# Patient Record
Sex: FEMALE | Race: WHITE | Employment: OTHER | URBAN - NONMETROPOLITAN AREA
[De-identification: names, ages, dates, MRNs, and addresses within clinical notes are randomized per-mention and may not be internally consistent; named-entity substitution may affect disease eponyms.]

---

## 2017-03-01 ENCOUNTER — INITIAL CONSULT (OUTPATIENT)
Dept: PHYSICAL MEDICINE AND REHAB | Age: 47
End: 2017-03-01

## 2017-03-01 VITALS
DIASTOLIC BLOOD PRESSURE: 90 MMHG | SYSTOLIC BLOOD PRESSURE: 136 MMHG | HEART RATE: 90 BPM | WEIGHT: 222.6 LBS | HEIGHT: 65 IN | BODY MASS INDEX: 37.09 KG/M2

## 2017-03-01 DIAGNOSIS — R42 DIZZINESS: ICD-10-CM

## 2017-03-01 DIAGNOSIS — R55 NEAR SYNCOPE: Primary | ICD-10-CM

## 2017-03-01 PROCEDURE — G8419 CALC BMI OUT NRM PARAM NOF/U: HCPCS | Performed by: PSYCHIATRY & NEUROLOGY

## 2017-03-01 PROCEDURE — 99204 OFFICE O/P NEW MOD 45 MIN: CPT | Performed by: PSYCHIATRY & NEUROLOGY

## 2017-03-01 PROCEDURE — 4004F PT TOBACCO SCREEN RCVD TLK: CPT | Performed by: PSYCHIATRY & NEUROLOGY

## 2017-03-01 PROCEDURE — G8484 FLU IMMUNIZE NO ADMIN: HCPCS | Performed by: PSYCHIATRY & NEUROLOGY

## 2017-03-01 PROCEDURE — G8427 DOCREV CUR MEDS BY ELIG CLIN: HCPCS | Performed by: PSYCHIATRY & NEUROLOGY

## 2017-03-07 ENCOUNTER — OFFICE VISIT (OUTPATIENT)
Dept: FAMILY MEDICINE CLINIC | Age: 47
End: 2017-03-07

## 2017-03-07 VITALS
HEIGHT: 65 IN | SYSTOLIC BLOOD PRESSURE: 122 MMHG | DIASTOLIC BLOOD PRESSURE: 70 MMHG | BODY MASS INDEX: 36.99 KG/M2 | RESPIRATION RATE: 16 BRPM | HEART RATE: 64 BPM | WEIGHT: 222 LBS

## 2017-03-07 DIAGNOSIS — F17.200 TOBACCO DEPENDENCE: Primary | ICD-10-CM

## 2017-03-07 PROCEDURE — 99213 OFFICE O/P EST LOW 20 MIN: CPT | Performed by: FAMILY MEDICINE

## 2017-03-07 RX ORDER — BUPROPION HYDROCHLORIDE 300 MG/1
300 TABLET ORAL EVERY MORNING
COMMUNITY
Start: 2017-02-24 | End: 2018-01-16 | Stop reason: SDUPTHER

## 2017-03-07 RX ORDER — VARENICLINE TARTRATE 25 MG
0.5 KIT ORAL SEE ADMIN INSTRUCTIONS
Qty: 1 EACH | Refills: 0 | Status: SHIPPED | OUTPATIENT
Start: 2017-03-07 | End: 2017-04-11 | Stop reason: SDUPTHER

## 2017-03-07 RX ORDER — VARENICLINE TARTRATE 1 MG/1
TABLET, FILM COATED ORAL
Qty: 60 TABLET | Refills: 3 | Status: SHIPPED | OUTPATIENT
Start: 2017-03-07 | End: 2017-04-17 | Stop reason: SDUPTHER

## 2017-03-07 ASSESSMENT — ENCOUNTER SYMPTOMS
CONSTIPATION: 0
SHORTNESS OF BREATH: 0
SINUS PRESSURE: 0

## 2017-03-13 ENCOUNTER — TELEPHONE (OUTPATIENT)
Dept: PHYSICAL MEDICINE AND REHAB | Age: 47
End: 2017-03-13

## 2017-04-10 ENCOUNTER — OFFICE VISIT (OUTPATIENT)
Dept: PHYSICAL MEDICINE AND REHAB | Age: 47
End: 2017-04-10

## 2017-04-10 VITALS
DIASTOLIC BLOOD PRESSURE: 92 MMHG | HEIGHT: 65 IN | HEART RATE: 79 BPM | BODY MASS INDEX: 36.82 KG/M2 | SYSTOLIC BLOOD PRESSURE: 138 MMHG | WEIGHT: 221 LBS

## 2017-04-10 DIAGNOSIS — R42 DIZZINESS: ICD-10-CM

## 2017-04-10 DIAGNOSIS — R55 NEAR SYNCOPE: Primary | ICD-10-CM

## 2017-04-10 PROCEDURE — G8427 DOCREV CUR MEDS BY ELIG CLIN: HCPCS | Performed by: PSYCHIATRY & NEUROLOGY

## 2017-04-10 PROCEDURE — 99213 OFFICE O/P EST LOW 20 MIN: CPT | Performed by: PSYCHIATRY & NEUROLOGY

## 2017-04-10 PROCEDURE — G8417 CALC BMI ABV UP PARAM F/U: HCPCS | Performed by: PSYCHIATRY & NEUROLOGY

## 2017-04-10 PROCEDURE — 4004F PT TOBACCO SCREEN RCVD TLK: CPT | Performed by: PSYCHIATRY & NEUROLOGY

## 2017-04-10 RX ORDER — LAMOTRIGINE 25 MG/1
TABLET ORAL
Qty: 120 TABLET | Refills: 3 | Status: SHIPPED | OUTPATIENT
Start: 2017-04-10 | End: 2017-06-05

## 2017-04-11 DIAGNOSIS — F17.200 TOBACCO DEPENDENCE: ICD-10-CM

## 2017-04-11 RX ORDER — VARENICLINE TARTRATE
KIT
Qty: 1 EACH | Refills: 0 | Status: SHIPPED | OUTPATIENT
Start: 2017-04-11 | End: 2017-04-18 | Stop reason: ALTCHOICE

## 2017-04-17 DIAGNOSIS — F17.200 TOBACCO DEPENDENCE: ICD-10-CM

## 2017-04-17 RX ORDER — VARENICLINE TARTRATE 1 MG/1
TABLET, FILM COATED ORAL
Qty: 60 TABLET | Refills: 3 | Status: SHIPPED | OUTPATIENT
Start: 2017-04-17 | End: 2017-06-01

## 2017-04-18 ENCOUNTER — OFFICE VISIT (OUTPATIENT)
Dept: FAMILY MEDICINE CLINIC | Age: 47
End: 2017-04-18

## 2017-04-18 VITALS
SYSTOLIC BLOOD PRESSURE: 132 MMHG | HEART RATE: 84 BPM | RESPIRATION RATE: 16 BRPM | WEIGHT: 218 LBS | BODY MASS INDEX: 36.32 KG/M2 | DIASTOLIC BLOOD PRESSURE: 74 MMHG | HEIGHT: 65 IN

## 2017-04-18 DIAGNOSIS — L57.0 KERATOSIS: ICD-10-CM

## 2017-04-18 DIAGNOSIS — F17.200 TOBACCO DEPENDENCE: Primary | ICD-10-CM

## 2017-04-18 PROCEDURE — 99213 OFFICE O/P EST LOW 20 MIN: CPT | Performed by: FAMILY MEDICINE

## 2017-04-18 RX ORDER — LANOLIN ALCOHOL/MO/W.PET/CERES
1000 CREAM (GRAM) TOPICAL DAILY
COMMUNITY
End: 2019-04-02

## 2017-04-18 ASSESSMENT — ENCOUNTER SYMPTOMS
SINUS PRESSURE: 0
SHORTNESS OF BREATH: 0
CONSTIPATION: 0

## 2017-06-01 ENCOUNTER — OFFICE VISIT (OUTPATIENT)
Dept: FAMILY MEDICINE CLINIC | Age: 47
End: 2017-06-01

## 2017-06-01 VITALS
HEART RATE: 88 BPM | WEIGHT: 229.13 LBS | DIASTOLIC BLOOD PRESSURE: 84 MMHG | HEIGHT: 65 IN | SYSTOLIC BLOOD PRESSURE: 122 MMHG | RESPIRATION RATE: 16 BRPM | BODY MASS INDEX: 38.17 KG/M2

## 2017-06-01 DIAGNOSIS — J06.9 UPPER RESPIRATORY TRACT INFECTION, UNSPECIFIED TYPE: Primary | ICD-10-CM

## 2017-06-01 PROCEDURE — 99213 OFFICE O/P EST LOW 20 MIN: CPT | Performed by: FAMILY MEDICINE

## 2017-06-01 RX ORDER — ARIPIPRAZOLE 2 MG/1
1 TABLET ORAL DAILY
COMMUNITY
Start: 2017-05-22 | End: 2018-03-01

## 2017-06-01 RX ORDER — VILAZODONE HYDROCHLORIDE 40 MG/1
1 TABLET ORAL DAILY
Status: ON HOLD | COMMUNITY
Start: 2017-05-25 | End: 2017-12-20 | Stop reason: HOSPADM

## 2017-06-01 RX ORDER — SULFAMETHOXAZOLE AND TRIMETHOPRIM 800; 160 MG/1; MG/1
1 TABLET ORAL 2 TIMES DAILY
Qty: 28 TABLET | Refills: 0 | Status: SHIPPED | OUTPATIENT
Start: 2017-06-01 | End: 2017-06-15

## 2017-06-01 ASSESSMENT — ENCOUNTER SYMPTOMS
EYE DISCHARGE: 0
RHINORRHEA: 1
CONSTIPATION: 0
COUGH: 1
SORE THROAT: 0

## 2017-06-05 ENCOUNTER — OFFICE VISIT (OUTPATIENT)
Dept: PHYSICAL MEDICINE AND REHAB | Age: 47
End: 2017-06-05

## 2017-06-05 VITALS
SYSTOLIC BLOOD PRESSURE: 130 MMHG | WEIGHT: 230 LBS | BODY MASS INDEX: 38.32 KG/M2 | HEIGHT: 65 IN | HEART RATE: 92 BPM | DIASTOLIC BLOOD PRESSURE: 82 MMHG

## 2017-06-05 DIAGNOSIS — R42 DIZZINESS: Primary | ICD-10-CM

## 2017-06-05 DIAGNOSIS — R55 NEAR SYNCOPE: ICD-10-CM

## 2017-06-05 PROCEDURE — 1036F TOBACCO NON-USER: CPT | Performed by: PSYCHIATRY & NEUROLOGY

## 2017-06-05 PROCEDURE — 99213 OFFICE O/P EST LOW 20 MIN: CPT | Performed by: PSYCHIATRY & NEUROLOGY

## 2017-06-05 PROCEDURE — G8427 DOCREV CUR MEDS BY ELIG CLIN: HCPCS | Performed by: PSYCHIATRY & NEUROLOGY

## 2017-06-05 PROCEDURE — G8417 CALC BMI ABV UP PARAM F/U: HCPCS | Performed by: PSYCHIATRY & NEUROLOGY

## 2017-06-05 RX ORDER — LAMOTRIGINE 100 MG/1
100 TABLET ORAL DAILY
Qty: 30 TABLET | Refills: 3 | Status: SHIPPED | OUTPATIENT
Start: 2017-06-05 | End: 2017-09-11

## 2017-07-31 ENCOUNTER — TELEPHONE (OUTPATIENT)
Dept: FAMILY MEDICINE CLINIC | Age: 47
End: 2017-07-31

## 2017-07-31 ENCOUNTER — OFFICE VISIT (OUTPATIENT)
Dept: FAMILY MEDICINE CLINIC | Age: 47
End: 2017-07-31

## 2017-07-31 VITALS
DIASTOLIC BLOOD PRESSURE: 82 MMHG | SYSTOLIC BLOOD PRESSURE: 142 MMHG | HEIGHT: 65 IN | WEIGHT: 227.25 LBS | HEART RATE: 92 BPM | BODY MASS INDEX: 37.86 KG/M2 | RESPIRATION RATE: 16 BRPM

## 2017-07-31 DIAGNOSIS — E66.9 OBESITY (BMI 30-39.9): ICD-10-CM

## 2017-07-31 DIAGNOSIS — R04.0 RECURRENT EPISTAXIS: Primary | ICD-10-CM

## 2017-07-31 PROCEDURE — 99213 OFFICE O/P EST LOW 20 MIN: CPT | Performed by: FAMILY MEDICINE

## 2017-07-31 RX ORDER — ALPRAZOLAM 0.25 MG/1
TABLET ORAL
Refills: 1 | COMMUNITY
Start: 2017-06-16 | End: 2020-05-26

## 2017-07-31 ASSESSMENT — ENCOUNTER SYMPTOMS
EYE DISCHARGE: 0
SORE THROAT: 0
SHORTNESS OF BREATH: 0
CONSTIPATION: 0
RHINORRHEA: 1
COUGH: 0
SINUS PRESSURE: 0

## 2017-08-29 ENCOUNTER — OFFICE VISIT (OUTPATIENT)
Dept: FAMILY MEDICINE CLINIC | Age: 47
End: 2017-08-29

## 2017-08-29 VITALS
SYSTOLIC BLOOD PRESSURE: 130 MMHG | RESPIRATION RATE: 16 BRPM | BODY MASS INDEX: 36.69 KG/M2 | HEIGHT: 65 IN | HEART RATE: 80 BPM | WEIGHT: 220.25 LBS | DIASTOLIC BLOOD PRESSURE: 74 MMHG

## 2017-08-29 DIAGNOSIS — E66.9 OBESITY (BMI 30-39.9): Primary | ICD-10-CM

## 2017-08-29 PROCEDURE — 99213 OFFICE O/P EST LOW 20 MIN: CPT | Performed by: FAMILY MEDICINE

## 2017-08-29 RX ORDER — LIRAGLUTIDE 6 MG/ML
1.8 INJECTION SUBCUTANEOUS DAILY
COMMUNITY
Start: 2017-08-26 | End: 2017-08-29 | Stop reason: SDUPTHER

## 2017-08-29 ASSESSMENT — ENCOUNTER SYMPTOMS
SINUS PRESSURE: 0
CONSTIPATION: 0
SHORTNESS OF BREATH: 0

## 2017-09-11 ENCOUNTER — OFFICE VISIT (OUTPATIENT)
Dept: NEUROLOGY | Age: 47
End: 2017-09-11
Payer: MEDICARE

## 2017-09-11 VITALS
DIASTOLIC BLOOD PRESSURE: 90 MMHG | BODY MASS INDEX: 37.65 KG/M2 | HEART RATE: 80 BPM | WEIGHT: 226 LBS | SYSTOLIC BLOOD PRESSURE: 131 MMHG | HEIGHT: 65 IN

## 2017-09-11 DIAGNOSIS — R55 NEAR SYNCOPE: ICD-10-CM

## 2017-09-11 DIAGNOSIS — R42 DIZZINESS: Primary | ICD-10-CM

## 2017-09-11 PROCEDURE — 99213 OFFICE O/P EST LOW 20 MIN: CPT | Performed by: PSYCHIATRY & NEUROLOGY

## 2017-09-11 PROCEDURE — 4004F PT TOBACCO SCREEN RCVD TLK: CPT | Performed by: PSYCHIATRY & NEUROLOGY

## 2017-09-11 PROCEDURE — G8417 CALC BMI ABV UP PARAM F/U: HCPCS | Performed by: PSYCHIATRY & NEUROLOGY

## 2017-09-11 PROCEDURE — G8427 DOCREV CUR MEDS BY ELIG CLIN: HCPCS | Performed by: PSYCHIATRY & NEUROLOGY

## 2017-09-21 ENCOUNTER — OFFICE VISIT (OUTPATIENT)
Dept: ENT CLINIC | Age: 47
End: 2017-09-21
Payer: MEDICARE

## 2017-09-21 VITALS
HEIGHT: 65 IN | TEMPERATURE: 98.4 F | DIASTOLIC BLOOD PRESSURE: 86 MMHG | WEIGHT: 224.5 LBS | BODY MASS INDEX: 37.4 KG/M2 | RESPIRATION RATE: 16 BRPM | HEART RATE: 92 BPM | SYSTOLIC BLOOD PRESSURE: 128 MMHG

## 2017-09-21 DIAGNOSIS — R09.81 NASAL CONGESTION: ICD-10-CM

## 2017-09-21 DIAGNOSIS — R09.81 NASAL CONGESTION: Primary | ICD-10-CM

## 2017-09-21 DIAGNOSIS — J01.01 ACUTE RECURRENT MAXILLARY SINUSITIS: ICD-10-CM

## 2017-09-21 DIAGNOSIS — J34.89 NASAL OBSTRUCTION: ICD-10-CM

## 2017-09-21 DIAGNOSIS — R06.83 SNORES: ICD-10-CM

## 2017-09-21 DIAGNOSIS — J34.89 NASAL OBSTRUCTION: Primary | ICD-10-CM

## 2017-09-21 PROCEDURE — G8427 DOCREV CUR MEDS BY ELIG CLIN: HCPCS | Performed by: PHYSICIAN ASSISTANT

## 2017-09-21 PROCEDURE — G8417 CALC BMI ABV UP PARAM F/U: HCPCS | Performed by: PHYSICIAN ASSISTANT

## 2017-09-21 PROCEDURE — 4004F PT TOBACCO SCREEN RCVD TLK: CPT | Performed by: PHYSICIAN ASSISTANT

## 2017-09-21 PROCEDURE — 99203 OFFICE O/P NEW LOW 30 MIN: CPT | Performed by: PHYSICIAN ASSISTANT

## 2017-09-21 RX ORDER — FLUTICASONE PROPIONATE 50 MCG
1 SPRAY, SUSPENSION (ML) NASAL DAILY
Qty: 1 BOTTLE | Refills: 3 | Status: ON HOLD | OUTPATIENT
Start: 2017-09-21 | End: 2017-12-20 | Stop reason: HOSPADM

## 2017-09-21 RX ORDER — SULFAMETHOXAZOLE AND TRIMETHOPRIM 800; 160 MG/1; MG/1
1 TABLET ORAL 2 TIMES DAILY
Qty: 56 TABLET | Refills: 0 | Status: SHIPPED | OUTPATIENT
Start: 2017-09-21 | End: 2017-10-19

## 2017-09-21 ASSESSMENT — ENCOUNTER SYMPTOMS
EYE REDNESS: 0
EYE DISCHARGE: 0
COUGH: 0
BLOOD IN STOOL: 0
DIARRHEA: 0
WHEEZING: 0
ABDOMINAL DISTENTION: 0
ANAL BLEEDING: 0
SORE THROAT: 0
ABDOMINAL PAIN: 0
SINUS PRESSURE: 0
TROUBLE SWALLOWING: 0
EYE PAIN: 0
BACK PAIN: 0
CHEST TIGHTNESS: 0
CHOKING: 0
VOMITING: 0
NAUSEA: 0
COLOR CHANGE: 0
CONSTIPATION: 0
EYE ITCHING: 0
PHOTOPHOBIA: 0
SHORTNESS OF BREATH: 0
RHINORRHEA: 0
APNEA: 0
RECTAL PAIN: 0
STRIDOR: 0
VOICE CHANGE: 0
FACIAL SWELLING: 0

## 2017-09-25 DIAGNOSIS — E66.9 OBESITY (BMI 30-39.9): ICD-10-CM

## 2017-09-25 RX ORDER — LIRAGLUTIDE 6 MG/ML
INJECTION SUBCUTANEOUS
Qty: 9 PEN | Refills: 3 | Status: SHIPPED | OUTPATIENT
Start: 2017-09-25 | End: 2017-09-28

## 2017-09-28 ENCOUNTER — TELEPHONE (OUTPATIENT)
Dept: FAMILY MEDICINE CLINIC | Age: 47
End: 2017-09-28

## 2017-10-24 ENCOUNTER — HOSPITAL ENCOUNTER (OUTPATIENT)
Dept: CT IMAGING | Age: 47
Discharge: HOME | End: 2017-10-24
Payer: MEDICARE

## 2017-10-24 DIAGNOSIS — J01.01 ACUTE RECURRENT MAXILLARY SINUSITIS: ICD-10-CM

## 2017-10-24 DIAGNOSIS — J34.89 NASAL OBSTRUCTION: ICD-10-CM

## 2017-10-24 DIAGNOSIS — R09.81 NASAL CONGESTION: ICD-10-CM

## 2017-10-24 PROCEDURE — 70486 CT MAXILLOFACIAL W/O DYE: CPT

## 2017-10-26 ENCOUNTER — OFFICE VISIT (OUTPATIENT)
Dept: ENT CLINIC | Age: 47
End: 2017-10-26
Payer: MEDICARE

## 2017-10-26 VITALS
HEART RATE: 100 BPM | TEMPERATURE: 98.2 F | DIASTOLIC BLOOD PRESSURE: 96 MMHG | BODY MASS INDEX: 38.65 KG/M2 | SYSTOLIC BLOOD PRESSURE: 140 MMHG | RESPIRATION RATE: 16 BRPM | WEIGHT: 232 LBS | HEIGHT: 65 IN

## 2017-10-26 DIAGNOSIS — R63.5 WEIGHT GAIN: ICD-10-CM

## 2017-10-26 DIAGNOSIS — J34.3 HYPERTROPHY OF BOTH INFERIOR NASAL TURBINATES: ICD-10-CM

## 2017-10-26 DIAGNOSIS — F17.200 SMOKER: ICD-10-CM

## 2017-10-26 DIAGNOSIS — R06.83 SNORES: ICD-10-CM

## 2017-10-26 DIAGNOSIS — J34.89 CONCHA BULLOSA: ICD-10-CM

## 2017-10-26 DIAGNOSIS — J34.2 DEVIATED NASAL SEPTUM: Primary | ICD-10-CM

## 2017-10-26 PROCEDURE — G8417 CALC BMI ABV UP PARAM F/U: HCPCS | Performed by: OTOLARYNGOLOGY

## 2017-10-26 PROCEDURE — 4004F PT TOBACCO SCREEN RCVD TLK: CPT | Performed by: OTOLARYNGOLOGY

## 2017-10-26 PROCEDURE — 99214 OFFICE O/P EST MOD 30 MIN: CPT | Performed by: OTOLARYNGOLOGY

## 2017-10-26 PROCEDURE — G8484 FLU IMMUNIZE NO ADMIN: HCPCS | Performed by: OTOLARYNGOLOGY

## 2017-10-26 PROCEDURE — G8427 DOCREV CUR MEDS BY ELIG CLIN: HCPCS | Performed by: OTOLARYNGOLOGY

## 2017-10-26 ASSESSMENT — ENCOUNTER SYMPTOMS
CHEST TIGHTNESS: 0
VOICE CHANGE: 0
SINUS PRESSURE: 0
COUGH: 0
RHINORRHEA: 0
NAUSEA: 0
TROUBLE SWALLOWING: 0
STRIDOR: 0
SHORTNESS OF BREATH: 0
CHOKING: 0
WHEEZING: 0
SORE THROAT: 0
VOMITING: 0
ABDOMINAL PAIN: 0
FACIAL SWELLING: 0
APNEA: 0
COLOR CHANGE: 0
DIARRHEA: 0

## 2017-10-26 NOTE — LETTER
[facility]  [facility] [address]  [address]  [address]  Phone: [phone]  Fax: [phone]    Madaline Spurling, MD        November 14, 2017    Roma Todd, [address] [facility] [address]    Patient: Kali Oconnor   MR Number: [id number]   YOB: 1970   Date of Visit: 10/26/2017     Dear Roma Todd,    I recently saw your patient, Kali Oconnor, regarding Her sinus CT after maximal therapy for sinusitis. There was no sinusitis present. She did have significant nasal obstruction. Below are the relevant portions of my assessment and plan of care. Assessment & Plan   Diagnoses and all orders for this visit:    1. Deviated nasal septum  SEPTOPLASTY   2. Autumn bullosa  SD NASAL/SINUS SCOPY,RMV AUTUMN BULL   3. Smoker     4. Hypertrophy of both inferior nasal turbinates  SD EXCISION TURBINATE,SUBMUCOUS   5. Snores     6. Weight gain         The findings were explained and her questions were answered. Options were discussed including surgery. She is agreeable to surgery. Recommended surgical procedures:  Septoplasty  Partial submucous resection both inferior turbinates  Partial resection uatumn bullosa right middle turbinate    Informed consent: Septoplasty complications that may occur were discussed including postoperative bleeding (usually easy to control), infection, nasal crusting, a hole in the septum (perforation), failure to completely improve breathing, persistent septal deflection resulting in the need for revision (uncommon), and very rarely, a change in appearance. They understand that no guarantees are made regarding either outcome or potential complications. They read the patient information sheet and were given a copy to take with them. All of their questions were answered. They requested we proceed. Return for Post-Op Check. If you have questions, please do not hesitate to call me.  I look forward

## 2017-10-26 NOTE — PROGRESS NOTES
morning       gabapentin (NEURONTIN) 400 MG capsule TAKE 1 CAPSULE BY MOUTH 3 TIMES A DAY  2    busPIRone (BUSPAR) 10 MG tablet Take 10 mg by mouth daily      Aspirin-Acetaminophen-Caffeine (EXCEDRIN EXTRA STRENGTH PO) Take  by mouth as needed.  calcium carbonate (TUMS) 500 MG chewable tablet Take 2 tablets by mouth as needed for Heartburn.  ibuprofen (ADVIL;MOTRIN) 800 MG tablet Take 800 mg by mouth 3 times daily. No current facility-administered medications for this visit. Past Medical History:   Diagnosis Date    Anxiety     Chronic gastritis 1/2015    Depression     GERD (gastroesophageal reflux disease)     chronic    Hiatal hernia 2009    Ovarian cyst     Pulmonary nodule 10/1/2013    Renal stone 2009    Schwannoma of spinal cord ([facility] [address] 9/17/2013      Past Surgical History:   Procedure Laterality Date    BLADDER SUSPENSION  2006    BREAST REDUCTION SURGERY  1995    CERVICAL SPINE SURGERY  2007    [address] [place]    COLONOSCOPY  [id number]    does not know who     HYSTERECTOMY  2006    hyst only    NERVE BLOCK  4/7/14    TONSILLECTOMY  1982    UPPER GASTROINTESTINAL ENDOSCOPY  2015     Family History   Problem Relation Age of Onset    Breast Cancer Mother     High Blood Pressure Father     Diabetes Father     Stroke Father     Cancer Brother     Cancer Maternal Grandfather      Social History   Substance Use Topics    Smoking status: Current Every Day Smoker     Packs/day: 0.50     Years: 14.00     Types: Cigarettes    Smokeless tobacco: Never Used      Comment: off & on    Alcohol use Yes      Comment: rarely       Subjective:      Review of Systems   Constitutional: Negative for activity change, appetite change, chills, diaphoresis, fatigue, fever and unexpected weight change.    HENT: Negative for congestion, dental problem, ear discharge, ear pain, facial swelling, hearing loss, mouth sores, nosebleeds, postnasal drip, rhinorrhea, sinus pressure, sneezing, sore throat, tinnitus, trouble swallowing and voice change. Eyes: Negative for visual disturbance. Respiratory: Negative for apnea, cough, choking, chest tightness, shortness of breath, wheezing and stridor. Cardiovascular: Negative for chest pain, palpitations and leg swelling. Gastrointestinal: Negative for abdominal pain, diarrhea, nausea and vomiting. Endocrine: Negative for cold intolerance, heat intolerance, polydipsia and polyuria. Genitourinary: Negative for dysuria, enuresis and hematuria. Musculoskeletal: Negative for arthralgias, gait problem, neck pain and neck stiffness. Skin: Negative for color change and rash. Allergic/Immunologic: Negative for environmental allergies, food allergies and immunocompromised state. Neurological: Negative for dizziness, syncope, facial asymmetry, speech difficulty, light-headedness and headaches. Hematological: Negative for adenopathy. Does not bruise/bleed easily. Psychiatric/Behavioral: Negative for confusion and sleep disturbance. The patient is not nervous/anxious. Objective:     BP (!) 140/96 (Site: Left Arm, Position: Sitting)   Pulse 100   Temp 98.2 °F (36.8 °C) (Oral)   Resp 16   Ht 5' 4.96\" (1.65 m)   Wt 232 lb (105.2 kg)   BMI 38.65 kg/m²     Physical Exam   Constitutional: She is oriented to person, place, and time. She appears well-developed and well-nourished. She is cooperative. HENT:   Head: Normocephalic and atraumatic. Head is without laceration. Right Ear: Hearing, tympanic membrane, external ear and ear canal normal. No drainage or swelling. Tympanic membrane is not scarred, not perforated and not erythematous. Tympanic membrane mobility is normal (on pneumatic massage). No middle ear effusion. Left Ear: Hearing, tympanic membrane, external ear and ear canal normal. No drainage or swelling. Tympanic membrane is not scarred, not perforated and not erythematous.  Tympanic membrane mobility is normal (on pneumatic

## 2017-10-30 DIAGNOSIS — Z01.818 PRE-OP TESTING: Primary | ICD-10-CM

## 2017-11-06 ENCOUNTER — TELEPHONE (OUTPATIENT)
Dept: FAMILY MEDICINE CLINIC | Age: 47
End: 2017-11-06

## 2017-11-16 ENCOUNTER — HOSPITAL ENCOUNTER (OUTPATIENT)
Age: 47
Discharge: HOME | End: 2017-11-16
Payer: MEDICARE

## 2017-11-16 ENCOUNTER — HOSPITAL ENCOUNTER (OUTPATIENT)
Dept: GENERAL RADIOLOGY | Age: 47
Discharge: HOME | End: 2017-11-16
Payer: MEDICARE

## 2017-11-16 DIAGNOSIS — Z01.818 PRE-OP TESTING: ICD-10-CM

## 2017-11-16 LAB
EKG ATRIAL RATE: 82 BPM
EKG P AXIS: 69 DEGREES
EKG P-R INTERVAL: 162 MS
EKG Q-T INTERVAL: 370 MS
EKG QRS DURATION: 82 MS
EKG QTC CALCULATION (BAZETT): 432 MS
EKG R AXIS: 47 DEGREES
EKG T AXIS: 57 DEGREES
EKG VENTRICULAR RATE: 82 BPM

## 2017-11-16 PROCEDURE — 71020 XR CHEST STANDARD TWO VW: CPT

## 2017-11-16 PROCEDURE — 93005 ELECTROCARDIOGRAM TRACING: CPT

## 2017-11-29 ENCOUNTER — OFFICE VISIT (OUTPATIENT)
Dept: FAMILY MEDICINE CLINIC | Age: 47
End: 2017-11-29

## 2017-11-29 VITALS
RESPIRATION RATE: 16 BRPM | DIASTOLIC BLOOD PRESSURE: 80 MMHG | HEART RATE: 76 BPM | HEIGHT: 65 IN | SYSTOLIC BLOOD PRESSURE: 120 MMHG | BODY MASS INDEX: 39.22 KG/M2 | WEIGHT: 235.38 LBS

## 2017-11-29 DIAGNOSIS — Z01.818 PRE-OP EVALUATION: Primary | ICD-10-CM

## 2017-11-29 PROCEDURE — 99214 OFFICE O/P EST MOD 30 MIN: CPT | Performed by: FAMILY MEDICINE

## 2017-11-29 ASSESSMENT — ENCOUNTER SYMPTOMS
SORE THROAT: 0
SHORTNESS OF BREATH: 0
RHINORRHEA: 0
CONSTIPATION: 0
SINUS PRESSURE: 0

## 2017-11-29 NOTE — PROGRESS NOTES
mood and affect. Her behavior is normal.     Blood pressure 120/80, pulse 76, resp. rate 16, height 5' 5\" (1.651 m), weight 235 lb 6 oz (106.8 kg), not currently breastfeeding. Assessment:      1.  Pre-op evaluation             Plan:      She is medically cleared for surgery

## 2017-12-08 ENCOUNTER — OFFICE VISIT (OUTPATIENT)
Dept: ENT CLINIC | Age: 47
End: 2017-12-08

## 2017-12-08 VITALS
HEIGHT: 65 IN | HEART RATE: 80 BPM | RESPIRATION RATE: 14 BRPM | WEIGHT: 233 LBS | DIASTOLIC BLOOD PRESSURE: 70 MMHG | BODY MASS INDEX: 38.82 KG/M2 | TEMPERATURE: 97.9 F | SYSTOLIC BLOOD PRESSURE: 126 MMHG

## 2017-12-08 DIAGNOSIS — J34.89 CONCHA BULLOSA: ICD-10-CM

## 2017-12-08 DIAGNOSIS — J34.3 HYPERTROPHY OF BOTH INFERIOR NASAL TURBINATES: ICD-10-CM

## 2017-12-08 DIAGNOSIS — J34.2 NASAL SEPTAL DEVIATION: Primary | ICD-10-CM

## 2017-12-08 PROCEDURE — 99999 PR OFFICE/OUTPT VISIT,PROCEDURE ONLY: CPT | Performed by: NURSE PRACTITIONER

## 2017-12-08 ASSESSMENT — ENCOUNTER SYMPTOMS
EYE DISCHARGE: 0
EYE REDNESS: 0
BACK PAIN: 0
PHOTOPHOBIA: 0
SHORTNESS OF BREATH: 0
CONSTIPATION: 0
NAUSEA: 0
DIARRHEA: 0
ABDOMINAL PAIN: 0
EYE PAIN: 0
EYE ITCHING: 0
CHEST TIGHTNESS: 0
CHOKING: 0
WHEEZING: 0
BLOOD IN STOOL: 0
SORE THROAT: 0
SINUS PRESSURE: 0
RECTAL PAIN: 0
VOMITING: 0
ABDOMINAL DISTENTION: 0
COLOR CHANGE: 0
ANAL BLEEDING: 0
RHINORRHEA: 0
FACIAL SWELLING: 0
VOICE CHANGE: 0
COUGH: 0
TROUBLE SWALLOWING: 0
STRIDOR: 0
APNEA: 0

## 2017-12-08 NOTE — PROGRESS NOTES
[facility]  [facility]  [address] [facility]  [address]  Dept: [phone]  Dept Fax: [phone]  Loc: [phone]    Palmer Chiang is a 56 y.o. female who was referred by No ref. provider found for:  Chief Complaint   Patient presents with    Pre-op Exam     Patient is here pre-op septoplasy/turbinates/irene 12/20/17   . HPI:       Patient here for pre-op appointment. She is scheduled for septoplasty, partial submucosal resection of bilateral inferior turbinates, and partial resection of right middle turbinate irene bullosa on 12/20/2017 with Dr Theresa Collins. Patient reports long-standing history of nasal obstruction. She also snores and sometimes chokes, then tosses and turns at night. She does not feel well rested in the mornings. She had CT sinus done that showed normally aerated sinuses, narrowed ostiomeatal units bilaterally and left nasal septal deviation. On exam, she was found to have very large inferior turbinates and a large right middle turbinate irene bullosa. No family or personal history of bleeding disorders or issues with anesthesia. Pre-op work-up included:    CXR- no acute cardiopulmonary process    EKG- normal sinus rhythm    CBC- within normal limits    CMP- within normal limits      Subjective:        Review of Systems   Constitutional: Negative for activity change, appetite change, chills, diaphoresis, fatigue, fever and unexpected weight change. HENT: Negative for congestion, dental problem, drooling, ear discharge, ear pain, facial swelling, hearing loss, mouth sores, nosebleeds, postnasal drip, rhinorrhea, sinus pressure, sneezing, sore throat, tinnitus, trouble swallowing and voice change. Eyes: Negative for photophobia, pain, discharge, redness, itching and visual disturbance. Respiratory: Negative for apnea, cough, choking, chest tightness, shortness of breath, wheezing and stridor.     Cardiovascular: Negative for chest

## 2017-12-11 ENCOUNTER — OFFICE VISIT (OUTPATIENT)
Dept: NEUROLOGY | Age: 47
End: 2017-12-11
Payer: MEDICARE

## 2017-12-11 VITALS
WEIGHT: 241 LBS | HEIGHT: 65 IN | SYSTOLIC BLOOD PRESSURE: 124 MMHG | BODY MASS INDEX: 40.15 KG/M2 | DIASTOLIC BLOOD PRESSURE: 84 MMHG | HEART RATE: 90 BPM

## 2017-12-11 DIAGNOSIS — R55 NEAR SYNCOPE: ICD-10-CM

## 2017-12-11 DIAGNOSIS — R42 DIZZINESS: Primary | ICD-10-CM

## 2017-12-11 PROCEDURE — G8427 DOCREV CUR MEDS BY ELIG CLIN: HCPCS | Performed by: PSYCHIATRY & NEUROLOGY

## 2017-12-11 PROCEDURE — 99213 OFFICE O/P EST LOW 20 MIN: CPT | Performed by: PSYCHIATRY & NEUROLOGY

## 2017-12-11 PROCEDURE — G8484 FLU IMMUNIZE NO ADMIN: HCPCS | Performed by: PSYCHIATRY & NEUROLOGY

## 2017-12-11 PROCEDURE — 4004F PT TOBACCO SCREEN RCVD TLK: CPT | Performed by: PSYCHIATRY & NEUROLOGY

## 2017-12-11 PROCEDURE — G8417 CALC BMI ABV UP PARAM F/U: HCPCS | Performed by: PSYCHIATRY & NEUROLOGY

## 2017-12-11 NOTE — PROGRESS NOTES
2-12 grossly intact with no focal deficits. Power 5/5 Throughout symmetric, Reflexes are +2 symmetric. Long tracts are intact. Cerebellar exam is Intact. Sensory exam is intact to light touch. Gait is intact. Musculoskeletal:  Has no hand arthritis, no limitation of ROM in any of the four extremities. Lower extremities no edema        DATA:  Results for orders placed or performed during the hospital encounter of 11/16/17   EKG 12 Lead   Result Value Ref Range    Ventricular Rate 82 BPM    Atrial Rate 82 BPM    P-R Interval 162 ms    QRS Duration 82 ms    Q-T Interval 370 ms    QTc Calculation (Bazett) 432 ms    P Axis 69 degrees    R Axis 47 degrees    T Axis 57 degrees        EEG 3/27/2017:  IMPRESSION:  This is an abnormal EEG due to presence of bifrontal sharp waves,  following hyperventilation, that are of questionable significance, though they  may be epileptogenic in nature or indicate seizure tendency in the proper  clinical context. No clinical seizures were observed. Clinical correlation  is recommended. MRI brain 4/14/2017:   I reviewed the MRI brain and agree with interpretation. COMPARISON: MRI of the brain dated March 10, 2017       TECHNIQUE: Multiplanar and multiple spin echo MRI images were obtained of the brain without contrast.           FINDINGS:       The brain parenchymal volume and gray-white interface is preserved. No abnormal signal alteration is identified. No impeded diffusion or abnormal susceptibility is seen. No mass, mass effect or extra-axial fluid collection is present. The ventricles are    stable in size and morphology without evidence of hydrocephalus. The basal cisterns and visualized vascular flow voids are patent.  The pituitary, brainstem and cervical medullary junction are within normal limits.       The visualized orbits, temporal bone structures and paranasal sinuses are unremarkable.              Impression       Unremarkable MRI of the exam without acute intracranial abnormality identified. No significant interval change from the prior exam.         Assessment:    1. Dizziness     2. Near syncope        She reports doing the same with her dizziness. No near passing out. She is off the Lamictal. Her exam is non focal.  No side effects reported to medication. She is back to smoking. After detailed discussion with patient we agreed on the following plan. Plan:  1. Referral to higher level of care re dizziness. 2. Continue with B12 supplements sublingual over the counter. 3. Need to quit smoking. 4. Report any new symptoms or concerns. 5. Follow up after evaluation at higher level of care. Please call if any questions.      Konrad Fajardo MD

## 2017-12-19 PROBLEM — J34.89 CONCHA BULLOSA: Status: ACTIVE | Noted: 2017-12-19

## 2017-12-19 PROBLEM — J34.3 HYPERTROPHY OF BOTH INFERIOR NASAL TURBINATES: Status: ACTIVE | Noted: 2017-12-19

## 2017-12-19 PROBLEM — J34.2 DEVIATED NASAL SEPTUM: Status: ACTIVE | Noted: 2017-12-19

## 2017-12-19 NOTE — H&P
Chief Complaint   Patient presents with    Pre-op Exam       Patient is here pre-op septoplasy/turbinates/irene 12/20/17   .     HPI:         Patient here for pre-op appointment. She is scheduled for septoplasty, partial submucosal resection of bilateral inferior turbinates, and partial resection of right middle turbinate irene bullosa on 12/20/2017 with Dr Iveth Bauer. Patient reports long-standing history of nasal obstruction. She also snores and sometimes chokes, then tosses and turns at night. She does not feel well rested in the mornings. She had CT sinus done that showed normally aerated sinuses, narrowed ostiomeatal units bilaterally and left nasal septal deviation. On exam, she was found to have very large inferior turbinates and a large right middle turbinate irene bullosa.      No family or personal history of bleeding disorders or issues with anesthesia.      Pre-op work-up included:    CXR- no acute cardiopulmonary process    EKG- normal sinus rhythm    CBC- within normal limits    CMP- within normal limits                            Subjective:         Review of Systems   Constitutional: Negative for activity change, appetite change, chills, diaphoresis, fatigue, fever and unexpected weight change. HENT: Negative for congestion, dental problem, drooling, ear discharge, ear pain, facial swelling, hearing loss, mouth sores, nosebleeds, postnasal drip, rhinorrhea, sinus pressure, sneezing, sore throat, tinnitus, trouble swallowing and voice change. Eyes: Negative for photophobia, pain, discharge, redness, itching and visual disturbance. Respiratory: Negative for apnea, cough, choking, chest tightness, shortness of breath, wheezing and stridor. Cardiovascular: Negative for chest pain, palpitations and leg swelling. Gastrointestinal: Negative for abdominal distention, abdominal pain, anal bleeding, blood in stool, constipation, diarrhea, nausea, rectal pain and vomiting.    Endocrine: bilaterally, no erythema  Turbinates: hypertrophic inferior turbinates (right>left)          Discharge:  none     Lips, tongue and oral cavity show tongue is midline, mobile, no lesions. Dentition: good, no malocclusion  Oral mucosa: moist  Tonsils: unremarkable  Oropharynx: normal-appearing mucosa and no pharyngitis, no exudate  Hard and soft palates symmetrical and intact. Uvula midline. Gag reflex present  Nasopharynx: not seen     No facial redness, swelling or tenderness.     Salivary glands not enlarged.     Neck symmetrical, supple  Cervical adenopathy: no palpable lymphadenopathy  Trachea midline     Chest equal and symmetrical expansion, no retractions     Extremities: no clubbing, cyanosis or edema  Gait steady  Skin: normal exposed surfaces  Cranial nerves grossly intact     Data:  All of the past medical history, past surgical history, family history, social history, allergies and current medications were reviewed.                CXR 11/16/2017  mpression   No acute findings. No active disease of the chest.      EKG 11/16/2017  Normal sinus rhythm  Normal ECG  When compared with ECG of 09-FEB-2013 11:43,  No significant change was found  Confirmed by PJ SHELTON ([id number]) on 11/16/2017 8:06:50 PM     Assessment:   Assessment   1. Nasal septal deviation      2. Irene bullosa      3. Hypertrophy of both inferior nasal turbinates            Plan:         1. Nasal septal deviation     2. Irene bullosa     3. Hypertrophy of both inferior nasal turbinates       Recommended surgical procedures:  Septoplasty  Partial submucous resection both inferior turbinates  Partial resection irene bullosa right middle turbinate     Okay to proceed with planned procedure. Reviewed planned procedure of septoplasty, partial submucosal resection of bilateral inferior turbinates, and partial resection of right middle turbinate irene bullosa. Discussed post-operative care.  Patient not to take any NSAIDs (including

## 2017-12-20 ENCOUNTER — ANESTHESIA (OUTPATIENT)
Dept: OPERATING ROOM | Age: 47
End: 2017-12-20
Payer: MEDICARE

## 2017-12-20 ENCOUNTER — ANESTHESIA EVENT (OUTPATIENT)
Dept: OPERATING ROOM | Age: 47
End: 2017-12-20
Payer: MEDICARE

## 2017-12-20 ENCOUNTER — HOSPITAL ENCOUNTER (OUTPATIENT)
Age: 47
Setting detail: OUTPATIENT SURGERY
Discharge: HOME | End: 2017-12-20
Attending: OTOLARYNGOLOGY | Admitting: OTOLARYNGOLOGY
Payer: MEDICARE

## 2017-12-20 VITALS
HEIGHT: 65 IN | SYSTOLIC BLOOD PRESSURE: 148 MMHG | HEART RATE: 93 BPM | TEMPERATURE: 98 F | OXYGEN SATURATION: 92 % | WEIGHT: 228.8 LBS | DIASTOLIC BLOOD PRESSURE: 93 MMHG | BODY MASS INDEX: 38.12 KG/M2 | RESPIRATION RATE: 18 BRPM

## 2017-12-20 VITALS
SYSTOLIC BLOOD PRESSURE: 106 MMHG | RESPIRATION RATE: 3 BRPM | OXYGEN SATURATION: 99 % | DIASTOLIC BLOOD PRESSURE: 60 MMHG

## 2017-12-20 PROCEDURE — 6360000002 HC RX W HCPCS: Performed by: NURSE ANESTHETIST, CERTIFIED REGISTERED

## 2017-12-20 PROCEDURE — 7100000011 HC PHASE II RECOVERY - ADDTL 15 MIN: Performed by: OTOLARYNGOLOGY

## 2017-12-20 PROCEDURE — 6360000002 HC RX W HCPCS: Performed by: ANESTHESIOLOGY

## 2017-12-20 PROCEDURE — 6360000002 HC RX W HCPCS: Performed by: OTOLARYNGOLOGY

## 2017-12-20 PROCEDURE — 3600000004 HC SURGERY LEVEL 4 BASE: Performed by: OTOLARYNGOLOGY

## 2017-12-20 PROCEDURE — 2500000003 HC RX 250 WO HCPCS: Performed by: NURSE ANESTHETIST, CERTIFIED REGISTERED

## 2017-12-20 PROCEDURE — 2580000003 HC RX 258: Performed by: OTOLARYNGOLOGY

## 2017-12-20 PROCEDURE — 3700000001 HC ADD 15 MINUTES (ANESTHESIA): Performed by: OTOLARYNGOLOGY

## 2017-12-20 PROCEDURE — 7100000010 HC PHASE II RECOVERY - FIRST 15 MIN: Performed by: OTOLARYNGOLOGY

## 2017-12-20 PROCEDURE — 7100000001 HC PACU RECOVERY - ADDTL 15 MIN: Performed by: OTOLARYNGOLOGY

## 2017-12-20 PROCEDURE — 3600000014 HC SURGERY LEVEL 4 ADDTL 15MIN: Performed by: OTOLARYNGOLOGY

## 2017-12-20 PROCEDURE — 7100000000 HC PACU RECOVERY - FIRST 15 MIN: Performed by: OTOLARYNGOLOGY

## 2017-12-20 PROCEDURE — 2500000003 HC RX 250 WO HCPCS: Performed by: OTOLARYNGOLOGY

## 2017-12-20 PROCEDURE — 6370000000 HC RX 637 (ALT 250 FOR IP): Performed by: OTOLARYNGOLOGY

## 2017-12-20 PROCEDURE — 3700000000 HC ANESTHESIA ATTENDED CARE: Performed by: OTOLARYNGOLOGY

## 2017-12-20 PROCEDURE — 2720000010 HC SURG SUPPLY STERILE: Performed by: OTOLARYNGOLOGY

## 2017-12-20 PROCEDURE — A6413 ADHESIVE BANDAGE, FIRST-AID: HCPCS | Performed by: OTOLARYNGOLOGY

## 2017-12-20 PROCEDURE — A6252 ABSORPT DRG >16 <=48 W/O BDR: HCPCS | Performed by: OTOLARYNGOLOGY

## 2017-12-20 RX ORDER — ROCURONIUM BROMIDE 10 MG/ML
INJECTION, SOLUTION INTRAVENOUS PRN
Status: DISCONTINUED | OUTPATIENT
Start: 2017-12-20 | End: 2017-12-20 | Stop reason: SDUPTHER

## 2017-12-20 RX ORDER — LIDOCAINE HYDROCHLORIDE 20 MG/ML
INJECTION, SOLUTION INFILTRATION; PERINEURAL PRN
Status: DISCONTINUED | OUTPATIENT
Start: 2017-12-20 | End: 2017-12-20 | Stop reason: SDUPTHER

## 2017-12-20 RX ORDER — GLYCOPYRROLATE 0.2 MG/ML
INJECTION INTRAMUSCULAR; INTRAVENOUS PRN
Status: DISCONTINUED | OUTPATIENT
Start: 2017-12-20 | End: 2017-12-20 | Stop reason: SDUPTHER

## 2017-12-20 RX ORDER — PREDNISONE 20 MG/1
20 TABLET ORAL DAILY
Qty: 4 TABLET | Refills: 0 | Status: SHIPPED | OUTPATIENT
Start: 2017-12-20 | End: 2017-12-23

## 2017-12-20 RX ORDER — SODIUM CHLORIDE 9 MG/ML
INJECTION, SOLUTION INTRAVENOUS CONTINUOUS
Status: DISCONTINUED | OUTPATIENT
Start: 2017-12-20 | End: 2017-12-20 | Stop reason: HOSPADM

## 2017-12-20 RX ORDER — DEXAMETHASONE SODIUM PHOSPHATE 4 MG/ML
12 INJECTION, SOLUTION INTRA-ARTICULAR; INTRALESIONAL; INTRAMUSCULAR; INTRAVENOUS; SOFT TISSUE
Status: DISCONTINUED | OUTPATIENT
Start: 2017-12-20 | End: 2017-12-20 | Stop reason: HOSPADM

## 2017-12-20 RX ORDER — FENTANYL CITRATE 50 UG/ML
50 INJECTION, SOLUTION INTRAMUSCULAR; INTRAVENOUS EVERY 5 MIN PRN
Status: DISCONTINUED | OUTPATIENT
Start: 2017-12-20 | End: 2017-12-20 | Stop reason: HOSPADM

## 2017-12-20 RX ORDER — ONDANSETRON 2 MG/ML
INJECTION INTRAMUSCULAR; INTRAVENOUS PRN
Status: DISCONTINUED | OUTPATIENT
Start: 2017-12-20 | End: 2017-12-20 | Stop reason: SDUPTHER

## 2017-12-20 RX ORDER — OXYMETAZOLINE HYDROCHLORIDE 0.05 G/100ML
SPRAY NASAL PRN
Status: DISCONTINUED | OUTPATIENT
Start: 2017-12-20 | End: 2017-12-20 | Stop reason: HOSPADM

## 2017-12-20 RX ORDER — ROPIVACAINE HYDROCHLORIDE 5 MG/ML
INJECTION, SOLUTION EPIDURAL; INFILTRATION; PERINEURAL PRN
Status: DISCONTINUED | OUTPATIENT
Start: 2017-12-20 | End: 2017-12-20 | Stop reason: HOSPADM

## 2017-12-20 RX ORDER — MORPHINE SULFATE 2 MG/ML
2 INJECTION, SOLUTION INTRAMUSCULAR; INTRAVENOUS EVERY 5 MIN PRN
Status: DISCONTINUED | OUTPATIENT
Start: 2017-12-20 | End: 2017-12-20 | Stop reason: HOSPADM

## 2017-12-20 RX ORDER — LIDOCAINE HYDROCHLORIDE AND EPINEPHRINE 10; 10 MG/ML; UG/ML
INJECTION, SOLUTION INFILTRATION; PERINEURAL PRN
Status: DISCONTINUED | OUTPATIENT
Start: 2017-12-20 | End: 2017-12-20 | Stop reason: HOSPADM

## 2017-12-20 RX ORDER — HYDROCODONE BITARTRATE AND ACETAMINOPHEN 7.5; 325 MG/1; MG/1
1 TABLET ORAL EVERY 4 HOURS PRN
Status: DISCONTINUED | OUTPATIENT
Start: 2017-12-20 | End: 2017-12-20 | Stop reason: HOSPADM

## 2017-12-20 RX ORDER — HYDRALAZINE HYDROCHLORIDE 20 MG/ML
5 INJECTION INTRAMUSCULAR; INTRAVENOUS EVERY 10 MIN PRN
Status: DISCONTINUED | OUTPATIENT
Start: 2017-12-20 | End: 2017-12-20 | Stop reason: HOSPADM

## 2017-12-20 RX ORDER — HYDROCODONE BITARTRATE AND ACETAMINOPHEN 7.5; 325 MG/1; MG/1
1 TABLET ORAL EVERY 4 HOURS PRN
Qty: 20 TABLET | Refills: 0 | Status: SHIPPED | OUTPATIENT
Start: 2017-12-20 | End: 2017-12-28 | Stop reason: ALTCHOICE

## 2017-12-20 RX ORDER — DIPHENHYDRAMINE HYDROCHLORIDE 50 MG/ML
12.5 INJECTION INTRAMUSCULAR; INTRAVENOUS
Status: DISCONTINUED | OUTPATIENT
Start: 2017-12-20 | End: 2017-12-20 | Stop reason: HOSPADM

## 2017-12-20 RX ORDER — FENTANYL CITRATE 50 UG/ML
INJECTION, SOLUTION INTRAMUSCULAR; INTRAVENOUS PRN
Status: DISCONTINUED | OUTPATIENT
Start: 2017-12-20 | End: 2017-12-20 | Stop reason: SDUPTHER

## 2017-12-20 RX ORDER — CEPHALEXIN 500 MG/1
500 CAPSULE ORAL 4 TIMES DAILY
Qty: 20 CAPSULE | Refills: 0 | Status: SHIPPED | OUTPATIENT
Start: 2017-12-20 | End: 2017-12-25

## 2017-12-20 RX ORDER — MEPERIDINE HYDROCHLORIDE 25 MG/ML
12.5 INJECTION INTRAMUSCULAR; INTRAVENOUS; SUBCUTANEOUS EVERY 5 MIN PRN
Status: DISCONTINUED | OUTPATIENT
Start: 2017-12-20 | End: 2017-12-20 | Stop reason: HOSPADM

## 2017-12-20 RX ORDER — NEOSTIGMINE METHYLSULFATE 1 MG/ML
INJECTION, SOLUTION INTRAVENOUS PRN
Status: DISCONTINUED | OUTPATIENT
Start: 2017-12-20 | End: 2017-12-20 | Stop reason: SDUPTHER

## 2017-12-20 RX ORDER — PSEUDOEPHEDRINE HYDROCHLORIDE 30 MG/1
30 TABLET ORAL EVERY 6 HOURS
Qty: 56 TABLET | Refills: 1 | Status: SHIPPED | OUTPATIENT
Start: 2017-12-20 | End: 2018-01-03

## 2017-12-20 RX ORDER — MIDAZOLAM HYDROCHLORIDE 1 MG/ML
INJECTION INTRAMUSCULAR; INTRAVENOUS PRN
Status: DISCONTINUED | OUTPATIENT
Start: 2017-12-20 | End: 2017-12-20 | Stop reason: SDUPTHER

## 2017-12-20 RX ORDER — HYDROCODONE BITARTRATE AND ACETAMINOPHEN 7.5; 325 MG/1; MG/1
TABLET ORAL
Status: DISCONTINUED
Start: 2017-12-20 | End: 2017-12-20 | Stop reason: HOSPADM

## 2017-12-20 RX ORDER — ONDANSETRON 2 MG/ML
4 INJECTION INTRAMUSCULAR; INTRAVENOUS
Status: DISCONTINUED | OUTPATIENT
Start: 2017-12-20 | End: 2017-12-20 | Stop reason: HOSPADM

## 2017-12-20 RX ORDER — LABETALOL HYDROCHLORIDE 5 MG/ML
5 INJECTION, SOLUTION INTRAVENOUS EVERY 5 MIN PRN
Status: DISCONTINUED | OUTPATIENT
Start: 2017-12-20 | End: 2017-12-20 | Stop reason: HOSPADM

## 2017-12-20 RX ADMIN — Medication 50 MG: at 14:28

## 2017-12-20 RX ADMIN — FENTANYL CITRATE 50 MCG: 50 INJECTION INTRAMUSCULAR; INTRAVENOUS at 14:28

## 2017-12-20 RX ADMIN — ONDANSETRON 4 MG: 2 INJECTION INTRAMUSCULAR; INTRAVENOUS at 16:35

## 2017-12-20 RX ADMIN — NEOSTIGMINE METHYLSULFATE 4 MG: 1 INJECTION, SOLUTION INTRAVENOUS at 16:34

## 2017-12-20 RX ADMIN — SODIUM CHLORIDE: 9 INJECTION, SOLUTION INTRAVENOUS at 09:43

## 2017-12-20 RX ADMIN — SODIUM CHLORIDE: 9 INJECTION, SOLUTION INTRAVENOUS at 15:32

## 2017-12-20 RX ADMIN — PROPOFOL 150 MG: 10 INJECTION, EMULSION INTRAVENOUS at 14:28

## 2017-12-20 RX ADMIN — MORPHINE SULFATE 2 MG: 2 INJECTION, SOLUTION INTRAMUSCULAR; INTRAVENOUS at 17:04

## 2017-12-20 RX ADMIN — MORPHINE SULFATE 2 MG: 2 INJECTION, SOLUTION INTRAMUSCULAR; INTRAVENOUS at 17:12

## 2017-12-20 RX ADMIN — MORPHINE SULFATE 2 MG: 2 INJECTION, SOLUTION INTRAMUSCULAR; INTRAVENOUS at 17:24

## 2017-12-20 RX ADMIN — FENTANYL CITRATE 50 MCG: 50 INJECTION INTRAMUSCULAR; INTRAVENOUS at 14:10

## 2017-12-20 RX ADMIN — DEXAMETHASONE SODIUM PHOSPHATE 12 MG: 4 INJECTION, SOLUTION INTRAMUSCULAR; INTRAVENOUS at 13:33

## 2017-12-20 RX ADMIN — MIDAZOLAM HYDROCHLORIDE 2 MG: 1 INJECTION, SOLUTION INTRAMUSCULAR; INTRAVENOUS at 14:26

## 2017-12-20 RX ADMIN — FENTANYL CITRATE 50 MCG: 50 INJECTION INTRAMUSCULAR; INTRAVENOUS at 15:20

## 2017-12-20 RX ADMIN — LIDOCAINE HYDROCHLORIDE 100 MG: 20 INJECTION, SOLUTION INFILTRATION; PERINEURAL at 14:28

## 2017-12-20 RX ADMIN — FENTANYL CITRATE 50 MCG: 50 INJECTION, SOLUTION INTRAMUSCULAR; INTRAVENOUS at 17:16

## 2017-12-20 RX ADMIN — PHENYLEPHRINE HYDROCHLORIDE 100 MCG: 10 INJECTION INTRAMUSCULAR; INTRAVENOUS; SUBCUTANEOUS at 14:41

## 2017-12-20 RX ADMIN — PHENYLEPHRINE HYDROCHLORIDE 100 MCG: 10 INJECTION INTRAMUSCULAR; INTRAVENOUS; SUBCUTANEOUS at 15:22

## 2017-12-20 RX ADMIN — PROPOFOL 50 MG: 10 INJECTION, EMULSION INTRAVENOUS at 16:30

## 2017-12-20 RX ADMIN — HYDROCODONE BITARTRATE AND ACETAMINOPHEN 1 TABLET: 7.5; 325 TABLET ORAL at 18:25

## 2017-12-20 RX ADMIN — GLYCOPYRROLATE 0.6 MG: 0.2 INJECTION, SOLUTION INTRAMUSCULAR; INTRAVENOUS at 16:34

## 2017-12-20 RX ADMIN — PROPOFOL 50 MG: 10 INJECTION, EMULSION INTRAVENOUS at 16:32

## 2017-12-20 RX ADMIN — Medication 20 MG: at 15:16

## 2017-12-20 ASSESSMENT — PULMONARY FUNCTION TESTS
PIF_VALUE: 18
PIF_VALUE: 18
PIF_VALUE: 17
PIF_VALUE: 19
PIF_VALUE: 34
PIF_VALUE: 18
PIF_VALUE: 17
PIF_VALUE: 17
PIF_VALUE: 19
PIF_VALUE: 19
PIF_VALUE: 18
PIF_VALUE: 18
PIF_VALUE: 17
PIF_VALUE: 0
PIF_VALUE: 17
PIF_VALUE: 17
PIF_VALUE: 1
PIF_VALUE: 19
PIF_VALUE: 17
PIF_VALUE: 18
PIF_VALUE: 18
PIF_VALUE: 17
PIF_VALUE: 19
PIF_VALUE: 18
PIF_VALUE: 18
PIF_VALUE: 31
PIF_VALUE: 17
PIF_VALUE: 18
PIF_VALUE: 17
PIF_VALUE: 18
PIF_VALUE: 19
PIF_VALUE: 18
PIF_VALUE: 17
PIF_VALUE: 17
PIF_VALUE: 18
PIF_VALUE: 18
PIF_VALUE: 19
PIF_VALUE: 17
PIF_VALUE: 18
PIF_VALUE: 17
PIF_VALUE: 19
PIF_VALUE: 18
PIF_VALUE: 18
PIF_VALUE: 0
PIF_VALUE: 18
PIF_VALUE: 20
PIF_VALUE: 19
PIF_VALUE: 0
PIF_VALUE: 18
PIF_VALUE: 17
PIF_VALUE: 18
PIF_VALUE: 16
PIF_VALUE: 19
PIF_VALUE: 17
PIF_VALUE: 17
PIF_VALUE: 18
PIF_VALUE: 19
PIF_VALUE: 18
PIF_VALUE: 18
PIF_VALUE: 17
PIF_VALUE: 18
PIF_VALUE: 18
PIF_VALUE: 17
PIF_VALUE: 16
PIF_VALUE: 30
PIF_VALUE: 16
PIF_VALUE: 20
PIF_VALUE: 18
PIF_VALUE: 18
PIF_VALUE: 17
PIF_VALUE: 17
PIF_VALUE: 18
PIF_VALUE: 17
PIF_VALUE: 18
PIF_VALUE: 18
PIF_VALUE: 17
PIF_VALUE: 18
PIF_VALUE: 18
PIF_VALUE: 17
PIF_VALUE: 18
PIF_VALUE: 17
PIF_VALUE: 18
PIF_VALUE: 17
PIF_VALUE: 30
PIF_VALUE: 19
PIF_VALUE: 18
PIF_VALUE: 19
PIF_VALUE: 25
PIF_VALUE: 25
PIF_VALUE: 18
PIF_VALUE: 17
PIF_VALUE: 18
PIF_VALUE: 17
PIF_VALUE: 17
PIF_VALUE: 21
PIF_VALUE: 21
PIF_VALUE: 18
PIF_VALUE: 19
PIF_VALUE: 18
PIF_VALUE: 17
PIF_VALUE: 18
PIF_VALUE: 18
PIF_VALUE: 0
PIF_VALUE: 18
PIF_VALUE: 18
PIF_VALUE: 17
PIF_VALUE: 23
PIF_VALUE: 0
PIF_VALUE: 17
PIF_VALUE: 17
PIF_VALUE: 6
PIF_VALUE: 17
PIF_VALUE: 16
PIF_VALUE: 18
PIF_VALUE: 18
PIF_VALUE: 17
PIF_VALUE: 23
PIF_VALUE: 16
PIF_VALUE: 17
PIF_VALUE: 18
PIF_VALUE: 19
PIF_VALUE: 17
PIF_VALUE: 17
PIF_VALUE: 18
PIF_VALUE: 20
PIF_VALUE: 17
PIF_VALUE: 17
PIF_VALUE: 19
PIF_VALUE: 18
PIF_VALUE: 17
PIF_VALUE: 17
PIF_VALUE: 18
PIF_VALUE: 17

## 2017-12-20 ASSESSMENT — PAIN SCALES - GENERAL
PAINLEVEL_OUTOF10: 6
PAINLEVEL_OUTOF10: 0
PAINLEVEL_OUTOF10: 5
PAINLEVEL_OUTOF10: 5
PAINLEVEL_OUTOF10: 7
PAINLEVEL_OUTOF10: 4
PAINLEVEL_OUTOF10: 6
PAINLEVEL_OUTOF10: 4
PAINLEVEL_OUTOF10: 5

## 2017-12-20 NOTE — ANESTHESIA PRE PROCEDURE
Temporal   SpO2:  96%   Weight: 230 lb (104.3 kg) 228 lb 12.8 oz (103.8 kg)   Height: 5' 5\" (1.651 m)                                               BP Readings from Last 3 Encounters:   12/20/17 (!) 147/78   12/11/17 124/84   12/08/17 126/70       NPO Status: Time of last liquid consumption: 2130                        Time of last solid consumption: 1830                        Date of last liquid consumption: 12/19/17                        Date of last solid food consumption: 12/19/17    BMI:   Wt Readings from Last 3 Encounters:   12/20/17 228 lb 12.8 oz (103.8 kg)   12/11/17 241 lb (109.3 kg)   12/08/17 233 lb (105.7 kg)     Body mass index is 38.07 kg/m². CBC:   Lab Results   Component Value Date    WBC 10.2 06/28/2016    WBC 5.2 02/09/2013    RBC 4.73 06/28/2016    HGB 14.5 06/28/2016    HCT 41.1 06/28/2016    MCV 86.9 06/28/2016    RDW 13.0 06/28/2016     06/28/2016     02/09/2013       CMP:   Lab Results   Component Value Date     06/28/2016    K 4.4 06/28/2016     06/28/2016    CO2 23 06/28/2016    BUN 15 06/28/2016    CREATININE 0.5 06/28/2016    LABGLOM >90 02/09/2013    GLUCOSE 89 06/28/2016    PROT 7.6 06/28/2016    CALCIUM 9.9 06/28/2016    BILITOT 0.3 06/28/2016    ALKPHOS 87 06/28/2016    AST 14 06/28/2016    ALT 10 06/28/2016       POC Tests: No results for input(s): POCGLU, POCNA, POCK, POCCL, POCBUN, POCHEMO, POCHCT in the last 72 hours.     Coags: No results found for: PROTIME, INR, APTT    HCG (If Applicable): No results found for: PREGTESTUR, PREGSERUM, HCG, HCGQUANT     ABGs: No results found for: PHART, PO2ART, DIY2DIK, XND9WJB, BEART, Z4CVAQMM     Type & Screen (If Applicable):  No results found for: LABABO, LABRH    Anesthesia Evaluation    Airway: Mallampati: III       Mouth opening: > = 3 FB Dental:          Pulmonary:                              Cardiovascular:        (-) CAD    ECG reviewed  Rhythm: regular                      Neuro/Psych:   (+) neuromuscular disease:, psychiatric history:            GI/Hepatic/Renal:   (+) hiatal hernia, GERD:,           Endo/Other:                     Abdominal:   (+) obese,         Vascular:                                        Anesthesia Plan      general     ASA 3       Induction: intravenous. MIPS: Postoperative opioids intended. Anesthetic plan and risks discussed with patient. Plan discussed with CRNA.                   Carolyne Mayer MD   12/20/2017

## 2017-12-20 NOTE — ANESTHESIA POSTPROCEDURE EVALUATION
Department of Anesthesiology  Postprocedure Note    Patient: Geoffrey Vail  MRN: [id number]  YOB: 1970  Date of evaluation: 12/20/2017  Time:  6:17 PM     Procedure Summary     Date:  12/20/17 Room / Location:  [address] [place] [address] NURIA Le    Anesthesia Start:  4357 Anesthesia Stop:  2772    Procedure:  SEPTOPLASTY SUBMUCOUS RESECTION - PARTIAL BILATERAL INFERIOR, NASAL ENDOSCOPY WITH AUTUMN BULLOSA RIGHT MIDDLE (Bilateral Nose) Diagnosis:  (DEVIATED NASAL SEPTUM, AUTUMN BULLOSA, HYPERTROPHY OF NASAL TURBINATES)    Surgeon:  Sarah Kitchen MD Responsible Provider:  Liliam Lynn MD    Anesthesia Type:  general ASA Status:  3          Anesthesia Type: general    Andrea Phase I: Andrea Score: 9    Andrea Phase II: Andrea Score: 10    Last vitals: Reviewed and per EMR flowsheets. Anesthesia Post Evaluation   [address]  POST-ANESTHESIA NOTE       Name:  Geoffrey Vail                                         Age:  56 y.o.   MRN:  [id number]      Last Vitals:  /83   Pulse 95   Temp 98 °F (36.7 °C) (Temporal)   Resp 16   Ht 5' 5\" (1.651 m)   Wt 228 lb 12.8 oz (103.8 kg)   SpO2 98%   BMI 38.07 kg/m²   Patient Vitals for the past 4 hrs:   BP Temp Temp src Pulse Resp SpO2   12/20/17 1759 130/83 - - 95 16 98 %   12/20/17 1740 (!) 146/80 - - 96 24 97 %   12/20/17 1735 (!) 146/79 - - 87 14 98 %   12/20/17 1730 (!) 149/80 - - 95 13 97 %   12/20/17 1725 (!) 142/74 - - 85 11 98 %   12/20/17 1720 - - - - - 96 %   12/20/17 1715 (!) 143/76 - - 91 17 95 %   12/20/17 1710 134/73 - - 95 10 96 %   12/20/17 1705 (!) 147/79 - - 97 12 96 %   12/20/17 1700 (!) 117/59 - - 103 18 97 %   12/20/17 1655 (!) 123/58 - - 100 21 94 %   12/20/17 1650 (!) 119/58 - - 103 22 91 %   12/20/17 1647 - 98 °F (36.7 °C) Temporal - - -       Level of Consciousness:  Awake    Respiratory:  Stable    Oxygen Saturation:  Stable    Cardiovascular:  Stable    Hydration:  Adequate    PONV:  Stable    Post-op Pain:  Adequate

## 2017-12-20 NOTE — PROGRESS NOTES
Oriented to sds    7     gave information on flu and pneumonia vaccine. Refuses flu and pneumonia vaccine. Family updated to register with volunteer out at . Informed family to take belonging with them. Keep nothing of value in room unattended. Medication given back to family. Family is taking them with them.

## 2017-12-20 NOTE — PROGRESS NOTES
1647-Pt arrived to PACU, pt hooked up to monitor, VSS, pt on room air, Yvette MONTANEZ at bedside for report,pt has no dressing to nose open to air, no drainage noted, SCD's on bilateral.   1652- Pt placed on face tent 02 sats 91% on room air.   1659- Pt states having a little bit of pain, rates pain a 3.  1704- Pt medicated with 2 mg of morphine pain 4 now. 1704- Pt states nose hurts. 1712- Pt states pain 4-5 now burning pain in her nose. 1716- Pt medicated with fentanyl for pain. 1718- Pt states still has burning pain. 1724- Pt medicated with 2 mg of morphine pain 5 now  1727- Pt denies nausea given a few ice chips. 1736- Pt tolerating ice chips. 1737- Pt dozes off easily respirations 12.   1744- Pt meets discharge criteria. No drainage from nose no dressing, burning about a 4-5 in nose.    Pt to SDS, friend in room on arrival, report to RN

## 2017-12-20 NOTE — BRIEF OP NOTE
Brief Postoperative Note  ______________________________________________________________    Patient: Nila Paula  YOB: 1970  MRN: [id number]  Date of Procedure: 12/20/2017    Pre-Op Diagnosis: DEVIATED NASAL SEPTUM, AUTUMN BULLOSA RIGHT MIDDLE TURBINATE, HYPERTROPHY OF INFERIOR NASAL TURBINATES    Post-Op Diagnosis: Same       Procedure(s):  SEPTOPLASTY; SUBMUCOUS RESECTION, PARTIAL, BILATERAL INFERIOR TURBINATES; NASAL ENDOSCOPY WITH PARTIAL RESECTION OF AUTUMN BULLOSA RIGHT MIDDLE TURBINATE    Anesthesia: General    Surgeon(s):  Darnell Lynne MD    Staff:  Scrub Person First: Denilson De Jesus     Estimated Blood Loss: LESS THAN 50 mL    Complications: None    Specimens:   * No specimens in log *    Implants:  * No implants in log *      Drains:      Findings: CONVEX  LEFT SEPTAL DEVIATION OPPOSITE LARGE RIGHT MIDDLE TURBINATE AUTUMN BULLOSA, WITH INFERIOR TURBINATE HYPERTROPHY.  Iraj Olson MD  Date: 12/20/2017  Time: 4:58 PM

## 2017-12-21 ENCOUNTER — OFFICE VISIT (OUTPATIENT)
Dept: ENT CLINIC | Age: 47
End: 2017-12-21

## 2017-12-21 VITALS
SYSTOLIC BLOOD PRESSURE: 120 MMHG | BODY MASS INDEX: 38.57 KG/M2 | DIASTOLIC BLOOD PRESSURE: 76 MMHG | HEART RATE: 78 BPM | WEIGHT: 231.8 LBS | RESPIRATION RATE: 18 BRPM | TEMPERATURE: 97.9 F

## 2017-12-21 DIAGNOSIS — Z98.890 STATUS POST NASAL SEPTOPLASTY: ICD-10-CM

## 2017-12-21 DIAGNOSIS — R09.81 NASAL CONGESTION: ICD-10-CM

## 2017-12-21 DIAGNOSIS — R06.83 SNORES: ICD-10-CM

## 2017-12-21 DIAGNOSIS — J34.89 NASAL OBSTRUCTION: ICD-10-CM

## 2017-12-21 DIAGNOSIS — J34.2 NASAL SEPTAL DEVIATION: Primary | ICD-10-CM

## 2017-12-21 DIAGNOSIS — J34.3 HYPERTROPHY OF BOTH INFERIOR NASAL TURBINATES: ICD-10-CM

## 2017-12-21 DIAGNOSIS — F17.200 SMOKER: ICD-10-CM

## 2017-12-21 DIAGNOSIS — J34.89 CONCHA BULLOSA: ICD-10-CM

## 2017-12-21 PROCEDURE — 99024 POSTOP FOLLOW-UP VISIT: CPT | Performed by: OTOLARYNGOLOGY

## 2017-12-21 ASSESSMENT — ENCOUNTER SYMPTOMS
FACIAL SWELLING: 0
RHINORRHEA: 0
CHOKING: 0
NAUSEA: 0
SINUS PRESSURE: 0
SORE THROAT: 0
SHORTNESS OF BREATH: 0
COUGH: 0
VOMITING: 0
COLOR CHANGE: 0
CHEST TIGHTNESS: 0
STRIDOR: 0
WHEEZING: 0
APNEA: 0
VOICE CHANGE: 0
ABDOMINAL PAIN: 0
TROUBLE SWALLOWING: 0
DIARRHEA: 0

## 2017-12-21 NOTE — OP NOTE
source of  the air cell. This area was opened up just enough to prevent obstruction  of outflow with mild lateralization of that turbinate. Cottonoids  impregnated with Afrin were placed in that space. A right hemitransfixion incision was created and right anterior and  posterior tunnel was elevated. The portion of the posterior inferior  aspect of the quadrangular cartilage was resected submucosally. Left  posterior tunnel was elevated and posterior bony reflections removed. This  appeared to adequately improve the airway. A fenestration was created in  the left posterior septal mucosa for drainage and septal envelope  reapproximated with plain 4-0 gut suture with a knot in the end and tied  anteriorly, \"quilting\" the septum back together. Hemitransfixion incision  was closed with a suture as well. Blocks with Ropivacaine with epinephrine were placed in the sphenopalatine  and anterior ethmoid areas. On the right side, the middle turbinate was  lightly cauterized with bipolar cautery and then Stammberger foam was  instilled into the operative space and on the medial side of the turbinate  to help stand it in appropriate anatomic position. Nose was cleansed. Two mL of 1% Xylocaine with epinephrine were injected into the  pterygopalatine fossa for postop pain relief for vasoconstriction. The  stomach was suctioned. Attention was returned to the nose. Hemostasis appeared secure. Bacitracin ointment applied to both sides of the nasal fossa. The patient  was awakened, extubated, and taken to the recovery room in satisfactory  condition. There were no complications.         Jeanette Kothari M.D.    D: 12/20/2017 17:23:45       T: 12/20/2017 23:08:41     [email or web address]  Job#: [id number]     Doc#: [id number]    CC:  Philomena Manzano M.D.

## 2017-12-21 NOTE — PROGRESS NOTES
[facility]  [address] [facility] Jay Misael Claros 84  Dept: [phone]  Dept Fax: [phone]  Loc: [phone]    Neeta Pleitez is a 56 y.o. female who was referred by No ref. provider found for:  Chief Complaint   Patient presents with    Post-Op Check     po septoplasty/turbinates/irene 12/20/17   . HPI:     Neeta Pleitez is a 56 y.o. female who presents today for post op exam, Septoplasty, bilateral partial submucous resection of the inferior turbinates and partial resection of irene bullosa, right middle turbinate on 12/20/17. Her nose is a bit sore and she is a bit congested today. History:     No Known Allergies  Current Outpatient Prescriptions   Medication Sig Dispense Refill    HYDROcodone-acetaminophen (NORCO) 7.5-325 MG per tablet Take 1 tablet by mouth every 4 hours as needed for Pain . 20 tablet 0    predniSONE (DELTASONE) 20 MG tablet Take 1 tablet by mouth daily for 3 days Then one-half tablet for two days. Start the day AFTER surgery 4 tablet 0    cephALEXin (KEFLEX) 500 MG capsule Take 1 capsule by mouth 4 times daily for 5 days 20 capsule 0    pseudoephedrine (DECONGESTANT) 30 MG tablet Take 1 tablet by mouth every 6 hours for 14 days 56 tablet 1    ALPRAZolam (XANAX) 0.25 MG tablet TAKE 1 TABLET BY MOUTH TWICE A DAY AS NEEDED FOR ANXIETY  1    ARIPiprazole (ABILIFY) 2 MG tablet Take 1 tablet by mouth daily      vitamin B-12 (CYANOCOBALAMIN) 1000 MCG tablet Take 1,000 mcg by mouth daily      buPROPion (WELLBUTRIN XL) 300 MG extended release tablet Take 300 mg by mouth every morning       gabapentin (NEURONTIN) 400 MG capsule TAKE 1 CAPSULE BY MOUTH 3 TIMES A DAY  2    busPIRone (BUSPAR) 10 MG tablet Take 10 mg by mouth daily      calcium carbonate (TUMS) 500 MG chewable tablet Take 2 tablets by mouth as needed for Heartburn. No current facility-administered medications for this visit.       Past Medical History: Diagnosis Date    Anxiety     Arthritis     Chronic gastritis 1/2015    Depression     Deviated septum     GERD (gastroesophageal reflux disease)     chronic    Hiatal hernia 2009    Ovarian cyst     PONV (postoperative nausea and vomiting)     Pulmonary nodule 10/1/2013    Schwannoma of spinal cord ([address] 9/17/2013      Past Surgical History:   Procedure Laterality Date    BLADDER SUSPENSION  2006   [address] CERVICAL SPINE SURGERY  2007    [address]    COLONOSCOPY  [id number]    does not know who     HYSTERECTOMY  2006    hyst only    NERVE BLOCK  4/7/14    OR REPAIR OF NASAL SEPTUM Bilateral 12/20/2017    SEPTOPLASTY SUBMUCOUS RESECTION - PARTIAL BILATERAL INFERIOR, NASAL ENDOSCOPY WITH AUTUMN BULLOSA RIGHT MIDDLE performed by Pennie Cabrera MD at [address] ENDOSCOPY  2015     Family History   Problem Relation Age of Onset    Breast Cancer Mother     High Blood Pressure Father     Diabetes Father     Stroke Father     Cancer Brother     Cancer Maternal Grandfather      Social History   Substance Use Topics    Smoking status: Current Every Day Smoker     Packs/day: 0.75     Years: 14.00     Types: Cigarettes    Smokeless tobacco: Never Used      Comment: off & on    Alcohol use Yes      Comment: rarely       Subjective:      Review of Systems   Constitutional: Negative for activity change, appetite change, chills, diaphoresis, fatigue, fever and unexpected weight change. HENT: Negative for congestion, dental problem, ear discharge, ear pain, facial swelling, hearing loss, mouth sores, nosebleeds, postnasal drip, rhinorrhea, sinus pressure, sneezing, sore throat, tinnitus, trouble swallowing and voice change. Eyes: Negative for visual disturbance. Respiratory: Negative for apnea, cough, choking, chest tightness, shortness of breath, wheezing and stridor.     Cardiovascular: Negative for chest pain, palpitations and leg swelling. Gastrointestinal: Negative for abdominal pain, diarrhea, nausea and vomiting. Endocrine: Negative for cold intolerance, heat intolerance, polydipsia and polyuria. Genitourinary: Negative for dysuria, enuresis and hematuria. Musculoskeletal: Negative for arthralgias, gait problem, neck pain and neck stiffness. Skin: Negative for color change and rash. Allergic/Immunologic: Negative for environmental allergies, food allergies and immunocompromised state. Neurological: Negative for dizziness, syncope, facial asymmetry, speech difficulty, light-headedness and headaches. Hematological: Negative for adenopathy. Does not bruise/bleed easily. Psychiatric/Behavioral: Negative for confusion and sleep disturbance. The patient is not nervous/anxious. Objective:     /76 (Site: Left Arm, Position: Sitting)   Pulse 78   Temp 97.9 °F (36.6 °C) (Oral)   Resp 18   Wt 231 lb 12.8 oz (105.1 kg)   BMI 38.57 kg/m²     Physical Exam    Nose is decongested and anesthetized with topical sprays. Nasal fossa is suctioned bilaterally. Clots are removed. Normal postoperative appearance. Data:  All of the past medical history, past surgical history, family history, social history, allergies and current medications were reviewed with the patient. Assessment & Plan   Diagnoses and all orders for this visit:    1. Nasal septal deviation     2. Rima bullosa     3. Hypertrophy of both inferior nasal turbinates     4. Smoker     5. Snores     6. Nasal congestion     7. Nasal obstruction     8. Status post nasal septoplasty         The findings were explained and her questions were answered. She is given the post op instructions again. Follow the instructions from yesterday's AVS form regarding continuing the Afrin, and starting the saline irrigations in 3 days. Use NeilMed bottle, their packets, and only distilled water at least twice a day.  Flush up one nostril and out

## 2017-12-28 ENCOUNTER — OFFICE VISIT (OUTPATIENT)
Dept: ENT CLINIC | Age: 47
End: 2017-12-28

## 2017-12-28 VITALS
RESPIRATION RATE: 14 BRPM | HEART RATE: 88 BPM | SYSTOLIC BLOOD PRESSURE: 114 MMHG | BODY MASS INDEX: 38.99 KG/M2 | HEIGHT: 65 IN | DIASTOLIC BLOOD PRESSURE: 70 MMHG | TEMPERATURE: 98.2 F | WEIGHT: 234 LBS

## 2017-12-28 DIAGNOSIS — J34.89 CONCHA BULLOSA: ICD-10-CM

## 2017-12-28 DIAGNOSIS — J34.2 NASAL SEPTAL DEVIATION: Primary | ICD-10-CM

## 2017-12-28 DIAGNOSIS — J34.3 HYPERTROPHY OF BOTH INFERIOR NASAL TURBINATES: ICD-10-CM

## 2017-12-28 PROCEDURE — 99024 POSTOP FOLLOW-UP VISIT: CPT | Performed by: OTOLARYNGOLOGY

## 2017-12-28 ASSESSMENT — ENCOUNTER SYMPTOMS
VOMITING: 0
WHEEZING: 0
TROUBLE SWALLOWING: 0
DIARRHEA: 0
ABDOMINAL PAIN: 0
COUGH: 0
COLOR CHANGE: 0
VOICE CHANGE: 0
SORE THROAT: 0
SINUS PRESSURE: 0
NAUSEA: 0
SHORTNESS OF BREATH: 0
CHOKING: 0
FACIAL SWELLING: 0
RHINORRHEA: 0
STRIDOR: 0
CHEST TIGHTNESS: 0
APNEA: 0

## 2017-12-28 NOTE — PROGRESS NOTES
[facility]  [facility]  [address]  Dept: [phone]  Dept Fax: [phone]  Loc: [phone]    Naveed Vasquez is a 56 y.o. female who was referred by No ref. provider found for:  Chief Complaint   Patient presents with    Post-Op Check     Patient is here po septoplasty/turbinates/irene 12-20-17   . HPI:     Naveed Vasquez is a 56 y.o. female who presents today for Follow-up on her nasal surgery week ago. Her airway is improved. She still having some crusting. She is performing her irrigations as instructed. [place] Ripper History:     No Known Allergies  Current Outpatient Prescriptions   Medication Sig Dispense Refill    ALPRAZolam (XANAX) 0.25 MG tablet TAKE 1 TABLET BY MOUTH TWICE A DAY AS NEEDED FOR ANXIETY  1    ARIPiprazole (ABILIFY) 2 MG tablet Take 1 tablet by mouth daily      vitamin B-12 (CYANOCOBALAMIN) 1000 MCG tablet Take 1,000 mcg by mouth daily      buPROPion (WELLBUTRIN XL) 300 MG extended release tablet Take 300 mg by mouth every morning       gabapentin (NEURONTIN) 400 MG capsule TAKE 1 CAPSULE BY MOUTH 3 TIMES A DAY  2    busPIRone (BUSPAR) 10 MG tablet Take 10 mg by mouth daily      calcium carbonate (TUMS) 500 MG chewable tablet Take 2 tablets by mouth as needed for Heartburn. No current facility-administered medications for this visit.       Past Medical History:   Diagnosis Date    Anxiety     Arthritis     Chronic gastritis 1/2015    Depression     Deviated septum     GERD (gastroesophageal reflux disease)     chronic    Hiatal hernia 2009    Ovarian cyst     PONV (postoperative nausea and vomiting)     Pulmonary nodule 10/1/2013    Schwannoma of spinal cord ([facility] [address] 9/17/2013      Past Surgical History:   Procedure Laterality Date    BLADDER SUSPENSION  2006    BREAST REDUCTION SURGERY  1995    CERVICAL SPINE SURGERY  2007    [address] [place]    COLONOSCOPY  [id number]    does not know who     syncope, facial asymmetry, speech difficulty, light-headedness and headaches. Hematological: Negative for adenopathy. Does not bruise/bleed easily. Psychiatric/Behavioral: Negative for confusion and sleep disturbance. The patient is not nervous/anxious. Objective:   /70 (Site: Left Arm, Position: Sitting)   Pulse 88   Temp 98.2 °F (36.8 °C) (Oral)   Resp 14   Ht 5' 5\" (1.651 m)   Wt 234 lb (106.1 kg)   BMI 38.94 kg/m²     Physical Exam   Nose: Nose decongested and anesthetized and some crusts are cleaned. No sign of infection. Data:  All of the past medical history, past surgical history, family history, social history, allergies and current medications were reviewed with the patient. Assessment & Plan   Diagnoses and all orders for this visit:    1. Nasal septal deviation     2. Rima bullosa     3. Hypertrophy of both inferior nasal turbinates         The findings were explained and her questions were answered. Continue the irrigations. Return in about 7 days (around 1/4/2018) for Post-Op Check. Chino Quinones. Tyler Wetzel MD    **This report has been created using voice recognition software. It may contain minor errors which are inherent in voice recognition technology. **

## 2018-01-04 ENCOUNTER — OFFICE VISIT (OUTPATIENT)
Dept: ENT CLINIC | Age: 48
End: 2018-01-04

## 2018-01-04 VITALS
SYSTOLIC BLOOD PRESSURE: 118 MMHG | HEIGHT: 65 IN | WEIGHT: 234 LBS | TEMPERATURE: 98.3 F | DIASTOLIC BLOOD PRESSURE: 70 MMHG | BODY MASS INDEX: 38.99 KG/M2 | HEART RATE: 92 BPM | RESPIRATION RATE: 14 BRPM

## 2018-01-04 DIAGNOSIS — J34.2 NASAL SEPTAL DEVIATION: ICD-10-CM

## 2018-01-04 DIAGNOSIS — J34.89 CONCHA BULLOSA: ICD-10-CM

## 2018-01-04 DIAGNOSIS — Z98.890 STATUS POST NASAL SEPTOPLASTY: Primary | ICD-10-CM

## 2018-01-04 DIAGNOSIS — J34.3 HYPERTROPHY OF BOTH INFERIOR NASAL TURBINATES: ICD-10-CM

## 2018-01-04 DIAGNOSIS — J34.2 DEVIATED NASAL SEPTUM: ICD-10-CM

## 2018-01-04 PROCEDURE — 99024 POSTOP FOLLOW-UP VISIT: CPT | Performed by: PHYSICIAN ASSISTANT

## 2018-01-04 ASSESSMENT — ENCOUNTER SYMPTOMS
ABDOMINAL DISTENTION: 0
CHEST TIGHTNESS: 0
PHOTOPHOBIA: 0
ABDOMINAL PAIN: 0
COUGH: 0
VOICE CHANGE: 0
CONSTIPATION: 0
COLOR CHANGE: 0
EYE REDNESS: 0
FACIAL SWELLING: 0
RHINORRHEA: 0
APNEA: 0
BACK PAIN: 0
EYE PAIN: 0
NAUSEA: 0
WHEEZING: 0
TROUBLE SWALLOWING: 0
VOMITING: 0
EYE ITCHING: 0
CHOKING: 0
RECTAL PAIN: 0
BLOOD IN STOOL: 0
ANAL BLEEDING: 0
SHORTNESS OF BREATH: 0
STRIDOR: 0
DIARRHEA: 0
SORE THROAT: 0
SINUS PRESSURE: 0
EYE DISCHARGE: 0

## 2018-01-04 NOTE — PROGRESS NOTES
at [address] ENDOSCOPY  2015         Subjective:      Review of Systems   Constitutional: Negative for activity change, appetite change, chills, diaphoresis, fatigue, fever and unexpected weight change. HENT: Negative for congestion, dental problem, drooling, ear discharge, ear pain, facial swelling, hearing loss, mouth sores, nosebleeds, postnasal drip, rhinorrhea, sinus pressure, sneezing, sore throat, tinnitus, trouble swallowing and voice change. Eyes: Negative for photophobia, pain, discharge, redness, itching and visual disturbance. Respiratory: Negative for apnea, cough, choking, chest tightness, shortness of breath, wheezing and stridor. Cardiovascular: Negative for chest pain, palpitations and leg swelling. Gastrointestinal: Negative for abdominal distention, abdominal pain, anal bleeding, blood in stool, constipation, diarrhea, nausea, rectal pain and vomiting. Endocrine: Negative for cold intolerance, heat intolerance, polydipsia, polyphagia and polyuria. Genitourinary: Negative for decreased urine volume, difficulty urinating, dyspareunia, dysuria, enuresis, flank pain, frequency, genital sores, hematuria, menstrual problem, pelvic pain, urgency, vaginal bleeding, vaginal discharge and vaginal pain. Musculoskeletal: Negative for arthralgias, back pain, gait problem, joint swelling, myalgias, neck pain and neck stiffness. Skin: Negative for color change, pallor, rash and wound. Allergic/Immunologic: Negative for environmental allergies, food allergies and immunocompromised state. Neurological: Negative for dizziness, tremors, seizures, syncope, facial asymmetry, speech difficulty, weakness, light-headedness, numbness and headaches. Hematological: Negative for adenopathy. Does not bruise/bleed easily.    Psychiatric/Behavioral: Negative for agitation, behavioral problems, confusion, decreased concentration, dysphoric mood,

## 2018-01-16 ENCOUNTER — OFFICE VISIT (OUTPATIENT)
Dept: PSYCHIATRY | Age: 48
End: 2018-01-16
Payer: MEDICARE

## 2018-01-16 DIAGNOSIS — F33.1 MODERATE EPISODE OF RECURRENT MAJOR DEPRESSIVE DISORDER (HCC): ICD-10-CM

## 2018-01-16 PROCEDURE — G8484 FLU IMMUNIZE NO ADMIN: HCPCS | Performed by: PSYCHIATRY & NEUROLOGY

## 2018-01-16 PROCEDURE — G8417 CALC BMI ABV UP PARAM F/U: HCPCS | Performed by: PSYCHIATRY & NEUROLOGY

## 2018-01-16 PROCEDURE — 4004F PT TOBACCO SCREEN RCVD TLK: CPT | Performed by: PSYCHIATRY & NEUROLOGY

## 2018-01-16 PROCEDURE — 99205 OFFICE O/P NEW HI 60 MIN: CPT | Performed by: PSYCHIATRY & NEUROLOGY

## 2018-01-16 PROCEDURE — G8428 CUR MEDS NOT DOCUMENT: HCPCS | Performed by: PSYCHIATRY & NEUROLOGY

## 2018-01-16 RX ORDER — BUPROPION HYDROCHLORIDE 150 MG/1
150 TABLET ORAL EVERY MORNING
Qty: 30 TABLET | Refills: 3 | Status: SHIPPED | OUTPATIENT
Start: 2018-01-16 | End: 2018-01-19 | Stop reason: DRUGHIGH

## 2018-01-16 RX ORDER — BUPROPION HYDROCHLORIDE 300 MG/1
300 TABLET ORAL EVERY MORNING
Qty: 30 TABLET | Refills: 3 | Status: SHIPPED | OUTPATIENT
Start: 2018-01-16 | End: 2018-04-12

## 2018-01-16 NOTE — PROGRESS NOTES
Chief Complaint   Patient presents with    Psychiatric Evaluation     depression     First visit for this 60-year-old  female from [facility], referred by Dr. Yuniel Moreno. She has been a patient of Dr. Yuniel Moreno for some time, but has felt that she's not getting anywhere. Therefore she asked to see someone else. Currently she is on bupropion  mg daily, Viibryd 40, Abilify 2mg daily. She's been on this regimen for several months. She is also on buspirone 10 mg twice daily. I asked her to tell me about her depression. She reported that her mood is down and sad and she's not infrequently tearful. She reports that she has no motivation to do anything, often times doesn't even want to get out of bed. She doesn't take care of her own hygiene or her home very well. She may shower only once a week. She avoids people and is generally withdrawn. She sees only her children. Sleep is poor, but it's partially because of neck and back pain. She had a laminectomy and had a schwannoma removed. She is going to [place] in the near future to have her painful conditions looked at. She only gets about five hours of sleep at night maximum, but may nap during the day for several hours. She doesn't enjoy much of anything, doesn't like herself and has a hard time concentrating. She said her children have ADHD and she wonders if she has some herself. It was apparently not much of a problem in school though. She told me that she had stopped smoking in December of this year, but has put on 40 pounds since then. She stopped for several months last summer and also gained weight then. She's disgusted by her weight but hasn't been able to lose any. She has no libido. Her memory is apparently unimpaired. She does have some suicidal ideation at times, but she denies any currently. The ideation is primarily passive when she does have it. She denied any experience with homicidal thoughts.     I questioned her about manic and hypomanic features as well. Basically she denies any and all of the symptoms of this. There is no family history that she's aware of. She denied abuse of any substances. There is also no family history of substance issues, but she doesn't seem terribly familiar with the family history on either side. She became depressed early on when she was in school. When she was about 12 her mother developed cancer and required a lot of care. She became the main caretaker for her mother, her siblings, and her stepfather. She had to do all of the cooking and cleaning. That was in addition to going to school. Mother  when she was 13. Shortly after that her stepfather himself became ill with cancer, and  a number of months later. That threw them into turmoil, and they did move in with some of the step relatives. She eventually moved in with her biological father. He was a [address] and she said he was not very personable or kind. She also lost a baby to SIDS. He was [de-identified] old. She said he was at the [employer] and she was at work when it happened. Medical history:    She had a schwannoma in the cervical spine which led to a fusion C4 to 7 and excision of the tumor. There was some nerve root damage caused by the surgery, and she lost her job subsequently. She is quite limited now in lifting capacity and ability to turn her neck so she did get disability from her employer. She has some loss of sensation in her right thumb and has tremors of her hands and feet. The main problem that she feels now is the pain that she suffers. As mentioned earlier she is going to [place] in the next few weeks to see if anything can be done about the painful conditions. Childhood: This was mostly described above. Her parents  when she was about [age]. Mother became ill at age 7. Mother  when she was 13. Between 7 and 17 she was the primary caretaker of the family.   A few months after her mother's death her stepfather also  of cancer. Education:    She is a high school graduate. She did start college, but she dropped out. She got , had children and never made it back. Employment:    She was working as an  at that the [employer] for three years when she developed the neck tumor that eventually disabled her. She is on long-term disability from the [employer]. She said she loved that job. Marital:    She's been  one time. She became depressed after the death of her mother, and even more so after the SIDS death of her son. Her  began cheating, and that led to a divorce after 10 years. She does not know if her  was depressed. He's now on his third marriage. Family history:    Lisbeth Burgos is actually known about either side of the family. Mother, and her close relatives all  of cancer around the same time, cutting off her access to that side. She was never close to her biologic father, and knows little about his side. She does not believe that there was any bipolar illness on either side, nor any substance abuse. Psychiatric history:    She's never been hospitalized, was in psychotherapy once, and has been working with Dr. Farias Rather as an outpatient. She's had a number of medication trials but didn't think she could recall all of them. She knew she did get Prozac, and she thinks Zoloft and Lexapro as well as Viibryd. Celexa was a ? She's not had Trintellix. She did try Cymbalta and perhaps Effexor. She did not have Fetzima or Pristiq or. Remeron. She does not think she has ever been on a tricyclic. She's not had any folate supplementation or genetic testing. She did not think that the Abilify was helpful.      Mental Status Examination    Level of consciousness:  within normal limits  Appearance:  well-appearing, street clothes, in chair, good grooming and good hygiene  Behavior/Motor:  no abnormalities noted  Attitude toward examiner:  cooperative, attentive and good eye contact  Speech:  spontaneous, normal rate, normal volume and well articulated  Mood:  depressed  Affect:  mood congruent  Thought processes:  linear, goal directed and coherent  Thought content:  Homocidal ideation: none  Suicidal Ideation:  passive  Delusions:  no evidence of delusions  Perceptual Disturbance:  denies any perceptual disturbance  Cognition:  oriented to person, place, and time  Concentration failed serial 7s; OK on 3s, \"world\" backwards  Memory intact  [employer] of knowledge:  good  Abstract thinking:  good  Insight:  good  Judgment:  good    DIAGNOSTIC IMPRESSION  Major depression recurrent moderate    Plan  Increase Wellburtrin to 450  Increase Abilify to 4 mg  Get genetic testing  Stop buspirone  Current Outpatient Prescriptions   Medication Sig Dispense Refill    buPROPion (WELLBUTRIN XL) 300 MG extended release tablet Take 1 tablet by mouth every morning 30 tablet 3    buPROPion (WELLBUTRIN XL) 150 MG extended release tablet Take 1 tablet by mouth every morning 30 tablet 3    ALPRAZolam (XANAX) 0.25 MG tablet TAKE 1 TABLET BY MOUTH TWICE A DAY AS NEEDED FOR ANXIETY  1    ARIPiprazole (ABILIFY) 2 MG tablet Take 1 tablet by mouth daily      vitamin B-12 (CYANOCOBALAMIN) 1000 MCG tablet Take 1,000 mcg by mouth daily      gabapentin (NEURONTIN) 400 MG capsule TAKE 1 CAPSULE BY MOUTH 3 TIMES A DAY  2    busPIRone (BUSPAR) 10 MG tablet Take 10 mg by mouth daily      calcium carbonate (TUMS) 500 MG chewable tablet Take 2 tablets by mouth as needed for Heartburn. No current facility-administered medications for this visit.

## 2018-01-19 ENCOUNTER — OFFICE VISIT (OUTPATIENT)
Dept: ENT CLINIC | Age: 48
End: 2018-01-19

## 2018-01-19 VITALS
RESPIRATION RATE: 16 BRPM | BODY MASS INDEX: 39.42 KG/M2 | WEIGHT: 236.9 LBS | DIASTOLIC BLOOD PRESSURE: 80 MMHG | SYSTOLIC BLOOD PRESSURE: 130 MMHG | HEART RATE: 80 BPM | TEMPERATURE: 98.1 F

## 2018-01-19 DIAGNOSIS — J34.3 HYPERTROPHY OF BOTH INFERIOR NASAL TURBINATES: ICD-10-CM

## 2018-01-19 DIAGNOSIS — Z98.890 STATUS POST NASAL SEPTOPLASTY: ICD-10-CM

## 2018-01-19 DIAGNOSIS — J34.2 DEVIATED NASAL SEPTUM: Primary | ICD-10-CM

## 2018-01-19 PROCEDURE — 99024 POSTOP FOLLOW-UP VISIT: CPT | Performed by: OTOLARYNGOLOGY

## 2018-01-19 RX ORDER — BUPROPION HYDROCHLORIDE 150 MG/1
150 TABLET, EXTENDED RELEASE ORAL 2 TIMES DAILY
COMMUNITY
End: 2018-04-12

## 2018-01-19 ASSESSMENT — ENCOUNTER SYMPTOMS
APNEA: 0
WHEEZING: 0
NAUSEA: 0
VOMITING: 0
STRIDOR: 0
RHINORRHEA: 0
SINUS PRESSURE: 0
ABDOMINAL PAIN: 0
COUGH: 0
VOICE CHANGE: 0
CHOKING: 0
FACIAL SWELLING: 0
TROUBLE SWALLOWING: 0
CHEST TIGHTNESS: 0
DIARRHEA: 0
SHORTNESS OF BREATH: 0
SORE THROAT: 0
COLOR CHANGE: 0

## 2018-01-19 NOTE — PROGRESS NOTES
[facility]  [facility]  [address] [facility]  Elvia Werner  Dept: [phone]  Dept Fax: [phone]  Loc: [phone]    Tami Yun is a 56 y.o. female who was referred by No ref. provider found for:  Chief Complaint   Patient presents with   [facility] Post-Op Check     Patient here for post op exam, Septoplasty, bilateral partial submucous resection of the inferior turbinates and partial resection of irene bullosa, right, middle turbinate on 12/20/17. She states her nose has been dry lately. Faustina Christine HPI:     Tami Yun is a 56 y.o. female who presents today for Follow-up on her nasal airway surgery. In doing fairly well. She has a bit of dryness. She is continuing to irrigate. Her airway is excellent and she is having no discomfort. Faustina Christine History:     No Known Allergies  Current Outpatient Prescriptions   Medication Sig Dispense Refill    buPROPion (WELLBUTRIN SR) 150 MG extended release tablet Take 150 mg by mouth 2 times daily      buPROPion (WELLBUTRIN XL) 300 MG extended release tablet Take 1 tablet by mouth every morning 30 tablet 3    ALPRAZolam (XANAX) 0.25 MG tablet TAKE 1 TABLET BY MOUTH TWICE A DAY AS NEEDED FOR ANXIETY  1    ARIPiprazole (ABILIFY) 2 MG tablet Take 1 tablet by mouth daily      vitamin B-12 (CYANOCOBALAMIN) 1000 MCG tablet Take 1,000 mcg by mouth daily      gabapentin (NEURONTIN) 400 MG capsule TAKE 1 CAPSULE BY MOUTH 3 TIMES A DAY  2    busPIRone (BUSPAR) 10 MG tablet Take 10 mg by mouth daily      calcium carbonate (TUMS) 500 MG chewable tablet Take 2 tablets by mouth as needed for Heartburn. No current facility-administered medications for this visit.       Past Medical History:   Diagnosis Date    Anxiety     Arthritis     Chronic gastritis 1/2015    Depression     Deviated septum     GERD (gastroesophageal reflux disease)     chronic    Hiatal hernia 2009    Ovarian cyst     PONV (postoperative nausea and vomiting)

## 2018-02-13 RX ORDER — VILAZODONE HYDROCHLORIDE 40 MG/1
40 TABLET ORAL DAILY
Qty: 30 TABLET | Refills: 0 | Status: SHIPPED | OUTPATIENT
Start: 2018-02-13 | End: 2018-03-01

## 2018-03-01 ENCOUNTER — OFFICE VISIT (OUTPATIENT)
Dept: PSYCHIATRY | Age: 48
End: 2018-03-01
Payer: MEDICARE

## 2018-03-01 DIAGNOSIS — F33.1 MODERATE EPISODE OF RECURRENT MAJOR DEPRESSIVE DISORDER (HCC): Primary | ICD-10-CM

## 2018-03-01 PROCEDURE — G8484 FLU IMMUNIZE NO ADMIN: HCPCS | Performed by: PSYCHIATRY & NEUROLOGY

## 2018-03-01 PROCEDURE — G8428 CUR MEDS NOT DOCUMENT: HCPCS | Performed by: PSYCHIATRY & NEUROLOGY

## 2018-03-01 PROCEDURE — G8417 CALC BMI ABV UP PARAM F/U: HCPCS | Performed by: PSYCHIATRY & NEUROLOGY

## 2018-03-01 PROCEDURE — 99213 OFFICE O/P EST LOW 20 MIN: CPT | Performed by: PSYCHIATRY & NEUROLOGY

## 2018-03-01 PROCEDURE — 4004F PT TOBACCO SCREEN RCVD TLK: CPT | Performed by: PSYCHIATRY & NEUROLOGY

## 2018-03-01 RX ORDER — DESVENLAFAXINE 50 MG/1
50 TABLET, EXTENDED RELEASE ORAL DAILY
Qty: 30 TABLET | Refills: 3 | Status: SHIPPED | OUTPATIENT
Start: 2018-03-01 | End: 2018-04-12

## 2018-04-12 ENCOUNTER — OFFICE VISIT (OUTPATIENT)
Dept: PSYCHIATRY | Age: 48
End: 2018-04-12
Payer: MEDICARE

## 2018-04-12 DIAGNOSIS — F33.1 MODERATE EPISODE OF RECURRENT MAJOR DEPRESSIVE DISORDER (HCC): Primary | ICD-10-CM

## 2018-04-12 PROCEDURE — G8417 CALC BMI ABV UP PARAM F/U: HCPCS | Performed by: PSYCHIATRY & NEUROLOGY

## 2018-04-12 PROCEDURE — G8428 CUR MEDS NOT DOCUMENT: HCPCS | Performed by: PSYCHIATRY & NEUROLOGY

## 2018-04-12 PROCEDURE — 4004F PT TOBACCO SCREEN RCVD TLK: CPT | Performed by: PSYCHIATRY & NEUROLOGY

## 2018-04-12 PROCEDURE — 99213 OFFICE O/P EST LOW 20 MIN: CPT | Performed by: PSYCHIATRY & NEUROLOGY

## 2018-04-12 RX ORDER — BUPROPION HYDROCHLORIDE 150 MG/1
150 TABLET ORAL EVERY MORNING
Qty: 30 TABLET | Refills: 5 | Status: SHIPPED | OUTPATIENT
Start: 2018-04-12 | End: 2018-10-30 | Stop reason: SDUPTHER

## 2018-04-12 RX ORDER — DESVENLAFAXINE 100 MG/1
100 TABLET, EXTENDED RELEASE ORAL DAILY
Qty: 30 TABLET | Refills: 5 | Status: SHIPPED | OUTPATIENT
Start: 2018-04-12 | End: 2018-10-01 | Stop reason: SDUPTHER

## 2018-04-12 RX ORDER — BUPROPION HYDROCHLORIDE 300 MG/1
300 TABLET ORAL EVERY MORNING
Qty: 30 TABLET | Refills: 5 | Status: SHIPPED | OUTPATIENT
Start: 2018-04-12 | End: 2018-10-30 | Stop reason: SDUPTHER

## 2018-05-30 ENCOUNTER — OFFICE VISIT (OUTPATIENT)
Dept: PSYCHIATRY | Age: 48
End: 2018-05-30
Payer: MEDICARE

## 2018-05-30 DIAGNOSIS — F33.42 RECURRENT MAJOR DEPRESSIVE DISORDER, IN FULL REMISSION (HCC): Primary | ICD-10-CM

## 2018-05-30 PROCEDURE — 4004F PT TOBACCO SCREEN RCVD TLK: CPT | Performed by: PSYCHIATRY & NEUROLOGY

## 2018-05-30 PROCEDURE — G8417 CALC BMI ABV UP PARAM F/U: HCPCS | Performed by: PSYCHIATRY & NEUROLOGY

## 2018-05-30 PROCEDURE — 99212 OFFICE O/P EST SF 10 MIN: CPT | Performed by: PSYCHIATRY & NEUROLOGY

## 2018-05-30 PROCEDURE — G8428 CUR MEDS NOT DOCUMENT: HCPCS | Performed by: PSYCHIATRY & NEUROLOGY

## 2018-07-30 ENCOUNTER — OFFICE VISIT (OUTPATIENT)
Dept: PSYCHIATRY | Age: 48
End: 2018-07-30
Payer: MEDICARE

## 2018-07-30 DIAGNOSIS — F33.42 RECURRENT MAJOR DEPRESSIVE DISORDER, IN FULL REMISSION (HCC): Primary | ICD-10-CM

## 2018-07-30 PROCEDURE — 99213 OFFICE O/P EST LOW 20 MIN: CPT | Performed by: PSYCHIATRY & NEUROLOGY

## 2018-07-30 PROCEDURE — G8428 CUR MEDS NOT DOCUMENT: HCPCS | Performed by: PSYCHIATRY & NEUROLOGY

## 2018-07-30 PROCEDURE — G8417 CALC BMI ABV UP PARAM F/U: HCPCS | Performed by: PSYCHIATRY & NEUROLOGY

## 2018-07-30 PROCEDURE — 4004F PT TOBACCO SCREEN RCVD TLK: CPT | Performed by: PSYCHIATRY & NEUROLOGY

## 2018-07-30 NOTE — PROGRESS NOTES
Chief Complaint   Patient presents with    Follow-up     2 mos MDD     Layne Eaton returned after two months to follow-up on major depression. She is currently on bupropion  mg daily and Pristiq 100 mg daily. She reports good symptom control, and denies any side effect issues. She does not desire any changes. I checked her genetic testing report and all is well there. Her folate metabolism is normal.  Both drugs are green lighted. We will follow up with her in three months this time. She has ample refills.     Mental Status Examination    Level of consciousness:  within normal limits  Appearance:  well-appearing, street clothes, in chair, good grooming and good hygiene  Behavior/Motor:  no abnormalities noted  Attitude toward examiner:  cooperative, attentive and good eye contact  Speech:  spontaneous, normal rate, normal volume and well articulated  Mood:  euthymic  Affect:  mood congruent  Thought processes:  linear, goal directed and coherent  Thought content:  Homocidal ideation: none  Suicidal Ideation:  denies suicidal ideation  Delusions:  no evidence of delusions  Perceptual Disturbance:  denies any perceptual disturbance  Cognition:  oriented to person, place, and time  Concentration succeeded  Memory intact  Fund of knowledge:  good  Abstract thinking:  good  Insight:  good  Judgment:  good    DIAGNOSTIC IMPRESSION  MDD full remission    Plan  No changes  Current Outpatient Prescriptions   Medication Sig Dispense Refill    desvenlafaxine succinate (PRISTIQ) 100 MG TB24 extended release tablet Take 1 tablet by mouth daily 30 tablet 5    buPROPion (WELLBUTRIN XL) 300 MG extended release tablet Take 1 tablet by mouth every morning These follow completion of the 150's 30 tablet 5    buPROPion (WELLBUTRIN XL) 150 MG extended release tablet Take 1 tablet by mouth every morning 30 tablet 5    ALPRAZolam (XANAX) 0.25 MG tablet TAKE 1 TABLET BY MOUTH TWICE A DAY AS NEEDED FOR ANXIETY  1   

## 2018-10-01 ENCOUNTER — HOSPITAL ENCOUNTER (OUTPATIENT)
Dept: MRI IMAGING | Age: 48
Discharge: HOME | End: 2018-10-01
Payer: MEDICARE

## 2018-10-01 DIAGNOSIS — M54.14 THORACIC NEURITIS: ICD-10-CM

## 2018-10-01 PROCEDURE — 72146 MRI CHEST SPINE W/O DYE: CPT

## 2018-10-01 RX ORDER — DESVENLAFAXINE 100 MG/1
100 TABLET, EXTENDED RELEASE ORAL DAILY
Qty: 30 TABLET | Refills: 5 | Status: SHIPPED | OUTPATIENT
Start: 2018-10-01 | End: 2019-03-30 | Stop reason: SDUPTHER

## 2018-10-30 ENCOUNTER — OFFICE VISIT (OUTPATIENT)
Dept: PSYCHIATRY | Age: 48
End: 2018-10-30
Payer: MEDICARE

## 2018-10-30 DIAGNOSIS — F33.42 RECURRENT MAJOR DEPRESSIVE DISORDER, IN FULL REMISSION (HCC): Primary | ICD-10-CM

## 2018-10-30 PROCEDURE — G8417 CALC BMI ABV UP PARAM F/U: HCPCS | Performed by: PSYCHIATRY & NEUROLOGY

## 2018-10-30 PROCEDURE — 99212 OFFICE O/P EST SF 10 MIN: CPT | Performed by: PSYCHIATRY & NEUROLOGY

## 2018-10-30 PROCEDURE — 4004F PT TOBACCO SCREEN RCVD TLK: CPT | Performed by: PSYCHIATRY & NEUROLOGY

## 2018-10-30 PROCEDURE — G8484 FLU IMMUNIZE NO ADMIN: HCPCS | Performed by: PSYCHIATRY & NEUROLOGY

## 2018-10-30 PROCEDURE — G8428 CUR MEDS NOT DOCUMENT: HCPCS | Performed by: PSYCHIATRY & NEUROLOGY

## 2018-10-30 RX ORDER — BUPROPION HYDROCHLORIDE 150 MG/1
150 TABLET ORAL EVERY MORNING
Qty: 30 TABLET | Refills: 5 | Status: SHIPPED | OUTPATIENT
Start: 2018-10-30 | End: 2019-04-29

## 2018-10-30 RX ORDER — BUPROPION HYDROCHLORIDE 300 MG/1
300 TABLET ORAL EVERY MORNING
Qty: 30 TABLET | Refills: 5 | Status: SHIPPED | OUTPATIENT
Start: 2018-10-30 | End: 2019-04-28 | Stop reason: SDUPTHER

## 2019-01-17 ENCOUNTER — HOSPITAL ENCOUNTER (OUTPATIENT)
Dept: MRI IMAGING | Age: 49
Discharge: HOME | End: 2019-01-17
Payer: MEDICARE

## 2019-01-17 DIAGNOSIS — M48.02 CERVICAL SPINAL STENOSIS: ICD-10-CM

## 2019-01-17 PROCEDURE — 72141 MRI NECK SPINE W/O DYE: CPT

## 2019-02-12 ENCOUNTER — OFFICE VISIT (OUTPATIENT)
Dept: PSYCHIATRY | Age: 49
End: 2019-02-12
Payer: MEDICARE

## 2019-02-12 DIAGNOSIS — F33.42 RECURRENT MAJOR DEPRESSIVE DISORDER, IN FULL REMISSION (HCC): Primary | ICD-10-CM

## 2019-02-12 PROCEDURE — G8484 FLU IMMUNIZE NO ADMIN: HCPCS | Performed by: PSYCHIATRY & NEUROLOGY

## 2019-02-12 PROCEDURE — G8428 CUR MEDS NOT DOCUMENT: HCPCS | Performed by: PSYCHIATRY & NEUROLOGY

## 2019-02-12 PROCEDURE — G8421 BMI NOT CALCULATED: HCPCS | Performed by: PSYCHIATRY & NEUROLOGY

## 2019-02-12 PROCEDURE — 4004F PT TOBACCO SCREEN RCVD TLK: CPT | Performed by: PSYCHIATRY & NEUROLOGY

## 2019-02-12 PROCEDURE — 99212 OFFICE O/P EST SF 10 MIN: CPT | Performed by: PSYCHIATRY & NEUROLOGY

## 2019-04-01 RX ORDER — DESVENLAFAXINE 100 MG/1
100 TABLET, EXTENDED RELEASE ORAL DAILY
Qty: 30 TABLET | Refills: 1 | Status: SHIPPED | OUTPATIENT
Start: 2019-04-01 | End: 2019-05-13 | Stop reason: SDUPTHER

## 2019-04-01 NOTE — TELEPHONE ENCOUNTER
[facility] has requested a refill of Pristiq 100mg/d on Esther's behalf. She attended an appointment in the office 2/12 and is scheduled to return 5/13.

## 2019-04-02 ENCOUNTER — OFFICE VISIT (OUTPATIENT)
Dept: FAMILY MEDICINE CLINIC | Age: 49
End: 2019-04-02

## 2019-04-02 VITALS
SYSTOLIC BLOOD PRESSURE: 126 MMHG | BODY MASS INDEX: 37.24 KG/M2 | DIASTOLIC BLOOD PRESSURE: 74 MMHG | WEIGHT: 223.5 LBS | HEART RATE: 84 BPM | RESPIRATION RATE: 16 BRPM | HEIGHT: 65 IN

## 2019-04-02 DIAGNOSIS — S39.012A LOW BACK STRAIN, INITIAL ENCOUNTER: Primary | ICD-10-CM

## 2019-04-02 PROCEDURE — G8417 CALC BMI ABV UP PARAM F/U: HCPCS | Performed by: FAMILY MEDICINE

## 2019-04-02 PROCEDURE — G8427 DOCREV CUR MEDS BY ELIG CLIN: HCPCS | Performed by: FAMILY MEDICINE

## 2019-04-02 PROCEDURE — 99213 OFFICE O/P EST LOW 20 MIN: CPT | Performed by: FAMILY MEDICINE

## 2019-04-02 RX ORDER — TIZANIDINE 4 MG/1
4 TABLET ORAL EVERY 6 HOURS PRN
Qty: 40 TABLET | Refills: 1 | Status: SHIPPED | OUTPATIENT
Start: 2019-04-02

## 2019-04-02 RX ORDER — MELOXICAM 15 MG/1
15 TABLET ORAL DAILY
COMMUNITY

## 2019-04-02 RX ORDER — TRAMADOL HYDROCHLORIDE 50 MG/1
50 TABLET ORAL EVERY 4 HOURS PRN
Qty: 40 TABLET | Refills: 0 | Status: SHIPPED | OUTPATIENT
Start: 2019-04-02 | End: 2019-04-09

## 2019-04-02 ASSESSMENT — ENCOUNTER SYMPTOMS
COUGH: 0
NAUSEA: 0
EYE PAIN: 0
ABDOMINAL PAIN: 0
WHEEZING: 0
CHEST TIGHTNESS: 0
CONSTIPATION: 0
SHORTNESS OF BREATH: 0
SORE THROAT: 0

## 2019-04-02 NOTE — PROGRESS NOTES
Subjective:      Patient ID: Shayy Prather is a 53 y.o. female. Right  Leg pain  And  Pain  Posterior  Leg  2  mths  Ago  But now  More    Right   si and  Hip  Area  And  To    Right  Posterior  Lateral  Thigh to  The  Knee    Only injury can think of is 2 months ago were 9 related to the leg when stopping to avoid falling over a dog on the floor. Since then has had a little bit of some discomfort but this is now   Worsened  to increased pain over the past couple days with no new injury     so some of the pain occurs is just mild without any activity but with increased activities ambulation the pain aches and more of worse pain and sometimes at night she'll get some spasm and discomfort      Disability     For  Neck pain        Leg Pain    The incident occurred 2 days ago. The incident occurred at home. There was no injury mechanism. The pain is present in the right thigh and right hip. The quality of the pain is described as aching. The pain is at a severity of 3/10. The pain is mild. Pertinent negatives include no numbness. Nothing aggravates the symptoms. She has tried nothing for the symptoms. The treatment provided mild relief. Past Medical History:   Diagnosis Date    Anxiety     Arthritis     Chronic gastritis 1/2015    Depression     Deviated septum     GERD (gastroesophageal reflux disease)     chronic    Hiatal hernia 2009    Ovarian cyst     PONV (postoperative nausea and vomiting)     Pulmonary nodule 10/1/2013    Schwannoma of spinal cord ([facility] [address] 9/17/2013       Review of Systems   Constitutional: Negative for appetite change, fatigue and fever. HENT: Negative for congestion, ear pain, postnasal drip and sore throat. Eyes: Negative for pain and visual disturbance. Respiratory: Negative for cough, chest tightness, shortness of breath and wheezing. Cardiovascular: Negative for chest pain, palpitations and leg swelling.    Gastrointestinal: Negative for abdominal pain, constipation and nausea. Genitourinary: Negative for dysuria and frequency. Musculoskeletal: Negative for arthralgias, joint swelling, neck pain and neck stiffness. Skin: Negative for rash. Neurological: Negative for dizziness, weakness, numbness and headaches. Hematological: Negative for adenopathy. Does not bruise/bleed easily. Psychiatric/Behavioral: Negative for behavioral problems and sleep disturbance. The patient is not nervous/anxious. /74 (Site: Right Upper Arm, Position: Sitting, Cuff Size: Medium Adult)   Pulse 84   Resp 16   Ht 5' 5\" (1.651 m)   Wt 223 lb 8 oz (101.4 kg)   BMI 37.19 kg/m²   Objective:   Physical Exam   Constitutional: She is oriented to person, place, and time. She appears well-developed and well-nourished. HENT:   Head: Normocephalic and atraumatic. Right Ear: External ear normal.   Left Ear: External ear normal.   Nose: Nose normal.   Mouth/Throat: Oropharynx is clear and moist.   Eyes: Pupils are equal, round, and reactive to light. Conjunctivae and EOM are normal. No scleral icterus. Neck: Normal range of motion. Neck supple. No JVD present. No thyromegaly present. Cardiovascular: Normal rate, regular rhythm, normal heart sounds and intact distal pulses. Pulmonary/Chest: Effort normal and breath sounds normal. She has no wheezes. She has no rales. Abdominal: Soft. Bowel sounds are normal. She exhibits no distension and no mass. There is no tenderness. Musculoskeletal: Normal range of motion. She exhibits no tenderness. Lumbar back: She exhibits pain and abnormal pulse. She exhibits normal range of motion and no tenderness. Back:    Lymphadenopathy:     She has no cervical adenopathy. Neurological: She is alert and oriented to person, place, and time. She has normal reflexes. No cranial nerve deficit. Skin: Skin is warm and dry. No rash noted. Psychiatric: She has a normal mood and affect.    Nursing note and vitals reviewed. Assessment:       Diagnosis Orders   1. Low back strain, initial encounter           Plan:      No orders of the defined types were placed in this encounter. Current Outpatient Medications   Medication Sig Dispense Refill    meloxicam (MOBIC) 15 MG tablet Take 15 mg by mouth daily      tiZANidine (ZANAFLEX) 4 MG tablet Take 1 tablet by mouth every 6 hours as needed (muscle spasm) 40 tablet 1    traMADol (ULTRAM) 50 MG tablet Take 1 tablet by mouth every 4 hours as needed for Pain for up to 7 days. 40 tablet 0    desvenlafaxine succinate (PRISTIQ) 100 MG TB24 extended release tablet TAKE 1 TABLET BY MOUTH DAILY 30 tablet 1    buPROPion (WELLBUTRIN XL) 300 MG extended release tablet Take 1 tablet by mouth every morning These follow completion of the 150's 30 tablet 5    buPROPion (WELLBUTRIN XL) 150 MG extended release tablet Take 1 tablet by mouth every morning 30 tablet 5    ALPRAZolam (XANAX) 0.25 MG tablet TAKE 1 TABLET BY MOUTH TWICE A DAY AS NEEDED FOR ANXIETY  1    gabapentin (NEURONTIN) 400 MG capsule TAKE 1 CAPSULE BY MOUTH 3 TIMES A DAY  2     No current facility-administered medications for this visit. Brynn Mike is ice to the back then heat. We added insulin tramadol with the of muscle relaxer already on Mobic but should hold down  No results found for this visit on 04/02/19.   Latrelle Merlin, MD

## 2019-04-29 RX ORDER — BUPROPION HYDROCHLORIDE 300 MG/1
300 TABLET ORAL EVERY MORNING
Qty: 30 TABLET | Refills: 5 | Status: SHIPPED | OUTPATIENT
Start: 2019-04-29 | End: 2019-10-22 | Stop reason: SDUPTHER

## 2019-04-29 NOTE — TELEPHONE ENCOUNTER
[facility] is requesting a medication refill for Wellbutrin XL 300mg ER; #30 with 5 refills;last with a start date of 10/30/18 and last refill date of 03/31/19. Patient's last completed appt was on 02/12/19 to return on 05/13/19.     Medication is pending your approval.

## 2019-05-13 ENCOUNTER — OFFICE VISIT (OUTPATIENT)
Dept: PSYCHIATRY | Age: 49
End: 2019-05-13
Payer: MEDICARE

## 2019-05-13 DIAGNOSIS — F33.42 RECURRENT MAJOR DEPRESSIVE DISORDER, IN FULL REMISSION (HCC): Primary | ICD-10-CM

## 2019-05-13 PROCEDURE — 4004F PT TOBACCO SCREEN RCVD TLK: CPT | Performed by: PSYCHIATRY & NEUROLOGY

## 2019-05-13 PROCEDURE — G8417 CALC BMI ABV UP PARAM F/U: HCPCS | Performed by: PSYCHIATRY & NEUROLOGY

## 2019-05-13 PROCEDURE — 99212 OFFICE O/P EST SF 10 MIN: CPT | Performed by: PSYCHIATRY & NEUROLOGY

## 2019-05-13 PROCEDURE — G8428 CUR MEDS NOT DOCUMENT: HCPCS | Performed by: PSYCHIATRY & NEUROLOGY

## 2019-05-13 RX ORDER — BUPROPION HYDROCHLORIDE 150 MG/1
150 TABLET ORAL EVERY MORNING
Qty: 30 TABLET | Refills: 5 | Status: SHIPPED | OUTPATIENT
Start: 2019-05-13 | End: 2019-11-13 | Stop reason: SDUPTHER

## 2019-05-13 RX ORDER — DESVENLAFAXINE 100 MG/1
100 TABLET, EXTENDED RELEASE ORAL DAILY
Qty: 30 TABLET | Refills: 5 | Status: SHIPPED | OUTPATIENT
Start: 2019-05-13 | End: 2019-11-13 | Stop reason: SDUPTHER

## 2019-05-13 NOTE — PROGRESS NOTES
Chief Complaint   Patient presents with    3 Month Follow-Up     MDD     Cristy Wheat returned after three months to follow-up on major depression. This time she was accompanied by her 9 month old grandson Tashia Fairbanks. She explained that his mother has been hospitalized with hyperemesis gravidarum, and is dehydrated. She is going today to explore whether she's having a gallbladder problem. She's been on IV fluids etc.    Cristy Wheat is appropriately concerned and worried about her. Those are the only symptoms that she's having however. There is no sign of any depression outside of these worries. She did not desire any changes. She was given appropriate refills. Mental Status Examination    Level of consciousness:  within normal limits  Appearance:  well-appearing, street clothes, in chair, good grooming and good hygiene  Behavior/Motor:  no abnormalities noted  Attitude toward examiner:  cooperative, attentive and good eye contact  Speech:  spontaneous, normal rate, normal volume and well articulated  Mood:  euthymic  Affect:  mood congruent  Thought processes:  linear, goal directed and coherent  Thought content:  Homocidal ideation: none  Suicidal Ideation:  denies suicidal ideation  Delusions:  no evidence of delusions  Perceptual Disturbance:  denies any perceptual disturbance  Cognition:  oriented to person, place, and time  Concentration succeeded  Memory intact  Fund of knowledge:  good  Abstract thinking:  good  Insight:  good  Judgment:  good    DIAGNOSTIC IMPRESSION  MDD full remission    Plan  No changes  Current Outpatient Medications   Medication Sig Dispense Refill    buPROPion (WELLBUTRIN XL) 150 MG extended release tablet Take 1 tablet by mouth every morning 30 tablet 5    desvenlafaxine succinate (PRISTIQ) 100 MG TB24 extended release tablet Take 1 tablet by mouth daily 30 tablet 5    buPROPion (WELLBUTRIN XL) 300 MG extended release tablet TAKE 1 TABLET BY MOUTH EVERY MORNING.  THESE FOLLOW COMPLETION OF 'S 30 tablet 5    meloxicam (MOBIC) 15 MG tablet Take 15 mg by mouth daily      tiZANidine (ZANAFLEX) 4 MG tablet Take 1 tablet by mouth every 6 hours as needed (muscle spasm) 40 tablet 1    ALPRAZolam (XANAX) 0.25 MG tablet TAKE 1 TABLET BY MOUTH TWICE A DAY AS NEEDED FOR ANXIETY  1    gabapentin (NEURONTIN) 400 MG capsule TAKE 1 CAPSULE BY MOUTH 3 TIMES A DAY  2     No current facility-administered medications for this visit.

## 2019-06-21 ENCOUNTER — OFFICE VISIT (OUTPATIENT)
Dept: FAMILY MEDICINE CLINIC | Age: 49
End: 2019-06-21

## 2019-06-21 ENCOUNTER — TELEPHONE (OUTPATIENT)
Dept: FAMILY MEDICINE CLINIC | Age: 49
End: 2019-06-21

## 2019-06-21 VITALS
HEIGHT: 65 IN | HEART RATE: 80 BPM | WEIGHT: 225.25 LBS | SYSTOLIC BLOOD PRESSURE: 130 MMHG | DIASTOLIC BLOOD PRESSURE: 78 MMHG | RESPIRATION RATE: 18 BRPM | BODY MASS INDEX: 37.53 KG/M2

## 2019-06-21 DIAGNOSIS — Z12.39 BREAST CANCER SCREENING: ICD-10-CM

## 2019-06-21 DIAGNOSIS — G56.92 ENTRAPMENT NEUROPATHY OF LEFT UPPER EXTREMITY: Primary | ICD-10-CM

## 2019-06-21 DIAGNOSIS — F17.200 TOBACCO DEPENDENCE: ICD-10-CM

## 2019-06-21 PROCEDURE — 99213 OFFICE O/P EST LOW 20 MIN: CPT | Performed by: FAMILY MEDICINE

## 2019-06-21 PROCEDURE — G8417 CALC BMI ABV UP PARAM F/U: HCPCS | Performed by: FAMILY MEDICINE

## 2019-06-21 PROCEDURE — G8427 DOCREV CUR MEDS BY ELIG CLIN: HCPCS | Performed by: FAMILY MEDICINE

## 2019-06-21 RX ORDER — VARENICLINE TARTRATE 25 MG
KIT ORAL
Qty: 1 BOX | Refills: 0 | Status: SHIPPED | OUTPATIENT
Start: 2019-06-21 | End: 2019-09-06

## 2019-06-21 RX ORDER — VARENICLINE TARTRATE 1 MG/1
TABLET, FILM COATED ORAL
Qty: 60 TABLET | Refills: 3 | Status: SHIPPED | OUTPATIENT
Start: 2019-06-21 | End: 2019-11-07 | Stop reason: SDUPTHER

## 2019-06-21 ASSESSMENT — ENCOUNTER SYMPTOMS
SINUS PRESSURE: 0
CONSTIPATION: 0
SHORTNESS OF BREATH: 0

## 2019-06-27 ENCOUNTER — HOSPITAL ENCOUNTER (OUTPATIENT)
Dept: WOMENS IMAGING | Age: 49
Discharge: HOME | End: 2019-06-27
Payer: MEDICARE

## 2019-06-27 DIAGNOSIS — Z12.39 BREAST CANCER SCREENING: ICD-10-CM

## 2019-06-27 PROCEDURE — 77063 BREAST TOMOSYNTHESIS BI: CPT

## 2019-07-01 ENCOUNTER — TELEPHONE (OUTPATIENT)
Dept: FAMILY MEDICINE CLINIC | Age: 49
End: 2019-07-01

## 2019-07-01 DIAGNOSIS — R92.1 BREAST CALCIFICATIONS ON MAMMOGRAM: ICD-10-CM

## 2019-07-01 DIAGNOSIS — Z91.89 INCREASED RISK OF BREAST CANCER: Primary | ICD-10-CM

## 2019-07-01 NOTE — TELEPHONE ENCOUNTER
Pt informed. She is willing to get the MRI done. She would like it to be scheduled at [facility]. Suki

## 2019-07-01 NOTE — TELEPHONE ENCOUNTER
----- Message from Nina Joyner MD sent at 6/28/2019  7:10 PM EDT -----  Let her know the radiologist calculated her lifetime risk of developing breast cancer to be 23%. Due to the risk being over 20% he also suggested she have a MRI of the breasts to maybe see any cancer more plainly.

## 2019-07-03 ENCOUNTER — TELEPHONE (OUTPATIENT)
Dept: FAMILY MEDICINE CLINIC | Age: 49
End: 2019-07-03

## 2019-07-09 ENCOUNTER — PROCEDURE VISIT (OUTPATIENT)
Dept: NEUROLOGY | Age: 49
End: 2019-07-09
Payer: MEDICARE

## 2019-07-09 DIAGNOSIS — M54.12 CERVICAL RADICULOPATHY: ICD-10-CM

## 2019-07-09 DIAGNOSIS — R20.0 LEFT ARM NUMBNESS: ICD-10-CM

## 2019-07-09 DIAGNOSIS — M79.602 LEFT ARM PAIN: ICD-10-CM

## 2019-07-09 DIAGNOSIS — M54.2 NECK PAIN: ICD-10-CM

## 2019-07-09 DIAGNOSIS — G56.02 LEFT CARPAL TUNNEL SYNDROME: Primary | ICD-10-CM

## 2019-07-09 PROCEDURE — 95909 NRV CNDJ TST 5-6 STUDIES: CPT | Performed by: PSYCHIATRY & NEUROLOGY

## 2019-07-09 PROCEDURE — 95886 MUSC TEST DONE W/N TEST COMP: CPT | Performed by: PSYCHIATRY & NEUROLOGY

## 2019-07-11 ENCOUNTER — HOSPITAL ENCOUNTER (OUTPATIENT)
Dept: MRI IMAGING | Age: 49
Discharge: HOME | End: 2019-07-11
Payer: MEDICARE

## 2019-07-11 DIAGNOSIS — R92.1 BREAST CALCIFICATIONS ON MAMMOGRAM: ICD-10-CM

## 2019-07-11 DIAGNOSIS — Z91.89 INCREASED RISK OF BREAST CANCER: ICD-10-CM

## 2019-07-11 PROCEDURE — 77049 MRI BREAST C-+ W/CAD BI: CPT

## 2019-07-11 PROCEDURE — 6360000004 HC RX CONTRAST MEDICATION: Performed by: FAMILY MEDICINE

## 2019-07-11 PROCEDURE — A9579 GAD-BASE MR CONTRAST NOS,1ML: HCPCS | Performed by: FAMILY MEDICINE

## 2019-07-11 RX ADMIN — GADOTERIDOL 20 ML: 279.3 INJECTION, SOLUTION INTRAVENOUS at 17:32

## 2019-07-17 DIAGNOSIS — N63.10 BREAST MASS, RIGHT: Primary | ICD-10-CM

## 2019-07-17 DIAGNOSIS — N60.01 BREAST CYST, RIGHT: ICD-10-CM

## 2019-07-18 ENCOUNTER — TELEPHONE (OUTPATIENT)
Dept: FAMILY MEDICINE CLINIC | Age: 49
End: 2019-07-18

## 2019-07-18 NOTE — TELEPHONE ENCOUNTER
If Dr Diya Reed did not make a referral to an orthopedic surgeon I could do so. Does she have a preference of one?  If no preference I would use Dr Miguel Duckworth at [facility]

## 2019-07-25 ENCOUNTER — HOSPITAL ENCOUNTER (OUTPATIENT)
Dept: WOMENS IMAGING | Age: 49
Discharge: HOME | End: 2019-07-25
Payer: MEDICARE

## 2019-07-25 DIAGNOSIS — R92.8 ABNORMAL MRI, BREAST: ICD-10-CM

## 2019-07-25 PROCEDURE — 76642 ULTRASOUND BREAST LIMITED: CPT

## 2019-08-13 ENCOUNTER — OFFICE VISIT (OUTPATIENT)
Dept: PSYCHIATRY | Age: 49
End: 2019-08-13
Payer: MEDICARE

## 2019-08-13 DIAGNOSIS — F33.42 RECURRENT MAJOR DEPRESSIVE DISORDER, IN FULL REMISSION (HCC): Primary | ICD-10-CM

## 2019-08-13 PROCEDURE — G8428 CUR MEDS NOT DOCUMENT: HCPCS | Performed by: PSYCHIATRY & NEUROLOGY

## 2019-08-13 PROCEDURE — 4004F PT TOBACCO SCREEN RCVD TLK: CPT | Performed by: PSYCHIATRY & NEUROLOGY

## 2019-08-13 PROCEDURE — 99212 OFFICE O/P EST SF 10 MIN: CPT | Performed by: PSYCHIATRY & NEUROLOGY

## 2019-08-13 PROCEDURE — G8417 CALC BMI ABV UP PARAM F/U: HCPCS | Performed by: PSYCHIATRY & NEUROLOGY

## 2019-08-13 NOTE — PROGRESS NOTES
Chief Complaint   Patient presents with    3 Month Follow-Up     MDD     [facility] returned after 3 months to follow-up on major depression. In general, she is doing well. She does report that she is being stressed by her daughter. Daughter is pregnant for the second time, and has hyperemesis gravidarum. Her boyfriend is not working, and will not step up and get a job. Therefore the 2 of them have split up and the daughters moved in with [facility], along with her older child. The boyfriend is the father of both the older child and the pregnancy. That is forcing [facility] to relocate herself. There is something that she mentioned with another daughter as well, but I did not quite get a handle on it. In any event she has got her hands full with moving and taking care of her daughters. She is doing reasonably well with the medicine. She does not think it is causing side effects, although she does report some night sweats. However she has been on it for more than a year and is also going through menopause now, and she is not suspicious of the medicine causing the sweats. She has ample supplies for the time being. She will need the Wellbutrin 300s in a few months.     Mental Status Examination    Level of consciousness:  within normal limits  Appearance:  well-appearing, street clothes, in chair, good grooming and good hygiene  Behavior/Motor:  no abnormalities noted  Attitude toward examiner:  cooperative, attentive and good eye contact  Speech:  spontaneous, normal rate, normal volume and well articulated  Mood:  euthymic  Affect:  mood congruent  Thought processes:  linear, goal directed and coherent  Thought content:  Homocidal ideation: none  Suicidal Ideation:  denies suicidal ideation  Delusions:  no evidence of delusions  Perceptual Disturbance:  denies any perceptual disturbance  Cognition:  oriented to person, place, and time  Concentration succeeded  Memory intact  Fund of knowledge:  good  Abstract

## 2019-09-06 ENCOUNTER — OFFICE VISIT (OUTPATIENT)
Dept: FAMILY MEDICINE CLINIC | Age: 49
End: 2019-09-06

## 2019-09-06 VITALS
DIASTOLIC BLOOD PRESSURE: 76 MMHG | SYSTOLIC BLOOD PRESSURE: 128 MMHG | HEART RATE: 84 BPM | WEIGHT: 230.25 LBS | BODY MASS INDEX: 38.36 KG/M2 | HEIGHT: 65 IN | RESPIRATION RATE: 16 BRPM

## 2019-09-06 DIAGNOSIS — K21.9 GASTROESOPHAGEAL REFLUX DISEASE WITHOUT ESOPHAGITIS: ICD-10-CM

## 2019-09-06 DIAGNOSIS — S29.011A CHEST WALL MUSCLE STRAIN, INITIAL ENCOUNTER: Primary | ICD-10-CM

## 2019-09-06 PROCEDURE — 99213 OFFICE O/P EST LOW 20 MIN: CPT | Performed by: FAMILY MEDICINE

## 2019-09-06 PROCEDURE — G8427 DOCREV CUR MEDS BY ELIG CLIN: HCPCS | Performed by: FAMILY MEDICINE

## 2019-09-06 PROCEDURE — 1036F TOBACCO NON-USER: CPT | Performed by: FAMILY MEDICINE

## 2019-09-06 PROCEDURE — G8417 CALC BMI ABV UP PARAM F/U: HCPCS | Performed by: FAMILY MEDICINE

## 2019-09-06 RX ORDER — TRAMADOL HYDROCHLORIDE 50 MG/1
50 TABLET ORAL EVERY 4 HOURS PRN
Qty: 40 TABLET | Refills: 0 | Status: SHIPPED | OUTPATIENT
Start: 2019-09-06 | End: 2019-09-13

## 2019-09-06 ASSESSMENT — ENCOUNTER SYMPTOMS
CHEST TIGHTNESS: 0
WHEEZING: 0
CONSTIPATION: 0
ABDOMINAL PAIN: 0
SORE THROAT: 0
EYE PAIN: 0
NAUSEA: 0
SHORTNESS OF BREATH: 0
COUGH: 0

## 2019-10-21 ENCOUNTER — TELEPHONE (OUTPATIENT)
Dept: FAMILY MEDICINE CLINIC | Age: 49
End: 2019-10-21

## 2019-10-22 ENCOUNTER — HOSPITAL ENCOUNTER (OUTPATIENT)
Dept: MRI IMAGING | Age: 49
Discharge: HOME | End: 2019-10-22
Payer: MEDICARE

## 2019-10-22 DIAGNOSIS — M54.14 THORACIC NEURITIS: ICD-10-CM

## 2019-10-22 PROCEDURE — 72146 MRI CHEST SPINE W/O DYE: CPT

## 2019-11-07 DIAGNOSIS — F17.200 TOBACCO DEPENDENCE: ICD-10-CM

## 2019-11-07 RX ORDER — VARENICLINE TARTRATE 1 MG/1
TABLET, FILM COATED ORAL
Qty: 60 TABLET | Refills: 3 | Status: SHIPPED | OUTPATIENT
Start: 2019-11-07 | End: 2019-11-12 | Stop reason: SDUPTHER

## 2019-11-12 DIAGNOSIS — F17.200 TOBACCO DEPENDENCE: ICD-10-CM

## 2019-11-12 RX ORDER — VARENICLINE TARTRATE 1 MG/1
TABLET, FILM COATED ORAL
Qty: 60 TABLET | Refills: 3 | Status: SHIPPED | OUTPATIENT
Start: 2019-11-12 | End: 2020-07-30 | Stop reason: ALTCHOICE

## 2019-11-13 ENCOUNTER — OFFICE VISIT (OUTPATIENT)
Dept: PSYCHIATRY | Age: 49
End: 2019-11-13
Payer: MEDICARE

## 2019-11-13 DIAGNOSIS — F33.1 MODERATE EPISODE OF RECURRENT MAJOR DEPRESSIVE DISORDER (HCC): Primary | ICD-10-CM

## 2019-11-13 PROCEDURE — G8417 CALC BMI ABV UP PARAM F/U: HCPCS | Performed by: PSYCHIATRY & NEUROLOGY

## 2019-11-13 PROCEDURE — G8484 FLU IMMUNIZE NO ADMIN: HCPCS | Performed by: PSYCHIATRY & NEUROLOGY

## 2019-11-13 PROCEDURE — 99213 OFFICE O/P EST LOW 20 MIN: CPT | Performed by: PSYCHIATRY & NEUROLOGY

## 2019-11-13 PROCEDURE — G8428 CUR MEDS NOT DOCUMENT: HCPCS | Performed by: PSYCHIATRY & NEUROLOGY

## 2019-11-13 PROCEDURE — 1036F TOBACCO NON-USER: CPT | Performed by: PSYCHIATRY & NEUROLOGY

## 2019-11-13 RX ORDER — BUPROPION HYDROCHLORIDE 300 MG/1
300 TABLET ORAL EVERY MORNING
Qty: 30 TABLET | Refills: 5 | Status: SHIPPED | OUTPATIENT
Start: 2019-11-13 | End: 2020-04-21 | Stop reason: SDUPTHER

## 2019-11-13 RX ORDER — DESVENLAFAXINE 100 MG/1
100 TABLET, EXTENDED RELEASE ORAL DAILY
Qty: 30 TABLET | Refills: 5 | Status: SHIPPED | OUTPATIENT
Start: 2019-11-13 | End: 2020-04-21 | Stop reason: SDUPTHER

## 2019-11-13 RX ORDER — BUPROPION HYDROCHLORIDE 150 MG/1
150 TABLET ORAL EVERY MORNING
Qty: 30 TABLET | Refills: 5 | Status: SHIPPED | OUTPATIENT
Start: 2019-11-13 | End: 2020-04-21 | Stop reason: SDUPTHER

## 2019-11-18 ENCOUNTER — TELEPHONE (OUTPATIENT)
Dept: FAMILY MEDICINE CLINIC | Age: 49
End: 2019-11-18

## 2019-11-18 DIAGNOSIS — G56.92 ENTRAPMENT NEUROPATHY OF LEFT UPPER EXTREMITY: Primary | ICD-10-CM

## 2019-12-12 ENCOUNTER — OFFICE VISIT (OUTPATIENT)
Dept: PSYCHIATRY | Age: 49
End: 2019-12-12
Payer: MEDICARE

## 2019-12-12 DIAGNOSIS — F33.0 MILD EPISODE OF RECURRENT MAJOR DEPRESSIVE DISORDER (HCC): Primary | ICD-10-CM

## 2019-12-12 PROCEDURE — 1036F TOBACCO NON-USER: CPT | Performed by: PSYCHIATRY & NEUROLOGY

## 2019-12-12 PROCEDURE — G8484 FLU IMMUNIZE NO ADMIN: HCPCS | Performed by: PSYCHIATRY & NEUROLOGY

## 2019-12-12 PROCEDURE — G8417 CALC BMI ABV UP PARAM F/U: HCPCS | Performed by: PSYCHIATRY & NEUROLOGY

## 2019-12-12 PROCEDURE — G8428 CUR MEDS NOT DOCUMENT: HCPCS | Performed by: PSYCHIATRY & NEUROLOGY

## 2019-12-12 PROCEDURE — 99212 OFFICE O/P EST SF 10 MIN: CPT | Performed by: PSYCHIATRY & NEUROLOGY

## 2020-01-06 NOTE — TELEPHONE ENCOUNTER
Marcio Burgos called the office to request a refill of Rexulti. She states that she was previously taking Rexulti 0.5mg/d and was recently increased on 12/12 to 1mg utilizing her existing supply. She tried to obtain a refill from the pharmacy but they told her it was too soon to fill. She is asking for a script for the current dose. According to Epic, she was to call the office after the increase to see how she was doing. She does find the increase to 1mg helpful but feels that there is room for improvement. She is scheduled to return March 12th. Medication has not yet been loaded until further direction from you.
parents

## 2020-02-28 ENCOUNTER — OFFICE VISIT (OUTPATIENT)
Dept: FAMILY MEDICINE CLINIC | Age: 50
End: 2020-02-28

## 2020-02-28 VITALS
HEIGHT: 63 IN | DIASTOLIC BLOOD PRESSURE: 84 MMHG | HEART RATE: 68 BPM | SYSTOLIC BLOOD PRESSURE: 138 MMHG | WEIGHT: 249.5 LBS | RESPIRATION RATE: 16 BRPM | BODY MASS INDEX: 44.21 KG/M2

## 2020-02-28 PROCEDURE — G8427 DOCREV CUR MEDS BY ELIG CLIN: HCPCS | Performed by: FAMILY MEDICINE

## 2020-02-28 PROCEDURE — 1036F TOBACCO NON-USER: CPT | Performed by: FAMILY MEDICINE

## 2020-02-28 PROCEDURE — 99213 OFFICE O/P EST LOW 20 MIN: CPT | Performed by: FAMILY MEDICINE

## 2020-02-28 PROCEDURE — G8417 CALC BMI ABV UP PARAM F/U: HCPCS | Performed by: FAMILY MEDICINE

## 2020-02-28 NOTE — PATIENT INSTRUCTIONS
Use two tablespoons of a liquid antacid and hold it in the mouth as long as possible then spit  Use the antacid four times a day  Let me know if the soreness continues another week

## 2020-03-01 ASSESSMENT — ENCOUNTER SYMPTOMS
SINUS PRESSURE: 0
SHORTNESS OF BREATH: 0
CONSTIPATION: 0
RHINORRHEA: 1

## 2020-03-04 NOTE — TELEPHONE ENCOUNTER
[facility] a [facility] for Rexulti 2mg;#30 with 1 refill;last with a start date of 01/07/20 and last refill date of 02/10/20. Medication is loaded pending your approval for a 30 day supply with 1 refill. This medication should last until the original provider is in the office. Patient does not return until 03/12/20.

## 2020-03-10 ENCOUNTER — TELEPHONE (OUTPATIENT)
Dept: PSYCHIATRY | Age: 50
End: 2020-03-10

## 2020-03-16 RX ORDER — BREXPIPRAZOLE 2 MG/1
TABLET ORAL
Qty: 30 TABLET | Refills: 1 | Status: SHIPPED | OUTPATIENT
Start: 2020-03-16 | End: 2020-05-26 | Stop reason: SINTOL

## 2020-03-16 NOTE — TELEPHONE ENCOUNTER
Per Dr. Dariana Roa could do it, but so could the smoking change.  She can try stopping the rexulti to see how it is without

## 2020-04-21 RX ORDER — DESVENLAFAXINE 100 MG/1
100 TABLET, EXTENDED RELEASE ORAL DAILY
Qty: 30 TABLET | Refills: 1 | Status: SHIPPED | OUTPATIENT
Start: 2020-04-21 | End: 2020-05-26 | Stop reason: SDUPTHER

## 2020-04-21 RX ORDER — BUPROPION HYDROCHLORIDE 300 MG/1
300 TABLET ORAL EVERY MORNING
Qty: 30 TABLET | Refills: 5 | Status: SHIPPED | OUTPATIENT
Start: 2020-04-21 | End: 2021-02-18 | Stop reason: SDUPTHER

## 2020-04-21 RX ORDER — BUPROPION HYDROCHLORIDE 150 MG/1
150 TABLET ORAL EVERY MORNING
Qty: 30 TABLET | Refills: 5 | Status: SHIPPED | OUTPATIENT
Start: 2020-04-21 | End: 2021-01-11 | Stop reason: SDUPTHER

## 2020-05-26 ENCOUNTER — VIRTUAL VISIT (OUTPATIENT)
Dept: PSYCHIATRY | Age: 50
End: 2020-05-26
Payer: MEDICARE

## 2020-05-26 PROCEDURE — 99213 OFFICE O/P EST LOW 20 MIN: CPT | Performed by: PSYCHIATRY & NEUROLOGY

## 2020-05-26 PROCEDURE — G8428 CUR MEDS NOT DOCUMENT: HCPCS | Performed by: PSYCHIATRY & NEUROLOGY

## 2020-05-26 PROCEDURE — G8417 CALC BMI ABV UP PARAM F/U: HCPCS | Performed by: PSYCHIATRY & NEUROLOGY

## 2020-05-26 PROCEDURE — 1036F TOBACCO NON-USER: CPT | Performed by: PSYCHIATRY & NEUROLOGY

## 2020-05-26 RX ORDER — DESVENLAFAXINE 100 MG/1
100 TABLET, EXTENDED RELEASE ORAL DAILY
Qty: 30 TABLET | Refills: 5 | Status: SHIPPED | OUTPATIENT
Start: 2020-05-26 | End: 2020-11-06

## 2020-05-26 NOTE — PROGRESS NOTES
Chief Complaint   Patient presents with    6 Month Follow-Up     MDD     Morenita Bass is a 51 y.o. female being evaluated by a Virtual Visit (video visit) encounter to address concerns as mentioned above. A caregiver was present when appropriate. Due to this being a TeleHealth encounter (During SSQWX-00 public health emergency), evaluation of the following organ systems was limited: Vitals/Constitutional/EENT/Resp/CV/GI//MS/Neuro/Skin/Heme-Lymph-Imm. Pursuant to the emergency declaration under the [facility], [address] [place] [address] authority and the [employer] Act, this Virtual Visit was conducted with patient's (and/or legal guardian's) consent, to reduce the patient's risk of exposure to COVID-19 and provide necessary medical care. The patient (and/or legal guardian) has also been advised to contact this office for worsening conditions or problems, and seek emergency medical treatment and/or call 911 if deemed necessary. Patient identification was verified at the start of the visit: Yes    Total time spent for this encounter: Not billed by time    Services were provided through a video synchronous discussion virtually to substitute for in-person clinic visit. Patient and provider were located at their individual homes. --Isaias Nava MD on 5/26/2020 at 11:28 AM    An electronic signature was used to authenticate this note. Chema was interviewed on video hookup for this appointment. She says that she is doing well. The virus restrictions are not bothering her very much. Her mood is good, and she is not having much in the way of anxiety. She did think that she was out of bupropion. However the chart shows that 6-month supply was put in in April so I told her to check with the pharmacy. She may be looking at an old bottle. If they do not verify that she can call us or they can.   She is also taking

## 2020-07-03 ENCOUNTER — HOSPITAL ENCOUNTER (OUTPATIENT)
Dept: MRI IMAGING | Age: 50
Discharge: HOME | End: 2020-07-03
Payer: MEDICARE

## 2020-07-03 PROCEDURE — 6360000004 HC RX CONTRAST MEDICATION: Performed by: NURSE PRACTITIONER

## 2020-07-03 PROCEDURE — A9579 GAD-BASE MR CONTRAST NOS,1ML: HCPCS | Performed by: NURSE PRACTITIONER

## 2020-07-03 PROCEDURE — 77049 MRI BREAST C-+ W/CAD BI: CPT

## 2020-07-03 RX ADMIN — GADOTERIDOL 20 ML: 279.3 INJECTION, SOLUTION INTRAVENOUS at 10:32

## 2020-07-23 ENCOUNTER — HOSPITAL ENCOUNTER (OUTPATIENT)
Dept: WOMENS IMAGING | Age: 50
Discharge: HOME | End: 2020-07-23
Payer: MEDICARE

## 2020-07-23 ENCOUNTER — HOSPITAL ENCOUNTER (OUTPATIENT)
Dept: MRI IMAGING | Age: 50
Discharge: HOME | End: 2020-07-23
Payer: MEDICARE

## 2020-07-23 PROCEDURE — 19085 BX BREAST 1ST LESION MR IMAG: CPT

## 2020-07-23 PROCEDURE — A9579 GAD-BASE MR CONTRAST NOS,1ML: HCPCS | Performed by: OBSTETRICS & GYNECOLOGY

## 2020-07-23 PROCEDURE — 88305 TISSUE EXAM BY PATHOLOGIST: CPT

## 2020-07-23 PROCEDURE — 6360000004 HC RX CONTRAST MEDICATION: Performed by: OBSTETRICS & GYNECOLOGY

## 2020-07-23 PROCEDURE — 77065 DX MAMMO INCL CAD UNI: CPT

## 2020-07-23 RX ADMIN — GADOTERIDOL 20 ML: 279.3 INJECTION, SOLUTION INTRAVENOUS at 10:26

## 2020-07-24 ENCOUNTER — CLINICAL DOCUMENTATION (OUTPATIENT)
Dept: WOMENS IMAGING | Age: 50
End: 2020-07-24

## 2020-07-24 NOTE — PROGRESS NOTES
Contact Type: [facility]    Contact Information: Arrived by herself for biopsy results. Dr. Ori Calloway discussed pathology and recommends excisional biopsy. All cards given for surgeons. Diagnosis: Papilloma     Requests Referral to Doctor(s) with appointment(s): Dr. Melissa Foss - office was closed so I left a message with Sudhir Murillo that the patient would be calling Monday for an appointment. I will follow up next week to make sure appointment was made. Additional Referral(s): Abdoulaye Padilla/Sandrita Grey: [facility],        Biopsy site status: Doing fine    Updated Tyrer-Cuzick Score, if applicable: n/a    Teaching sheets provided: Excisional biopsy, Needle localization, Papilloma    Notes: Explained terms doctor and navigators will discuss at next appointments. Questions addressed and encouraged patient to ask [facility]. Will receive call within 24 hrs. Referral faxed to Navigation. Answered yes to any section on Hereditary Risk Assessment: Yes, she may be interested. I will fax this paperwork to [facility]. Patient stated she would maybe be interested based on her family history and she definitely wants this papilloma out. Additional information: Patient stated based on her family history she definitely wants this papilloma out.

## 2020-07-30 ENCOUNTER — OFFICE VISIT (OUTPATIENT)
Dept: SURGERY | Age: 50
End: 2020-07-30
Payer: MEDICARE

## 2020-07-30 VITALS
OXYGEN SATURATION: 97 % | WEIGHT: 237.7 LBS | BODY MASS INDEX: 39.6 KG/M2 | TEMPERATURE: 97 F | HEART RATE: 110 BPM | SYSTOLIC BLOOD PRESSURE: 138 MMHG | RESPIRATION RATE: 18 BRPM | DIASTOLIC BLOOD PRESSURE: 60 MMHG | HEIGHT: 65 IN

## 2020-07-30 PROCEDURE — G8427 DOCREV CUR MEDS BY ELIG CLIN: HCPCS | Performed by: SURGERY

## 2020-07-30 PROCEDURE — G8417 CALC BMI ABV UP PARAM F/U: HCPCS | Performed by: SURGERY

## 2020-07-30 PROCEDURE — 99203 OFFICE O/P NEW LOW 30 MIN: CPT | Performed by: SURGERY

## 2020-07-30 NOTE — PATIENT INSTRUCTIONS
Patient Education        Lumpectomy: Before Your Surgery  What is a lumpectomy? A lumpectomy is surgery to remove cancer from the breast. Your doctor will make a small cut (incision) and take out the cancer. The whole breast will not be removed. The doctor will try to also take a small amount of normal tissue around the cancer. This is known as \"getting clear margins. \" Some people will need another surgery to be sure the margins are clear. The doctor may also check the nearby lymph nodes during the surgery. After a lumpectomy, you will probably go home the same day. Most people can go back to work or their normal routine in 1 to 3 weeks. This depends on how you feel. It also depends on the type of work you do and whether you need more treatment. This may include radiation or chemotherapy. Most people who have a lumpectomy for cancer also get radiation treatment. The surgery will leave scars. Sometimes it leaves a dent in the breast too. Most women will look normal in a bra. But your breasts may not match in size or shape after surgery. This depends on the size of your breasts. It also depends on how much tissue was removed. When you find out that you have cancer, you may feel many emotions and may need some help coping. Seek out family, friends, and counselors for support. You also can do things at home to make yourself feel better while you go through treatment. Call the [facility] (1-848.195.9731) or visit its website at [address] [employer] for more information. Follow-up care is a key part of your treatment and safety. Be sure to make and go to all appointments, and call your doctor if you are having problems. It's also a good idea to know your test results and keep a list of the medicines you take. How do you prepare for surgery? Surgery can be stressful. This information will help you understand what you can expect. And it will help you safely prepare for surgery.   Preparing for surgery  · Be sure you have someone to take you home. Anesthesia and pain medicine will make it unsafe for you to drive or get home on your own. · Understand exactly what surgery is planned, along with the risks, benefits, and other options. · If you take aspirin or some other blood thinner, ask your doctor if you should stop taking it before your surgery. Make sure that you understand exactly what your doctor wants you to do. These medicines increase the risk of bleeding. · Tell your doctor ALL the medicines, vitamins, supplements, and herbal remedies you take. Some may increase the risk of problems during your surgery. Your doctor will tell you if you should stop taking any of them before the surgery and how soon to do it. · Make sure your doctor and the hospital have a copy of your advance directive. If you don't have one, you may want to prepare one. It lets others know your health care wishes. It's a good thing to have before any type of surgery or procedure. What happens on the day of surgery? · Follow the instructions exactly about when to stop eating and drinking. If you don't, your surgery may be canceled. If your doctor told you to take your medicines on the day of surgery, take them with only a sip of water. · Take a bath or shower before you come in for your surgery. Do not apply lotions, perfumes, deodorants, or nail polish. · Do not shave the surgical site yourself. · Take off all jewelry and piercings. And take out contact lenses, if you wear them. · Bring a comfortable, supportive bra with you. You will need to wear this all the time, even during the night, for the first week after surgery. At the hospital or surgery center    · Bring a picture ID. · The area for surgery is often marked to make sure there are no errors. If your doctor can't feel the lump, a needle can be put in the suspicious area. This may be done during a mammogram just before surgery.  The needle will guide your doctor. · You will be kept comfortable and safe by your anesthesia provider. The anesthesia may make you sleep. Or it may just numb the area being worked on. · The surgery will take about 1 hour or longer, depending on the size of the lump. When should you call your doctor? · You have questions or concerns. · You don't understand how to prepare for your surgery. · You become ill before the surgery (such as fever, flu, or a cold). · You need to reschedule or have changed your mind about having the surgery. Where can you learn more? Go to https://Medify.[employer]. org and sign in to your Face-Me account. Enter ([phone] in the Krush box to learn more about \"Lumpectomy: Before Your Surgery. \"     If you do not have an account, please click on the \"Sign Up Now\" link. Current as of: August 22, 2019               Content Version: 12.5  © 5081-8052 [employer]wise, Incorporated. Care instructions adapted under license by [facility] ([facility]). If you have questions about a medical condition or this instruction, always ask your healthcare professional. [place] [address] any warranty or liability for your use of this information.

## 2020-07-30 NOTE — LETTER
[address] [facility]  [place] [address] [facility] [address]  Phone: [phone]  Fax: [phone]    Mulu Quiñones MD        July 31, 2020    Patient: Joneen Severs   MR Number: [id number]   YOB: 1970   Date of Visit: 7/30/2020     Dear Dr. Mya Chand,    Thank you for the request for consultation for Joneen Severs to me for the evaluation of right MRI detected breast mass with discordant core biopsy associated papilloma almost 2 cm size of mass. Below are the relevant portions of my assessment and plan of care. 1. Papilloma of right breast    2. Pre-op testing    3. Family history of breast cancer in first degree relative, Mom    4. Right breast mass    1. Schedule patient for needle localization lumpectomy of the right breast  2. Preoperative testing per anesthesia guidelines including COVID-19 screen  3. Risk of procedure including not limited to bleeding, infection, hematoma, seroma as well as potential for malignant diagnoses were all discussed with [facility]. She expressed understanding all her questions answered. 4.  MAC versus general anesthesia. If you have questions, please do not hesitate to call me. I look forward to following Jillian Innocent along with you.     Sincerely,          Mulu Quiñones MD

## 2020-07-30 NOTE — PROGRESS NOTES
Mansi García MD   General Surgery  New Patient Evaluation in Office  Pt Name: Khloe Myeers  Date of Birth 1970   Today's Date: 2020  Medical Record Number: [id number]  Referring Provider: Carlos Asher MD  Primary Care Provider: Radha Gonzales MD  Chief Complaint:  Chief Complaint   Patient presents with    Surgical Consult     New patient ref [address]- right breast papilloma       ASSESSMENT      1. Papilloma of right breast    2. Pre-op testing    3. Family history of breast cancer in first degree relative, Mom    4. Right breast mass     PLANS      1. Schedule patient for needle localization lumpectomy of the right breast  2. Preoperative testing per anesthesia guidelines including COVID-19 screen  3. Risk of procedure including not limited to bleeding, infection, hematoma, seroma as well as potential for malignant diagnoses were all discussed with Chema. She expressed understanding all her questions answered. 4.  MAC versus general anesthesia. Brenda Menchaca is a 51y.o. year old female who is presenting today in the office for evaluation of a papilloma of the right breast associated with MRI detected mass in the right breast on screening. She has dense breasts and her mother was diagnosed with breast cancer at age 28. Her brother  in his early 45s of pancreatic cancer. She is unaware that anyone in her family underwent genetic counseling or testing. With her dense breast she this year underwent MRI screening and was found to have a new mass over 2 cm in size in the right breast per report below. She underwent a MRI guided biopsy which revealed papilloma and hyperplasia without atypia. This was felt to be discordant with imaging findings and excision with needle localization was recommended. Chema has had no breast symptoms. 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 She denies prior breast biopsies.   She has had prior breast reduction surgery. She does have fairly significant hematoma at the biopsy site. G5, P4 she had her children in her 25s. Menarche age 16. She had a hysterectomy for prolapse and menorrhagia in her 35s. Her ovaries remain.   Past Medical History  Past Medical History:   Diagnosis Date    Anxiety     Arthritis     Chronic gastritis 1/2015    Depression     Deviated septum     GERD (gastroesophageal reflux disease)     chronic    Hiatal hernia 2009    Intraductal papilloma of breast 07/2020    right    Numbness and tingling 2007    left leg and foot-chronic    Ovarian cyst     PONV (postoperative nausea and vomiting)     Pulmonary nodule 10/1/2013    Schwannoma of spinal cord ([facility] [address] 9/17/2013       Past Surgical History  Past Surgical History:   Procedure Laterality Date    BLADDER SUSPENSION  2006   [facility]  2007    c4-c7 Dr Nicole Doyle COLONOSCOPY  [phone]    Dr Mohan Sinclair  06/16/2006    Dr Yessy Munson  06/16/2006    vaginal hyster,anterior colporrhaphy,monarc suburethral hammock placement,right ovarian cystectomy-Dr Rajan    MRI BREAST BX USING DEVICE RIGHT  7/23/2020    MRI BREAST BX USING DEVICE RIGHT 7/23/2020 [facility]    NERVE BLOCK  4/7/14    [place] REPAIR OF NASAL SEPTUM Bilateral 12/20/2017    SEPTOPLASTY SUBMUCOUS RESECTION - PARTIAL BILATERAL INFERIOR, NASAL ENDOSCOPY WITH AUTUMN BULLOSA RIGHT MIDDLE performed by Cecy Alvarado MD at [address] [place] [address]  2015    Dr Gordon Hernandez       Medications  Current Outpatient Medications   Medication Sig Dispense Refill    Omeprazole Magnesium (PRILOSEC OTC PO) Take by mouth daily      desvenlafaxine succinate (PRISTIQ) 100 MG TB24 extended release tablet Take 1 tablet by mouth daily 30 tablet 5    buPROPion (WELLBUTRIN XL) 300 MG extended release tablet Take 1 tablet by mouth every morning In addition to 150mg dose 30 tablet 5    buPROPion (WELLBUTRIN XL) 150 MG extended release tablet Take 1 tablet by mouth every morning 30 tablet 5    meloxicam (MOBIC) 15 MG tablet Take 15 mg by mouth daily      tiZANidine (ZANAFLEX) 4 MG tablet Take 1 tablet by mouth every 6 hours as needed (muscle spasm) 40 tablet 1    gabapentin (NEURONTIN) 400 MG capsule TAKE 1 CAPSULE BY MOUTH 3 TIMES A DAY  2     No current facility-administered medications for this visit.       Allergies   No Known Allergies    Family History  Family History   Problem Relation Age of Onset    Breast Cancer Mother 28    Lung Cancer Mother     Brain Cancer Mother     High Blood Pressure Father     Diabetes Father     Stroke Father     Cancer Brother         pancreas    Cancer Maternal Grandfather         unsure what type    Breast Cancer Maternal Aunt 34        mets    No Known Problems Sister     No Known Problems Maternal Grandmother     No Known Problems Paternal Grandmother     No Known Problems Paternal Grandfather        SocialHistory  Social History     Socioeconomic History    Marital status:      Spouse name: Not on file    Number of children: 3    Years of education: Not on file    Highest education level: Not on file   Occupational History    Occupation: disabled   Social Needs    Financial resource strain: Not on file    Food insecurity     Worry: Not on file     Inability: Not on file   [employer] needs     Medical: Not on file     Non-medical: Not on file   Tobacco Use    Smoking status: Former Smoker     Packs/day: 0.75     Years: 18.00     Pack years: 13.50     Types: Cigarettes     Start date: 5/1/1989    Smokeless tobacco: Never Used    Tobacco comment: off and on through the years   Substance and Sexual Activity    Alcohol use: Yes     Comment: rarely    Drug use: No    Sexual activity: Yes     Partners: Male   Lifestyle    Physical activity     Days per week: Not on file     Minutes per session: Not on file    Stress: Not on file   Relationships    Social connections     Talks on phone: Not on file     Gets together: Not on file     Attends [employer] service: Not on file     Active member of club or organization: Not on file     Attends meetings of clubs or organizations: Not on file     Relationship status: Not on file    Intimate partner violence     Fear of current or ex partner: Not on file     Emotionally abused: Not on file     Physically abused: Not on file     Forced sexual activity: Not on file   Other Topics Concern    Not on file   Social History Narrative    Not on file           Review of Systems  Review of Systems   Constitutional: Negative for chills, fatigue, fever and unexpected weight change. HENT: Negative for sore throat, trouble swallowing and voice change. Eyes: Negative for visual disturbance. Respiratory: Negative for cough, shortness of breath and wheezing. Cardiovascular: Negative for chest pain and palpitations. Gastrointestinal: Negative for abdominal pain, blood in stool, nausea and vomiting. Endocrine: Negative for cold intolerance, heat intolerance and polydipsia. Genitourinary: Negative for dysuria, flank pain and hematuria. Musculoskeletal: Positive for back pain, neck pain and neck stiffness. Negative for gait problem, joint swelling and myalgias. Skin: Negative for color change and rash. Allergic/Immunologic: Negative for immunocompromised state. Neurological: Positive for tremors and headaches. Negative for dizziness, seizures and speech difficulty. Hematological: Does not bruise/bleed easily. Psychiatric/Behavioral: Negative for behavioral problems, confusion and suicidal ideas.        OBJECTIVE     /60 (Site: Right Upper Arm, Position: Sitting, Cuff Size: Medium Adult)   Pulse 110   Temp 97 °F (36.1 °C) (Temporal)   Resp 18   Ht 5' 5\" (1.651 m)   Wt 237 lb 11.2 oz (107.8 kg)   LMP 14 2016     2016    K 4.4 2016     2016    CREATININE 0.5 2016    BUN 15 2016    CO2 23 2016    TSH 0.759 2016       Performed by: [facility]      FLACO HURST                   [id number]   Assoc.                                              Page 1 of 8 [phone]   [facility] [place], [place] [address]                                                       PROC: 2020   NV[facility]/. [facility]                                    RECV: 2020   [address]                                    RPTD: 2020   [place], [place] [address]                       MRN:  [id number]    LOC: MRI                       ACCT: [de-identified]  SEX: F                       : 1970  AGE: 51 Y                          PATHOLOGY REPORT                       ATTN: JOANNA OTTO                       REQ: Kim Cabrera         Clinical Information: ABNORMAL MRI, BREAST     FINAL DIAGNOSIS:   Right breast, biopsy:    Intraductal papilloma with abundant usual ductal hyperplasia.     Surrounding proliferative fibrocystic changes including stromal       fibrosis, microcyst formation, usual ductal hyperplasia, and       apocrine metaplasia.     Microcalcifications associated with fibrocystic changes.         Negative for atypia and malignancy.      Specimen:   CORE NEEDLE BREAST BIOPSY, RIGHT       Gross Examination:   The container is labeled [address], right breast.  Received in   formalin are multiple fragments of yellow-white fibroadipose tissue and   blood clot aggregating to 2.5 x 1.8 x 0.3 cm.  2 ns.  SMW/DKR:[id number]     Fixative:  10% neutral buffered formalin   Tissue removal time:  10:20   Tissue fixation time:  10:22   Total fixation time: 9 hours 38 minutes     Microscopic Examination:   Sections show breast tissue with proliferative fibrocystic changes   including stromal fibrosis, microcyst formation, usual ductal   hyperplasia, and apocrine metaplasia.  An intraductal papilloma with   abundant usual ductal hyperplasia is present. Andrews Comal are   microcalcifications associated with the fibrocystic changes. Andrews Comal is   no evidence of atypia or malignancy. Dr. Monica Jacobson has reviewed   this case and agrees with the diagnosis. [facility]                                        <Sign Out Dr. Beverly Coy M.D., F.C.A.P. NVML/ [address] [facility]  Printed on:  7/24/2020   [facility]              #[id number] - MRI BREAST BILATERAL W WO CONTRAST    BREAST MRI OF THE RIGHT BREAST : 7/3/2020    CLINICAL: Family history of breast ca; bilateral dense breasts.                Interpretation of this MRI was correlated with available    mammograms and previous MRI scans.         Axial and coronal T2 STIR, sagittal T1, axial T1 fat saturated    pre and post contrast dynamic imaging was performed of both    breasts.  Images were reviewed at a [employer] 3D    workstation and time intensity curves were analyzed.         There is mild background enhancement. The breast parenchyma has    scattered fibroglandular elements.         There is a 1.7 cm x 2.5 cm area in the right breast upper inner    quadrant anterior depth 4.7 cm from the nipple, 3.3 cm from the    skin, and 12 cm from the chest wall.  This shows non-mass like    and homogeneous enhancement and delayed plateau type vascular    enhancement.  The abnormality is best seen on the post contrast    subtraction axial 70 slice.           On the left, there are no areas of suspicious enhancement.  There     is no suspicious morphology.  No dominant lesions are present.         There are scattered small foci of enhancement bilaterally which    appear otherwise similar when compared to prior examination dated     7/11/2019.  These stable small foci of enhancement likely    represent fibrocystic change.         No acute osseous findings are demonstrated. No lymphadenopathy is     seen.           There are 2 hyperintense T2-weighted lesions demonstrated within    the right hepatic lobe seen on coronal T2-weighted series 2    images 26 and image 25. The largest of these measures    approximately 1.6 cm in diameter. However this is incompletely    visualized. Further characterization could be obtained with liver     ultrasound or liver lesion protocol CT imaging. These do not    appear significantly changed when compared to the prior MRI    examination.         IMPRESSION: INCOMPLETE: NEEDS ADDITIONAL IMAGING    1. The 1.7 cm x 2.5 cm area in the right breast appears to have    an intermediate suspicion for malignancy.  An MRI guided biopsy    is recommended.           2. There are 2 hyperintense T2-weighted lesions demonstrated    within the right hepatic lobe seen on coronal T2-weighted series    2 images 26 and image 25. The largest of these measures    approximately 1.6 cm in diameter. However this is incompletely    visualized. Further characterization could be obtained with liver     ultrasound or liver lesion protocol CT imaging.  These do not    appear significantly changed when compared to the prior MRI    examination.              The patient has been or will be contacted. Nahomy Hernandez M.D.      rd/:7/3/2020 14:29:21      copy to: Marla Scruggs CNP, [facility],    fax: [phone]         Imaging Technologist: Chon LEMOS(R)(MR), [address]    letter sent: Additional Tests Needed      MRI BI-RADS: 0: Incomplete: Needs Additional Imaging   [id number]                        #[id number] - MRI BREAST BILATERAL W WO CONTRAST    BREAST MRI OF THE RIGHT BREAST : 7/3/2020    CLINICAL: Family history of breast ca; bilateral dense breasts.                Interpretation of this MRI was correlated with available    mammograms and previous MRI scans.         Axial and coronal T2 STIR, sagittal T1, axial T1 fat saturated    pre and post contrast dynamic imaging was performed of both    breasts.  Images were reviewed at a [email or web address] 3D    workstation and time intensity curves were analyzed.         There is mild background enhancement. The breast parenchyma has    scattered fibroglandular elements.         There is a 1.7 cm x 2.5 cm area in the right breast upper inner    quadrant anterior depth 4.7 cm from the nipple, 3.3 cm from the    skin, and 12 cm from the chest wall.  This shows non-mass like    and homogeneous enhancement and delayed plateau type vascular    enhancement.  The abnormality is best seen on the post contrast    subtraction axial 70 slice.           On the left, there are no areas of suspicious enhancement.  There     is no suspicious morphology.  No dominant lesions are present.         There are scattered small foci of enhancement bilaterally which    appear otherwise similar when compared to prior examination dated     7/11/2019. These stable small foci of enhancement likely    represent fibrocystic change.         No acute osseous findings are demonstrated. No lymphadenopathy is     seen.           There are 2 hyperintense T2-weighted lesions demonstrated within    the right hepatic lobe seen on coronal T2-weighted series 2    images 26 and image 25. The largest of these measures    approximately 1.6 cm in diameter. However this is incompletely    visualized. Further characterization could be obtained with liver     ultrasound or liver lesion protocol CT imaging. These do not    appear significantly changed when compared to the prior MRI    examination.         IMPRESSION: INCOMPLETE: NEEDS ADDITIONAL IMAGING    1. The 1.7 cm x 2.5 cm area in the right breast appears to have    an intermediate suspicion for malignancy.  An MRI guided biopsy    is recommended.           2.  There are 2 hyperintense T2-weighted lesions demonstrated    within the right hepatic lobe seen on coronal T2-weighted series    2 images 26 and image 25. The largest of these measures    approximately 1.6 cm in diameter. However this is incompletely    visualized. Further characterization could be obtained with liver     ultrasound or liver lesion protocol CT imaging.  These do not    appear significantly changed when compared to the prior MRI    examination.              The patient has been or will be contacted.               Imaging and ultrasound imaging reviewed

## 2020-07-31 ASSESSMENT — ENCOUNTER SYMPTOMS
VOICE CHANGE: 0
VOMITING: 0
SORE THROAT: 0
COUGH: 0
WHEEZING: 0
COLOR CHANGE: 0
ABDOMINAL PAIN: 0
TROUBLE SWALLOWING: 0
SHORTNESS OF BREATH: 0
BLOOD IN STOOL: 0
NAUSEA: 0
BACK PAIN: 1

## 2020-08-04 ENCOUNTER — HOSPITAL ENCOUNTER (OUTPATIENT)
Age: 50
Discharge: HOME | End: 2020-08-04
Payer: MEDICARE

## 2020-08-04 LAB
HCT VFR BLD CALC: 41.7 % (ref 37–47)
HEMOGLOBIN: 13.2 GM/DL (ref 12–16)

## 2020-08-04 PROCEDURE — 85014 HEMATOCRIT: CPT

## 2020-08-04 PROCEDURE — 36415 COLL VENOUS BLD VENIPUNCTURE: CPT

## 2020-08-04 PROCEDURE — U0003 INFECTIOUS AGENT DETECTION BY NUCLEIC ACID (DNA OR RNA); SEVERE ACUTE RESPIRATORY SYNDROME CORONAVIRUS 2 (SARS-COV-2) (CORONAVIRUS DISEASE [COVID-19]), AMPLIFIED PROBE TECHNIQUE, MAKING USE OF HIGH THROUGHPUT TECHNOLOGIES AS DESCRIBED BY CMS-2020-01-R: HCPCS

## 2020-08-04 PROCEDURE — 85018 HEMOGLOBIN: CPT

## 2020-08-05 LAB — SARS-COV-2: NOT DETECTED

## 2020-08-12 ENCOUNTER — ANESTHESIA (OUTPATIENT)
Dept: OPERATING ROOM | Age: 50
End: 2020-08-12
Payer: MEDICARE

## 2020-08-12 ENCOUNTER — HOSPITAL ENCOUNTER (OUTPATIENT)
Dept: WOMENS IMAGING | Age: 50
Discharge: HOME | End: 2020-08-12
Attending: SURGERY
Payer: MEDICARE

## 2020-08-12 ENCOUNTER — HOSPITAL ENCOUNTER (OUTPATIENT)
Age: 50
Setting detail: OUTPATIENT SURGERY
Discharge: HOME | End: 2020-08-12
Attending: SURGERY | Admitting: SURGERY
Payer: MEDICARE

## 2020-08-12 ENCOUNTER — HOSPITAL ENCOUNTER (OUTPATIENT)
Dept: WOMENS IMAGING | Age: 50
Discharge: HOME | End: 2020-08-12
Payer: MEDICARE

## 2020-08-12 ENCOUNTER — ANESTHESIA EVENT (OUTPATIENT)
Dept: OPERATING ROOM | Age: 50
End: 2020-08-12
Payer: MEDICARE

## 2020-08-12 VITALS
OXYGEN SATURATION: 94 % | DIASTOLIC BLOOD PRESSURE: 58 MMHG | RESPIRATION RATE: 13 BRPM | SYSTOLIC BLOOD PRESSURE: 115 MMHG

## 2020-08-12 VITALS
DIASTOLIC BLOOD PRESSURE: 68 MMHG | SYSTOLIC BLOOD PRESSURE: 145 MMHG | TEMPERATURE: 96.9 F | BODY MASS INDEX: 39.89 KG/M2 | HEIGHT: 65 IN | OXYGEN SATURATION: 95 % | HEART RATE: 85 BPM | WEIGHT: 239.4 LBS | RESPIRATION RATE: 16 BRPM

## 2020-08-12 PROBLEM — D24.1 PAPILLOMA OF RIGHT BREAST: Status: ACTIVE | Noted: 2020-08-12

## 2020-08-12 PROCEDURE — 88307 TISSUE EXAM BY PATHOLOGIST: CPT

## 2020-08-12 PROCEDURE — 2500000003 HC RX 250 WO HCPCS: Performed by: SURGERY

## 2020-08-12 PROCEDURE — 3600000013 HC SURGERY LEVEL 3 ADDTL 15MIN: Performed by: SURGERY

## 2020-08-12 PROCEDURE — 19301 PARTIAL MASTECTOMY: CPT | Performed by: SURGERY

## 2020-08-12 PROCEDURE — 7100000010 HC PHASE II RECOVERY - FIRST 15 MIN: Performed by: SURGERY

## 2020-08-12 PROCEDURE — 2580000003 HC RX 258: Performed by: SURGERY

## 2020-08-12 PROCEDURE — 76098 X-RAY EXAM SURGICAL SPECIMEN: CPT

## 2020-08-12 PROCEDURE — 6370000000 HC RX 637 (ALT 250 FOR IP): Performed by: SURGERY

## 2020-08-12 PROCEDURE — 6360000002 HC RX W HCPCS: Performed by: NURSE ANESTHETIST, CERTIFIED REGISTERED

## 2020-08-12 PROCEDURE — 3700000000 HC ANESTHESIA ATTENDED CARE: Performed by: SURGERY

## 2020-08-12 PROCEDURE — 3700000001 HC ADD 15 MINUTES (ANESTHESIA): Performed by: SURGERY

## 2020-08-12 PROCEDURE — 7100000011 HC PHASE II RECOVERY - ADDTL 15 MIN: Performed by: SURGERY

## 2020-08-12 PROCEDURE — 3600000003 HC SURGERY LEVEL 3 BASE: Performed by: SURGERY

## 2020-08-12 PROCEDURE — 2709999900 HC NON-CHARGEABLE SUPPLY: Performed by: SURGERY

## 2020-08-12 PROCEDURE — 2709999900 MAM NEEDLE BREAST LOCALIZATION RIGHT

## 2020-08-12 PROCEDURE — 2500000003 HC RX 250 WO HCPCS: Performed by: NURSE ANESTHETIST, CERTIFIED REGISTERED

## 2020-08-12 RX ORDER — PROPOFOL 10 MG/ML
INJECTION, EMULSION INTRAVENOUS CONTINUOUS PRN
Status: DISCONTINUED | OUTPATIENT
Start: 2020-08-12 | End: 2020-08-12 | Stop reason: SDUPTHER

## 2020-08-12 RX ORDER — SODIUM CHLORIDE 9 MG/ML
INJECTION, SOLUTION INTRAVENOUS CONTINUOUS
Status: DISCONTINUED | OUTPATIENT
Start: 2020-08-12 | End: 2020-08-12 | Stop reason: HOSPADM

## 2020-08-12 RX ORDER — ONDANSETRON 2 MG/ML
4 INJECTION INTRAMUSCULAR; INTRAVENOUS EVERY 6 HOURS PRN
Status: DISCONTINUED | OUTPATIENT
Start: 2020-08-12 | End: 2020-08-12 | Stop reason: HOSPADM

## 2020-08-12 RX ORDER — SODIUM CHLORIDE 0.9 % (FLUSH) 0.9 %
10 SYRINGE (ML) INJECTION EVERY 12 HOURS SCHEDULED
Status: DISCONTINUED | OUTPATIENT
Start: 2020-08-12 | End: 2020-08-12 | Stop reason: HOSPADM

## 2020-08-12 RX ORDER — LIDOCAINE HYDROCHLORIDE AND EPINEPHRINE 10; 10 MG/ML; UG/ML
INJECTION, SOLUTION INFILTRATION; PERINEURAL PRN
Status: DISCONTINUED | OUTPATIENT
Start: 2020-08-12 | End: 2020-08-12 | Stop reason: ALTCHOICE

## 2020-08-12 RX ORDER — IBUPROFEN 800 MG/1
400 TABLET ORAL EVERY 6 HOURS PRN
Status: DISCONTINUED | OUTPATIENT
Start: 2020-08-12 | End: 2020-08-12 | Stop reason: HOSPADM

## 2020-08-12 RX ORDER — FENTANYL CITRATE 50 UG/ML
INJECTION, SOLUTION INTRAMUSCULAR; INTRAVENOUS PRN
Status: DISCONTINUED | OUTPATIENT
Start: 2020-08-12 | End: 2020-08-12 | Stop reason: SDUPTHER

## 2020-08-12 RX ORDER — ONDANSETRON 2 MG/ML
INJECTION INTRAMUSCULAR; INTRAVENOUS PRN
Status: DISCONTINUED | OUTPATIENT
Start: 2020-08-12 | End: 2020-08-12 | Stop reason: SDUPTHER

## 2020-08-12 RX ORDER — PROMETHAZINE HYDROCHLORIDE 25 MG/1
12.5 TABLET ORAL EVERY 6 HOURS PRN
Status: DISCONTINUED | OUTPATIENT
Start: 2020-08-12 | End: 2020-08-12 | Stop reason: HOSPADM

## 2020-08-12 RX ORDER — HYDROCODONE BITARTRATE AND ACETAMINOPHEN 5; 325 MG/1; MG/1
1 TABLET ORAL EVERY 4 HOURS PRN
Status: DISCONTINUED | OUTPATIENT
Start: 2020-08-12 | End: 2020-08-12 | Stop reason: HOSPADM

## 2020-08-12 RX ORDER — SODIUM CHLORIDE 9 MG/ML
INJECTION, SOLUTION INTRAVENOUS CONTINUOUS
Status: DISCONTINUED | OUTPATIENT
Start: 2020-08-12 | End: 2020-08-12 | Stop reason: SDUPTHER

## 2020-08-12 RX ORDER — HYDROCODONE BITARTRATE AND ACETAMINOPHEN 5; 325 MG/1; MG/1
1 TABLET ORAL EVERY 4 HOURS PRN
Qty: 18 TABLET | Refills: 0 | Status: SHIPPED | OUTPATIENT
Start: 2020-08-12 | End: 2020-08-15

## 2020-08-12 RX ORDER — PROPOFOL 10 MG/ML
INJECTION, EMULSION INTRAVENOUS PRN
Status: DISCONTINUED | OUTPATIENT
Start: 2020-08-12 | End: 2020-08-12 | Stop reason: SDUPTHER

## 2020-08-12 RX ORDER — LIDOCAINE HYDROCHLORIDE 20 MG/ML
INJECTION, SOLUTION EPIDURAL; INFILTRATION; INTRACAUDAL; PERINEURAL PRN
Status: DISCONTINUED | OUTPATIENT
Start: 2020-08-12 | End: 2020-08-12 | Stop reason: SDUPTHER

## 2020-08-12 RX ORDER — DEXAMETHASONE SODIUM PHOSPHATE 4 MG/ML
INJECTION, SOLUTION INTRA-ARTICULAR; INTRALESIONAL; INTRAMUSCULAR; INTRAVENOUS; SOFT TISSUE PRN
Status: DISCONTINUED | OUTPATIENT
Start: 2020-08-12 | End: 2020-08-12 | Stop reason: SDUPTHER

## 2020-08-12 RX ORDER — CEFAZOLIN SODIUM 1 G/3ML
INJECTION, POWDER, FOR SOLUTION INTRAMUSCULAR; INTRAVENOUS PRN
Status: DISCONTINUED | OUTPATIENT
Start: 2020-08-12 | End: 2020-08-12 | Stop reason: SDUPTHER

## 2020-08-12 RX ORDER — MORPHINE SULFATE 2 MG/ML
4 INJECTION, SOLUTION INTRAMUSCULAR; INTRAVENOUS
Status: DISCONTINUED | OUTPATIENT
Start: 2020-08-12 | End: 2020-08-12 | Stop reason: HOSPADM

## 2020-08-12 RX ORDER — MORPHINE SULFATE 2 MG/ML
2 INJECTION, SOLUTION INTRAMUSCULAR; INTRAVENOUS
Status: DISCONTINUED | OUTPATIENT
Start: 2020-08-12 | End: 2020-08-12 | Stop reason: HOSPADM

## 2020-08-12 RX ORDER — BUPIVACAINE HYDROCHLORIDE 5 MG/ML
INJECTION, SOLUTION PERINEURAL PRN
Status: DISCONTINUED | OUTPATIENT
Start: 2020-08-12 | End: 2020-08-12 | Stop reason: ALTCHOICE

## 2020-08-12 RX ORDER — MIDAZOLAM HYDROCHLORIDE 1 MG/ML
INJECTION INTRAMUSCULAR; INTRAVENOUS PRN
Status: DISCONTINUED | OUTPATIENT
Start: 2020-08-12 | End: 2020-08-12 | Stop reason: SDUPTHER

## 2020-08-12 RX ORDER — HYDROCODONE BITARTRATE AND ACETAMINOPHEN 5; 325 MG/1; MG/1
2 TABLET ORAL EVERY 4 HOURS PRN
Status: DISCONTINUED | OUTPATIENT
Start: 2020-08-12 | End: 2020-08-12 | Stop reason: HOSPADM

## 2020-08-12 RX ORDER — SODIUM CHLORIDE 0.9 % (FLUSH) 0.9 %
10 SYRINGE (ML) INJECTION PRN
Status: DISCONTINUED | OUTPATIENT
Start: 2020-08-12 | End: 2020-08-12 | Stop reason: HOSPADM

## 2020-08-12 RX ADMIN — LIDOCAINE HYDROCHLORIDE 80 MG: 20 INJECTION, SOLUTION EPIDURAL; INFILTRATION; INTRACAUDAL; PERINEURAL at 10:36

## 2020-08-12 RX ADMIN — HYDROCODONE BITARTRATE AND ACETAMINOPHEN 1 TABLET: 5; 325 TABLET ORAL at 11:45

## 2020-08-12 RX ADMIN — FENTANYL CITRATE 50 MCG: 50 INJECTION, SOLUTION INTRAMUSCULAR; INTRAVENOUS at 10:35

## 2020-08-12 RX ADMIN — ONDANSETRON HYDROCHLORIDE 4 MG: 4 INJECTION, SOLUTION INTRAMUSCULAR; INTRAVENOUS at 10:43

## 2020-08-12 RX ADMIN — DEXAMETHASONE SODIUM PHOSPHATE 12 MG: 4 INJECTION, SOLUTION INTRAMUSCULAR; INTRAVENOUS at 10:43

## 2020-08-12 RX ADMIN — CEFAZOLIN 2000 MG: 1 INJECTION, POWDER, FOR SOLUTION INTRAMUSCULAR; INTRAVENOUS; PARENTERAL at 10:34

## 2020-08-12 RX ADMIN — FENTANYL CITRATE 50 MCG: 50 INJECTION, SOLUTION INTRAMUSCULAR; INTRAVENOUS at 10:50

## 2020-08-12 RX ADMIN — PROPOFOL 150 MCG/KG/MIN: 10 INJECTION, EMULSION INTRAVENOUS at 10:37

## 2020-08-12 RX ADMIN — MIDAZOLAM HYDROCHLORIDE 2 MG: 1 INJECTION, SOLUTION INTRAMUSCULAR; INTRAVENOUS at 10:32

## 2020-08-12 RX ADMIN — SODIUM CHLORIDE: 9 INJECTION, SOLUTION INTRAVENOUS at 09:35

## 2020-08-12 RX ADMIN — PROPOFOL 60 MG: 10 INJECTION, EMULSION INTRAVENOUS at 10:37

## 2020-08-12 ASSESSMENT — PAIN SCALES - GENERAL
PAINLEVEL_OUTOF10: 0
PAINLEVEL_OUTOF10: 4

## 2020-08-12 ASSESSMENT — PAIN - FUNCTIONAL ASSESSMENT: PAIN_FUNCTIONAL_ASSESSMENT: 0-10

## 2020-08-12 NOTE — ANESTHESIA PRE PROCEDURE
Department of Anesthesiology  Preprocedure Note       Name:  Joann Haider   Age:  51 y.o.  :  1970                                          MRN:  [id number]         Date:  2020      Surgeon: Norberto Mcclain):  Hua Fajardo MD    Procedure: Procedure(s):  RIGHT BREAST LUMPECTOMY, PREOP NEEDLE LOC    Medications prior to admission:   Prior to Admission medications    Medication Sig Start Date End Date Taking? Authorizing Provider   Omeprazole Magnesium (PRILOSEC OTC PO) Take by mouth daily   Yes Historical Provider, MD   desvenlafaxine succinate (PRISTIQ) 100 MG TB24 extended release tablet Take 1 tablet by mouth daily 20  Yes Lon Benito MD   buPROPion (WELLBUTRIN XL) 300 MG extended release tablet Take 1 tablet by mouth every morning In addition to 150mg dose 4/21/20 10/18/20 Yes Lon Benito MD   buPROPion (WELLBUTRIN XL) 150 MG extended release tablet Take 1 tablet by mouth every morning 20  Yes Lon Benito MD   tiZANidine (ZANAFLEX) 4 MG tablet Take 1 tablet by mouth every 6 hours as needed (muscle spasm) 19  Yes Elsa Garcia MD   gabapentin (NEURONTIN) 400 MG capsule TAKE 1 CAPSULE BY MOUTH 3 TIMES A DAY 16  Yes Historical Provider, MD   meloxicam (MOBIC) 15 MG tablet Take 15 mg by mouth daily    Historical Provider, MD       Current medications:    Current Facility-Administered Medications   Medication Dose Route Frequency Provider Last Rate Last Dose    0.9 % sodium chloride infusion   Intravenous Continuous Hua Fajardo MD           Allergies:  No Known Allergies    Problem List:    Patient Active Problem List   Diagnosis Code    Family history of breast cancer in first degree relative, Mom Z80.3    Rt.  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    Hypertrophy of both inferior nasal turbinates J34.3    Moderate episode of recurrent major depressive disorder ([facility]) F33.1    Recurrent major depressive disorder, in full remission ([facility] [address] F33.42       Past Medical History:        Diagnosis Date    Anxiety     Arthritis     Chronic gastritis 1/2015    Depression     Deviated septum     GERD (gastroesophageal reflux disease)     chronic    Hiatal hernia 2009    Intraductal papilloma of breast 07/2020    right    Numbness and tingling 2007    left leg and foot-chronic    Ovarian cyst     PONV (postoperative nausea and vomiting)     Pulmonary nodule 10/1/2013    Schwannoma of spinal cord ([facility] [address] 9/17/2013       Past Surgical History:        Procedure Laterality Date    BLADDER SUSPENSION  2006   [facility]  2007    c4-c7 Dr Laureano Shell  [phone]    Dr Ashley Burns  06/16/2006    Dr Li Johnson  06/16/2006    vaginal hyster,anterior colporrhaphy,monarc suburethral hammock placement,right ovarian cystectomy-Dr Rajan    MRI BREAST BX USING DEVICE RIGHT  7/23/2020    MRI BREAST BX USING DEVICE RIGHT 7/23/2020 [facility]    NERVE BLOCK  4/7/14    [facility] REPAIR OF NASAL SEPTUM Bilateral 12/20/2017    SEPTOPLASTY SUBMUCOUS RESECTION - PARTIAL BILATERAL INFERIOR, NASAL ENDOSCOPY WITH AUTUMN BULLOSA RIGHT MIDDLE performed by Samina Stewart MD at [address] ENDOSCOPY  2015    Dr Ashley Amaral       Social History:    Social History     Tobacco Use    Smoking status: Former Smoker     Packs/day: 0.75     Years: 18.00     Pack years: 13.50     Types: Cigarettes     Start date: 5/1/1989    Smokeless tobacco: Never Used    Tobacco comment: off and on through the years   Substance Use Topics    Alcohol use: Yes     Comment: rarely Counseling given: Not Answered  Comment: off and on through the years      Vital Signs (Current):   Vitals:    08/12/20 0952   BP: (!) 144/84   Pulse: 94   Resp: 16   Temp: 97.6 °F (36.4 °C)   TempSrc: Infrared   SpO2: 96%   Weight: 239 lb 6.4 oz (108.6 kg)   Height: 5' 5\" (1.651 m)                                              BP Readings from Last 3 Encounters:   08/12/20 (!) 144/84   07/30/20 138/60   02/28/20 138/84       NPO Status: Time of last liquid consumption: 1830                        Time of last solid consumption: 1830                        Date of last liquid consumption: 08/11/20                        Date of last solid food consumption: 08/11/20    BMI:   Wt Readings from Last 3 Encounters:   08/12/20 239 lb 6.4 oz (108.6 kg)   07/30/20 237 lb 11.2 oz (107.8 kg)   02/28/20 249 lb 8 oz (113.2 kg)     Body mass index is 39.84 kg/m². CBC:   Lab Results   Component Value Date    WBC 10.2 06/28/2016    WBC 5.2 02/09/2013    RBC 4.73 06/28/2016    HGB 13.2 08/04/2020    HCT 41.7 08/04/2020    MCV 86.9 06/28/2016    RDW 13.0 06/28/2016     06/28/2016     02/09/2013       CMP:   Lab Results   Component Value Date     06/28/2016    K 4.4 06/28/2016     06/28/2016    CO2 23 06/28/2016    BUN 15 06/28/2016    CREATININE 0.5 06/28/2016    LABGLOM >90 02/09/2013    GLUCOSE 89 06/28/2016    PROT 7.6 06/28/2016    CALCIUM 9.9 06/28/2016    BILITOT 0.3 06/28/2016    ALKPHOS 87 06/28/2016    AST 14 06/28/2016    ALT 10 06/28/2016       POC Tests: No results for input(s): POCGLU, POCNA, POCK, POCCL, POCBUN, POCHEMO, POCHCT in the last 72 hours.     Coags: No results found for: PROTIME, INR, APTT    HCG (If Applicable): No results found for: PREGTESTUR, PREGSERUM, HCG, HCGQUANT     ABGs: No results found for: PHART, PO2ART, MKN1EHX, TVP0XAW, BEART, R3ZXEXHI     Type & Screen (If Applicable):  No results found for: LABABO, LABRH    Drug/Infectious Status (If Applicable):  No results found for: HIV, HEPCAB    COVID-19 Screening (If Applicable):   Lab Results   Component Value Date    COVID19 Not Detected 08/04/2020         Anesthesia Evaluation     history of anesthetic complications: PONV. Airway: Mallampati: II       Mouth opening: > = 3 FB Dental:          Pulmonary:       (-) COPD                           Cardiovascular:        (-) hypertension                Neuro/Psych:   (+) psychiatric history:            GI/Hepatic/Renal:   (+) hiatal hernia, GERD:,           Endo/Other:                     Abdominal:   (+) obese,         Vascular:                                        Anesthesia Plan      MAC     ASA 2             Anesthetic plan and risks discussed with patient. Plan discussed with CRNA.                   Sriram Lopez MD   8/12/2020

## 2020-08-12 NOTE — OP NOTE
the right breast.  Lumpectomy surrounding the wire  including the hematoma cavity was excised. Specimen radiograph revealed  the clip, wire and mass within the specimen. Hemostasis was achieved  with electrocautery. The wound was irrigated. Dermis was then closed  with interrupted Vicryl suture and skin was closed running subcuticular  4-0 Monocryl suture. Skin glue was applied. The patient tolerated the  procedure well, was transported to the recovery area at the surgery  center in stable condition.         Jasen Banegas M.D.    D: 08/12/2020 11:19:31       T: 08/12/2020 11:27:16     [email or web address]  Job#: [id number]     Doc#: [id number]    CC:

## 2020-08-12 NOTE — ANESTHESIA POSTPROCEDURE EVALUATION
Department of Anesthesiology  Postprocedure Note    Patient: Joanne Gooden  MRN: [id number]  YOB: 1970  Date of evaluation: 8/12/2020  Time:  1:15 PM     Procedure Summary     Date:  08/12/20 Room / Location:  [facility] 04 / [facility]    Anesthesia Start:  1030 Anesthesia Stop:  1113    Procedure:  RIGHT BREAST LUMPECTOMY, PREOP NEEDLE LOC (Right ) Diagnosis:  (RIGHT BREAST PAPILLOMA)    Surgeon:  Ingrid Hirsch MD Responsible Provider:  Santos Yee MD    Anesthesia Type:  MAC ASA Status:  2          Anesthesia Type: MAC    Andrea Phase I:      Andrea Phase II: Andrea Score: 10    Last vitals: Reviewed and per EMR flowsheets.        Anesthesia Post Evaluation

## 2020-08-12 NOTE — H&P
[address] [address]  History and Physical Update    Pt Name: Portia Knox  MRN: [id number]  YOB: 1970  Date of evaluation: 2020    [x] I have examined the patient and reviewed the H&P/Consult and there are no changes to the patient or plans. [] I have examined the patient and reviewed the H&P/Consult and have noted the following changes:        Ainsley Baron MD  Electronically signed 2020 at 10:09 Vick Barr MD   General Surgery   New Patient Evaluation in Office   Pt Name: Portia Knox   Date of Birth 1970   Today's Date: 2020   Medical Record Number: [id number]   Referring Provider: Rupert Church MD   Primary Care Provider: Varun Samano MD   Chief Complaint:        Chief Complaint   Patient presents with    Surgical Consult     New patient ref [facility]- right breast papilloma     ASSESSMENT     1. Papilloma of right breast    2. Pre-op testing    3. Family history of breast cancer in first degree relative, Mom    4. Right breast mass   PLANS   1. Schedule patient for needle localization lumpectomy of the right breast   2. Preoperative testing per anesthesia guidelines including COVID-19 screen   3. Risk of procedure including not limited to bleeding, infection, hematoma, seroma as well as potential for malignant diagnoses were all discussed with [facility]. She expressed understanding all her questions answered. 4. MAC versus general anesthesia. Archana Kaplan is a 51y.o. year old female who is presenting today in the office for evaluation of a papilloma of the right breast associated with MRI detected mass in the right breast on screening. She has dense breasts and her mother was diagnosed with breast cancer at age 28. Her brother  in his early 45s of pancreatic cancer. She is unaware that anyone in her family underwent genetic counseling or testing.    With her dense breast she this year underwent MRI screening and was found to have a new mass over 2 cm in size in the right breast per report below. She underwent a MRI guided biopsy which revealed papilloma and hyperplasia without atypia. This was felt to be discordant with imaging findings and excision with needle localization was recommended. Marleni Barreto has had no breast symptoms. 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 She denies prior breast biopsies. She has had prior breast reduction surgery. She does have fairly significant hematoma at the biopsy site. G5, P4 she had her children in her 25s. Menarche age 16. She had a hysterectomy for prolapse and menorrhagia in her 35s. Her ovaries remain. Past Medical History   Past Medical History                                                                                                               Past Surgical History   Past Surgical History                                                                                                                                         Medications   Current Facility-Administered Medications                                                                                   Allergies   No Known Allergies   Family History   Family History                                                                                                                              SocialHistory   Social History                                                                                                                                                                                                                                                                                                                                      Review of Systems   Review of Systems   Constitutional: Negative for chills, fatigue, fever and unexpected weight change. HENT: Negative for sore throat, trouble swallowing and voice change. Eyes: Negative for visual disturbance.    Respiratory: Negative for cough, shortness of breath and wheezing. Cardiovascular: Negative for chest pain and palpitations. Gastrointestinal: Negative for abdominal pain, blood in stool, nausea and vomiting. Endocrine: Negative for cold intolerance, heat intolerance and polydipsia. Genitourinary: Negative for dysuria, flank pain and hematuria. Musculoskeletal: Positive for back pain, neck pain and neck stiffness. Negative for gait problem, joint swelling and myalgias. Skin: Negative for color change and rash. Allergic/Immunologic: Negative for immunocompromised state. Neurological: Positive for tremors and headaches. Negative for dizziness, seizures and speech difficulty. Hematological: Does not bruise/bleed easily. Psychiatric/Behavioral: Negative for behavioral problems, confusion and suicidal ideas. OBJECTIVE   /60 (Site: Right Upper Arm, Position: Sitting, Cuff Size: Medium Adult)  Pulse 110  Temp 97 °F (36.1 °C) (Temporal)  Resp 18  Ht 5' 5\" (1.651 m)  Wt 237 lb 11.2 oz (107.8 kg)  LMP 01/01/2008  SpO2 97%  BMI 39.56 kg/m²   Physical Exam   Vitals signs reviewed. Constitutional:   Appearance: Normal appearance. She is well-developed. HENT:   Head: Normocephalic and atraumatic. Nose: No congestion or rhinorrhea. Eyes:   General: No scleral icterus. Extraocular Movements: Extraocular movements intact. Pupils: Pupils are equal, round, and reactive to light. Neck:   Musculoskeletal: Neck supple. Vascular: No JVD. Trachea: No tracheal deviation. Cardiovascular:   Rate and Rhythm: Normal rate and regular rhythm. Heart sounds: Normal heart sounds. No murmur. Pulmonary:   Effort: Pulmonary effort is normal. No respiratory distress. Breath sounds: Normal breath sounds. No wheezing. Chest:   Chest wall: No tenderness. Breasts:   Right: No inverted nipple, mass, nipple discharge or skin change. Left: No inverted nipple, mass, nipple discharge or skin change.        Abdominal: best seen on the post contrast    subtraction axial 70 slice. On the left, there are no areas of suspicious enhancement. There    is no suspicious morphology. No dominant lesions are present. There are scattered small foci of enhancement bilaterally which    appear otherwise similar when compared to prior examination dated    7/11/2019. These stable small foci of enhancement likely    represent fibrocystic change. No acute osseous findings are demonstrated. No lymphadenopathy is    seen. There are 2 hyperintense T2-weighted lesions demonstrated within    the right hepatic lobe seen on coronal T2-weighted series 2    images 26 and image 25. The largest of these measures    approximately 1.6 cm in diameter. However this is incompletely    visualized. Further characterization could be obtained with liver    ultrasound or liver lesion protocol CT imaging. These do not    appear significantly changed when compared to the prior MRI    examination. IMPRESSION: INCOMPLETE: NEEDS ADDITIONAL IMAGING    1. The 1.7 cm x 2.5 cm area in the right breast appears to have    an intermediate suspicion for malignancy. An MRI guided biopsy    is recommended. 2. There are 2 hyperintense T2-weighted lesions demonstrated    within the right hepatic lobe seen on coronal T2-weighted series    2 images 26 and image 25. The largest of these measures    approximately 1.6 cm in diameter. However this is incompletely    visualized. Further characterization could be obtained with liver    ultrasound or liver lesion protocol CT imaging. These do not    appear significantly changed when compared to the prior MRI    examination. The patient has been or will be contacted.           Tatyana García M.D.    rd/:7/3/2020 80:83:47    copy to: Brigida Wong CNP, [facility],    fax: [phone]       Imaging Technologist: Andrea LEMOS(R)(MR), [address]    letter sent: Additional Tests Needed    MRI BI-RADS: 0: Incomplete: Needs Additional Imaging [id number]                  #[id number] - MRI BREAST BILATERAL W WO CONTRAST    BREAST MRI OF THE RIGHT BREAST : 7/3/2020    CLINICAL: Family history of breast ca; bilateral dense breasts. Interpretation of this MRI was correlated with available    mammograms and previous MRI scans. Axial and coronal T2 STIR, sagittal T1, axial T1 fat saturated    pre and post contrast dynamic imaging was performed of both    breasts. Images were reviewed at a [employer]    workstation and time intensity curves were analyzed. There is mild background enhancement. The breast parenchyma has    scattered fibroglandular elements. There is a 1.7 cm x 2.5 cm area in the right breast upper inner    quadrant anterior depth 4.7 cm from the nipple, 3.3 cm from the    skin, and 12 cm from the chest wall. This shows non-mass like    and homogeneous enhancement and delayed plateau type vascular    enhancement. The abnormality is best seen on the post contrast    subtraction axial 70 slice. On the left, there are no areas of suspicious enhancement. There    is no suspicious morphology. No dominant lesions are present. There are scattered small foci of enhancement bilaterally which    appear otherwise similar when compared to prior examination dated    7/11/2019. These stable small foci of enhancement likely    represent fibrocystic change. No acute osseous findings are demonstrated. No lymphadenopathy is    seen. There are 2 hyperintense T2-weighted lesions demonstrated within    the right hepatic lobe seen on coronal T2-weighted series 2    images 26 and image 25. The largest of these measures    approximately 1.6 cm in diameter. However this is incompletely    visualized. Further characterization could be obtained with liver    ultrasound or liver lesion protocol CT imaging.  These do not    appear significantly changed when compared to the prior MRI    examination. IMPRESSION: INCOMPLETE: NEEDS ADDITIONAL IMAGING    1. The 1.7 cm x 2.5 cm area in the right breast appears to have    an intermediate suspicion for malignancy. An MRI guided biopsy    is recommended. 2. There are 2 hyperintense T2-weighted lesions demonstrated    within the right hepatic lobe seen on coronal T2-weighted series    2 images 26 and image 25. The largest of these measures    approximately 1.6 cm in diameter. However this is incompletely    visualized. Further characterization could be obtained with liver    ultrasound or liver lesion protocol CT imaging. These do not    appear significantly changed when compared to the prior MRI    examination.

## 2020-08-12 NOTE — BRIEF OP NOTE
Brief Postoperative Note      Patient: Luzmaria Castro  YOB: 1970  MRN: [id number]    Date of Procedure: 8/12/2020    Pre-Op Diagnosis: RIGHT BREAST PAPILLOMA associated mass    Post-Op Diagnosis: Same       Procedure(s):  RIGHT BREAST LUMPECTOMY, PREOP NEEDLE LOC    Surgeon(s):  Deja Jones MD    Assistant:  First Assistant: Lorelei Angulo RN    Anesthesia: Monitor Anesthesia Care    Estimated Blood Loss (mL): Minimal    Complications: None    Specimens:   ID Type Source Tests Collected by Time Destination   A : right breast tissue to [employer] Tissue Breast 1 Jayy Valentine MD 8/12/2020 1045        Implants:  * No implants in log *      Drains: * No LDAs found *    Findings:     Electronically signed by Deja Jones MD on 8/12/2020 at 11:11 AM

## 2020-08-13 ENCOUNTER — TELEPHONE (OUTPATIENT)
Dept: SURGERY | Age: 50
End: 2020-08-13

## 2020-08-13 NOTE — TELEPHONE ENCOUNTER
Called to check on patient post op, pt states she has some breast soreness but overall doing ok. She has been taking pain medication when needed, she is applying ice. No nausea or trouble urinating. Discussed showering and incision care. All questions answered, pt was advised to call the office if any issues arise.

## 2020-08-24 ENCOUNTER — OFFICE VISIT (OUTPATIENT)
Dept: SURGERY | Age: 50
End: 2020-08-24

## 2020-08-24 VITALS
HEART RATE: 68 BPM | TEMPERATURE: 97.7 F | OXYGEN SATURATION: 96 % | SYSTOLIC BLOOD PRESSURE: 115 MMHG | RESPIRATION RATE: 14 BRPM | WEIGHT: 234.2 LBS | HEIGHT: 65 IN | DIASTOLIC BLOOD PRESSURE: 75 MMHG | BODY MASS INDEX: 39.02 KG/M2

## 2020-08-24 PROCEDURE — 99024 POSTOP FOLLOW-UP VISIT: CPT | Performed by: SURGERY

## 2020-08-24 NOTE — PROGRESS NOTES
Maddy Jansen MD  General Surgery  Postprocedure Evaluation in Office  Pt Name: Vadim Carnes  Date of Birth 1970   Today's Date: 8/24/2020  Medical Record Number: [id number]  Primary Care Provider: Brie Basilio MD  Chief Complaint   Patient presents with   [facility] Post-Op Check     s/p Right partial mastectomy with preoperative needle loc 8/12/20     ASSESSMENT      1. Papilloma of right breast    2. Family history of breast cancer in first degree relative, Mom    3. Encounter for postoperative care         PLANS       1. Pathology reviewed with the patient who understands. All questions were answered. 2. Follow up: Return in about 6 months (around 2/24/2021) for breast papilloma. 3.  Follow patient through next mammogram.      Jennifer Nelson is seen today for post-op follow-up. She is status post right partial mastectomy for right breast mass associated papilloma. History of first-degree relative of breast cancer patient opted to proceed with excision. Residual papilloma excised. No atypia. She denies any wound issues and is not taking any pain medications.     Medications    Current Outpatient Medications:     Omeprazole Magnesium (PRILOSEC OTC PO), Take by mouth daily, Disp: , Rfl:     desvenlafaxine succinate (PRISTIQ) 100 MG TB24 extended release tablet, Take 1 tablet by mouth daily, Disp: 30 tablet, Rfl: 5    buPROPion (WELLBUTRIN XL) 300 MG extended release tablet, Take 1 tablet by mouth every morning In addition to 150mg dose, Disp: 30 tablet, Rfl: 5    buPROPion (WELLBUTRIN XL) 150 MG extended release tablet, Take 1 tablet by mouth every morning, Disp: 30 tablet, Rfl: 5    meloxicam (MOBIC) 15 MG tablet, Take 15 mg by mouth daily, Disp: , Rfl:     tiZANidine (ZANAFLEX) 4 MG tablet, Take 1 tablet by mouth every 6 hours as needed (muscle spasm), Disp: 40 tablet, Rfl: 1    gabapentin (NEURONTIN) 400 MG capsule, TAKE 1 CAPSULE BY MOUTH 3 TIMES A DAY, Disp: , Rfl: 2  Allergies    No Known Allergies    Review of Systems  History obtained from the patient. Constitutional: Denies any fever, chills, fatigue. Wound: Denies any rash, skin color changes or wound problems. Resp: Denies any cough, shortness of breath. CV: Denies any chest pain, orthopnea or syncope. GI: Denies any nausea, vomiting, blood in the stool, constipation or diarrhea. OBJECTIVE     VITALS: /75 (Site: Left Upper Arm, Position: Sitting, Cuff Size: Medium Adult)   Pulse 68   Temp 97.7 °F (36.5 °C) (Tympanic)   Resp 14   Ht 5' 5\" (1.651 m)   Wt 234 lb 3.2 oz (106.2 kg)   LMP 2008   SpO2 96%   BMI 38.97 kg/m²     CONSTITUTIONAL: Alert and oriented times 3, no acute distress and cooperative to examination. SKIN: Skin color, texture, turgor normal. No rashes or lesions. INCISION: wound margins intact and healing well. No signs of infection. No drainage. NECK: Soft, trachea midline and straight  BREAST: Lumpectomy right No evidence of seroma or hematoma. LUNGS:clear  CARDIOVASCULAR: normal heart rate    NEUROLOGIC: No sensory or motor nerve irritation  EXTREMITIES: no cyanosis, no clubbing and no edema.      Performed by: [facility]     Jeoffrey Blizzard, CYNTHIA                   [phone]   Assoc.                                              Page 1 of 1   [address]   [employer] [address], [place] [address]                                                       PROC: 2020   [facility]                                    RECV: 2020   [address] [facility]                                    RPTD: 2020   [employer] [address], [place] [address]                       MRN:  [id number]    [facility]                       ACCT: [id number]  SEX: F                       : 1970  AGE: 51 Y                          PATHOLOGY REPORT                       ATTN: LIV AUGUSTE   [de-identified]                  REQ: [facility]LT       Copies To:   STAN MURILLO       Clinical Information: RIGHT BREAST PAPILLOMA     FINAL DIAGNOSIS: Breast, right, papilloma, excision:    Focal intraductal papilloma.       Fibrocystic changes including microcyst formation, usual ductal       hyperplasia, apocrine metaplasia, and columnar cell change.    Microcalcifications.       Changes consistent with previous biopsy site.    Negative for malignancy.      Specimen:   EXCISION OF BREAST, RIGHT       Gross Examination:   The container is labeled Reece Vasquez, right breast tissue.  Received   fresh on an x-ray grid is an oriented lumpectomy specimen including a   localization wire.  The specimen measures 6 cm from the skin to deep   margin, 3 cm from the medial to lateral margin, and about 2.5 cm from   the cranial to caudal margins.  The specimen is friable with ragged   margins along the caudal margin.  The caudal margin is inked blue, the   cranial margin is inked yellow, the medial and deep margins are inked   black, and the lateral and skin margins are inked green.  Sections   through the specimen reveal a large hematoma measuring about 2.1 x 1.5   x about 3 cm.  The area is opened to both the caudal and lateral   margins.  There are no discrete lesions.  Accompanying x-ray indicates   a clip at grid coordinates C/3.  Upon sectioning, the specimen is   markedly friable.  Representative sections are submitted.  Cassette #1   - deep margin; cassette #2 - caudal, medial, and lateral margins;   cassette #3 - cranial, medial, and lateral margins; cassette #4 -   caudal and lateral margins; cassette #5 - cranial, medial, and lateral   margins; cassette #6 - lateral, cranial, and caudal margins; cassette   #7 - lateral and cranial margins; cassette #8 - lateral, cranial, and   caudal margins; and cassette #9 - skin margin.  ss.  SIO/DKR:[id number]     Fixative:  10% neutral buffered formalin   Tissue removal time:  10:45   Tissue fixation time:  11:30   Total fixation time: 32 hours and 30 minutes     Microscopic Examination:   Sections of breast parenchyma demonstrates changes consistent with   previous biopsy site including fat necrosis, foreign body giant cell   reaction, chronic inflammation, and fibrosis.  There is focal residual   intraductal papilloma.  The background breast parenchyma demonstrates   fibrocystic changes including usual ductal hyperplasia, microcyst   formation, columnar cell change, and apocrine metaplasia.  There are   microcalcifications associated with benign breast epithelium and   stroma.  In situ and invasive carcinoma are not identified. [id number]                                                     <Sign Out Dr. Joni Good M.D., F.C.A.P.        NVML/ [address] [address]  Printed on:  8/16/2020   [address]   [facility] [address], [facility]   Original print date: 08/16/2020

## 2020-11-06 ENCOUNTER — VIRTUAL VISIT (OUTPATIENT)
Dept: FAMILY MEDICINE CLINIC | Age: 50
End: 2020-11-06

## 2020-11-06 PROCEDURE — 99213 OFFICE O/P EST LOW 20 MIN: CPT | Performed by: FAMILY MEDICINE

## 2020-11-06 RX ORDER — DESVENLAFAXINE 100 MG/1
TABLET, EXTENDED RELEASE ORAL
COMMUNITY
Start: 2020-10-13 | End: 2021-02-22 | Stop reason: ALTCHOICE

## 2020-11-06 RX ORDER — NITROFURANTOIN 25; 75 MG/1; MG/1
100 CAPSULE ORAL 2 TIMES DAILY
Qty: 14 CAPSULE | Refills: 0 | Status: SHIPPED | OUTPATIENT
Start: 2020-11-06 | End: 2020-11-10

## 2020-11-06 ASSESSMENT — ENCOUNTER SYMPTOMS
VOMITING: 0
ABDOMINAL PAIN: 0
NAUSEA: 0
BACK PAIN: 1
COUGH: 0
CHEST TIGHTNESS: 0
DIARRHEA: 0
CONSTIPATION: 0
EYES NEGATIVE: 1
SHORTNESS OF BREATH: 0

## 2020-11-06 NOTE — PROGRESS NOTES
Date: 11/6/2020    Grzegorz Navarro is a 53 y.o. female who presents today for:  Chief Complaint   Patient presents with    Urinary Frequency     and urgency, started about 5 days ago. Patient verified Video Chat was not encrypted, and agrees to the Video Exam in presence of nurse. Patient was at :  Present in the room was:    HPI:     HPI    has a current medication list which includes the following prescription(s): desvenlafaxine, nitrofurantoin (macrocrystal-monohydrate), omeprazole magnesium, bupropion, bupropion, meloxicam, tizanidine, and gabapentin.     No Known Allergies    Social History     Tobacco Use    Smoking status: Former Smoker     Packs/day: 0.75     Years: 18.00     Pack years: 13.50     Types: Cigarettes     Start date: 5/1/1989    Smokeless tobacco: Never Used    Tobacco comment: off and on through the years   Substance Use Topics    Alcohol use: Yes     Comment: rarely    Drug use: No       Past Medical History:   Diagnosis Date    Anxiety     Arthritis     Chronic gastritis 1/2015    Depression     Deviated septum     GERD (gastroesophageal reflux disease)     chronic    Hiatal hernia 2009    Intraductal papilloma of breast 07/2020    right    Numbness and tingling 2007    left leg and foot-chronic    Ovarian cyst     PONV (postoperative nausea and vomiting)     Pulmonary nodule 10/1/2013    Schwannoma of spinal cord ([address] 9/17/2013       Past Surgical History:   Procedure Laterality Date    BLADDER SUSPENSION  2006    BREAST LUMPECTOMY Right 8/12/2020    RIGHT BREAST LUMPECTOMY, PREOP NEEDLE LOC [facility]    c4-c7 Dr Rosa Maria Tolentino COLONOSCOPY  [id number]    Dr Alice Simons  06/16/2006    Dr Jose Lucas  06/16/2006    vaginal hyster,anterior colporrhaphy,monarc suburethral hammock placement,right ovarian cystectomy-Dr Rajan    MRI BREAST BX USING DEVICE RIGHT  7/23/2020    MRI BREAST BX USING DEVICE RIGHT 7/23/2020 [facility]    NERVE BLOCK  4/7/14    IN REPAIR OF NASAL SEPTUM Bilateral 12/20/2017    SEPTOPLASTY SUBMUCOUS RESECTION - PARTIAL BILATERAL INFERIOR, NASAL ENDOSCOPY WITH AUTUMN BULLOSA RIGHT MIDDLE performed by Maura Zabala MD at [address] ENDOSCOPY  2015    Dr Bulmaro Ross       Family History   Problem Relation Age of Onset    Breast Cancer Mother 28    Lung Cancer Mother     Brain Cancer Mother     High Blood Pressure Father     Diabetes Father     Stroke Father     Cancer Brother         pancreas    Cancer Maternal Grandfather         unsure what type    Breast Cancer Maternal Aunt 34        mets    No Known Problems Sister     No Known Problems Maternal Grandmother     No Known Problems Paternal Grandmother     No Known Problems Paternal Grandfather      Subjective:     Review of Systems   Constitutional: Negative for activity change, appetite change, chills, diaphoresis, fatigue and fever. HENT: Negative. Eyes: Negative. Respiratory: Negative for cough, chest tightness and shortness of breath. Cardiovascular: Negative for chest pain, palpitations and leg swelling. Gastrointestinal: Negative for abdominal pain, constipation, diarrhea, nausea and vomiting. Genitourinary: Positive for frequency and urgency. Negative for dysuria and hematuria. Musculoskeletal: Positive for back pain (chronic). Skin: Negative. Negative for rash. Neurological: Negative for dizziness, syncope, weakness, light-headedness, numbness and headaches. Psychiatric/Behavioral: Negative.        :   LMP 01/01/2008   Wt Readings from Last 3 Encounters:   08/24/20 234 lb 3.2 oz (106.2 kg)   08/12/20 239 lb 6.4 oz (108.6 kg)   07/30/20 237 lb 11.2 oz (107.8 kg)     Physical Exam  Constitutional:       Appearance: Normal appearance.    Pulmonary: Effort: Pulmonary effort is normal.   Neurological:      Mental Status: She is alert and oriented to person, place, and time. Psychiatric:         Mood and Affect: Mood normal.         Behavior: Behavior normal.       :       Diagnosis Orders   1. Urinary frequency  Glucose, random    Urinalysis Reflex to Culture       :      Requested Prescriptions     Signed Prescriptions Disp Refills    nitrofurantoin, macrocrystal-monohydrate, (MACROBID) 100 MG capsule 14 capsule 0     Sig: Take 1 capsule by mouth 2 times daily for 7 days     Current Outpatient Medications   Medication Sig Dispense Refill    desvenlafaxine (KHEDEZLA) 100 MG TB24 ER tablet TAKE 1 TABLET BY MOUTH ONCE DAILY      nitrofurantoin, macrocrystal-monohydrate, (MACROBID) 100 MG capsule Take 1 capsule by mouth 2 times daily for 7 days 14 capsule 0    Omeprazole Magnesium (PRILOSEC OTC PO) Take by mouth daily      buPROPion (WELLBUTRIN XL) 300 MG extended release tablet Take 1 tablet by mouth every morning In addition to 150mg dose 30 tablet 5    buPROPion (WELLBUTRIN XL) 150 MG extended release tablet Take 1 tablet by mouth every morning 30 tablet 5    meloxicam (MOBIC) 15 MG tablet Take 15 mg by mouth daily      tiZANidine (ZANAFLEX) 4 MG tablet Take 1 tablet by mouth every 6 hours as needed (muscle spasm) 40 tablet 1    gabapentin (NEURONTIN) 400 MG capsule TAKE 1 CAPSULE BY MOUTH 3 TIMES A DAY  2     No current facility-administered medications for this visit. Orders Placed This Encounter   Procedures    Glucose, random     Standing Status:   Future     Standing Expiration Date:   11/6/2021    Urinalysis Reflex to Culture     Standing Status:   Future     Standing Expiration Date:   11/6/2021     Order Specific Question:   SPECIFY(EX-CATH,MIDSTREAM,CYSTO,ETC)? Answer:   midstream       Continue current medications. Return in about 2 weeks (around 11/20/2020), or if symptoms worsen or fail to improve.      Discussed use, benefit, and side effects of prescribed medications. All patient questions answered. Pt voiced understanding. Patient agreed with treatment plan. telemedicine duration 15 minutes    Being evaluated by a Virtual Visit (video visit) encounter to address concerns as mentioned above. A caregiver was present when appropriate. Due to this being a TeleHealth encounter (During [facility] public health emergency), evaluation of the following organ systems was limited: Vitals/Constitutional/EENT/Resp/CV/GI//MS/Neuro/Skin/Heme-Lymph-Imm. Pursuant to the emergency declaration under the [address] [place] [address], [address] [place] [address] authority and the [employer] Act, this Virtual Visit was conducted with patient's (and/or legal guardian's) consent, to reduce the patient's risk of exposure to COVID-19 and provide necessary medical care. The patient (and/or legal guardian) has also been advised to contact this office for worsening conditions or problems, and seek emergency medical treatment and/or call 911 if deemed necessary. Services were provided through a video synchronous discussion virtually to substitute for in-person clinic visit.

## 2020-11-07 LAB
APPEARANCE: CLEAR
BILIRUBIN: NEGATIVE
COLOR: YELLOW
GLUCOSE BLD-MCNC: NEGATIVE MG/DL
GLUCOSE: 116 MG/DL (ref 65–99)
KETONES, URINE: NEGATIVE MG/DL
LEUKOCYTE ESTERASE, URINE: ABNORMAL
NITRITE, URINE: NEGATIVE
OCCULT BLOOD,URINE: NEGATIVE
OTHER MICROSCOPIC ELEMENTS: ABNORMAL
PH: 6.5 (ref 5–8.5)
PROTEIN, URINE: ABNORMAL MG/DL
RBC: 1 /HPF (ref 0–5)
SITE/TYPE: ABNORMAL
SP GRAVITY MISCELLANEOUS: 1.02 (ref 1–1.03)
UROBILINOGEN, URINE: <1.1 EU/DL
WBC: 5 /HPF (ref 0–5)

## 2020-11-10 ENCOUNTER — OFFICE VISIT (OUTPATIENT)
Dept: FAMILY MEDICINE CLINIC | Age: 50
End: 2020-11-10

## 2020-11-10 ENCOUNTER — TELEPHONE (OUTPATIENT)
Dept: FAMILY MEDICINE CLINIC | Age: 50
End: 2020-11-10

## 2020-11-10 VITALS
TEMPERATURE: 97.2 F | RESPIRATION RATE: 18 BRPM | HEART RATE: 60 BPM | WEIGHT: 239.13 LBS | SYSTOLIC BLOOD PRESSURE: 132 MMHG | DIASTOLIC BLOOD PRESSURE: 80 MMHG | BODY MASS INDEX: 39.84 KG/M2 | HEIGHT: 65 IN

## 2020-11-10 PROBLEM — G25.0 ESSENTIAL TREMOR: Status: ACTIVE | Noted: 2018-01-05

## 2020-11-10 PROBLEM — G43.809 CERVICOGENIC MIGRAINE: Status: ACTIVE | Noted: 2018-01-05

## 2020-11-10 PROBLEM — G44.86 CERVICOGENIC HEADACHE: Status: ACTIVE | Noted: 2018-01-05

## 2020-11-10 PROBLEM — E66.01 SEVERE OBESITY (BMI 35.0-39.9): Status: ACTIVE | Noted: 2018-01-05

## 2020-11-10 PROBLEM — H81.91 VESTIBULOPATHY OF RIGHT EAR: Status: ACTIVE | Noted: 2018-01-05

## 2020-11-10 LAB
BACTERIA URINE, POC: ABNORMAL
BILIRUBIN URINE: 0 MG/DL
BLOOD, URINE: NEGATIVE
CASTS URINE, POC: ABNORMAL
CLARITY: ABNORMAL
COLOR: YELLOW
CRYSTALS URINE, POC: ABNORMAL
EPI CELLS URINE, POC: ABNORMAL
GLUCOSE URINE: ABNORMAL
KETONES, URINE: NEGATIVE
LEUKOCYTE EST, POC: ABNORMAL
NITRITE, URINE: NEGATIVE
PH UA: 6 (ref 4.5–8)
PROTEIN UA: POSITIVE
RBC URINE, POC: ABNORMAL
SPECIFIC GRAVITY UA: 1.01 (ref 1–1.03)
UROBILINOGEN, URINE: NORMAL
WBC URINE, POC: ABNORMAL
YEAST URINE, POC: ABNORMAL

## 2020-11-10 PROCEDURE — 81000 URINALYSIS NONAUTO W/SCOPE: CPT | Performed by: FAMILY MEDICINE

## 2020-11-10 PROCEDURE — 99213 OFFICE O/P EST LOW 20 MIN: CPT | Performed by: FAMILY MEDICINE

## 2020-11-10 ASSESSMENT — ENCOUNTER SYMPTOMS
SINUS PRESSURE: 0
SHORTNESS OF BREATH: 0
CONSTIPATION: 0

## 2020-11-10 NOTE — PROGRESS NOTES
Subjective:      Patient ID: Stanford Wick is a 53 y.o. female. HPI  1. There has been some urgency and pressure to void. 2. No BM or urinary changes. Not sexually active. No change in caffeine use or spicy foods  3. We reviewed the UA and the glucose  4. The urgency is maybe less    Review of Systems   Constitutional: Negative for fatigue. HENT: Negative for sinus pressure. Eyes: Negative for visual disturbance. Respiratory: Negative for shortness of breath. Cardiovascular: Negative for chest pain. Gastrointestinal: Negative for constipation. Genitourinary: Negative. Musculoskeletal: Negative for arthralgias. Skin: Negative for rash. Neurological: Negative for headaches. The patient's medications, allergies, past medical problems, surgical, social, and family histories were reviewed and updated as needed. Objective:   Physical Exam  Constitutional:       General: She is not in acute distress. Appearance: She is well-developed. HENT:      Head: Normocephalic and atraumatic. Eyes:      General: No scleral icterus. Conjunctiva/sclera: Conjunctivae normal.   Neck:      Trachea: No tracheal deviation. Cardiovascular:      Rate and Rhythm: Normal rate. Pulmonary:      Effort: Pulmonary effort is normal.   Abdominal:      General: Bowel sounds are normal.      Palpations: Abdomen is soft. There is no mass. Tenderness: There is no abdominal tenderness. Skin:     General: Skin is warm and dry. Neurological:      Mental Status: She is alert and oriented to person, place, and time. Psychiatric:         Behavior: Behavior normal.     Blood pressure 132/80, pulse 60, temperature 97.2 °F (36.2 °C), temperature source Infrared, resp. rate 18, height 5' 5\" (1.651 m), weight 239 lb 2 oz (108.5 kg), last menstrual period 01/01/2008, not currently breastfeeding. Assessment:       Diagnosis Orders   1.  Urinary urgency  POC URINE with Microscopic           Plan: See me in a month if the feelings are not better  If the pelvic feelings get better see me in 6 months        Mee Cox MD

## 2020-11-10 NOTE — PATIENT INSTRUCTIONS
See me in a month if the feelings are not better  If the pelvic feelings get better see me in 6 months

## 2020-11-12 LAB
REPORT STATUS: NORMAL
SITE/TYPE: NORMAL
URINE CULTURE, ROUTINE: NORMAL

## 2021-01-11 RX ORDER — BUPROPION HYDROCHLORIDE 150 MG/1
150 TABLET ORAL EVERY MORNING
Qty: 30 TABLET | Refills: 2 | Status: SHIPPED | OUTPATIENT
Start: 2021-01-11 | End: 2021-04-15 | Stop reason: SDUPTHER

## 2021-01-11 NOTE — TELEPHONE ENCOUNTER
Giancarlo Estrada is a patient of Dr. Marii Bass. She has requested a refill of Wellbutrin XL 150mg (total dose 450mg). She attended an appointment 5/26/20 and is to resume care with you 4/15/21.

## 2021-01-13 ENCOUNTER — HOSPITAL ENCOUNTER (OUTPATIENT)
Dept: MRI IMAGING | Age: 51
Discharge: HOME | End: 2021-01-13
Payer: MEDICARE

## 2021-01-13 DIAGNOSIS — M54.2 CERVICALGIA: ICD-10-CM

## 2021-01-13 PROCEDURE — 72141 MRI NECK SPINE W/O DYE: CPT

## 2021-01-30 ENCOUNTER — APPOINTMENT (OUTPATIENT)
Dept: GENERAL RADIOLOGY | Age: 51
End: 2021-01-30
Payer: MEDICARE

## 2021-01-30 ENCOUNTER — HOSPITAL ENCOUNTER (EMERGENCY)
Age: 51
Discharge: HOME | End: 2021-01-30
Attending: EMERGENCY MEDICINE
Payer: MEDICARE

## 2021-01-30 VITALS
RESPIRATION RATE: 18 BRPM | SYSTOLIC BLOOD PRESSURE: 131 MMHG | BODY MASS INDEX: 39.32 KG/M2 | DIASTOLIC BLOOD PRESSURE: 85 MMHG | WEIGHT: 236 LBS | HEIGHT: 65 IN | HEART RATE: 107 BPM | OXYGEN SATURATION: 96 % | TEMPERATURE: 98.6 F

## 2021-01-30 DIAGNOSIS — M23.92 INTERNAL DERANGEMENT OF LEFT KNEE: Primary | ICD-10-CM

## 2021-01-30 PROCEDURE — 99283 EMERGENCY DEPT VISIT LOW MDM: CPT

## 2021-01-30 PROCEDURE — 6360000002 HC RX W HCPCS: Performed by: EMERGENCY MEDICINE

## 2021-01-30 PROCEDURE — 6370000000 HC RX 637 (ALT 250 FOR IP): Performed by: EMERGENCY MEDICINE

## 2021-01-30 PROCEDURE — 73564 X-RAY EXAM KNEE 4 OR MORE: CPT

## 2021-01-30 PROCEDURE — 96372 THER/PROPH/DIAG INJ SC/IM: CPT

## 2021-01-30 RX ORDER — HYDROCODONE BITARTRATE AND ACETAMINOPHEN 5; 325 MG/1; MG/1
1 TABLET ORAL ONCE
Status: COMPLETED | OUTPATIENT
Start: 2021-01-30 | End: 2021-01-30

## 2021-01-30 RX ORDER — HYDROCODONE BITARTRATE AND ACETAMINOPHEN 5; 325 MG/1; MG/1
1 TABLET ORAL EVERY 6 HOURS PRN
Qty: 9 TABLET | Refills: 0 | Status: SHIPPED | OUTPATIENT
Start: 2021-01-30 | End: 2021-02-02

## 2021-01-30 RX ORDER — KETOROLAC TROMETHAMINE 30 MG/ML
30 INJECTION, SOLUTION INTRAMUSCULAR; INTRAVENOUS ONCE
Status: COMPLETED | OUTPATIENT
Start: 2021-01-30 | End: 2021-01-30

## 2021-01-30 RX ADMIN — KETOROLAC TROMETHAMINE 30 MG: 30 INJECTION, SOLUTION INTRAMUSCULAR at 09:04

## 2021-01-30 RX ADMIN — HYDROCODONE BITARTRATE AND ACETAMINOPHEN 1 TABLET: 5; 325 TABLET ORAL at 09:04

## 2021-01-30 ASSESSMENT — PAIN DESCRIPTION - LOCATION: LOCATION: KNEE

## 2021-01-30 ASSESSMENT — PAIN DESCRIPTION - ORIENTATION: ORIENTATION: LEFT

## 2021-01-30 ASSESSMENT — PAIN DESCRIPTION - PAIN TYPE: TYPE: ACUTE PAIN

## 2021-01-30 NOTE — ED PROVIDER NOTES
[facility] ENCOUNTER          Pt Name: Dmitriy Ziegler  MRN: [id number]  [place] 1970  Date of evaluation: 1/30/2021  Emergency Physician: Cata Gaston, [facility]       Chief Complaint   Patient presents with    Knee Pain     History obtained from the patient. HISTORY OF PRESENT ILLNESS    HPI  Dmitriy Ziegler is a 53 y.o. female who presents to the emergency department for evaluation of fall. Patient states she was in the shower. She states she slipped. She states she tried to catch herself. She states she twisted her left lower extremity causing sudden pain in her left knee. She states she felt a pop and since that time her left knee has felt unstable. It is worse with ambulation. Pain is localized to bilateral anterior joint lines. Denies head injury no blood thinners no hip pain no back pain. The patient has no other acute complaints at this time. REVIEW OF SYSTEMS   Review of Systems   Musculoskeletal: Positive for joint swelling. Negative for arthralgias, gait problem and myalgias. Skin: Negative for rash and wound. Neurological: Negative for headaches. Hematological: Negative for adenopathy. Does not bruise/bleed easily.          PAST MEDICAL AND SURGICAL HISTORY     Past Medical History:   Diagnosis Date    Anxiety     Arthritis     Chronic gastritis 1/2015    Depression     Deviated septum     GERD (gastroesophageal reflux disease)     chronic    Hiatal hernia 2009    Intraductal papilloma of breast 07/2020    right    Numbness and tingling 2007    left leg and foot-chronic    Ovarian cyst     PONV (postoperative nausea and vomiting)     Pulmonary nodule 10/1/2013    Schwannoma of spinal cord ([address] 9/17/2013     Past Surgical History:   Procedure Laterality Date    BLADDER SUSPENSION  2006    BREAST LUMPECTOMY Right 8/12/2020    RIGHT BREAST LUMPECTOMY, [facility]OP NEEDLE LOC [facility] [address] [facility]  2007    c4-c7 Dr Natalie Sauceda COLONOSCOPY  [phone]    Dr Bernadette Domínguez  06/16/2006    Dr Rashad Wetzel  06/16/2006    vaginal hyster,anterior colporrhaphy,monarc suburethral hammock placement,right ovarian cystectomy-Dr Rajan    MRI BREAST BX USING DEVICE RIGHT  7/23/2020    MRI BREAST BX USING DEVICE RIGHT 7/23/2020 [facility]    NERVE BLOCK  4/7/14    MO REPAIR OF NASAL SEPTUM Bilateral 12/20/2017    SEPTOPLASTY SUBMUCOUS RESECTION - PARTIAL BILATERAL INFERIOR, NASAL ENDOSCOPY WITH AUTUMN BULLOSA RIGHT MIDDLE performed by Yamile Rosenbaum MD at [facility] ENDOSCOPY  2015    Dr Kenney Friday     Current Facility-Administered Medications:     HYDROcodone-acetaminophen (NORCO) 5-325 MG per tablet 1 tablet, 1 tablet, Oral, Once, Nain Everett DO    ketorolac (TORADOL) injection 30 mg, 30 mg, Intramuscular, Once, Nain Everett DO    Current Outpatient Medications:     buPROPion (WELLBUTRIN XL) 150 MG extended release tablet, Take 1 tablet by mouth every morning, Disp: 30 tablet, Rfl: 2    desvenlafaxine (KHEDEZLA) 100 MG TB24 ER tablet, TAKE 1 TABLET BY MOUTH ONCE DAILY, Disp: , Rfl:     Omeprazole Magnesium (PRILOSEC OTC PO), Take by mouth daily, Disp: , Rfl:     buPROPion (WELLBUTRIN XL) 300 MG extended release tablet, Take 1 tablet by mouth every morning In addition to 150mg dose, Disp: 30 tablet, Rfl: 5    meloxicam (MOBIC) 15 MG tablet, Take 15 mg by mouth daily, Disp: , Rfl:     tiZANidine (ZANAFLEX) 4 MG tablet, Take 1 tablet by mouth every 6 hours as needed (muscle spasm), Disp: 40 tablet, Rfl: 1    gabapentin (NEURONTIN) 400 MG capsule, TAKE 1 CAPSULE BY MOUTH 3 TIMES A DAY, Disp: , Rfl: 2      SOCIAL HISTORY     Social History     Social History Narrative    Not on file     Social History Tobacco Use    Smoking status: Former Smoker     Packs/day: 0.75     Years: 18.00     Pack years: 13.50     Types: Cigarettes     Start date: 5/1/1989    Smokeless tobacco: Never Used    Tobacco comment: off and on through the years   Substance Use Topics    Alcohol use: Yes     Comment: rarely    Drug use: No         ALLERGIES   No Known Allergies      FAMILY HISTORY     Family History   Problem Relation Age of Onset    Breast Cancer Mother 28    Lung Cancer Mother     Brain Cancer Mother     High Blood Pressure Father     Diabetes Father     Stroke Father     Cancer Brother         pancreas    Cancer Maternal Grandfather         unsure what type    Breast Cancer Maternal Aunt 34        mets    No Known Problems Sister     No Known Problems Maternal Grandmother     No Known Problems Paternal Grandmother     No Known Problems Paternal Grandfather          PHYSICAL EXAM     ED Triage Vitals [01/30/21 0842]   BP Temp Temp Source Pulse Resp SpO2 Height Weight   131/85 98.6 °F (37 °C) Oral 107 18 96 % 5' 5\" (1.651 m) 236 lb (107 kg)         Additional Vital Signs:  Vitals:    01/30/21 0842   BP: 131/85   Pulse: 107   Resp: 18   Temp: 98.6 °F (37 °C)   SpO2: 96%       Physical Exam  Vitals signs and nursing note reviewed. Constitutional:       General: She is not in acute distress. Appearance: She is not ill-appearing. HENT:      Head: Normocephalic and atraumatic. Nose: No congestion or rhinorrhea. Mouth/Throat:      Mouth: Mucous membranes are moist.   Cardiovascular:      Rate and Rhythm: Normal rate and regular rhythm. Pulses: Normal pulses. Heart sounds: Normal heart sounds. Pulmonary:      Effort: Pulmonary effort is normal.      Breath sounds: Normal breath sounds. Musculoskeletal: Normal range of motion. Right hip: She exhibits normal strength, no tenderness and no bony tenderness.       Left hip: She exhibits normal strength, no tenderness and no bony tenderness. Right knee: Normal.      Left knee: She exhibits swelling and effusion. She exhibits normal range of motion, no LCL laxity and no MCL laxity. Tenderness found. Medial joint line, lateral joint line and LCL tenderness noted. No MCL and no patellar tendon tenderness noted. Lumbar back: She exhibits no tenderness and no bony tenderness. Neurological:      Mental Status: She is alert. Psychiatric:         Attention and Perception: Attention normal.         Mood and Affect: Affect normal.         Initial vital signs and nursing assessment reviewed and abnormal from Tachycardia from pain. Pulsoximetry is normal per my interpretation. MEDICAL DECISION MAKING   Initial Assessment: Given the patient's above chief complaint and findings on history and physical examination, I thought it was appropriate to consider the following emergency medical conditions: Knee sprain/strain, ligamentous injury, internal derangement of knee, septic arthritis, fracture, joint instability, knee dislocation although some of these diagnoses are unlikely they were considered in my medical decision making. Plan: Left knee x-ray symptomatic treatment with pain control and reassess         ED RESULTS   Laboratory results:  Labs Reviewed - No data to display    Radiologic studies results:  XR KNEE LEFT (MIN 4 VIEWS)   Final Result       1. Small knee joint effusion. 2. No displaced fracture is noted. 3. Mild degenerative changes in the patellofemoral compartment. **This report has been created using voice recognition software. It may contain minor errors which are inherent in voice recognition technology. **      Final report electronically signed by Dr. Cindi Holley on 1/30/2021 9:15 AM          ED Medications administered this visit:   Medications   HYDROcodone-acetaminophen (NORCO) 5-325 MG per tablet 1 tablet (1 tablet Oral Given 1/30/21 0904)   ketorolac (TORADOL) injection 30 mg (30 mg

## 2021-02-18 RX ORDER — DESVENLAFAXINE 100 MG/1
100 TABLET, EXTENDED RELEASE ORAL DAILY
Qty: 30 TABLET | Refills: 1 | Status: SHIPPED | OUTPATIENT
Start: 2021-02-18 | End: 2021-04-15 | Stop reason: SDUPTHER

## 2021-02-18 RX ORDER — BUPROPION HYDROCHLORIDE 300 MG/1
300 TABLET ORAL EVERY MORNING
Qty: 30 TABLET | Refills: 1 | Status: SHIPPED | OUTPATIENT
Start: 2021-02-18 | End: 2021-04-15 | Stop reason: SDUPTHER

## 2021-02-18 NOTE — TELEPHONE ENCOUNTER
Brigitte Chen is a former patient of Dr. Kirill Arreguin who was last seen 5/26/20 and is scheduled to resume care with you on 4/15/21. She has requested refills of Wellbutrin XL 300mg (takes total daily dose 450mg) and Pristiq 100mg/d. *She states that she has enough Wellbutrin XL 150mg at this time.

## 2021-02-22 ENCOUNTER — OFFICE VISIT (OUTPATIENT)
Dept: SURGERY | Age: 51
End: 2021-02-22
Payer: MEDICARE

## 2021-02-22 VITALS
BODY MASS INDEX: 39.05 KG/M2 | SYSTOLIC BLOOD PRESSURE: 110 MMHG | RESPIRATION RATE: 20 BRPM | OXYGEN SATURATION: 97 % | TEMPERATURE: 97.7 F | WEIGHT: 234.4 LBS | HEART RATE: 100 BPM | DIASTOLIC BLOOD PRESSURE: 60 MMHG | HEIGHT: 65 IN

## 2021-02-22 DIAGNOSIS — Z80.3 FAMILY HISTORY OF BREAST CANCER IN FIRST DEGREE RELATIVE: ICD-10-CM

## 2021-02-22 DIAGNOSIS — D24.1 PAPILLOMA OF RIGHT BREAST: Primary | ICD-10-CM

## 2021-02-22 PROCEDURE — 99213 OFFICE O/P EST LOW 20 MIN: CPT | Performed by: SURGERY

## 2021-02-22 ASSESSMENT — ENCOUNTER SYMPTOMS
NAUSEA: 0
BLOOD IN STOOL: 0
COLOR CHANGE: 0
VOMITING: 0
TROUBLE SWALLOWING: 0
WHEEZING: 0
ABDOMINAL PAIN: 0
VOICE CHANGE: 0
COUGH: 0
SORE THROAT: 0
SHORTNESS OF BREATH: 0

## 2021-02-22 NOTE — LETTER
[address] [facility]  [place] [address] [address] [facility] [address]  Phone: [phone]  Fax: [phone]    Comfort Ramirez MD        February 24, 2021    Navjot Gan, [facility] [address]    Patient: Yuridia Reed   MR Number: [id number]   YOB: 1970   Date of Visit: 2/22/2021     Dear Navjot Gan,    I recently saw your patient, Yuridia Reed for a follow-up visit. Below are the relevant portions of my assessment and plan of care. 1. Papilloma of right breast    2. Family history of breast cancer in first degree relative, Mom        1. Clinical breast examination today is benign. 2.  Reviewed pathology again with patient focal intraductal papilloma without atypia usual ductal hyperplasia. 3.  Schedule patient for bilateral mammography. She had been getting MRIs. No mammogram in over a year. If you have questions, please do not hesitate to call me.   Sincerely,          Comfort Ramirez MD

## 2021-02-22 NOTE — PROGRESS NOTES
Hector Davis MD   General Surgery  New Patient Evaluation in Office  Pt Name: Herb Suarez  Date of Birth 1970   Today's Date: 2021  Medical Record Number: [id number]  Referring Provider: No ref. provider found  Primary Care Provider: Lindy García MD  Chief Complaint:  Chief Complaint   Patient presents with    6 Month Follow-Up     Papilloma of right breast--s/p Right partial mastectomy with preoperative needle loc 20       ASSESSMENT      1. Papilloma of right breast    2. Family history of breast cancer in first degree relative, Mom         PLANS      1. Clinical breast examination today is benign. 2.  Reviewed pathology again with patient focal intraductal papilloma without atypia usual ductal hyperplasia. 3.  Schedule patient for bilateral mammography. She had been getting MRIs. No mammogram in over a year. Floor. If follow-up mammogram unremarkable can follow-up surgical clinic on an as-needed basis. SUBJECTIVE   History of present illness:  Jackelyn Jones is a 53y.o. year old female who is presenting today in the office for follow-up evaluation of a papilloma of the right breast associated with MRI detected mass in the right breast on screening. She has dense breasts and her mother was diagnosed with breast cancer at age 28. Her brother  in his early 45s of pancreatic cancer. She is unaware that anyone in her family underwent genetic counseling or testing. With her dense breast she this year underwent MRI screening and was found to have a new mass over 2 cm in size in the right breast per report below. She underwent a MRI guided biopsy which revealed papilloma and hyperplasia without atypia. This was felt to be discordant with imaging findings and excision with needle localization was recommended. Chema has had no breast symptoms. 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 She denies prior breast biopsies.   She has had prior breast reduction surgery. She underwent a needle local physician lumpectomy with benign pathology and no atypia. Interval history: Anna Foster denies any breast issues. She has a left knee torn meniscus and has some leg pain on that basis. She has no breast pain or nipple discharge. She denies any new cancer diagnoses in her family. She had been getting breast MRIs. She is agreeable to a mammogram.  Clinical exam today is benign. If unremarkable she is comfortable returning to her gynecologist for interval breast examinations. She is more than welcome to call our office with any changes in exam or if she desires another exam on a yearly basis. G5, P4 she had her children in her 25s. Menarche age 16. She had a hysterectomy for prolapse and menorrhagia in her 35s. Her ovaries remain.   Past Medical History  Past Medical History:   Diagnosis Date    Anxiety     Arthritis     Chronic gastritis 1/2015    Depression     Deviated septum     GERD (gastroesophageal reflux disease)     chronic    Hiatal hernia 2009    Intraductal papilloma of breast 07/2020    right    Numbness and tingling 2007    left leg and foot-chronic    Ovarian cyst     PONV (postoperative nausea and vomiting)     Pulmonary nodule 10/1/2013    Schwannoma of spinal cord ([facility] [address] 9/17/2013       Past Surgical History  Past Surgical History:   Procedure Laterality Date    BLADDER SUSPENSION  2006    BREAST LUMPECTOMY Right 8/12/2020    RIGHT BREAST LUMPECTOMY, PREOP NEEDLE LOC performed by Yamile Sylvester MD at [address] [facility]  2007    c4-c7 Dr Natasha Swanson COLONOSCOPY  [id number]    Dr Darren Esquivel  06/16/2006    Dr Jose Alberto Stafford  06/16/2006    vaginal hyster,anterior colporrhaphy,monarc suburethral hammock placement,right ovarian cystectomy-Dr Rajan    MRI BREAST BX USING DEVICE RIGHT  7/23/2020    MRI BREAST BX USING DEVICE RIGHT 7/23/2020 [facility]    NERVE BLOCK  4/7/14    ID REPAIR OF NASAL SEPTUM Bilateral 12/20/2017    SEPTOPLASTY SUBMUCOUS RESECTION - PARTIAL BILATERAL INFERIOR, NASAL ENDOSCOPY WITH AUTUMN BULLOSA RIGHT MIDDLE performed by Etelvina Collazo MD at [address] [place] [address]  2015    Dr Faby Herrera       Medications  Current Outpatient Medications   Medication Sig Dispense Refill    buPROPion (WELLBUTRIN XL) 300 MG extended release tablet Take 1 tablet by mouth every morning In addition to 150mg dose 30 tablet 1    desvenlafaxine succinate (PRISTIQ) 100 MG TB24 extended release tablet Take 1 tablet by mouth daily 30 tablet 1    buPROPion (WELLBUTRIN XL) 150 MG extended release tablet Take 1 tablet by mouth every morning 30 tablet 2    Omeprazole Magnesium (PRILOSEC OTC PO) Take by mouth daily      meloxicam (MOBIC) 15 MG tablet Take 15 mg by mouth daily      tiZANidine (ZANAFLEX) 4 MG tablet Take 1 tablet by mouth every 6 hours as needed (muscle spasm) 40 tablet 1    gabapentin (NEURONTIN) 400 MG capsule TAKE 1 CAPSULE BY MOUTH 3 TIMES A DAY  2     No current facility-administered medications for this visit.       Allergies   No Known Allergies    Family History  Family History   Problem Relation Age of Onset    Breast Cancer Mother 28    Lung Cancer Mother     Brain Cancer Mother     High Blood Pressure Father     Diabetes Father     Stroke Father     Cancer Brother         pancreas    Cancer Maternal Grandfather         unsure what type    Breast Cancer Maternal Aunt 34        mets    No Known Problems Sister     No Known Problems Maternal Grandmother     No Known Problems Paternal Grandmother     No Known Problems Paternal Grandfather        SocialHistory  Social History     Socioeconomic History    Marital status:      Spouse name: Not on file    Number of children: 4    Years of education: Not on file    Highest education level: Not on file   Occupational History    Occupation: disabled   Social Needs    Financial resource strain: Not on file    Food insecurity     Worry: Not on file     Inability: Not on file   [employer] needs     Medical: Not on file     Non-medical: Not on file   Tobacco Use    Smoking status: Former Smoker     Packs/day: 0.75     Years: 18.00     Pack years: 13.50     Types: Cigarettes     Start date: 5/1/1989    Smokeless tobacco: Never Used    Tobacco comment: off and on through the years   Substance and Sexual Activity    Alcohol use: Yes     Comment: rarely    Drug use: No    Sexual activity: Yes     Partners: Male   Lifestyle    Physical activity     Days per week: Not on file     Minutes per session: Not on file    Stress: Not on file   Relationships    Social connections     Talks on phone: Not on file     Gets together: Not on file     Attends Synagogue service: Not on file     Active member of club or organization: Not on file     Attends meetings of clubs or organizations: Not on file     Relationship status: Not on file    Intimate partner violence     Fear of current or ex partner: Not on file     Emotionally abused: Not on file     Physically abused: Not on file     Forced sexual activity: Not on file   Other Topics Concern    Not on file   Social History Narrative    Not on file           Review of Systems  Review of Systems   Constitutional: Negative for chills, fatigue, fever and unexpected weight change. HENT: Negative for sore throat, trouble swallowing and voice change. Eyes: Negative for visual disturbance. Respiratory: Negative for cough, shortness of breath and wheezing. Cardiovascular: Negative for chest pain and palpitations. Gastrointestinal: Negative for abdominal pain, blood in stool, nausea and vomiting. Endocrine: Negative for cold intolerance, heat intolerance and polydipsia.    Genitourinary: Negative for dysuria, flank pain and hematuria. Musculoskeletal: Positive for neck pain. Negative for back pain, gait problem, joint swelling, myalgias and neck stiffness. Left knee pain torn meniscus   Skin: Negative for color change and rash. Allergic/Immunologic: Negative for immunocompromised state. Neurological: Positive for tremors, numbness and headaches. Negative for dizziness, seizures and speech difficulty. Hematological: Does not bruise/bleed easily. Psychiatric/Behavioral: Positive for dysphoric mood. Negative for behavioral problems, confusion and suicidal ideas. OBJECTIVE     /60 (Site: Right Upper Arm, Position: Sitting, Cuff Size: Medium Adult)   Pulse 100   Temp 97.7 °F (36.5 °C) (Temporal)   Resp 20   Ht 5' 5\" (1.651 m)   Wt 234 lb 6.4 oz (106.3 kg)   LMP 01/01/2008   SpO2 97%   BMI 39.01 kg/m²      Physical Exam  Vitals signs reviewed. Constitutional:       Appearance: Normal appearance. She is well-developed. HENT:      Head: Normocephalic and atraumatic. Nose: No congestion or rhinorrhea. Eyes:      General: No scleral icterus. Extraocular Movements: Extraocular movements intact. Pupils: Pupils are equal, round, and reactive to light. Neck:      Musculoskeletal: Neck supple. Vascular: No JVD. Trachea: No tracheal deviation. Cardiovascular:      Rate and Rhythm: Normal rate and regular rhythm. Heart sounds: Normal heart sounds. No murmur. Pulmonary:      Effort: Pulmonary effort is normal. No respiratory distress. Breath sounds: Normal breath sounds. No wheezing. Chest:      Chest wall: No tenderness. Breasts:         Right: No inverted nipple, mass, nipple discharge, skin change or tenderness. Left: No inverted nipple, mass, nipple discharge, skin change or tenderness. Abdominal:      General: There is no distension. Palpations: There is no mass. Tenderness: There is no abdominal tenderness.    Musculoskeletal: fibrocystic changes including stromal       fibrosis, microcyst formation, usual ductal hyperplasia, and       apocrine metaplasia.     Microcalcifications associated with fibrocystic changes.         Negative for atypia and malignancy. Specimen:   CORE NEEDLE BREAST BIOPSY, RIGHT       Gross Examination:   The container is labeled Georgette Jacobo, right breast.  Received in   formalin are multiple fragments of yellow-white fibroadipose tissue and   blood clot aggregating to 2.5 x 1.8 x 0.3 cm.  2 ns.  SMW/DKR:[id number]     Fixative:  10% neutral buffered formalin   Tissue removal time:  10:20   Tissue fixation time:  10:22   Total fixation time: 9 hours 38 minutes     Microscopic Examination:   Sections show breast tissue with proliferative fibrocystic changes   including stromal fibrosis, microcyst formation, usual ductal   hyperplasia, and apocrine metaplasia.  An intraductal papilloma with   abundant usual ductal hyperplasia is present. Goyal Hoop are   microcalcifications associated with the fibrocystic changes. Goyal Hoop is   no evidence of atypia or malignancy. Dr. Valentino Fusi has reviewed   this case and agrees with the diagnosis. Nuria Black                                        <Sign Out Dr. Jarred Gomez M.D., F.C.A.P.        NVML/ [address] [employer] [facility]  Printed on:  7/24/2020   [facility]              #[id number] - MRI BREAST BILATERAL W WO CONTRAST    BREAST MRI OF THE RIGHT BREAST : 7/3/2020    CLINICAL: Family history of breast ca; bilateral dense breasts.                Interpretation of this MRI was correlated with available    mammograms and previous MRI scans.         Axial and coronal T2 STIR, sagittal T1, axial T1 fat saturated    pre and post contrast dynamic imaging was performed of both    breasts.  Images were reviewed at a [employer] 3D    workstation and time intensity curves were analyzed.         There is mild background enhancement. The breast parenchyma has    scattered fibroglandular elements.         There is a 1.7 cm x 2.5 cm area in the right breast upper inner    quadrant anterior depth 4.7 cm from the nipple, 3.3 cm from the    skin, and 12 cm from the chest wall.  This shows non-mass like    and homogeneous enhancement and delayed plateau type vascular    enhancement.  The abnormality is best seen on the post contrast    subtraction axial 70 slice.           On the left, there are no areas of suspicious enhancement.  There     is no suspicious morphology.  No dominant lesions are present.         There are scattered small foci of enhancement bilaterally which    appear otherwise similar when compared to prior examination dated     7/11/2019. These stable small foci of enhancement likely    represent fibrocystic change.         No acute osseous findings are demonstrated. No lymphadenopathy is     seen.           There are 2 hyperintense T2-weighted lesions demonstrated within    the right hepatic lobe seen on coronal T2-weighted series 2    images 26 and image 25. The largest of these measures    approximately 1.6 cm in diameter. However this is incompletely    visualized. Further characterization could be obtained with liver     ultrasound or liver lesion protocol CT imaging. These do not    appear significantly changed when compared to the prior MRI    examination.         IMPRESSION: INCOMPLETE: NEEDS ADDITIONAL IMAGING    1. The 1.7 cm x 2.5 cm area in the right breast appears to have    an intermediate suspicion for malignancy.  An MRI guided biopsy    is recommended.           2. There are 2 hyperintense T2-weighted lesions demonstrated    within the right hepatic lobe seen on coronal T2-weighted series    2 images 26 and image 25. The largest of these measures    approximately 1.6 cm in diameter. However this is incompletely    visualized.  Further characterization could be obtained with liver     ultrasound or liver lesion protocol CT imaging. These do not    appear significantly changed when compared to the prior MRI    examination.              The patient has been or will be contacted. Perico Rodriguez M.D.      rd/:7/3/2020 14:29:21      copy to: Klever Matt CNP, [facility],    fax: [phone]         Imaging Technologist: Jesus LEMOS(KAYLEEN)(MR)[id number]    letter sent: Additional Tests Needed      MRI BI-RADS: 0: Incomplete: Needs Additional Imaging   [id number]                        #[id number] - MRI BREAST BILATERAL W WO CONTRAST    BREAST MRI OF THE RIGHT BREAST : 7/3/2020    CLINICAL: Family history of breast ca; bilateral dense breasts.                Interpretation of this MRI was correlated with available    mammograms and previous MRI scans.         Axial and coronal T2 STIR, sagittal T1, axial T1 fat saturated    pre and post contrast dynamic imaging was performed of both    breasts.  Images were reviewed at a [employer] 3D    workstation and time intensity curves were analyzed.         There is mild background enhancement. The breast parenchyma has    scattered fibroglandular elements.         There is a 1.7 cm x 2.5 cm area in the right breast upper inner    quadrant anterior depth 4.7 cm from the nipple, 3.3 cm from the    skin, and 12 cm from the chest wall.  This shows non-mass like    and homogeneous enhancement and delayed plateau type vascular    enhancement.  The abnormality is best seen on the post contrast    subtraction axial 70 slice.           On the left, there are no areas of suspicious enhancement.  There     is no suspicious morphology.  No dominant lesions are present.         There are scattered small foci of enhancement bilaterally which    appear otherwise similar when compared to prior examination dated     7/11/2019.  These stable small foci of enhancement likely    represent fibrocystic change.         No acute osseous findings are demonstrated. No lymphadenopathy is     seen.           There are 2 hyperintense T2-weighted lesions demonstrated within    the right hepatic lobe seen on coronal T2-weighted series 2    images 26 and image 25. The largest of these measures    approximately 1.6 cm in diameter. However this is incompletely    visualized. Further characterization could be obtained with liver     ultrasound or liver lesion protocol CT imaging. These do not    appear significantly changed when compared to the prior MRI    examination.         IMPRESSION: INCOMPLETE: NEEDS ADDITIONAL IMAGING    1. The 1.7 cm x 2.5 cm area in the right breast appears to have    an intermediate suspicion for malignancy.  An MRI guided biopsy    is recommended.           2. There are 2 hyperintense T2-weighted lesions demonstrated    within the right hepatic lobe seen on coronal T2-weighted series    2 images 26 and image 25. The largest of these measures    approximately 1.6 cm in diameter. However this is incompletely    visualized. Further characterization could be obtained with liver     ultrasound or liver lesion protocol CT imaging.  These do not    appear significantly changed when compared to the prior MRI    examination.              The patient has been or will be contacted.

## 2021-02-24 ASSESSMENT — ENCOUNTER SYMPTOMS: BACK PAIN: 0

## 2021-02-25 DIAGNOSIS — D24.1 INTRADUCTAL PAPILLOMA OF RIGHT BREAST: Primary | ICD-10-CM

## 2021-02-26 ENCOUNTER — HOSPITAL ENCOUNTER (OUTPATIENT)
Dept: WOMENS IMAGING | Age: 51
Discharge: HOME | End: 2021-02-26
Payer: MEDICARE

## 2021-02-26 ENCOUNTER — HOSPITAL ENCOUNTER (OUTPATIENT)
Dept: WOMENS IMAGING | Age: 51
End: 2021-02-26
Payer: MEDICARE

## 2021-02-26 DIAGNOSIS — D24.1 PAPILLOMA OF RIGHT BREAST: ICD-10-CM

## 2021-02-26 PROCEDURE — G0279 TOMOSYNTHESIS, MAMMO: HCPCS

## 2021-04-15 ENCOUNTER — VIRTUAL VISIT (OUTPATIENT)
Dept: PSYCHIATRY | Age: 51
End: 2021-04-15
Payer: MEDICARE

## 2021-04-15 DIAGNOSIS — G89.29 OTHER CHRONIC PAIN: ICD-10-CM

## 2021-04-15 DIAGNOSIS — F33.42 RECURRENT MAJOR DEPRESSIVE DISORDER, IN FULL REMISSION (HCC): Primary | ICD-10-CM

## 2021-04-15 DIAGNOSIS — G47.00 INSOMNIA, UNSPECIFIED TYPE: ICD-10-CM

## 2021-04-15 PROCEDURE — 99213 OFFICE O/P EST LOW 20 MIN: CPT | Performed by: PSYCHIATRY & NEUROLOGY

## 2021-04-15 RX ORDER — DESVENLAFAXINE 100 MG/1
100 TABLET, EXTENDED RELEASE ORAL DAILY
Qty: 30 TABLET | Refills: 2 | Status: SHIPPED | OUTPATIENT
Start: 2021-04-15 | End: 2021-07-07 | Stop reason: SDUPTHER

## 2021-04-15 RX ORDER — BUPROPION HYDROCHLORIDE 300 MG/1
300 TABLET ORAL EVERY MORNING
Qty: 30 TABLET | Refills: 2 | Status: SHIPPED | OUTPATIENT
Start: 2021-04-15 | End: 2021-07-07 | Stop reason: SDUPTHER

## 2021-04-15 RX ORDER — BUPROPION HYDROCHLORIDE 150 MG/1
150 TABLET ORAL EVERY MORNING
Qty: 30 TABLET | Refills: 2 | Status: SHIPPED | OUTPATIENT
Start: 2021-04-15 | End: 2021-07-07 | Stop reason: SDUPTHER

## 2021-04-15 NOTE — PROGRESS NOTES
[address]  [place] [id number] [phone]    Progress Note    Patient:  Agapito Connor  YOB: 1970  PCP:  Sharifa Pimentel MD  Visit Date:  4/15/2021    TELEHEALTH EVALUATION -- Audio/Visual (During DPDUN-02 public health emergency)    Patient location: home  Physician location: [facility] [place]  This virtual visit was conducted via interactive, real-time video. Chief Complaint   Patient presents with    Follow-up    Medication Check    Depression    Anxiety       SUBJECTIVE:      Agapito Connor, a 53 y.o. female, presents for a follow up visit. Patient reports she is doing well. Patient is compliant with medication regimen. She presents alone. Previously saw Dr. Mina Suresh then started treatment with Dr. Bhumi Benedict in 2020. Last saw Dr. Bhumi Benedict in 2020 and now transferred to me. She notes \"doing pretty good\". Has had 2 recent losses. Her uncle just  from covid. Her best friend's father  as well. Coping ok with the losses. Continues on Pristiq and Wellbutrin. Living with her best friend and bf's . Was living in [facility] but now in [place] [facility] Is on disability. H/o schwannoma and s/p surgery for that. Has chronic neck pain and HA. Worsens her mood. Janusz David in shower and tore L ACL and meniscus. Had to use a walker which worsened her neck. Now using cane. Prefers to make no medication changes. Denies any side effects, maybe dry mouth. Using Ibuprofen PM which helps sleep. Takes Neurontin and Zanaflex at night. Her pain specialist in [facility] passed away. Has been seeing a few other clinicians at that practice. Pain is not very well controlled. Good and bad days. Sees a psychologist, Dr. Christelle Lindquist, which has been very helpful. No SI.       Med Trials:  Wellbutrin, Pristiq, Xanax, Viibryd, Abilify, Buspar, Prozac, Zoloft, Lexapro, Cymbalta, ? Effexor    OBJECTIVE:  Vitals: LMP 01/01/2008     MENTAL STATUS EXAM:    GENERAL  Build: Normal    Hygiene:  Appropriate    SENSORIUM Orientation: Place, Person, Time, & Situation     Consciousness: Alert    ATTENTION   Focused    RELATEDNESS  Cooperative    EYE CONTACT   Good    PSYCHOMOTOR  Normal    SPEECH Volume: Normal     Rate: Normal rate and tone    Amplitude: Within normal limits   MOOD  Euthymic    AFFECT Range: Full    THOUGHT Process:  Goal-Directed     Content: no evidence of psychosis    COGNITION Insight: Good    Judgement:  Intact    MEMORY  Intact    INTELLIGENCE  Average       Mobility/Gait: Independently, uses cane    Controlled SubstancesMonitoring:   not done      ASSESSMENT: Doing well today. No changes per her request. Mood stable. Some trouble with sleep and pain. No SI. Diagnosis Orders   1. Recurrent major depressive disorder, in full remission ([facility] [address]     2. Insomnia, unspecified type     3. Other chronic pain       Medical Hx:     PLAN:     · Medications:   · Wellbutrin  mg QAM  · Pristiq 100 mg QD  · Therapy: sees Dr. Geetha Licea in [address] [place] [address]  · Labs/Tests/Imaging: none   · Records Reviewed: [facility]  · Patient advised to call if patient has any difficulties with treatment  Return in about 10 weeks (around 6/24/2021) for med check, follow up. Electronically signed by Mekhi Tuttle MD on 4/15/2021 at 11:48 AM    Maritza العراقي is a 53 y.o. female being evaluated by a Virtual Visit (video visit) to address concerns as mentioned above. A caregiver was present when appropriate. Due to this being a TeleHealth encounter (EDUXD-50 public health emergency), evaluation of the following organ systems was limited: Vitals/Constitutional/EENT/Resp/CV/GI//MS/Neuro/Skin/Heme-Lymph-Imm.   Pursuant to the emergency declaration under the [facility] [place] waiver authority and the [facility] Appropriations Act, this Virtual Visit was conducted with patient's (and/or legal guardian's) consent, to reduce the patient's risk of exposure to COVID-19 and provide necessary medical care. The patient (and/or legal guardian) has also been advised to contact this office for worsening conditions or problems, and seek emergency medical treatment and/or call 911 if deemed necessary. Patient identification was verified at the start of the visit: Yes     Total time spent for this encounter: not billed by time     Services were provided through a video synchronous discussion virtually to substitute for in-person clinic visit. Patient and provider were located at their individual homes.     Electronically signed by Melissa Puentes MD on 4/15/2021 at 11:48 AM

## 2021-07-07 ENCOUNTER — VIRTUAL VISIT (OUTPATIENT)
Dept: PSYCHIATRY | Age: 51
End: 2021-07-07
Payer: MEDICARE

## 2021-07-07 DIAGNOSIS — F41.9 ANXIETY: ICD-10-CM

## 2021-07-07 DIAGNOSIS — G47.00 INSOMNIA, UNSPECIFIED TYPE: ICD-10-CM

## 2021-07-07 DIAGNOSIS — G89.29 OTHER CHRONIC PAIN: ICD-10-CM

## 2021-07-07 DIAGNOSIS — F33.9 EPISODE OF RECURRENT MAJOR DEPRESSIVE DISORDER, UNSPECIFIED DEPRESSION EPISODE SEVERITY (HCC): Primary | ICD-10-CM

## 2021-07-07 PROCEDURE — 99214 OFFICE O/P EST MOD 30 MIN: CPT | Performed by: PSYCHIATRY & NEUROLOGY

## 2021-07-07 RX ORDER — BUPROPION HYDROCHLORIDE 300 MG/1
300 TABLET ORAL EVERY MORNING
Qty: 30 TABLET | Refills: 2 | Status: SHIPPED | OUTPATIENT
Start: 2021-07-07 | End: 2021-09-09 | Stop reason: SDUPTHER

## 2021-07-07 RX ORDER — DESVENLAFAXINE 100 MG/1
100 TABLET, EXTENDED RELEASE ORAL DAILY
Qty: 30 TABLET | Refills: 2 | Status: SHIPPED | OUTPATIENT
Start: 2021-07-07 | End: 2021-09-09 | Stop reason: SDUPTHER

## 2021-07-07 RX ORDER — DESVENLAFAXINE 50 MG/1
50 TABLET, EXTENDED RELEASE ORAL DAILY
Qty: 30 TABLET | Refills: 2 | Status: SHIPPED | OUTPATIENT
Start: 2021-07-07 | End: 2021-09-09 | Stop reason: SDUPTHER

## 2021-07-07 RX ORDER — BUPROPION HYDROCHLORIDE 150 MG/1
150 TABLET ORAL EVERY MORNING
Qty: 30 TABLET | Refills: 2 | Status: SHIPPED | OUTPATIENT
Start: 2021-07-07 | End: 2021-09-09 | Stop reason: SDUPTHER

## 2021-07-07 NOTE — PROGRESS NOTES
[address]  [place] [address]  [phone]    Progress Note    Patient:  Dee Quiroga  YOB: 1970  PCP:  Virgie Wilson MD  Visit Date:  7/7/2021    TELEHEALTH EVALUATION -- Audio/Visual (During NIFNL-75 public health emergency)    Patient location: home  Physician location: [place], [facility]  This virtual visit was conducted via interactive, real-time video. Chief Complaint   Patient presents with    Follow-up    Medication Check    Depression    Insomnia    Anxiety       SUBJECTIVE:    Start time: 11:26am  End time: 12:00pm    Dee Quiroga, a 53 y.o. female, presents for a follow up visit. Patient reports she is not doing well. Patient is compliant with medication regimen. She presents alone. \"I've been better. \"   Worsening depression, anxiety, and pain. Saw her therapist yesterday, Dr. Rivera Erm. Tearful often, \"I don't know why. \" Could be anytime of day. Passive suicidal thoughts, \"I'd rather be dead. \" Feeling that way x a few weeks. Denies there being a trigger for a change. Has felt this way before. Denies any suicidal intent or plan. Unsure what is stressing her. Denies trigger for worsening symptoms. S/p hysterectomy (still has her tubes and ovaries), told last year she was going through menopause. When more stressed and anxious chews her fingers, whole fingers. Gets blisters on them from chewing. Right now they are healing. Advised wearing gloves. Pain has been a bit worse with her neck. Will be getting injections tomorrow. Has done nerve ablation and injections in the past which wasn't helpful. Trigger point injections. Nothing has provided relief. Sees Pain Mgmt at [facility]. Feels more heard and validated by the new NP she's seeing. In March fell and tore her ACL and meniscus.  Had surgery to repair ACL and feeling more stable to work on her neck now. Sees Dr. Brigid Maldonado for therapy every 2 weeks. Typically feels worse around Oct around time of her mom's death and Eriberto when she lost her son. Denies any psychotic sxs. Discussed tx options and we agree to try Pristiq increase which she tolerates well. Discussed with her IOP which she declines for now. She's aware of inpatient until but at this time she doesn't require inpatient treatment. Denies any past suicide attempts. Med Trials: Wellbutrin, Pristiq, Xanax, Viibryd, Abilify, Buspar, Prozac, Zoloft, Lexapro, Cymbalta, ? Effexor    OBJECTIVE:  Vitals: LMP 01/01/2008     MENTAL STATUS EXAM:    GENERAL  Build: Normal    Hygiene:  Appropriate in casual dress   SENSORIUM Orientation: Place, Person, Time, & Situation     Consciousness: Alert    ATTENTION   Focused    RELATEDNESS  Cooperative    EYE CONTACT   Good    PSYCHOMOTOR  Normal    SPEECH Volume: Normal     Rate: Normal rate and down tone    Amplitude: Within normal limits   MOOD  Anxious and Dysphoric    AFFECT Range: Limited, mood congruent, tearful at times, social smile present    THOUGHT Process:  Goal-Directed     Content: no evidence of psychosis    COGNITION Insight: Good    Judgement:  Intact    MEMORY  Intact    INTELLIGENCE  Average       Mobility/Gait: Independently, uses cane    Controlled SubstancesMonitoring:   not done      ASSESSMENT: Will increase Pristiq for worsening depression and anxiety. Bites her fingers when stressed. Will consider whether Wellbutrin contributes to anxiety/stress/agitation. No suicidal intent or plan. Will monitor passive thoughts of death. Diagnosis Orders   1. Episode of recurrent major depressive disorder, unspecified depression episode severity ([facility] [address]     2. Insomnia, unspecified type     3. Other chronic pain     4.  Anxiety       Medical Hx:     PLAN:     · Medications:   · Wellbutrin  mg QAM  · Increase Pristiq 100 mg QAM to 150 mg QAM  · Therapy: sees [place]. Joselito Bennett [place]  · Labs/Tests/Imaging: none   · Records Reviewed: [facility]  · Patient advised to call if patient has any difficulties with treatment  Return in about 4 weeks (around 8/4/2021) for med check, follow up. Electronically signed by Tj Worley MD on 7/7/2021 at 12:00 PM    Liana Ocasio is a 53 y.o. female being evaluated by a Virtual Visit (video visit) to address concerns as mentioned above. A caregiver was present when appropriate. Due to this being a TeleHealth encounter (ECPQV-30 public health emergency), evaluation of the following organ systems was limited: Vitals/Constitutional/EENT/Resp/CV/GI//MS/Neuro/Skin/Heme-Lymph-Imm. Pursuant to the emergency declaration under the [address] [place] [address] authority and the "[employer]" and [employer] Act, this Virtual Visit was conducted with patient's (and/or legal guardian's) consent, to reduce the patient's risk of exposure to COVID-19 and provide necessary medical care. The patient (and/or legal guardian) has also been advised to contact this office for worsening conditions or problems, and seek emergency medical treatment and/or call 911 if deemed necessary. Patient identification was verified at the start of the visit: Yes     Total time spent for this encounter: 34 minutes     Services were provided through a video synchronous discussion virtually to substitute for in-person clinic visit. Patient and provider were located at their individual homes.     Electronically signed by Tj Worley MD on 7/7/2021 at 12:00 PM

## 2021-07-15 ENCOUNTER — HOSPITAL ENCOUNTER (EMERGENCY)
Age: 51
Discharge: HOME | End: 2021-07-15
Payer: MEDICARE

## 2021-07-15 ENCOUNTER — APPOINTMENT (OUTPATIENT)
Dept: GENERAL RADIOLOGY | Age: 51
End: 2021-07-15
Payer: MEDICARE

## 2021-07-15 VITALS
HEART RATE: 95 BPM | DIASTOLIC BLOOD PRESSURE: 108 MMHG | SYSTOLIC BLOOD PRESSURE: 152 MMHG | OXYGEN SATURATION: 95 % | TEMPERATURE: 98.2 F | RESPIRATION RATE: 16 BRPM

## 2021-07-15 DIAGNOSIS — S61.211A LACERATION OF LEFT INDEX FINGER WITHOUT FOREIGN BODY WITHOUT DAMAGE TO NAIL, INITIAL ENCOUNTER: Primary | ICD-10-CM

## 2021-07-15 PROCEDURE — 90715 TDAP VACCINE 7 YRS/> IM: CPT | Performed by: NURSE PRACTITIONER

## 2021-07-15 PROCEDURE — 99283 EMERGENCY DEPT VISIT LOW MDM: CPT

## 2021-07-15 PROCEDURE — 12001 RPR S/N/AX/GEN/TRNK 2.5CM/<: CPT

## 2021-07-15 PROCEDURE — 90471 IMMUNIZATION ADMIN: CPT | Performed by: NURSE PRACTITIONER

## 2021-07-15 PROCEDURE — 6360000002 HC RX W HCPCS: Performed by: NURSE PRACTITIONER

## 2021-07-15 PROCEDURE — 73140 X-RAY EXAM OF FINGER(S): CPT

## 2021-07-15 PROCEDURE — 2500000003 HC RX 250 WO HCPCS

## 2021-07-15 RX ORDER — LIDOCAINE HYDROCHLORIDE 10 MG/ML
INJECTION, SOLUTION INFILTRATION; PERINEURAL
Status: COMPLETED
Start: 2021-07-15 | End: 2021-07-15

## 2021-07-15 RX ADMIN — TETANUS TOXOID, REDUCED DIPHTHERIA TOXOID AND ACELLULAR PERTUSSIS VACCINE, ADSORBED 0.5 ML: 5; 2.5; 8; 8; 2.5 SUSPENSION INTRAMUSCULAR at 19:58

## 2021-07-15 RX ADMIN — LIDOCAINE HYDROCHLORIDE 200 MG: 10 INJECTION, SOLUTION INFILTRATION; PERINEURAL at 19:00

## 2021-07-15 ASSESSMENT — ENCOUNTER SYMPTOMS
EYE DISCHARGE: 0
EYE ITCHING: 0
CHEST TIGHTNESS: 0
SINUS PRESSURE: 0
EYE PAIN: 0
BACK PAIN: 0
DIARRHEA: 0
WHEEZING: 0
VOICE CHANGE: 0
FACIAL SWELLING: 0
CHOKING: 0
NAUSEA: 0
COUGH: 0
SINUS PAIN: 0
CONSTIPATION: 0
EYE REDNESS: 0
SHORTNESS OF BREATH: 0
RHINORRHEA: 0
STRIDOR: 0
VOMITING: 0
ABDOMINAL PAIN: 0
COLOR CHANGE: 0

## 2021-07-15 ASSESSMENT — PAIN DESCRIPTION - LOCATION: LOCATION: FINGER (COMMENT WHICH ONE)

## 2021-07-15 ASSESSMENT — PAIN SCALES - GENERAL: PAINLEVEL_OUTOF10: 1

## 2021-07-15 ASSESSMENT — PAIN DESCRIPTION - PAIN TYPE: TYPE: ACUTE PAIN

## 2021-07-15 NOTE — ED TRIAGE NOTES
Pt presents to ED via lobby with c/o laceration to her left index finger.  Pt cut her finger with a knife while cutting cabbage

## 2021-07-15 NOTE — ED PROVIDER NOTES
nervous/anxious and is not hyperactive. PAST MEDICAL HISTORY     Past Medical History:   Diagnosis Date    Anxiety     Arthritis     Chronic gastritis 1/2015    Depression     Deviated septum     GERD (gastroesophageal reflux disease)     chronic    Hiatal hernia 2009    Intraductal papilloma of breast 07/2020    right    Numbness and tingling 2007    left leg and foot-chronic    Ovarian cyst     PONV (postoperative nausea and vomiting)     Pulmonary nodule 10/1/2013    Schwannoma of spinal cord ([facility] [address] 9/17/2013       SURGICALHISTORY      has a past surgical history that includes Hysterectomy (06/16/2006); bladder suspension (2006); Tonsillectomy (1982); Breast reduction surgery (1995); Cervical spine surgery (2007); Nerve Block (4/7/14); Colonoscopy (0226,6251); Upper gastrointestinal endoscopy (2015); pr repair of nasal septum (Bilateral, 12/20/2017); MRI BIOPSY BREAST W LOC DEVICE RIGHT 1ST LESION (7/23/2020); Cystoscopy (06/16/2006); and Breast lumpectomy (Right, 8/12/2020). CURRENT MEDICATIONS       Discharge Medication List as of 7/15/2021  7:43 PM      CONTINUE these medications which have NOT CHANGED    Details   !! desvenlafaxine succinate (PRISTIQ) 100 MG TB24 extended release tablet Take 1 tablet by mouth daily Along with 50 mg tab for total dose 150 mg in the morning., Disp-30 tablet, R-2Normal      !! buPROPion (WELLBUTRIN XL) 300 MG extended release tablet Take 1 tablet by mouth every morning Along with 150 mg for total 450 mg in morning, Disp-30 tablet, R-2Normal      !!  buPROPion (WELLBUTRIN XL) 150 MG extended release tablet Take 1 tablet by mouth every morning Along with 300 mg for total 450 mg in morning, Disp-30 tablet, R-2Normal      !! desvenlafaxine succinate (PRISTIQ) 50 MG TB24 extended release tablet Take 1 tablet by mouth daily Along with 100 mg tab for total dose 150 mg in morning, Disp-30 tablet, R-2Normal      Omeprazole Magnesium (PRILOSEC OTC PO) Take by mouth dailyHistorical Med      meloxicam (MOBIC) 15 MG tablet Take 15 mg by mouth dailyHistorical Med      tiZANidine (ZANAFLEX) 4 MG tablet Take 1 tablet by mouth every 6 hours as needed (muscle spasm), Disp-40 tablet, R-1Normal      gabapentin (NEURONTIN) 400 MG capsule TAKE 1 CAPSULE BY MOUTH 3 TIMES A DAY, R-2       !! - Potential duplicate medications found. Please discuss with provider. ALLERGIES     has No Known Allergies. FAMILY HISTORY     She indicated that her mother is . She indicated that her father is alive. She indicated that her sister is alive. She indicated that her brother is . She indicated that her maternal grandmother is . She indicated that her maternal grandfather is . She indicated that her paternal grandmother is . She indicated that her paternal grandfather is . She indicated that her maternal aunt is . family history includes Brain Cancer in her mother; Breast Cancer (age of onset: 28) in her mother; Breast Cancer (age of onset: 29) in her maternal aunt; Cancer in her brother and maternal grandfather; Diabetes in her father; High Blood Pressure in her father; [employer] in her mother; No Known Problems in her maternal grandmother, paternal grandfather, paternal grandmother, and sister; Stroke in her father.     SOCIAL HISTORY       Social History     Socioeconomic History    Marital status:      Spouse name: Not on file    Number of children: 3    Years of education: Not on file    Highest education level: Not on file   Occupational History    Occupation: disabled   Tobacco Use    Smoking status: Former Smoker     Packs/day: 0.75     Years: 18.00     Pack years: 13.50     Types: Cigarettes     Start date: 1989    Smokeless tobacco: Never Used    Tobacco comment: off and on through the years   Substance and Sexual Activity    Alcohol use: Yes     Comment: rarely    Drug use: No    Sexual activity: Yes Partners: Male   Other Topics Concern    Not on file   Social History Narrative    Not on file     Social Determinants of Health     Financial Resource Strain:     Difficulty of Paying Living Expenses:    Food Insecurity:     Worried About Running Out of Food in the Last Year:     [address] in the Last Year:    Transportation Needs:     Lack of Transportation (Medical):  Lack of Transportation (Non-Medical):    Physical Activity:     Days of Exercise per Week:     Minutes of Exercise per Session:    Stress:     Feeling of Stress :    Social Connections:     Frequency of Communication with Friends and Family:     Frequency of Social Gatherings with Friends and Family:     Attends [employer] Services:     Active Member of Clubs or Organizations:     Attends Club or Organization Meetings:     Marital Status:    Intimate Partner Violence:     Fear of Current or Ex-Partner:     Emotionally Abused:     Physically Abused:     Sexually Abused:        PHYSICAL EXAM     INITIAL VITALS:  oral temperature is 98.2 °F (36.8 °C). Her blood pressure is 152/108 (abnormal) and her pulse is 95. Her respiration is 16 and oxygen saturation is 95%. Physical Exam  Constitutional:       General: She is not in acute distress. Appearance: She is overweight. She is not ill-appearing, toxic-appearing or diaphoretic. HENT:      Head: Normocephalic and atraumatic. Nose: Nose normal. No congestion or rhinorrhea. Eyes:      General: No scleral icterus. Extraocular Movements: Extraocular movements intact. Cardiovascular:      Rate and Rhythm: Normal rate. Pulmonary:      Effort: Pulmonary effort is normal. No respiratory distress. Musculoskeletal:         General: No swelling, tenderness or deformity. Normal range of motion. Cervical back: Normal range of motion and neck supple. No rigidity. Skin:     General: Skin is warm and dry. Coloration: Skin is not jaundiced or pale. Findings: Laceration present. No bruising, erythema or rash. Comments: Laceration on left index finger   Neurological:      General: No focal deficit present. Mental Status: She is alert and oriented to person, place, and time. Psychiatric:         Mood and Affect: Mood normal.         Behavior: Behavior normal.         Thought Content: Thought content normal.         Judgment: Judgment normal.           DIFFERENTIAL DIAGNOSIS:   Laceration of left index finger, nerve injury, foreign body. Vascular injury     DIAGNOSTIC RESULTS   . RADIOLOGY: non-plainfilm images(s) such as CT, Ultrasound and MRI are read by the radiologist.  Plain radiographic images are visualized and preliminarily interpreted by the emergency physician unless otherwise stated below. XR FINGER LEFT (MIN 2 VIEWS)   Final Result   FINDINGS/IMPRESSION:   There is normal osseous alignment without visualized fracture. No radiopaque foreign body is identified. **This report has been created using voice recognition software. It may contain minor errors which are inherent in voice recognition technology. **      Final report electronically signed by Dr. Alberto Frost MD on 7/15/2021 6:39 PM            LABS:   Labs Reviewed - No data to display    EMERGENCY DEPARTMENT COURSE:   Vitals:    Vitals:    07/15/21 1800 07/15/21 1839   BP: (!) 170/100 (!) 152/108   Pulse: 94 95   Resp: 16    Temp: 98.2 °F (36.8 °C)    TempSrc: Oral    SpO2: 95%          MDM      Patient was seen and evaluated in the emergency department, patient appeared to be in no acute distress, vital signs were reviewed, hypertension noted. Physical exam was completed, 2 cm laceration noted to the index finger of the left hand. Active bleeding noted. Patient has no decreased range of motion or decrease in station of this digit. X-rays were obtained negative for acute findings. Discussed plan of care with patient she is amenable with suture repair. bodies/material removed: no    Skin repair:     Repair method:  Sutures    Suture size:  4-0    Suture material:  Nylon    Suture technique:  Simple interrupted    Number of sutures:  9  Approximation:     Approximation:  Close  Post-procedure details:     Dressing:  Non-adherent dressing and splint for protection    Patient tolerance of procedure: Tolerated well, no immediate complications        FINAL IMPRESSION     1. Laceration of left index finger without foreign body without damage to nail, initial encounter          DISPOSITION/PLAN   Patient discharged in stable condition    PATIENT REFERREDTO:  Luis Rodriguez MD  [address]  [phone]    Go in 10 days  For suture removal      DISCHARGE MEDICATIONS:  Discharge Medication List as of 7/15/2021  7:43 PM          (Please note that portions of this note were completed with a voice recognition program.  Efforts were made to edit the dictations but occasionally words are mis-transcribed.)    Provider:  I personally performed the services described in the documentation,reviewed and edited the documentation which was dictated to the scribe in my presence, and it accurately records my words and actions.     Stew Farrar CNP 07/16/21 9:11 AM    CATIE Reyes - ESTELLA          [employer], CATIE - CNP  07/16/21 8013

## 2021-07-15 NOTE — ED NOTES
Received bedside report from Oscar RN at this time. Pt resting in bed with call light in reach, no distress noted at this time.        [facility]  07/15/21 1932

## 2021-07-16 ENCOUNTER — CARE COORDINATION (OUTPATIENT)
Dept: FAMILY MEDICINE CLINIC | Age: 51
End: 2021-07-16

## 2021-07-16 NOTE — CARE COORDINATION
Ambulatory Care Coordination  ED Follow up Call    Reason for ED visit: Laceration Left Index finger  Status:     improved    Did you call your PCP prior to going to the ED? No      Did you receive a discharge instructions from the Emergency Room? Yes  Review of Instructions:     Understands what to report/when to return?:  Yes   Understands discharge instructions?:  Yes   Following discharge instructions?:  Yes   If not why? Are there any new complaints of pain? No  New Pain Meds? No    Constipation prophylaxis needed? N/A    If you have a wound is the dressing clean, dry, and intact? Yes  Understands wound care regimen? Yes    Are there any other complaints/concerns that you wish to tell your provider? Her finger is still numb. FU appts/Provider:    Future Appointments   Date Time Provider Sarah Beth Epperson   9/9/2021 12:30 PM MD Terry Rodriguez [place] [facility] [place]           New Medications? :No       Medication Reconciliation by phone - No  Understands Medications? Yes  Taking Medications? Yes  Can you swallow your pills? Yes    Any further needs in the home i.e. Equipment? No    Link to services in community?:  N/A   Which services:    Shayy's finger is doing fine. It is healing but is still numb. She said they told her it was pretty deep so she might have nerve damage. She has been notified is coming in on the 26th to follow up and have her sutures removed.

## 2021-07-26 ENCOUNTER — OFFICE VISIT (OUTPATIENT)
Dept: FAMILY MEDICINE CLINIC | Age: 51
End: 2021-07-26

## 2021-07-26 VITALS
WEIGHT: 225.13 LBS | RESPIRATION RATE: 18 BRPM | HEIGHT: 65 IN | TEMPERATURE: 97.3 F | DIASTOLIC BLOOD PRESSURE: 88 MMHG | BODY MASS INDEX: 37.51 KG/M2 | SYSTOLIC BLOOD PRESSURE: 136 MMHG | HEART RATE: 74 BPM

## 2021-07-26 DIAGNOSIS — Z48.02 VISIT FOR SUTURE REMOVAL: ICD-10-CM

## 2021-07-26 DIAGNOSIS — S61.211S LACERATION OF LEFT INDEX FINGER WITHOUT FOREIGN BODY WITHOUT DAMAGE TO NAIL, SEQUELA: Primary | ICD-10-CM

## 2021-07-26 PROCEDURE — 99213 OFFICE O/P EST LOW 20 MIN: CPT | Performed by: FAMILY MEDICINE

## 2021-07-26 SDOH — ECONOMIC STABILITY: FOOD INSECURITY: WITHIN THE PAST 12 MONTHS, YOU WORRIED THAT YOUR FOOD WOULD RUN OUT BEFORE YOU GOT MONEY TO BUY MORE.: NEVER TRUE

## 2021-07-26 SDOH — ECONOMIC STABILITY: FOOD INSECURITY: WITHIN THE PAST 12 MONTHS, THE FOOD YOU BOUGHT JUST DIDN'T LAST AND YOU DIDN'T HAVE MONEY TO GET MORE.: NEVER TRUE

## 2021-07-26 ASSESSMENT — ENCOUNTER SYMPTOMS
COUGH: 0
CONSTIPATION: 0
ABDOMINAL PAIN: 0
NAUSEA: 0
CHEST TIGHTNESS: 0
WHEEZING: 0
SHORTNESS OF BREATH: 0
SORE THROAT: 0
EYE PAIN: 0

## 2021-07-26 ASSESSMENT — SOCIAL DETERMINANTS OF HEALTH (SDOH): HOW HARD IS IT FOR YOU TO PAY FOR THE VERY BASICS LIKE FOOD, HOUSING, MEDICAL CARE, AND HEATING?: NOT HARD AT ALL

## 2021-07-26 NOTE — PROGRESS NOTES
Subjective:      Patient ID: Latha Rendon is a 53 y.o. female. Laceration  o  f  Left  Index  Finger     Hand Pain   The incident occurred more than 1 week ago (  11  days  ago). The pain is present in the left fingers (left  index finger  laceration  at  pip  joiint area). The pain is at a severity of 2/10. Pertinent negatives include no chest pain or numbness. She has tried ice and rest for the symptoms. The treatment provided mild relief. Review of Systems   Constitutional: Negative for appetite change, fatigue and fever. HENT: Negative for congestion, ear pain, postnasal drip and sore throat. Eyes: Negative for pain and visual disturbance. Respiratory: Negative for cough, chest tightness, shortness of breath and wheezing. Cardiovascular: Negative for chest pain, palpitations and leg swelling. Gastrointestinal: Negative for abdominal pain, constipation and nausea. Genitourinary: Negative for dysuria and frequency. Musculoskeletal: Negative for arthralgias, joint swelling, neck pain and neck stiffness. Skin: Negative for rash. Neurological: Negative for dizziness, weakness, numbness and headaches. Hematological: Negative for adenopathy. Does not bruise/bleed easily. Psychiatric/Behavioral: Negative for behavioral problems and sleep disturbance. The patient is not nervous/anxious. /88 (Site: Right Upper Arm, Position: Sitting, Cuff Size: Large Adult)   Pulse 74   Temp 97.3 °F (36.3 °C) (Skin)   Resp 18   Ht 5' 5\" (1.651 m)   Wt 225 lb 2 oz (102.1 kg)   LMP 01/01/2008   BMI 37.46 kg/m²   Objective:   Physical Exam  Musculoskeletal:      Comments:  Left index  From of pip and dip and mcp  joint and clean and dry  With  sutures  At  Pip  Joint at thumb  Side  With  numbness          suture  Removal and  stereistripd   9  sutures    Assessment:       Diagnosis Orders   1.  Visit for suture removal           Plan:      Current Outpatient Medications   Medication Sig Dispense Refill    desvenlafaxine succinate (PRISTIQ) 100 MG TB24 extended release tablet Take 1 tablet by mouth daily Along with 50 mg tab for total dose 150 mg in the morning. 30 tablet 2    buPROPion (WELLBUTRIN XL) 300 MG extended release tablet Take 1 tablet by mouth every morning Along with 150 mg for total 450 mg in morning 30 tablet 2    buPROPion (WELLBUTRIN XL) 150 MG extended release tablet Take 1 tablet by mouth every morning Along with 300 mg for total 450 mg in morning 30 tablet 2    desvenlafaxine succinate (PRISTIQ) 50 MG TB24 extended release tablet Take 1 tablet by mouth daily Along with 100 mg tab for total dose 150 mg in morning 30 tablet 2    Omeprazole Magnesium (PRILOSEC OTC PO) Take by mouth daily      meloxicam (MOBIC) 15 MG tablet Take 15 mg by mouth daily      tiZANidine (ZANAFLEX) 4 MG tablet Take 1 tablet by mouth every 6 hours as needed (muscle spasm) 40 tablet 1    gabapentin (NEURONTIN) 400 MG capsule TAKE 1 CAPSULE BY MOUTH 3 TIMES A DAY  2     No current facility-administered medications for this visit. Keep  Clean and   Dry and use  Strips   No orders of the defined types were placed in this encounter.        see as scheduled  Angelica Delatorre MD

## 2021-09-09 ENCOUNTER — OFFICE VISIT (OUTPATIENT)
Dept: FAMILY MEDICINE CLINIC | Age: 51
End: 2021-09-09

## 2021-09-09 ENCOUNTER — VIRTUAL VISIT (OUTPATIENT)
Dept: PSYCHIATRY | Age: 51
End: 2021-09-09
Payer: MEDICARE

## 2021-09-09 VITALS
HEART RATE: 70 BPM | DIASTOLIC BLOOD PRESSURE: 100 MMHG | RESPIRATION RATE: 16 BRPM | SYSTOLIC BLOOD PRESSURE: 164 MMHG | HEIGHT: 65 IN | BODY MASS INDEX: 37.18 KG/M2 | WEIGHT: 223.13 LBS | TEMPERATURE: 97.7 F

## 2021-09-09 DIAGNOSIS — F41.9 ANXIETY: ICD-10-CM

## 2021-09-09 DIAGNOSIS — G89.29 OTHER CHRONIC PAIN: ICD-10-CM

## 2021-09-09 DIAGNOSIS — F33.42 RECURRENT MAJOR DEPRESSIVE DISORDER, IN FULL REMISSION (HCC): Primary | ICD-10-CM

## 2021-09-09 DIAGNOSIS — G47.00 INSOMNIA, UNSPECIFIED TYPE: ICD-10-CM

## 2021-09-09 DIAGNOSIS — M54.50 ACUTE BILATERAL LOW BACK PAIN WITHOUT SCIATICA: Primary | ICD-10-CM

## 2021-09-09 PROCEDURE — 99214 OFFICE O/P EST MOD 30 MIN: CPT | Performed by: FAMILY MEDICINE

## 2021-09-09 PROCEDURE — 99213 OFFICE O/P EST LOW 20 MIN: CPT | Performed by: PSYCHIATRY & NEUROLOGY

## 2021-09-09 RX ORDER — BUPROPION HYDROCHLORIDE 150 MG/1
150 TABLET ORAL EVERY MORNING
Qty: 30 TABLET | Refills: 2 | Status: SHIPPED | OUTPATIENT
Start: 2021-09-09 | End: 2021-12-09 | Stop reason: SDUPTHER

## 2021-09-09 RX ORDER — DESVENLAFAXINE 50 MG/1
50 TABLET, EXTENDED RELEASE ORAL DAILY
Qty: 30 TABLET | Refills: 2 | Status: SHIPPED | OUTPATIENT
Start: 2021-09-09 | End: 2021-12-09 | Stop reason: SDUPTHER

## 2021-09-09 RX ORDER — DESVENLAFAXINE 100 MG/1
100 TABLET, EXTENDED RELEASE ORAL DAILY
Qty: 30 TABLET | Refills: 2 | Status: SHIPPED | OUTPATIENT
Start: 2021-09-09 | End: 2021-12-09 | Stop reason: SDUPTHER

## 2021-09-09 RX ORDER — PREDNISONE 20 MG/1
20 TABLET ORAL DAILY
Qty: 10 TABLET | Refills: 0 | Status: SHIPPED | OUTPATIENT
Start: 2021-09-09 | End: 2021-09-19

## 2021-09-09 RX ORDER — BUPROPION HYDROCHLORIDE 300 MG/1
300 TABLET ORAL EVERY MORNING
Qty: 30 TABLET | Refills: 2 | Status: SHIPPED | OUTPATIENT
Start: 2021-09-09 | End: 2021-12-09 | Stop reason: SDUPTHER

## 2021-09-09 RX ORDER — TRAMADOL HYDROCHLORIDE 50 MG/1
50 TABLET ORAL NIGHTLY
Qty: 10 TABLET | Refills: 0 | Status: SHIPPED | OUTPATIENT
Start: 2021-09-09 | End: 2021-09-19

## 2021-09-09 ASSESSMENT — ENCOUNTER SYMPTOMS
SINUS PRESSURE: 0
CONSTIPATION: 0
SHORTNESS OF BREATH: 0
BACK PAIN: 1

## 2021-09-09 NOTE — PROGRESS NOTES
[address]  [place] [address]  [phone]    Progress Note    Patient:  Pierce Germain  YOB: 1970  PCP:  Damir Phelan MD  Visit Date:  9/9/2021    TELEHEALTH EVALUATION -- Audio/Visual (During OTKDE-59 public health emergency)    Patient location: home  Physician location: [place], [facility]  This virtual visit was conducted via interactive, real-time video. Chief Complaint   Patient presents with    Follow-up    Medication Check    Anxiety    Depression    Insomnia       SUBJECTIVE:    Start time: 11:26am  End time: 12:00pm    Pierce Germain, a 53 y.o. female, presents for a follow up visit. Patient reports she is not doing well. Patient is compliant with medication regimen. She presents with her grandson. Mood is feeling \"better\" and less tearful. Still \"a little\" tearfulness but generally is a reason. Her grandson's first day of . Notes he stutters and teared up thinking of kids making fun of him. Declines to make any further changes. Denies any side effects. Going to [facility] for a [facility]. Going with her dtr, VJ, her son, and 4 grandkids. Not too much stress. Trouble with sleep related to back pain. Wakes up and it's stiff, hurting. Hurts to breathe, compares to the feeling of a broken rib. Will sit upright in her recliner. Med Trials: Wellbutrin, Pristiq, Xanax, Viibryd, Abilify, Buspar, Prozac, Zoloft, Lexapro, Cymbalta, ? Effexor    OBJECTIVE:  Vitals: LMP 01/01/2008     MENTAL STATUS EXAM:    GENERAL  Build: Normal    Hygiene:  Appropriate   SENSORIUM Orientation: Place, Person, Time, & Situation     Consciousness: Alert    ATTENTION   Focused    RELATEDNESS  Cooperative    EYE CONTACT   Good    PSYCHOMOTOR  Normal    SPEECH Volume: Normal     Rate: Normal rate and tone    Amplitude:  Within normal limits   MOOD  \"better\"    AFFECT Range: Full    THOUGHT Process:  Goal-Directed     Content: no evidence of psychosis    COGNITION Insight: Good    Judgement:  Intact    MEMORY  Intact    INTELLIGENCE  Average       Mobility/Gait: Independently, uses cane    Controlled SubstancesMonitoring:   not done      ASSESSMENT: No changes today. Mood and tearfulness improved with last Pristiq increase. Typically feels worse around Oct around time of her mom's death and Eriberto when she lost her son. Diagnosis Orders   1. Recurrent major depressive disorder, in full remission ([facility] [address]     2. Insomnia, unspecified type     3. Other chronic pain     4. Anxiety       Medical Hx:     PLAN:     · Medications:   · Wellbutrin  mg QAM  · Pristiq 150 mg QAM  · Therapy: sees Dr. Vazquez Brain in [address] [facility]  · Labs/Tests/Imaging: none   · Records Reviewed: [facility]  · Patient advised to call if patient has any difficulties with treatment  Return in about 3 months (around 12/9/2021) for med check, follow up. Electronically signed by Tiffanie Horowitz MD on 9/9/2021 at 12:43 PM    Huber Campos is a 53 y.o. female being evaluated by a Virtual Visit (video visit) to address concerns as mentioned above. A caregiver was present when appropriate. Due to this being a TeleHealth encounter (PUWUN-74 public health emergency), evaluation of the following organ systems was limited: Vitals/Constitutional/EENT/Resp/CV/GI//MS/Neuro/Skin/Heme-Lymph-Imm. Pursuant to the emergency declaration under the [address] [place] [address] authority and the [employer] and [employer] Act, this Virtual Visit was conducted with patient's (and/or legal guardian's) consent, to reduce the patient's risk of exposure to COVID-19 and provide necessary medical care.   The patient (and/or legal guardian) has also been advised to contact this office for worsening conditions or problems, and seek emergency medical treatment and/or call 911 if deemed necessary. Patient identification was verified at the start of the visit: Yes     Total time spent for this encounter: not billed by time     Services were provided through a video synchronous discussion virtually to substitute for in-person clinic visit. Patient and provider were located at their individual homes.     Electronically signed by Susanna Honeycutt MD on 9/9/2021 at 12:43 PM

## 2021-09-09 NOTE — PROGRESS NOTES
Dontrell Bakre (:  1970) is a 53 y.o. female,Established patient, here for evaluation of the following chief complaint(s):  Back Pain         ASSESSMENT/PLAN:   Diagnosis Orders   1. Acute bilateral low back pain without sciatica  predniSONE (DELTASONE) 20 MG tablet    traMADol (ULTRAM) 50 MG tablet       Stop in for a BP check when you come back  Subjective   SUBJECTIVE/OBJECTIVE:  HPI  1. There is usually pain in the upper back/shoulder areas  2. There is also some pain in the mid to lower back that will get her out of bed and into the chair  3. She had some vicodin recently that hasn't helped  4. No trigger for the recent increase in intensity  5. She is getting ready to go on a family vacation to the beach  6. She continues on the mobic and takes zanaflex 8 mg at night normally  7. She doesn't recall using toradol and that tramadol in the past had helped for some similar trouble. She thinks she needs meds for just at night as during the day she is not so bad  Review of Systems   Constitutional: Negative for fatigue. HENT: Negative for sinus pressure. Eyes: Negative for visual disturbance. Respiratory: Negative for shortness of breath. Cardiovascular: Negative for chest pain. Gastrointestinal: Negative for constipation. Genitourinary: Negative. Musculoskeletal: Positive for arthralgias, back pain and myalgias. Skin: Negative for rash. Neurological: Negative for headaches. The patient's medications, allergies, past medical problems, surgical, social, and family histories were reviewed and updated as needed. Objective   Physical Exam  Constitutional:       General: She is not in acute distress. Appearance: She is well-developed. HENT:      Head: Normocephalic and atraumatic. Eyes:      General: No scleral icterus. Conjunctiva/sclera: Conjunctivae normal.   Neck:      Trachea: No tracheal deviation. Cardiovascular:      Rate and Rhythm: Normal rate.    Pulmonary: Effort: Pulmonary effort is normal.   Musculoskeletal:        Back:       Comments: forward flexion of about 15 degrees which she states is common for her   Skin:     General: Skin is warm and dry. Neurological:      Mental Status: She is alert and oriented to person, place, and time. Psychiatric:         Behavior: Behavior normal.                  An electronic signature was used to authenticate this note.     --Sawyer Mercado MD

## 2021-11-23 ENCOUNTER — TELEPHONE (OUTPATIENT)
Dept: FAMILY MEDICINE CLINIC | Age: 51
End: 2021-11-23

## 2021-11-23 DIAGNOSIS — B96.89 ACUTE BACTERIAL SINUSITIS: Primary | ICD-10-CM

## 2021-11-23 DIAGNOSIS — J01.90 ACUTE BACTERIAL SINUSITIS: Primary | ICD-10-CM

## 2021-11-23 RX ORDER — AZITHROMYCIN 250 MG/1
250 TABLET, FILM COATED ORAL SEE ADMIN INSTRUCTIONS
Qty: 6 TABLET | Refills: 0 | Status: SHIPPED | OUTPATIENT
Start: 2021-11-23 | End: 2021-11-28

## 2021-11-23 NOTE — TELEPHONE ENCOUNTER
Date of last visit:  9/9/2021  Date of next visit:  Visit date not found    Requested Prescriptions     Signed Prescriptions Disp Refills    azithromycin (ZITHROMAX) 250 MG tablet 6 tablet 0     Sig: Take 1 tablet by mouth See Admin Instructions for 5 days 500mg on day 1 followed by 250mg on days 2 - 5     Authorizing Provider: Pinky Thomas     Ordering User: Lyle Coughlin       Pt Informed.

## 2021-11-23 NOTE — TELEPHONE ENCOUNTER
Pt called req ATB for sinus pain,congestion,yellow mucus,clogged ears. No other symptoms. Pt informed me she went to the  [address] [place] [address] Urgent [facility] on 11-18-21, because we had no openings here, and was given a Zpac. Pt stated she usually needs 2 Zpacs to work with her sinus infections. Pt tested negative for covid per pt.     Tere

## 2021-12-09 ENCOUNTER — VIRTUAL VISIT (OUTPATIENT)
Dept: PSYCHIATRY | Age: 51
End: 2021-12-09
Payer: MEDICARE

## 2021-12-09 DIAGNOSIS — G89.29 OTHER CHRONIC PAIN: ICD-10-CM

## 2021-12-09 DIAGNOSIS — F33.42 RECURRENT MAJOR DEPRESSIVE DISORDER, IN FULL REMISSION (HCC): Primary | ICD-10-CM

## 2021-12-09 DIAGNOSIS — F41.9 ANXIETY: ICD-10-CM

## 2021-12-09 DIAGNOSIS — G47.00 INSOMNIA, UNSPECIFIED TYPE: ICD-10-CM

## 2021-12-09 PROCEDURE — 99213 OFFICE O/P EST LOW 20 MIN: CPT | Performed by: PSYCHIATRY & NEUROLOGY

## 2021-12-09 RX ORDER — DESVENLAFAXINE 100 MG/1
100 TABLET, EXTENDED RELEASE ORAL DAILY
Qty: 30 TABLET | Refills: 2 | Status: SHIPPED | OUTPATIENT
Start: 2021-12-09 | End: 2022-03-14 | Stop reason: SDUPTHER

## 2021-12-09 RX ORDER — DESVENLAFAXINE 50 MG/1
50 TABLET, EXTENDED RELEASE ORAL DAILY
Qty: 30 TABLET | Refills: 2 | Status: SHIPPED | OUTPATIENT
Start: 2021-12-09 | End: 2022-03-14 | Stop reason: SDUPTHER

## 2021-12-09 RX ORDER — BUPROPION HYDROCHLORIDE 300 MG/1
300 TABLET ORAL EVERY MORNING
Qty: 30 TABLET | Refills: 2 | Status: SHIPPED | OUTPATIENT
Start: 2021-12-09 | End: 2022-03-14 | Stop reason: SDUPTHER

## 2021-12-09 RX ORDER — BUPROPION HYDROCHLORIDE 150 MG/1
150 TABLET ORAL EVERY MORNING
Qty: 30 TABLET | Refills: 2 | Status: SHIPPED | OUTPATIENT
Start: 2021-12-09 | End: 2022-03-14 | Stop reason: SDUPTHER

## 2021-12-09 NOTE — PROGRESS NOTES
[address]  [place] [address]  [phone]    Progress Note    Patient:  Joyce Clancy  YOB: 1970  PCP:  Jerzy Harrison MD  Visit Date:  12/9/2021    TELEHEALTH EVALUATION -- Audio/Visual (During LGEVU-12 public health emergency)    Patient location: home  Physician location: [place], [facility]  This virtual visit was conducted via interactive, real-time video. Chief Complaint   Patient presents with    Follow-up    Medication Check    Depression    Anxiety    Insomnia       SUBJECTIVE:      Joyce Clancy, a 49 y. o. female, presents for a follow up visit. Patient reports she is doing well. Patient is compliant with medication regimen. She presents alone. Doing well. Ready for holidays to be over which is a stressful time regarding finances. 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 Unsure if she will refer pt to someone else. Her dtr sees her too and is being referred elsewhere. Disappointed but understands. Really liked working with her. Mood is \"alright, surprisingly\" noting with colder weather would usually feel worse. Some trouble with more knee pain. Was able to help her dtr move and was limited in what she could do. Pain did flare a bit worse after that. More HA lately and \"pinchy feeling\" down R side. Saw someone at [facility] for her [facility]. Want her to do more exercise, considering stationary bike. Declines to make any med changes today. Feels current medications are helpful. Anxiety is under control. Sleep is fair. Main stressors lately are finances and Xmas. Has 6 grandkids and 2 other children who are part of the extended family because their parents don't do much for them. 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 Enjoys seeing her grandkids happy.        Med Trials: Wellbutrin, Pristiq, Xanax, Viibryd, Abilify, Buspar, Prozac, Zoloft, Lexapro, Cymbalta, ? Effexor    OBJECTIVE:  Vitals: LMP 01/01/2008     MENTAL STATUS EXAM:    GENERAL  Build: Normal    Hygiene:  Appropriate in casual dress   SENSORIUM Orientation: Place, Person, Time, & Situation     Consciousness: Alert    ATTENTION   Focused    RELATEDNESS  Cooperative    EYE CONTACT   Good    PSYCHOMOTOR  Normal    SPEECH Volume: Normal     Rate: Normal rate and upbeat tone    Amplitude: Within normal limits   MOOD  \"alright, surprisingly\"    AFFECT Range: Full, bright    THOUGHT Process:  Goal-Directed     Content: no evidence of psychosis    COGNITION Insight: Good    Judgement:  Intact    MEMORY  Intact    INTELLIGENCE  Average       Mobility/Gait: Independently, uses cane    Controlled SubstancesMonitoring:   not done      ASSESSMENT: Mood is stable. Anxiety under control. No changes today. Ongoing pain concerns. Typically feels worse around Oct around time of her mom's death and Eriberto when she lost her son. Pristiq with mood and tearfulness benefit. Diagnosis Orders   1. Recurrent major depressive disorder, in full remission ([facility] [address]     2. Insomnia, unspecified type     3. Other chronic pain     4. Anxiety         PLAN:     · Medications:   · Wellbutrin  mg QAM  · Pristiq 150 mg QAM  · Therapy: sees Dr. Mary Lou Lozano in [facility] who will be leaving, looking into other options  · Labs/Tests/Imaging: none   · Records Reviewed: [facility]  · Patient advised to call if patient has any difficulties with treatment  Return in about 3 months (around 3/9/2022) for med check, follow up. Electronically signed by Liz Sanders MD on 12/9/2021 at 10:19 AM    Estela Ax is a 49 y.o. female being evaluated by a Virtual Visit (video visit) to address concerns as mentioned above. A caregiver was present when appropriate.  Due to this being a TeleHealth encounter ([place] [address] public health emergency), evaluation of the following organ systems was limited: Vitals/Constitutional/EENT/Resp/CV/GI//MS/Neuro/Skin/Heme-Lymph-Imm. Pursuant to the emergency declaration under the [address] [place] [address] and the [employer] Act, this Virtual Visit was conducted with patient's (and/or legal guardian's) consent, to reduce the patient's risk of exposure to COVID-19 and provide necessary medical care. The patient (and/or legal guardian) has also been advised to contact this office for worsening conditions or problems, and seek emergency medical treatment and/or call 911 if deemed necessary. Patient identification was verified at the start of the visit: Yes     Total time spent for this encounter: not billed by time     Services were provided through a video synchronous discussion virtually to substitute for in-person clinic visit. Patient and provider were located at their individual homes.     Electronically signed by Herman Downey MD on 12/9/2021 at 10:19 AM

## 2022-02-16 ENCOUNTER — OFFICE VISIT (OUTPATIENT)
Dept: FAMILY MEDICINE CLINIC | Age: 52
End: 2022-02-16

## 2022-02-16 VITALS
SYSTOLIC BLOOD PRESSURE: 168 MMHG | DIASTOLIC BLOOD PRESSURE: 88 MMHG | RESPIRATION RATE: 16 BRPM | WEIGHT: 223 LBS | TEMPERATURE: 96.9 F | HEIGHT: 65 IN | BODY MASS INDEX: 37.15 KG/M2 | HEART RATE: 84 BPM

## 2022-02-16 DIAGNOSIS — I10 PRIMARY HYPERTENSION: Primary | ICD-10-CM

## 2022-02-16 PROCEDURE — 99214 OFFICE O/P EST MOD 30 MIN: CPT | Performed by: FAMILY MEDICINE

## 2022-02-16 PROCEDURE — 93000 ELECTROCARDIOGRAM COMPLETE: CPT | Performed by: FAMILY MEDICINE

## 2022-02-16 RX ORDER — BISOPROLOL FUMARATE AND HYDROCHLOROTHIAZIDE 5; 6.25 MG/1; MG/1
1 TABLET ORAL DAILY
Qty: 30 TABLET | Refills: 1 | Status: SHIPPED | OUTPATIENT
Start: 2022-02-16 | End: 2022-03-18 | Stop reason: SDUPTHER

## 2022-02-16 ASSESSMENT — ENCOUNTER SYMPTOMS
COUGH: 0
ABDOMINAL PAIN: 0
SORE THROAT: 0
SHORTNESS OF BREATH: 0
EYE PAIN: 0
CHEST TIGHTNESS: 0
NAUSEA: 0
WHEEZING: 0
CONSTIPATION: 0

## 2022-02-16 NOTE — PROGRESS NOTES
Subjective:      Patient ID: Kings Guerrero is a 49 y.o. female. Cervical  Pain  Noted and with  injections and  Noted and    Better and  With pain  Nerve  Block and  bp  Up         On  Vacation and  bp  Up and  persist    Hypertension  This is a chronic problem. The current episode started more than 1 year ago. The problem has been resolved since onset. Associated symptoms include headaches and neck pain. Pertinent negatives include no chest pain, palpitations or shortness of breath. The current treatment provides significant improvement. There are no compliance problems.       .  Past Medical History:   Diagnosis Date    Anxiety     Arthritis     Chronic gastritis 1/2015    Depression     Deviated septum     GERD (gastroesophageal reflux disease)     chronic    Hiatal hernia 2009    Intraductal papilloma of breast 07/2020    right    Numbness and tingling 2007    left leg and foot-chronic    Ovarian cyst     PONV (postoperative nausea and vomiting)     Pulmonary nodule 10/1/2013    Schwannoma of spinal cord ([address] 9/17/2013     Past Surgical History:   Procedure Laterality Date    ANTERIOR CRUCIATE LIGAMENT REPAIR Left     OIO    BLADDER SUSPENSION  2006    BREAST LUMPECTOMY Right 08/12/2020    RIGHT BREAST LUMPECTOMY, PREOP NEEDLE LOC performed by Lebron Diaz MD at [place] [address] Bilateral     OIO   [address]  2007    c4-c7 Dr Ptia Valdes COLONOSCOPY  [phone]    Dr Kyle Amaya  06/16/2006    Dr Denice Bynum  06/16/2006    vaginal hyster,anterior colporrhaphy,monarc suburethral hammock placement,right ovarian cystectomy-Dr Rajan    MRI BREAST BX USING DEVICE RIGHT  07/23/2020    MRI BREAST BX USING DEVICE RIGHT 7/23/2020 [facility]    NERVE BLOCK  04/07/2014    PA REPAIR OF NASAL SEPTUM Bilateral 12/20/2017    SEPTOPLASTY SUBMUCOUS RESECTION - PARTIAL BILATERAL INFERIOR, NASAL ENDOSCOPY WITH AUTUMN BULLOSA RIGHT MIDDLE performed by Jillian Caballero MD at [facility]  ENDOSCOPY  2015    Dr Pravin Guy     Social History     Socioeconomic History    Marital status:      Spouse name: Not on file    Number of children: 3    Years of education: Not on file    Highest education level: Not on file   Occupational History    Occupation: disabled   Tobacco Use    Smoking status: Current Every Day Smoker     Packs/day: 0.50     Years: 18.00     Pack years: 9.00     Types: Cigarettes     Start date: 5/1/1989    Smokeless tobacco: Never Used    Tobacco comment: off and on through the years   Substance and Sexual Activity    Alcohol use: Yes     Comment: rarely    Drug use: No    Sexual activity: Yes     Partners: Male   Other Topics Concern    Not on file   Social History Narrative    Not on file     Social Determinants of Health     Financial Resource Strain: Low Risk     Difficulty of Paying Living Expenses: Not hard at all   Food Insecurity: No Food Insecurity    Worried About [address] in the Last Year: Never true    [address] in the Last Year: Never true   Transportation Needs:     Lack of Transportation (Medical): Not on file    Lack of Transportation (Non-Medical):  Not on file   Physical Activity:     Days of Exercise per Week: Not on file    Minutes of Exercise per Session: Not on file   Stress:     Feeling of Stress : Not on file   Social Connections:     Frequency of Communication with Friends and Family: Not on file    Frequency of Social Gatherings with Friends and Family: Not on file    Attends [employer] Services: Not on file    Active Member of Clubs or Organizations: Not on file    Attends Club or Organization Meetings: Not on file    Marital Status: Not on file   Intimate Partner Violence:     Fear of Current or Ex-Partner: Not on file    Emotionally Abused: Not on file    Physically Abused: Not on file    Sexually Abused: Not on file   Housing Stability:     Unable to Pay for Housing in the Last Year: Not on file    Number of Places Lived in the Last Year: Not on file    Unstable Housing in the Last Year: Not on file     Family History   Problem Relation Age of Onset    Breast Cancer Mother 28    Lung Cancer Mother     Brain Cancer Mother     High Blood Pressure Father     Diabetes Father     Stroke Father     Cancer Brother         pancreas    Cancer Maternal Grandfather         unsure what type    Breast Cancer Maternal Aunt 34        mets    No Known Problems Sister     No Known Problems Maternal Grandmother     No Known Problems Paternal Grandmother     No Known Problems Paternal Grandfather        Review of Systems   Constitutional: Negative for appetite change, fatigue and fever. HENT: Negative for congestion, ear pain, postnasal drip and sore throat. Eyes: Negative for pain and visual disturbance. Respiratory: Negative for cough, chest tightness, shortness of breath and wheezing. Cardiovascular: Negative for chest pain, palpitations and leg swelling. Gastrointestinal: Negative for abdominal pain, constipation and nausea. Genitourinary: Negative for dysuria and frequency. Musculoskeletal: Positive for neck pain. Negative for arthralgias, joint swelling and neck stiffness. Skin: Negative for rash. Neurological: Positive for headaches. Negative for dizziness, weakness and numbness. Hematological: Negative for adenopathy. Does not bruise/bleed easily. Psychiatric/Behavioral: Negative for behavioral problems and sleep disturbance. The patient is not nervous/anxious.       BP (!) 168/88 (Site: Right Upper Arm, Position: Sitting, Cuff Size: Medium Adult)   Pulse 84   Temp 96.9 °F (36.1 °C) (Infrared)   Resp 16   Ht 5' 5\" (1.651 m)   Wt 223 lb (101.2 kg)   LMP 01/01/2008   BMI 37.11 kg/m²   Objective:   Physical Exam  Vitals and nursing note reviewed. Constitutional:       Appearance: She is well-developed. HENT:      Head: Normocephalic and atraumatic. Right Ear: External ear normal.      Left Ear: External ear normal.      Nose: Nose normal.   Eyes:      General: No scleral icterus. Conjunctiva/sclera: Conjunctivae normal.      Pupils: Pupils are equal, round, and reactive to light. Neck:      Thyroid: No thyromegaly. Vascular: No JVD. Cardiovascular:      Rate and Rhythm: Normal rate and regular rhythm. Heart sounds: Normal heart sounds. Pulmonary:      Effort: Pulmonary effort is normal.      Breath sounds: Normal breath sounds. No wheezing or rales. Abdominal:      General: Bowel sounds are normal. There is no distension. Palpations: Abdomen is soft. There is no mass. Tenderness: There is no abdominal tenderness. Musculoskeletal:         General: No tenderness. Normal range of motion. Cervical back: Normal range of motion and neck supple. Lymphadenopathy:      Cervical: No cervical adenopathy. Skin:     General: Skin is warm and dry. Findings: No rash. Neurological:      Mental Status: She is alert and oriented to person, place, and time. Cranial Nerves: No cranial nerve deficit. Deep Tendon Reflexes: Reflexes are normal and symmetric.         ekg  With  NSR  And  normal  qrs and  St  Segments       Assessment:       Diagnosis Orders   1.  Primary hypertension  EKG 12 Lead    TSH with Reflex    Lipid Panel    Comprehensive Metabolic Panel    CBC with Auto Differential    XR CHEST STANDARD (2 VW)         Plan:      Orders Placed This Encounter   Procedures    XR CHEST STANDARD (2 VW)     Standing Status:   Future     Standing Expiration Date:   2/16/2023     Order Specific Question:   Reason for exam:     Answer:   htn    TSH with Reflex     Standing Status:   Future     Standing Expiration Date:   2/16/2023    Lipid Panel     Standing Status:   Future     Standing

## 2022-02-18 ENCOUNTER — HOSPITAL ENCOUNTER (OUTPATIENT)
Age: 52
Discharge: HOME | End: 2022-02-18
Payer: MEDICARE

## 2022-02-18 ENCOUNTER — HOSPITAL ENCOUNTER (OUTPATIENT)
Dept: GENERAL RADIOLOGY | Age: 52
Discharge: HOME | End: 2022-02-18
Payer: MEDICARE

## 2022-02-18 DIAGNOSIS — I10 PRIMARY HYPERTENSION: ICD-10-CM

## 2022-02-18 LAB
ALBUMIN SERPL-MCNC: 4.4 G/DL (ref 3.5–5.1)
ALP BLD-CCNC: 121 U/L (ref 38–126)
ALT SERPL-CCNC: 12 U/L (ref 11–66)
ANION GAP SERPL CALCULATED.3IONS-SCNC: 13 MEQ/L (ref 8–16)
AST SERPL-CCNC: 13 U/L (ref 5–40)
ATYPICAL LYMPHOCYTES: ABNORMAL %
BASOPHILS # BLD: 0.3 %
BASOPHILS ABSOLUTE: 0 THOU/MM3 (ref 0–0.1)
BILIRUB SERPL-MCNC: 0.2 MG/DL (ref 0.3–1.2)
BUN BLDV-MCNC: 19 MG/DL (ref 7–22)
CALCIUM SERPL-MCNC: 9.2 MG/DL (ref 8.5–10.5)
CHLORIDE BLD-SCNC: 102 MEQ/L (ref 98–111)
CHOLESTEROL, TOTAL: 232 MG/DL (ref 100–199)
CO2: 24 MEQ/L (ref 23–33)
CREAT SERPL-MCNC: 0.7 MG/DL (ref 0.4–1.2)
DIFFERENTIAL TYPE: ABNORMAL
EOSINOPHIL # BLD: 1.5 %
EOSINOPHILS ABSOLUTE: 0.2 THOU/MM3 (ref 0–0.4)
ERYTHROCYTE [DISTWIDTH] IN BLOOD BY AUTOMATED COUNT: 14.9 % (ref 11.5–14.5)
ERYTHROCYTE [DISTWIDTH] IN BLOOD BY AUTOMATED COUNT: 44 FL (ref 35–45)
GFR SERPL CREATININE-BSD FRML MDRD: 88 ML/MIN/1.73M2
GLUCOSE BLD-MCNC: 97 MG/DL (ref 70–108)
HCT VFR BLD CALC: 41.8 % (ref 37–47)
HDLC SERPL-MCNC: 60 MG/DL
HEMOGLOBIN: 13.1 GM/DL (ref 12–16)
IMMATURE GRANS (ABS): 0.06 THOU/MM3 (ref 0–0.07)
IMMATURE GRANULOCYTES: 0.6 %
LDL CHOLESTEROL CALCULATED: 143 MG/DL
LYMPHOCYTES # BLD: 41 %
LYMPHOCYTES ABSOLUTE: 4.3 THOU/MM3 (ref 1–4.8)
MCH RBC QN AUTO: 25.8 PG (ref 26–33)
MCHC RBC AUTO-ENTMCNC: 31.3 GM/DL (ref 32.2–35.5)
MCV RBC AUTO: 82.4 FL (ref 81–99)
MONOCYTES # BLD: 7.2 %
MONOCYTES ABSOLUTE: 0.8 THOU/MM3 (ref 0.4–1.3)
NUCLEATED RED BLOOD CELLS: 0 /100 WBC
PATHOLOGIST REVIEW: ABNORMAL
PLATELET # BLD: 415 THOU/MM3 (ref 130–400)
PLATELET ESTIMATE: ABNORMAL
PMV BLD AUTO: 8.7 FL (ref 9.4–12.4)
POTASSIUM SERPL-SCNC: 4 MEQ/L (ref 3.5–5.2)
RBC # BLD: 5.07 MILL/MM3 (ref 4.2–5.4)
SCAN OF BLOOD SMEAR: NORMAL
SEG NEUTROPHILS: 49.4 %
SEGMENTED NEUTROPHILS ABSOLUTE COUNT: 5.2 THOU/MM3 (ref 1.8–7.7)
SODIUM BLD-SCNC: 139 MEQ/L (ref 135–145)
TOTAL PROTEIN: 7.7 G/DL (ref 6.1–8)
TRIGL SERPL-MCNC: 147 MG/DL (ref 0–199)
TSH SERPL DL<=0.05 MIU/L-ACNC: 1.26 UIU/ML (ref 0.4–4.2)
WBC # BLD: 10.5 THOU/MM3 (ref 4.8–10.8)

## 2022-02-18 PROCEDURE — 85025 COMPLETE CBC W/AUTO DIFF WBC: CPT

## 2022-02-18 PROCEDURE — 84443 ASSAY THYROID STIM HORMONE: CPT

## 2022-02-18 PROCEDURE — 36415 COLL VENOUS BLD VENIPUNCTURE: CPT

## 2022-02-18 PROCEDURE — 80053 COMPREHEN METABOLIC PANEL: CPT

## 2022-02-18 PROCEDURE — 71046 X-RAY EXAM CHEST 2 VIEWS: CPT

## 2022-02-18 PROCEDURE — 80061 LIPID PANEL: CPT

## 2022-02-21 ENCOUNTER — TELEPHONE (OUTPATIENT)
Dept: FAMILY MEDICINE CLINIC | Age: 52
End: 2022-02-21

## 2022-02-21 NOTE — TELEPHONE ENCOUNTER
----- Message from Felicia Vernon MD sent at 2/21/2022  5:10 AM EST -----  Call as cxr all good also

## 2022-02-21 NOTE — TELEPHONE ENCOUNTER
----- Message from Cate Garcia MD sent at 2/21/2022  4:55 AM EST -----  Call chol 232 and want 200 other lab ok as not anemic but red cells slight small so just make sure taking a Mvi for over age 48 . Small red cell can mean vitamin def or a times anemia. .please call

## 2022-02-25 ENCOUNTER — NURSE ONLY (OUTPATIENT)
Dept: FAMILY MEDICINE CLINIC | Age: 52
End: 2022-02-25

## 2022-02-25 VITALS — DIASTOLIC BLOOD PRESSURE: 80 MMHG | SYSTOLIC BLOOD PRESSURE: 126 MMHG

## 2022-02-25 DIAGNOSIS — I10 PRIMARY HYPERTENSION: Primary | ICD-10-CM

## 2022-03-10 ENCOUNTER — HOSPITAL ENCOUNTER (OUTPATIENT)
Dept: PHYSICAL THERAPY | Age: 52
Setting detail: THERAPIES SERIES
Discharge: HOME | End: 2022-03-10
Payer: MEDICARE

## 2022-03-10 PROCEDURE — 97161 PT EVAL LOW COMPLEX 20 MIN: CPT

## 2022-03-14 ENCOUNTER — OFFICE VISIT (OUTPATIENT)
Dept: PSYCHIATRY | Age: 52
End: 2022-03-14
Payer: MEDICARE

## 2022-03-14 DIAGNOSIS — F41.9 ANXIETY: ICD-10-CM

## 2022-03-14 DIAGNOSIS — G47.00 INSOMNIA, UNSPECIFIED TYPE: ICD-10-CM

## 2022-03-14 DIAGNOSIS — F33.42 RECURRENT MAJOR DEPRESSIVE DISORDER, IN FULL REMISSION (HCC): Primary | ICD-10-CM

## 2022-03-14 DIAGNOSIS — G89.29 OTHER CHRONIC PAIN: ICD-10-CM

## 2022-03-14 PROCEDURE — 90833 PSYTX W PT W E/M 30 MIN: CPT | Performed by: PSYCHIATRY & NEUROLOGY

## 2022-03-14 PROCEDURE — 99213 OFFICE O/P EST LOW 20 MIN: CPT | Performed by: PSYCHIATRY & NEUROLOGY

## 2022-03-14 RX ORDER — BUPROPION HYDROCHLORIDE 150 MG/1
150 TABLET ORAL EVERY MORNING
Qty: 30 TABLET | Refills: 5 | Status: SHIPPED | OUTPATIENT
Start: 2022-03-14 | End: 2022-09-07 | Stop reason: SDUPTHER

## 2022-03-14 RX ORDER — DESVENLAFAXINE 50 MG/1
50 TABLET, EXTENDED RELEASE ORAL DAILY
Qty: 30 TABLET | Refills: 5 | Status: SHIPPED | OUTPATIENT
Start: 2022-03-14 | End: 2022-09-07 | Stop reason: SDUPTHER

## 2022-03-14 RX ORDER — DESVENLAFAXINE 100 MG/1
100 TABLET, EXTENDED RELEASE ORAL DAILY
Qty: 30 TABLET | Refills: 5 | Status: SHIPPED | OUTPATIENT
Start: 2022-03-14 | End: 2022-04-13 | Stop reason: SDUPTHER

## 2022-03-14 RX ORDER — BUPROPION HYDROCHLORIDE 300 MG/1
300 TABLET ORAL EVERY MORNING
Qty: 30 TABLET | Refills: 5 | Status: SHIPPED | OUTPATIENT
Start: 2022-03-14 | End: 2022-09-07 | Stop reason: SDUPTHER

## 2022-03-14 NOTE — PROGRESS NOTES
[address] [facility]  [facility] [id number] [phone]    Progress Note    Patient:  Susannah Montana  YOB: 1970  PCP:  Boo Mello MD  Visit Date:  3/14/2022      Chief Complaint   Patient presents with    Follow-up    Medication Check    Anxiety    Insomnia    Depression       SUBJECTIVE:    Time in: 10:27am  Time out: 10:51am  Documentation time: 5 min    Susannah Montana, a 49 y. o. female, presents for a follow up visit. Patient reports she is doing well. Patient is compliant with medication regimen. She presents alone. Doing \"really good\". Mood is stable. Anxiety under fair control. Did ok this winter which is typically harder time of year. Notes having \"moments\" of getting upset. Feels she can manage it well. Describes feeling of \"hating my life\". Dealt a bad hand. The deaths of her mom and her son have been the biggest things. Multiple traumas from loss and grief. Her dad was there growing up \"disowned me 4 times\". Once because her son is . That was hard for her to accept. Lost her mom age 13. She was dx with cancer when pt was 6 yo. Life was fairly chaotic after that. Her stepdad  not long after her mom from cancer. Didn't stay with her bio dad instead lived with her 8 yo sister at her stepfather's sister's home. Her first child  of SIDS. Had wanted a child so badly. She and her son live with her best friend (since 1st grade) and friend's . Wants to live in her own place. Notes financial limitations. Does a lot for her friend's household. Limitations of living there. Can't have her own family there freely noting friend has an aggressive dog. Her son will graduate from [employer] this year. Will get $750 less per month without the soc security from him. He works part time and will likely work full time but wants him to be able to save. Notes with her depression tends to spend. Financial strategies to get some debts paid off. Son has ADHD. Son's car broke down and he's relying on her car now. Thinks she's dealing with all the stress better with medications on board. Sleep varies noting her pain limits that. Declines to make any med changes. Her therapist has left but she denies the need for a new therapist at this time. Spent much of session in psychotherapy discussing current stressors, grief, coping skills, strategies for change, supportive therapy, psycheducation. Med Trials: Wellbutrin, Pristiq, Xanax, Viibryd, Abilify, Buspar, Prozac, Zoloft, Lexapro, Cymbalta, ? Effexor    OBJECTIVE:  Vitals: LMP 01/01/2008     MENTAL STATUS EXAM:    GENERAL  Build: Normal    Hygiene:  Appropriate    SENSORIUM Orientation: Place, Person, Time, & Situation     Consciousness: Alert    ATTENTION   Focused    RELATEDNESS  Cooperative    EYE CONTACT   Good    PSYCHOMOTOR  Normal    SPEECH Volume: Normal     Rate: Normal rate and tone    Amplitude: Within normal limits, talkative   MOOD  Euthymic    AFFECT Range: Full, mood congruent, social smile present, tearful at times discussing grief   THOUGHT Process:  Goal-Directed     Content: no evidence of psychosis    COGNITION Insight: Good    Judgement:  Intact    MEMORY  Intact    INTELLIGENCE  Average       Mobility/Gait: Independently, has used a cane in the past but not today    Controlled SubstancesMonitoring:   not done      ASSESSMENT: No changes today. Mood is fairly stable. No longer in therapy. Typically feels worse around Oct around time of her mom's death and Eriberto when she lost her son. Pristiq with mood and tearfulness benefit. Diagnosis Orders   1. Recurrent major depressive disorder, in full remission ([facility] [address]     2. Insomnia, unspecified type     3. Anxiety     4.  Other chronic pain         PLAN:     · Medications:   · Wellbutrin  mg QAM  · Pristiq 150 mg QAM  · Therapy: previously saw Jovanni Farah in [facility], declines therapy for now  · Labs/Tests/Imaging: none   · Records Reviewed: [facility]  · Patient advised to call if patient has any difficulties with treatment  Return in about 3 months (around 6/14/2022) for med check, follow up. I spent 24 minutes face to face with this patient. I spent 16 minutes or longer in psychotherapy discussing current stressors, grief, coping skills, strategies for change, supportive therapy, psycheducation. Remainder of time spent on symptom evaluation and medication management.    Total time for encounter: 29 minutes      Electronically signed by Biju Simon MD on 3/14/2022 at 10:59 AM

## 2022-03-17 ENCOUNTER — HOSPITAL ENCOUNTER (OUTPATIENT)
Dept: PHYSICAL THERAPY | Age: 52
Setting detail: THERAPIES SERIES
Discharge: HOME | End: 2022-03-17
Payer: MEDICARE

## 2022-03-17 PROCEDURE — 97110 THERAPEUTIC EXERCISES: CPT

## 2022-03-17 PROCEDURE — 97140 MANUAL THERAPY 1/> REGIONS: CPT

## 2022-03-17 NOTE — PROGRESS NOTES
[facility] [facility]  PHYSICAL THERAPY  [] EVALUATION  [x] DAILY NOTE (LAND) [] DAILY NOTE (AQUATIC ) [] PROGRESS NOTE [] DISCHARGE NOTE    [x] OUTPATIENT REHABILITATION CENTER [facility]   [] [place] [address]    [] [facility]   [] Phoebe Segundo    Date: 3/17/2022  Patient Name:  Emily Granados  : 1970  MRN: [id number]  CSN: [id number]    Referring Practitioner CATIE Gómez -*   Diagnosis Postlaminectomy syndrome, not elsewhere classified [M96.1]    Treatment Diagnosis Pain in neck, decreased cervical ROM, weakness in R shoulder, forward head and rounded shoulder posture    Date of Evaluation 3/10/22    Additional Pertinent History HTN, arthritis       Functional Outcome Measure Used Neck Disability Index    Functional Outcome Score 50 (3/10/22)       Insurance: Primary: Payor: Martha Asif /  /  / ,   Secondary:    Authorization Information: PRE CERTIFICATION REQUIRED: YES, AFTER EVAL WITH AIM   INSURANCE THERAPY BENEFIT:     AQUATIC THERAPY COVERED: YES  MODALITIES COVERED:  YES    Visit # 2, 2/10 for progress note   Visits Allowed: RECEIVED AUTH FOR 6 PT VISITS FROM 3/10/22-22  CPT CODES: 28850, 23273, 75437, 89518, 12926, 86921   Recertification Date: 52   Physician Follow-Up:    Physician Orders:    History of Present Illness: Patient reports that she has had neck pain since . Patient reports that she had cervical laminectomy done in  when they also removed a tumor. Patient reports that her surgery caused her neck and arm pain. Patient reports that they caused some nerve damage down her R arm. Patient reports that she gets really tight through her neck and shoulders. Patient gets headaches daily related to her neck pain. Patient reports pain with turning her head, looking up, lifting and raising her arms. Patient reports pain with sleeping. Patient reports that she has numbness and tingling in R thumb since her surgery.       SUBJECTIVE: Patient states she is sore and rates current pain 6/10 along neck, L side>R today. Occasional use of ice to help. TREATMENT   Precautions:    Pain: 6/10 pain in neck, no headache     X in shaded column indicates activity completed today   Modalities Parameters/  Location  Notes                     Manual Therapy Time/Technique  Notes   STM to B cervical paraspinals and UT seated 10 min x    Supine suboccipital release  x          Exercise/Intervention   Notes   Supine chin tucks 10x3s  x Pain in sitting   Supine shoulder press 10x3s  x    Seated        Shoulder rolls 10x  x    scap squeezes 10x  x    UT stretch 3x15s  x    Levator stretch 3x15s  x    pec stretch-doorway 3x15s  x Cues for form                          Specific Interventions Next Treatment: upper trap/levator/pec stretching, chin tucks, SNAG retraction/extension/rotation, posture exercises, R shoulder strength, manual therapy-STM to neck, suboccipital release, educate on cervical towel roll for sleeping. Activity/Treatment Tolerance:  [x]  Patient tolerated treatment well  []  Patient limited by fatigue  []  Patient limited by pain   []  Patient limited by medical complications  []  Other:     Assessment: Initiated exercises as listed above working on mobility and gentle strength. Patient noticed some discomfort with seated chin tucks but felt better in supine. Also started STM to B cervical paraspinals and into UT. Patient tolerated moderate pressure. Will continue to progress as tolerated by patient per POC. GOALS:  Patient Goal: to improve neck pain and tightness     Short Term Goals: 4 weeks  1. Patient will improve B cervical lateral flexion AROM to 30 degrees to allow decreased pain with household tasks. 2. Patient will improve B cervical rotation AROM to 60 degrees to allow decreased pain with turning head during driving. 3. Patient will improve R shoulder strength to 5/5 to allow ease of lifting.     Long Term Goals: 8 weeks  1. Patient will report decrease in pain to 4/10 at most to allow ease of ADLs and household tasks. 2. Patient will report a decrease in frequency of headaches to allow ease of household and daily tasks. 3. Patient will improve cervical lateral flexion AROM to 35 degrees to allow decreased pain with household tasks. 4. Patient will improve cervical rotation AROM to 70 degrees to allow decreased pain with turning head during driving. 5. Patient will improve Neck Disability Index Score from 18/50 to 8/50 to allow decreased disability and improved functional mobility. 6. Patient will be independent with HEP in order to prevent re-injury and improve functional abilities. Patient Education:   [x]  HEP/Education Completed: Given HO for HEP. Fast Asset Access Code:  []  No new Education completed  []  Reviewed Prior HEP      [x]  Patient verbalized and/or demonstrated understanding of education provided. []  Patient unable to verbalize and/or demonstrate understanding of education provided. Will continue education. []  Barriers to learning:     PLAN:  Treatment Recommendations: Strengthening, Range of Motion, Manual Therapy - Soft Tissue Mobilization, Manual Therapy - Joint Manipulation, Pain Management, Home Exercise Program, Patient Education, Integrative Dry Needling, Aquatics and Modalities    []  Plan of care initiated. Plan to see patient 2 times per week for 8 weeks to address the treatment planned outlined above.   [x]  Continue with current plan of care  []  Modify plan of care as follows:    []  Hold pending physician visit  []  Discharge    Time In 1015   Time Out 1055   Timed Code Minutes: 40 min   Total Treatment Time: 40 min       Electronically Signed by: Vickey Lefort, PTA

## 2022-03-18 ENCOUNTER — TELEMEDICINE (OUTPATIENT)
Dept: FAMILY MEDICINE CLINIC | Age: 52
End: 2022-03-18

## 2022-03-18 DIAGNOSIS — Z00.00 INITIAL MEDICARE ANNUAL WELLNESS VISIT: Primary | ICD-10-CM

## 2022-03-18 DIAGNOSIS — E78.00 PURE HYPERCHOLESTEROLEMIA: ICD-10-CM

## 2022-03-18 DIAGNOSIS — I10 PRIMARY HYPERTENSION: ICD-10-CM

## 2022-03-18 PROCEDURE — G0438 PPPS, INITIAL VISIT: HCPCS | Performed by: FAMILY MEDICINE

## 2022-03-18 RX ORDER — BISOPROLOL FUMARATE AND HYDROCHLOROTHIAZIDE 5; 6.25 MG/1; MG/1
1 TABLET ORAL DAILY
Qty: 30 TABLET | Refills: 2 | Status: SHIPPED | OUTPATIENT
Start: 2022-03-18 | End: 2022-07-12 | Stop reason: SDUPTHER

## 2022-03-18 ASSESSMENT — PATIENT HEALTH QUESTIONNAIRE - PHQ9
3. TROUBLE FALLING OR STAYING ASLEEP: 0
SUM OF ALL RESPONSES TO PHQ QUESTIONS 1-9: 1
7. TROUBLE CONCENTRATING ON THINGS, SUCH AS READING THE NEWSPAPER OR WATCHING TELEVISION: 0
10. IF YOU CHECKED OFF ANY PROBLEMS, HOW DIFFICULT HAVE THESE PROBLEMS MADE IT FOR YOU TO DO YOUR WORK, TAKE CARE OF THINGS AT HOME, OR GET ALONG WITH OTHER PEOPLE: 0
6. FEELING BAD ABOUT YOURSELF - OR THAT YOU ARE A FAILURE OR HAVE LET YOURSELF OR YOUR FAMILY DOWN: 0
1. LITTLE INTEREST OR PLEASURE IN DOING THINGS: 0
SUM OF ALL RESPONSES TO PHQ QUESTIONS 1-9: 1
2. FEELING DOWN, DEPRESSED OR HOPELESS: 0
5. POOR APPETITE OR OVEREATING: 0
SUM OF ALL RESPONSES TO PHQ QUESTIONS 1-9: 1
SUM OF ALL RESPONSES TO PHQ QUESTIONS 1-9: 1
SUM OF ALL RESPONSES TO PHQ9 QUESTIONS 1 & 2: 0
9. THOUGHTS THAT YOU WOULD BE BETTER OFF DEAD, OR OF HURTING YOURSELF: 0
8. MOVING OR SPEAKING SO SLOWLY THAT OTHER PEOPLE COULD HAVE NOTICED. OR THE OPPOSITE, BEING SO FIGETY OR RESTLESS THAT YOU HAVE BEEN MOVING AROUND A LOT MORE THAN USUAL: 0
4. FEELING TIRED OR HAVING LITTLE ENERGY: 1

## 2022-03-18 ASSESSMENT — LIFESTYLE VARIABLES
HOW OFTEN DO YOU HAVE A DRINK CONTAINING ALCOHOL: MONTHLY OR LESS
HOW MANY STANDARD DRINKS CONTAINING ALCOHOL DO YOU HAVE ON A TYPICAL DAY: 1 OR 2

## 2022-03-18 NOTE — PROGRESS NOTES
Current Tobacco use:  Patient  reports that she has been smoking cigarettes. She started smoking about 32 years ago. She has a 9.00 pack-year smoking history. She has never used smokeless tobacco.    Is pt motiviated to quit:    No    Has patient set a quit date: No    Is patient taking any over the counter anti-smoking medications:  No    Is the patient reporting any side effects of anti-smoking medications? No    Has the patient tried any anti - smoking aids in the past: No      Patient Self-Management Goal for Chronic Condition  Goal: I will take all medications as prescribed by my doctor, and I will call the office if I am having any medication problems. Barriers to success: none  Plan for overcoming my barriers: N/A     Confidence: 8/10  Date goal set: 3/18/22  Date goal attained:     Have you seen any other physician or provider since your last visit no    Have you had any other diagnostic tests since your last visit? no    Have you changed or stopped any medications since your last visit including any over-the-counter medicines, vitamins, or herbal medicines? no     Are you taking all your prescribed medications?  Yes    If NO, why?

## 2022-03-18 NOTE — PROGRESS NOTES
Medicare Annual Wellness Visit    Kaylynn Paredes is here for Medicare AWV    Assessment & Plan   Primary hypertension  The following orders have not been finalized:  -     bisoprolol-hydroCHLOROthiazide (Mateo Pike) 5-6.25 MG per tablet      Recommendations for Preventive Services Due: see orders and patient instructions/AVS.  Recommended screening schedule for the next 5-10 years is provided to the patient in written form: see Patient Instructions/AVS.     No follow-ups on file. Subjective   1. She states onset of feeling ill the past 3 days. She used some robitussin, tylenol. She's had some nausea, diarrhea and ache. 2. No trouble tolerating the ziac. The BP at home today was 147/90  3. We reviewed the labs and the LDL being too high. I said we will send some info and have her work on diet and 20 minutes of constant movement daily  4. She's doing ok with her depression    Patient's complete Health Risk Assessment and screening values have been reviewed and are found in Flowsheets. The following problems were reviewed today and where indicated follow up appointments were made and/or referrals ordered.     Positive Risk Factor Screenings with Interventions:       Tobacco Use:     Tobacco Use: High Risk    Smoking Tobacco Use: Current Every Day Smoker    Smokeless Tobacco Use: Never Used     E-Cigarettes/Vaping Use     Questions Responses    E-Cigarette/Vaping Use     Start Date     Passive Exposure     Quit Date     Counseling Given     Comments         Substance Abuse - Tobacco Interventions:  we discussed ways of stopping smoking         General Health and ACP:  General  In general, how would you say your health is?: Good  In the past 7 days, have you experienced any of the following: New or Increased Pain, New or Increased Fatigue, Loneliness, Social Isolation, Stress or Anger?: (!) Yes  Select all that apply: (!) New or Increased Fatigue  Do you get the social and emotional support that you need?: Yes  Do you have a Living Will?: (!) No    Advance Directives     Power of [address] Will ACP-Advance Directive ACP-Power of     Not on File Not on File Not on File Not on File      General Health Risk Interventions:  · we reviewed the use of the living will. she had fatigue recently and we discussed the labs being ok and the need to start getting outdoors more    Health Habits/Nutrition:     Physical Activity: Insufficiently Active    Days of Exercise per Week: 3 days    Minutes of Exercise per Session: 20 min     Have you lost any weight without trying in the past 3 months?: (!) Yes     Have you seen the dentist within the past year?: (!) No    Health Habits/Nutrition Interventions:  · she eats once daily. she is trying to fine a dentist in her network             Objective      Patient-Reported Vitals  Patient-Reported Systolic (Top): 965 mmHg  Patient-Reported Diastolic (Bottom): 90 mmHg              No Known Allergies  Prior to Visit Medications    Medication Sig Taking? Authorizing Provider   desvenlafaxine succinate (PRISTIQ) 50 MG TB24 extended release tablet Take 1 tablet by mouth daily Along with 100 mg tab for total dose 150 mg in morning  Trevor Sanabria MD   desvenlafaxine succinate (PRISTIQ) 100 MG TB24 extended release tablet Take 1 tablet by mouth daily Along with 50 mg tab for total dose 150 mg in the morning.   Trevor Sanabria MD   buPROPion (WELLBUTRIN XL) 300 MG extended release tablet Take 1 tablet by mouth every morning Along with 150 mg for total 450 mg in morning  Trevor Sanabria MD   buPROPion (WELLBUTRIN XL) 150 MG extended release tablet Take 1 tablet by mouth every morning Along with 300 mg for total 450 mg in morning  Trevor Sanabria MD   bisoprolol-hydroCHLOROthiazide [facility]) 5-6.25 MG per tablet Take 1 tablet by mouth daily  Elzbieta Odonnell MD   Omeprazole Magnesium (PRILOSEC OTC PO) Take by mouth daily  Historical Provider, MD   meloxicam (MOBIC) 15 MG tablet Take 15 mg by mouth daily  Historical Provider, MD   tiZANidine (ZANAFLEX) 4 MG tablet Take 1 tablet by mouth every 6 hours as needed (muscle spasm)  Sarabjit Daly MD   gabapentin (NEURONTIN) 400 MG capsule TAKE 1 CAPSULE BY MOUTH 3 TIMES A DAY  Historical Provider, MD Espinoza (Including outside providers/suppliers regularly involved in providing care):   Patient Care Team:  Keven Grijalva MD as PCP - General (Family Medicine)  Keven Grijalva MD as PCP - [facility] Empaneled Provider    Reviewed and updated this visit:  Allergies  Meds            Maritza Flank, was evaluated through a synchronous (real-time) audio-video encounter. The patient (or guardian if applicable) is aware that this is a billable service, which includes applicable co-pays. This Virtual Visit was conducted with patient's (and/or legal guardian's) consent. The visit was conducted pursuant to the emergency declaration under the [facility] [address] authority and the [employer] Act. Patient identification was verified, and a caregiver was present when appropriate. The patient was located at home in a state where the provider was licensed to provide care.

## 2022-03-18 NOTE — PATIENT INSTRUCTIONS
Personalized Preventive Plan for Kings Guerrero - 3/18/2022  Medicare offers a range of preventive health benefits. Some of the tests and screenings are paid in full while other may be subject to a deductible, co-insurance, and/or copay. Some of these benefits include a comprehensive review of your medical history including lifestyle, illnesses that may run in your family, and various assessments and screenings as appropriate. After reviewing your medical record and screening and assessments performed today your provider may have ordered immunizations, labs, imaging, and/or referrals for you. A list of these orders (if applicable) as well as your Preventive Care list are included within your After Visit Summary for your review. Other Preventive Recommendations:    · A preventive eye exam performed by an eye specialist is recommended every 1-2 years to screen for glaucoma; cataracts, macular degeneration, and other eye disorders. · A preventive dental visit is recommended every 6 months. · Try to get at least 150 minutes of exercise per week or 10,000 steps per day on a pedometer . · Order or download the FREE \"Exercise & Physical Activity: Your Everyday Guide\" from [employer]. Call 8-447.693.3571 or search [employer] online. · You need 9502-1904 mg of calcium and 1523-1104 IU of vitamin D per day. It is possible to meet your calcium requirement with diet alone, but a vitamin D supplement is usually necessary to meet this goal.  · When exposed to the sun, use a sunscreen that protects against both UVA and UVB radiation with an SPF of 30 or greater. Reapply every 2 to 3 hours or after sweating, drying off with a towel, or swimming. · Always wear a seat belt when traveling in a car. Always wear a helmet when riding a bicycle or motorcycle.

## 2022-03-23 ENCOUNTER — HOSPITAL ENCOUNTER (OUTPATIENT)
Dept: PHYSICAL THERAPY | Age: 52
Setting detail: THERAPIES SERIES
Discharge: HOME | End: 2022-03-23
Payer: MEDICARE

## 2022-03-23 PROCEDURE — 97110 THERAPEUTIC EXERCISES: CPT

## 2022-03-23 PROCEDURE — 97140 MANUAL THERAPY 1/> REGIONS: CPT

## 2022-03-23 NOTE — PROGRESS NOTES
[place]  PHYSICAL THERAPY  [] EVALUATION  [x] DAILY NOTE (LAND) [] DAILY NOTE (AQUATIC ) [] PROGRESS NOTE [] DISCHARGE NOTE    [x] OUTPATIENT REHABILITATION CENTER - [place]   [] [place]     [] [facility]   [] Graciela Gillette    Date: 3/23/2022  Patient Name:  Julissa Hsu  : 1970  MRN: [id number]  CSN: [id number]    Referring Practitioner CATIE Foy -*   Diagnosis Postlaminectomy syndrome, not elsewhere classified [M96.1]    Treatment Diagnosis Pain in neck, decreased cervical ROM, weakness in R shoulder, forward head and rounded shoulder posture    Date of Evaluation 3/10/22    Additional Pertinent History HTN, arthritis       Functional Outcome Measure Used Neck Disability Index    Functional Outcome Score 1850 (3/10/22)       Insurance: Primary: Payor: Radha Cadena /  /  / ,   Secondary:    Authorization Information: PRE CERTIFICATION REQUIRED: YES, AFTER EVAL WITH AIM   INSURANCE THERAPY BENEFIT:     AQUATIC THERAPY COVERED: YES  MODALITIES COVERED:  YES    Visit # 3, 3/10 for progress note   Visits Allowed: Ha FROM 3/10/22-22  CPT CODES: 94994, 59724, 92350, 90897, 24945, 15051   Recertification Date: 1/3/02   Physician Follow-Up:    Physician Orders:    History of Present Illness: Patient reports that she has had neck pain since . Patient reports that she had cervical laminectomy done in  when they also removed a tumor. Patient reports that her surgery caused her neck and arm pain. Patient reports that they caused some nerve damage down her R arm. Patient reports that she gets really tight through her neck and shoulders. Patient gets headaches daily related to her neck pain. Patient reports pain with turning her head, looking up, lifting and raising her arms. Patient reports pain with sleeping. Patient reports that she has numbness and tingling in R thumb since her surgery.       SUBJECTIVE: Patient states she noticed increased soreness after last visit. Patient is sore and rates current pain 6/10 along neck and between shoulder blades today. Occasional use of ice to help. Reports compliance with HEP. TREATMENT   Precautions:    Pain: 6/10 pain in neck, no headache     X in shaded column indicates activity completed today   Modalities Parameters/  Location  Notes                     Manual Therapy Time/Technique  Notes   STM to B cervical paraspinals and UT seated 10 min x    Supine suboccipital release  x          Exercise/Intervention   Notes   Supine chin tucks 10x3s  x Pain in sitting   Supine shoulder press 10x3s  x    Supine chest stretch 3x10s  x    Seated        Shoulder rolls 10x  x    scap squeezes 10x  x    UT stretch 3x15s  x    Levator stretch 3x15s  x    pec stretch-doorway 3x15s   Supine instead today                          Specific Interventions Next Treatment: upper trap/levator/pec stretching, chin tucks, SNAG retraction/extension/rotation, posture exercises, R shoulder strength, manual therapy-STM to neck, suboccipital release, educate on cervical towel roll for sleeping. Activity/Treatment Tolerance:  [x]  Patient tolerated treatment well  []  Patient limited by fatigue  []  Patient limited by pain   []  Patient limited by medical complications  []  Other:     Assessment:  Patient started with STM to B cervical paraspinals and into UT with lighter pressure per patient request. Also received suboccipital release in supine. Patient felt relief after manual today and felt the lighter pressure helped. Patient completed exercises as listed above working on mobility and gentle strength. Switched corner stretch to supine chest stretch. Will continue to progress as tolerated by patient per POC. GOALS:  Patient Goal: to improve neck pain and tightness     Short Term Goals: 4 weeks  1.  Patient will improve B cervical lateral flexion AROM to 30 degrees to allow decreased pain with household tasks. 2. Patient will improve B cervical rotation AROM to 60 degrees to allow decreased pain with turning head during driving. 3. Patient will improve R shoulder strength to 5/5 to allow ease of lifting. Long Term Goals: 8 weeks  1. Patient will report decrease in pain to 4/10 at most to allow ease of ADLs and household tasks. 2. Patient will report a decrease in frequency of headaches to allow ease of household and daily tasks. 3. Patient will improve cervical lateral flexion AROM to 35 degrees to allow decreased pain with household tasks. 4. Patient will improve cervical rotation AROM to 70 degrees to allow decreased pain with turning head during driving. 5. Patient will improve Neck Disability Index Score from 18/50 to 8/50 to allow decreased disability and improved functional mobility. 6. Patient will be independent with HEP in order to prevent re-injury and improve functional abilities. Patient Education:   [x]  HEP/Education Completed: Given HO for HEP.  Click Quote Save Access Code:  []  No new Education completed  []  Reviewed Prior HEP      [x]  Patient verbalized and/or demonstrated understanding of education provided. []  Patient unable to verbalize and/or demonstrate understanding of education provided. Will continue education. []  Barriers to learning:     PLAN:  Treatment Recommendations: Strengthening, Range of Motion, Manual Therapy - Soft Tissue Mobilization, Manual Therapy - Joint Manipulation, Pain Management, Home Exercise Program, Patient Education, Integrative Dry Needling, Aquatics and Modalities    []  Plan of care initiated. Plan to see patient 2 times per week for 8 weeks to address the treatment planned outlined above.   [x]  Continue with current plan of care  []  Modify plan of care as follows:    []  Hold pending physician visit  []  Discharge    Time In 0950   Time Out 1030   Timed Code Minutes: 40 min   Total Treatment Time: 40 min       Electronically Signed by: Jose Becker, ZINA

## 2022-03-25 ENCOUNTER — HOSPITAL ENCOUNTER (OUTPATIENT)
Dept: PHYSICAL THERAPY | Age: 52
Setting detail: THERAPIES SERIES
Discharge: HOME | End: 2022-03-25
Payer: MEDICARE

## 2022-03-25 PROCEDURE — 97110 THERAPEUTIC EXERCISES: CPT

## 2022-03-25 PROCEDURE — 97140 MANUAL THERAPY 1/> REGIONS: CPT

## 2022-03-25 NOTE — PROGRESS NOTES
[address] [facility]  PHYSICAL THERAPY  [] EVALUATION  [x] DAILY NOTE (LAND) [] DAILY NOTE (AQUATIC ) [] PROGRESS NOTE [] DISCHARGE NOTE    [x] OUTPATIENT REHABILITATION CENTER [facility]   [] [place] [address]    [] [facility]   [] [facility]    Date: 3/25/2022  Patient Name:  Ashvin Tipton  : 1970  MRN: [id number]  CSN: [id number]    Referring Practitioner CATIE Ferrer -*   Diagnosis Postlaminectomy syndrome, not elsewhere classified [M96.1]    Treatment Diagnosis Pain in neck, decreased cervical ROM, weakness in R shoulder, forward head and rounded shoulder posture    Date of Evaluation 3/10/22    Additional Pertinent History HTN, arthritis       Functional Outcome Measure Used Neck Disability Index    Functional Outcome Score 50 (3/10/22)       Insurance: Primary: Payor: Jennifer Osborne /  /  / ,   Secondary:    Authorization Information: PRE CERTIFICATION REQUIRED: YES, AFTER EVAL WITH AIM   INSURANCE THERAPY BENEFIT:     AQUATIC THERAPY COVERED: YES  MODALITIES COVERED:  YES    Visit # 4, 4/10 for progress note   Visits Allowed: RECEIVED [facility] 6 PT VISITS FROM 3/10/22-22  CPT CODES: 73038, 00506, 39436, 54887, 71064, 95531   Recertification Date: 9/3/97   Physician Follow-Up:    Physician Orders:    History of Present Illness: Patient reports that she has had neck pain since . Patient reports that she had cervical laminectomy done in  when they also removed a tumor. Patient reports that her surgery caused her neck and arm pain. Patient reports that they caused some nerve damage down her R arm. Patient reports that she gets really tight through her neck and shoulders. Patient gets headaches daily related to her neck pain. Patient reports pain with turning her head, looking up, lifting and raising her arms. Patient reports pain with sleeping. Patient reports that she has numbness and tingling in R thumb since her surgery.       SUBJECTIVE: Patient reports that after her last session she had increased pain when trying to lay on her left side. She states that her increased pain was her mid back region. She states that last night she was able to tolerate laying on her left side again. She rates her current pain a 5/10 at her neck and between her shoulder blades. She reports compliance with her HEP. Objective:   TREATMENT   Precautions:    Pain: 5/10 pain in neck, pain at mid thoracic region, no headache     X in shaded column indicates activity completed today   Modalities Parameters/  Location  Notes                     Manual Therapy Time/Technique  Notes   STM to B cervical paraspinals and UT seated 10 minutes X    Supine suboccipital release 3 minutes  X          Exercise/Intervention   Notes   Supine chin tucks 10x3s  X Pain in sitting   Supine shoulder press 10x3s  X    Supine chest stretch 3x10s  X           Seated        Shoulder rolls 15x  X    scap squeezes 15x3 sec  X    UT stretch 3x15s  X    Levator stretch 3x15s  X    pec stretch-doorway 3x15s   Supine instead today    Seated thoracic extension over ball** 3x15s  X Patient reported that it felt good between her shoulder blades. Performed a set prior to supine position and post supine position   SNAG extension into towel ** 5x5s  X Noted \"felt good\"   SNAG retraction into towel ** 5x5s  X    SNAG rotation with towel- bilaterally **  5x ea x5s  X                    Specific Interventions Next Treatment: upper trap/levator/pec stretching, chin tucks, SNAG retraction/extension/rotation, posture exercises, R shoulder strength, manual therapy-STM to neck, suboccipital release, educate on cervical towel roll for sleeping.     Activity/Treatment Tolerance:  [x]  Patient tolerated treatment well  []  Patient limited by fatigue  []  Patient limited by pain   []  Patient limited by medical complications  []  Other:     Assessment: Added therapeutic exercises as indicated by ** in the exercise demonstrated understanding of education provided. []  Patient unable to verbalize and/or demonstrate understanding of education provided. Will continue education. []  Barriers to learning:     PLAN:  Treatment Recommendations: Strengthening, Range of Motion, Manual Therapy - Soft Tissue Mobilization, Manual Therapy - Joint Manipulation, Pain Management, Home Exercise Program, Patient Education, Integrative Dry Needling, Aquatics and Modalities    []  Plan of care initiated. Plan to see patient 2 times per week for 8 weeks to address the treatment planned outlined above.   [x]  Continue with current plan of care  []  Modify plan of care as follows:    []  Hold pending physician visit  []  Discharge    Time In 0859   Time Out 0940   Timed Code Minutes: 41 min   Total Treatment Time: 41 min       Electronically Signed by: Lashae To PTA

## 2022-03-28 ENCOUNTER — APPOINTMENT (OUTPATIENT)
Dept: PHYSICAL THERAPY | Age: 52
End: 2022-03-28
Payer: MEDICARE

## 2022-03-31 ENCOUNTER — HOSPITAL ENCOUNTER (OUTPATIENT)
Dept: PHYSICAL THERAPY | Age: 52
Setting detail: THERAPIES SERIES
Discharge: HOME | End: 2022-03-31
Payer: MEDICARE

## 2022-03-31 PROCEDURE — 97140 MANUAL THERAPY 1/> REGIONS: CPT

## 2022-03-31 PROCEDURE — 97110 THERAPEUTIC EXERCISES: CPT

## 2022-03-31 NOTE — PROGRESS NOTES
[address] [facility]  PHYSICAL THERAPY  [] EVALUATION  [x] DAILY NOTE (LAND) [] DAILY NOTE (AQUATIC ) [] PROGRESS NOTE [] DISCHARGE NOTE    [x] OUTPATIENT REHABILITATION CENTER [facility]   [] [place] [address]    [] [facility]   [] Tani Buddy    Date: 3/31/2022  Patient Name:  Camelia Fernandez  : 1970  MRN: [id number]  CSN: [id number]    Referring Practitioner CATIE Paniagua -*   Diagnosis Postlaminectomy syndrome, not elsewhere classified [M96.1]    Treatment Diagnosis Pain in neck, decreased cervical ROM, weakness in R shoulder, forward head and rounded shoulder posture    Date of Evaluation 3/10/22    Additional Pertinent History HTN, arthritis       Functional Outcome Measure Used Neck Disability Index    Functional Outcome Score 1850 (3/10/22)       Insurance: Primary: Payor: Vita Ba /  /  / ,   Secondary:    Authorization Information: PRE CERTIFICATION REQUIRED: YES, AFTER EVAL WITH AIM   INSURANCE THERAPY BENEFIT:     AQUATIC THERAPY COVERED: YES  MODALITIES COVERED:  YES    Visit # 5, /10 for progress note   Visits Allowed: RECEIVED [facility] 6 PT VISITS FROM 3/10/22-22  CPT CODES: 51209, 33362, 15680, 33256, 89153, 96421   Recertification Date: 88   Physician Follow-Up:    Physician Orders:    History of Present Illness: Patient reports that she has had neck pain since . Patient reports that she had cervical laminectomy done in  when they also removed a tumor. Patient reports that her surgery caused her neck and arm pain. Patient reports that they caused some nerve damage down her R arm. Patient reports that she gets really tight through her neck and shoulders. Patient gets headaches daily related to her neck pain. Patient reports pain with turning her head, looking up, lifting and raising her arms. Patient reports pain with sleeping. Patient reports that she has numbness and tingling in R thumb since her surgery.       SUBJECTIVE: Patient states she was sore after her last therapy session but she thought thought the manual work helped. Patient notes compliance with HEP. Denies headache or radicular symptoms into arms today. Objective:   TREATMENT   Precautions:    Pain: 3/10 Neck, thoracic. No headache     X in shaded column indicates activity completed today   Modalities Parameters/  Location  Notes                     Manual Therapy Time/Technique  Notes   STM to B cervical paraspinals and UT seated 10 minutes X    Supine suboccipital release 3 minutes  X          Exercise/Intervention   Notes   Supine chin tucks into pillow 10x 5 sec  X Pain in sitting   Supine shoulder press/elbow press 10x 3 sec  X    Supine chest stretch 3x 10-15 sec  X           Seated        Shoulder rolls 15x  X    Scap squeezes 15x 3 sec  X    UT stretch 3x 15 sec  X    Levator stretch 3x 15 sec  X    Pec stretch-doorway 3x 15 sec  X Slight \"pinch\" at Right bicep insertion    Seated thoracic extension small over ball  5x 15 sec  X Patient reported that it felt good between her shoulder blades. SNAG extension into towel  5x 5 sec  X Noted \"felt good\"   SNAG retraction into towel  5x 5 sec  X    SNAG rotation with towel- bilaterally  5x each 5 sec  X                    Specific Interventions Next Treatment: upper trap/levator/pec stretching, chin tucks, SNAG retraction/extension/rotation, posture exercises, R shoulder strength, manual therapy-STM to neck, suboccipital release, educate on cervical towel roll for sleeping. Activity/Treatment Tolerance:  [x]  Patient tolerated treatment well  []  Patient limited by fatigue  []  Patient limited by pain   []  Patient limited by medical complications  []  Other:     Assessment: Did not progress therex today due to patient having increased soreness after last therapy session. Patient occasionally needing cues for correct performance and muscle activation of exercises. Ended session with manual work.  Moderate tightness noted with soft tissue manipulation but patient felt looser after completion. Patient may benefit from ultrasound/e-stim combo to decrease tightness. GOALS:  Patient Goal: to improve neck pain and tightness     Short Term Goals: 4 weeks  1. Patient will improve B cervical lateral flexion AROM to 30 degrees to allow decreased pain with household tasks. 2. Patient will improve B cervical rotation AROM to 60 degrees to allow decreased pain with turning head during driving. 3. Patient will improve R shoulder strength to 5/5 to allow ease of lifting. Long Term Goals: 8 weeks  1. Patient will report decrease in pain to 4/10 at most to allow ease of ADLs and household tasks. 2. Patient will report a decrease in frequency of headaches to allow ease of household and daily tasks. 3. Patient will improve cervical lateral flexion AROM to 35 degrees to allow decreased pain with household tasks. 4. Patient will improve cervical rotation AROM to 70 degrees to allow decreased pain with turning head during driving. 5. Patient will improve Neck Disability Index Score from 18/50 to 8/50 to allow decreased disability and improved functional mobility. 6. Patient will be independent with HEP in order to prevent re-injury and improve functional abilities. Patient Education:   [x]  HEP/Education Completed: Continue HEP, correct exercise technique, monitor response to treatment session.  [employer] Access Code: [email or web address]  []  No new Education completed  [x]  Reviewed Prior HEP      [x]  Patient verbalized and/or demonstrated understanding of education provided. []  Patient unable to verbalize and/or demonstrate understanding of education provided. Will continue education.   []  Barriers to learning:     PLAN:  Treatment Recommendations: Strengthening, Range of Motion, Manual Therapy - Soft Tissue Mobilization, Manual Therapy - Joint Manipulation, Pain Management, Home Exercise Program, Patient Education, Integrative Dry Needling, Aquatics and Modalities    []  Plan of care initiated. Plan to see patient 2 times per week for 8 weeks to address the treatment planned outlined above.   [x]  Continue with current plan of care  []  Modify plan of care as follows:    []  Hold pending physician visit  []  Discharge    Time In 0930   Time Out 1015   Timed Code Minutes: 45 min   Total Treatment Time: 45 min       Electronically Signed by: Chauncey Helm, PTA

## 2022-04-04 ENCOUNTER — HOSPITAL ENCOUNTER (OUTPATIENT)
Dept: PHYSICAL THERAPY | Age: 52
Setting detail: THERAPIES SERIES
Discharge: HOME | End: 2022-04-04

## 2022-04-04 PROCEDURE — 97140 MANUAL THERAPY 1/> REGIONS: CPT

## 2022-04-04 PROCEDURE — 97110 THERAPEUTIC EXERCISES: CPT

## 2022-04-04 NOTE — DISCHARGE SUMMARY
[address] [facility]  PHYSICAL THERAPY  [] EVALUATION  [] DAILY NOTE (LAND) [] DAILY NOTE (AQUATIC ) [] PROGRESS NOTE [x] DISCHARGE NOTE    [x] OUTPATIENT REHABILITATION CENTER [facility]   [] [place] [address]    [] [facility] [place]   [] Amelia Rodrigues    Date: 2022  Patient Name:  Michelle Salinas  : 1970  MRN: [id number]  CSN: [id number]    Referring Practitioner CATIE Fernández -*   Diagnosis Postlaminectomy syndrome, not elsewhere classified [M96.1]    Treatment Diagnosis Pain in neck, decreased cervical ROM, weakness in R shoulder, forward head and rounded shoulder posture    Date of Evaluation 3/10/22    Additional Pertinent History HTN, arthritis       Functional Outcome Measure Used Neck Disability Index    Functional Outcome Score  (3/10/22)  (22)      Insurance: Primary: Payor: Karishma Mayo /  /  / ,   Secondary:    Authorization Information: PRE CERTIFICATION REQUIRED: YES, AFTER EVAL WITH AIM   INSURANCE THERAPY BENEFIT:     AQUATIC THERAPY COVERED: YES  MODALITIES COVERED:  YES    Visit # 6, /10 for progress note   Visits Allowed: [facility] 6 PT VISITS FROM 3/10/22-22  CPT CODES: 99750, 24847, 05931, 54578, 02998, 38451   Recertification Date:    Physician Follow-Up:    Physician Orders:    History of Present Illness: Patient reports that she has had neck pain since . Patient reports that she had cervical laminectomy done in  when they also removed a tumor. Patient reports that her surgery caused her neck and arm pain. Patient reports that they caused some nerve damage down her R arm. Patient reports that she gets really tight through her neck and shoulders. Patient gets headaches daily related to her neck pain. Patient reports pain with turning her head, looking up, lifting and raising her arms. Patient reports pain with sleeping. Patient reports that she has numbness and tingling in R thumb since her surgery. SUBJECTIVE: Patient reports that she has 5/10 pain in neck upon arrival. Patient reports 7/10 pain at most in the past week. Patient reports no change in headache frequency since start of PT. Patient reports that some sessions she feels more relaxed after but her pain always comes back. Patient does have a headache today. Objective:   TREATMENT   Precautions:    Pain: 5/10 Neck, thoracic.  headache today    X in shaded column indicates activity completed today   Modalities Parameters/  Location  Notes                     Manual Therapy Time/Technique  Notes   STM to B cervical paraspinals and UT seated 10 minutes X    Supine suboccipital release 5 minutes  X          Exercise/Intervention   Notes   Supine chin tucks into pillow 10x 5 sec   Pain in sitting   Supine shoulder press/elbow press 10x 3 sec      Supine chest stretch 3x 10-15 sec             Seated        Shoulder rolls 15x      Scap squeezes 15x 3 sec      UT stretch 3x 15 sec      Levator stretch 3x 15 sec      Pec stretch-doorway 3x 15 sec   Slight \"pinch\" at Right bicep insertion    Seated thoracic extension small over ball  5x 15 sec   Patient reported that it felt good between her shoulder blades. SNAG extension into towel  5x 5 sec  X Noted \"felt good\"   SNAG retraction into towel  10x 5 sec  X    SNAG rotation with towel- bilaterally  5x each 5 sec  X                    Specific Interventions Next Treatment: upper trap/levator/pec stretching, chin tucks, SNAG retraction/extension/rotation, posture exercises, R shoulder strength, manual therapy-STM to neck, suboccipital release, educate on cervical towel roll for sleeping. Activity/Treatment Tolerance:  [x]  Patient tolerated treatment well  []  Patient limited by fatigue  []  Patient limited by pain   []  Patient limited by medical complications  []  Other:     Assessment: Patient seen for progress report today. Patient has been seen for 6 visits at this time.  Patient has made progress toward goals. Patient improved neck AROM lateral flexion from 20-29 deg to 30-35 degrees. Patient improved neck AROM L from 50 to 60 degrees. Patient improved R shoulder strength 5/5 MMT except R ER strength at 4+/5. Patient does report no change in headaches since start of PT. Pain levels are still high at times up to 7/10 pain. Patient scoring worse on NDI today at 22/50 compared to 18/50 at eval likely due to patients headache today. Patient would benefit from continued skilled PT to further improve pain, headaches, ROM, and shoulder strength to allow improved functional mobility. Patient would like to be discharged at this time as she is going to be [employer]. Will discharge from PT at this time per patient request.     GOALS:  Patient Goal: to improve neck pain and tightness     Short Term Goals: 4 weeks  1. Patient will improve B cervical lateral flexion AROM to 30 degrees to allow decreased pain with household tasks. GOAL MET:  R 35 deg and L 30 degrees . Discontinue Goal  2. Patient will improve B cervical rotation AROM to 60 degrees to allow decreased pain with turning head during driving. GOAL NOT MET: R 55 deg and L 60 degrees . Discontinue Goal  3. Patient will improve R shoulder strength to 5/5 to allow ease of lifting. GOAL NOT MET: R ER 4+/5 MMT, flexion/abduction and IR 5/5 . Discontinue Goal    Long Term Goals: 8 weeks  1. Patient will report decrease in pain to 4/10 at most to allow ease of ADLs and household tasks. GOAL NOT MET: 5/10 pain currently, 7/10 pain at most in the past week. Discontinue Goal  2. Patient will report a decrease in frequency of headaches to allow ease of household and daily tasks. GOAL NOT MET: Patient reports that her headache frequency is the same . Discontinue Goal  3. Patient will improve cervical lateral flexion AROM to 35 degrees to allow decreased pain with household tasks. GOAL NOT MET: R 35 deg and L 30 deg.   Discontinue Goal  4. Patient will improve cervical rotation AROM to 70 degrees to allow decreased pain with turning head during driving. GOAL NOT MET: R 55 and L 60 degrees . Discontinue Goal  5. Patient will improve Neck Disability Index Score from 18/50 to 8/50 to allow decreased disability and improved functional mobility. GOAL NOT MET: NDI 22/50 . Discontinue Goal  6. Patient will be independent with HEP in order to prevent re-injury and improve functional abilities. GOAL MET:  Patient is I with HEP and performs daily . Discontinue Goal    Patient Education:   [x]  HEP/Education Completed: Continue HEP, review of HEP    [employer] Access Code: [id number]  []  No new Education completed  [x]  Reviewed Prior HEP      [x]  Patient verbalized and/or demonstrated understanding of education provided. []  Patient unable to verbalize and/or demonstrate understanding of education provided. Will continue education. []  Barriers to learning:     PLAN:  Treatment Recommendations: Strengthening, Range of Motion, Manual Therapy - Soft Tissue Mobilization, Manual Therapy - Joint Manipulation, Pain Management, Home Exercise Program, Patient Education, Integrative Dry Needling, Aquatics and Modalities    []  Plan of care initiated. Plan to see patient 2 times per week for 8 weeks to address the treatment planned outlined above.   []  Continue with current plan of care  []  Modify plan of care as follows:    []  Hold pending physician visit  [x]  Discharge    Time In 0928   Time Out 1007   Timed Code Minutes: 39 min   Total Treatment Time: 39 min       Electronically Signed by: Kay Amador, PT

## 2022-04-07 ENCOUNTER — TELEPHONE (OUTPATIENT)
Dept: FAMILY MEDICINE CLINIC | Age: 52
End: 2022-04-07

## 2022-04-07 DIAGNOSIS — G44.86 CERVICOGENIC HEADACHE: Primary | ICD-10-CM

## 2022-04-07 NOTE — TELEPHONE ENCOUNTER
Patient called [facility]k to change Pain Management Rahul to Dr Lora Madden in [facility]. Please fax new referral to [phone]    Please call Suze Castillo when done.

## 2022-04-13 RX ORDER — DESVENLAFAXINE 100 MG/1
100 TABLET, EXTENDED RELEASE ORAL DAILY
Qty: 30 TABLET | Refills: 5 | Status: SHIPPED | OUTPATIENT
Start: 2022-04-13 | End: 2022-09-07 | Stop reason: SDUPTHER

## 2022-04-13 NOTE — TELEPHONE ENCOUNTER
Jaclyn Mendozae called into the office stating that she is on 2 separate doses of Pristiq and [facility] only received 1 of them 2. Per chart, [facility] confirmed both doses but I reached out to [facility] and spoke with [facility], she verified that they received the 50mg dose but not the 100mg dose (last Rx was received in 12/2021). Medication is re-pending your approval for a 30 day supply with 5 refills; please advise otherwise. She is scheduled to return on 06/13/22. Last seen on 03/14/22.

## 2022-04-14 NOTE — TELEPHONE ENCOUNTER
Pt called inquiring about referral. She was informed that it was faxed on 04/07/22. She said she called yesterday and they informed her that they never received anything. Pt asked to refax. I refaxed referral to fax number provide.

## 2022-04-19 NOTE — TELEPHONE ENCOUNTER
Pain Management called back stating they only got the referral, no demographics, testing or insurance information so that is why they could not call pt to make an appt. Refaxed referral, pt demographics. Last office note, labs, imaging, photo id and insurance card to fax number below.

## 2022-05-25 LAB — LDL CHOLESTEROL DIRECT: 160 MG/DL

## 2022-05-26 ENCOUNTER — TELEPHONE (OUTPATIENT)
Dept: FAMILY MEDICINE CLINIC | Age: 52
End: 2022-05-26

## 2022-05-26 DIAGNOSIS — E78.00 PURE HYPERCHOLESTEROLEMIA: Primary | ICD-10-CM

## 2022-05-26 RX ORDER — ROSUVASTATIN CALCIUM 10 MG/1
10 TABLET, COATED ORAL NIGHTLY
Qty: 90 TABLET | Refills: 3 | Status: SHIPPED | OUTPATIENT
Start: 2022-05-26

## 2022-05-26 NOTE — TELEPHONE ENCOUNTER
----- Message from Hany Clinton MD sent at 5/26/2022  1:26 PM EDT -----  Let her know that the LDL is higher than in February. She should begin a statin to lower it. I would like to send in a Rx for generic Crestor and have her check fasting labs in mid July to see how it's working if she agrees.

## 2022-06-13 ENCOUNTER — TELEMEDICINE (OUTPATIENT)
Dept: PSYCHIATRY | Age: 52
End: 2022-06-13
Payer: COMMERCIAL

## 2022-06-13 DIAGNOSIS — F33.42 RECURRENT MAJOR DEPRESSIVE DISORDER, IN FULL REMISSION (HCC): Primary | ICD-10-CM

## 2022-06-13 DIAGNOSIS — F41.9 ANXIETY: ICD-10-CM

## 2022-06-13 DIAGNOSIS — G47.00 INSOMNIA, UNSPECIFIED TYPE: ICD-10-CM

## 2022-06-13 DIAGNOSIS — G89.29 OTHER CHRONIC PAIN: ICD-10-CM

## 2022-06-13 PROCEDURE — 99213 OFFICE O/P EST LOW 20 MIN: CPT | Performed by: PSYCHIATRY & NEUROLOGY

## 2022-06-13 NOTE — PROGRESS NOTES
[address]  [place] [address]  [phone]    Progress Note    Patient:  Alba Enriquez  YOB: 1970  PCP:  Rachid Humphrey MD  Visit Date:  6/13/2022    TELEHEALTH EVALUATION -- Audio/Visual (During ZAOKV-99 public health emergency)    Patient location: home  Physician location: [place], [facility]  This virtual visit was conducted via interactive, real-time video. Chief Complaint   Patient presents with    Follow-up    Medication Check    Depression    Anxiety    Insomnia       SUBJECTIVE:      Alba Enriquez, a 49 y. o. female, presents for a follow up visit. Patient reports she is doing well. Patient is compliant with medication regimen. She presents alone. Doing \"alright\" overall. Notes mood is \"okay\". Has been helping a friend work at [employer]. Enjoying that and helps her keep busy. Has her son's graduation party next month. Thankful for all her kids getting through school. He wants to attend community college. Hasn't chosen a major yet. He will continue to live there. Glad she won't have empty nest yet. Occasionally gets a \"sad\" day. Can manage it ok. Anxiety is under fair control. Sleep \"eh\" noting issues with neck pain. Restless sleep. Will sleep in her chair. Tried several different pillows for her neck. Declines to make any med changes today. Med Trials: Wellbutrin, Pristiq, Xanax, Viibryd, Abilify, Buspar, Prozac, Zoloft, Lexapro, Cymbalta, ? Effexor    OBJECTIVE:  Vitals: LMP 01/01/2008     MENTAL STATUS EXAM:    GENERAL  Build: Normal    Hygiene:  Appropriate in casual dress   SENSORIUM Orientation: Place, Person, Time, & Situation     Consciousness: Alert    ATTENTION   Focused    RELATEDNESS  Cooperative    EYE CONTACT   Good    PSYCHOMOTOR  Normal    SPEECH Volume: Normal     Rate: Normal rate and upbeat tone Amplitude: Within normal limits   MOOD  \"okay\"    AFFECT Range: Full, bright and smiling   THOUGHT Process:  Goal-Directed     Content: no evidence of psychosis    COGNITION Insight: Good    Judgement:  Intact    MEMORY  Intact    INTELLIGENCE  Average       Mobility/Gait: Independently, has used a cane in the pas    Controlled SubstancesMonitoring:   not done      ASSESSMENT: Doing well. No med changes today. Typically feels worse around Oct around time of her mom's death and Eriberto when she lost her son. Pristiq with mood and tearfulness benefit. Diagnosis Orders   1. Recurrent major depressive disorder, in full remission ([facility] [address]     2. Insomnia, unspecified type     3. Anxiety     4. Other chronic pain         PLAN:     · Medications:   · Wellbutrin  mg QAM  · Pristiq 150 mg QAM  · Therapy: previously saw Primitivo Precious in [address] [place] [address], declines therapy for now  · Labs/Tests/Imaging: none   · Records Reviewed: [facility]  · Patient advised to call if patient has any difficulties with treatment  Return in about 3 months (around 9/13/2022) for med check, follow up. Melissa Verduzco, was evaluated through a synchronous (real-time) audio-video encounter. The patient (or guardian if applicable) is aware that this is a billable service, which includes applicable copays. This virtual visit was conducted with patient's (and/or legal guardian's) consent. The visit was conducted pursuant to the emergency declaration under the [address] [place] [address], [address] [place] [address] authority and the [employer] and [employer] General Act. Patient identification was verified, and a caregiver was present when appropriate. The patient was located in a state where the provider was licensed to provide care.       Total time spent for this encounter: not billed by time    Electronically signed by Joe Mann MD on 6/13/2022 at 12:45 PM

## 2022-07-12 DIAGNOSIS — I10 PRIMARY HYPERTENSION: ICD-10-CM

## 2022-07-12 RX ORDER — BISOPROLOL FUMARATE AND HYDROCHLOROTHIAZIDE 5; 6.25 MG/1; MG/1
1 TABLET ORAL DAILY
Qty: 60 TABLET | Refills: 0 | Status: SHIPPED | OUTPATIENT
Start: 2022-07-12 | End: 2022-08-16 | Stop reason: SDUPTHER

## 2022-07-12 NOTE — TELEPHONE ENCOUNTER
Date of last visit:  3/18/2022  Date of next visit:  Visit date not found    Requested Prescriptions     Pending Prescriptions Disp Refills    bisoprolol-hydroCHLOROthiazide (ZIAC) 5-6.25 MG per tablet 90 tablet 2     Sig: Take 1 tablet by mouth daily

## 2022-08-16 ENCOUNTER — OFFICE VISIT (OUTPATIENT)
Dept: FAMILY MEDICINE CLINIC | Age: 52
End: 2022-08-16

## 2022-08-16 VITALS
SYSTOLIC BLOOD PRESSURE: 134 MMHG | DIASTOLIC BLOOD PRESSURE: 70 MMHG | BODY MASS INDEX: 37.07 KG/M2 | RESPIRATION RATE: 16 BRPM | HEART RATE: 80 BPM | HEIGHT: 65 IN | WEIGHT: 222.5 LBS

## 2022-08-16 DIAGNOSIS — I10 PRIMARY HYPERTENSION: ICD-10-CM

## 2022-08-16 PROCEDURE — 99213 OFFICE O/P EST LOW 20 MIN: CPT | Performed by: FAMILY MEDICINE

## 2022-08-16 RX ORDER — GABAPENTIN 600 MG/1
TABLET ORAL
COMMUNITY
Start: 2022-07-21

## 2022-08-16 RX ORDER — RIZATRIPTAN BENZOATE 10 MG/1
TABLET ORAL
COMMUNITY
Start: 2022-07-07

## 2022-08-16 RX ORDER — BISOPROLOL FUMARATE AND HYDROCHLOROTHIAZIDE 5; 6.25 MG/1; MG/1
1 TABLET ORAL DAILY
Qty: 90 TABLET | Refills: 3 | Status: SHIPPED | OUTPATIENT
Start: 2022-08-16

## 2022-08-16 SDOH — ECONOMIC STABILITY: FOOD INSECURITY: WITHIN THE PAST 12 MONTHS, YOU WORRIED THAT YOUR FOOD WOULD RUN OUT BEFORE YOU GOT MONEY TO BUY MORE.: NEVER TRUE

## 2022-08-16 SDOH — ECONOMIC STABILITY: FOOD INSECURITY: WITHIN THE PAST 12 MONTHS, THE FOOD YOU BOUGHT JUST DIDN'T LAST AND YOU DIDN'T HAVE MONEY TO GET MORE.: NEVER TRUE

## 2022-08-16 ASSESSMENT — ENCOUNTER SYMPTOMS
SHORTNESS OF BREATH: 0
SINUS PRESSURE: 0
CONSTIPATION: 0

## 2022-08-16 ASSESSMENT — SOCIAL DETERMINANTS OF HEALTH (SDOH): HOW HARD IS IT FOR YOU TO PAY FOR THE VERY BASICS LIKE FOOD, HOUSING, MEDICAL CARE, AND HEATING?: NOT HARD AT ALL

## 2022-08-16 NOTE — PROGRESS NOTES
Gillian Miranda (:  1970) is a 49 y.o. female,Established patient, here for evaluation of the following chief complaint(s): Other (Craving ice/) and Stress         ASSESSMENT/PLAN:        Diagnosis Orders   1. Primary hypertension  bisoprolol-hydroCHLOROthiazide (ZIAC) 5-6.25 MG per tablet    CBC with Auto Differential            Do the fasting labs soon  See me in 2023    Subjective   SUBJECTIVE/OBJECTIVE:  HPI  She has been craving ice the past 2 months  She is normally a chewer and is stressed by several events recently  Review of Systems   Constitutional:  Negative for fatigue. HENT:  Negative for sinus pressure. Eyes:  Negative for visual disturbance. Respiratory:  Negative for shortness of breath. Cardiovascular:  Negative for chest pain. Gastrointestinal:  Negative for constipation. Genitourinary: Negative. Musculoskeletal:  Negative for arthralgias. Skin:  Negative for rash. Neurological:  Negative for headaches. Psychiatric/Behavioral:  The patient is nervous/anxious. The patient's medications, allergies, past medical problems, surgical, social, and family histories were reviewed and updated as needed. Objective   Physical Exam  Constitutional:       Appearance: Normal appearance. She is well-developed. HENT:      Head: Normocephalic and atraumatic. Eyes:      General: No scleral icterus. Conjunctiva/sclera: Conjunctivae normal.   Neck:      Trachea: No tracheal deviation. Cardiovascular:      Rate and Rhythm: Normal rate and regular rhythm. Heart sounds: Normal heart sounds. Pulmonary:      Effort: Pulmonary effort is normal.      Breath sounds: Normal breath sounds. Skin:     General: Skin is warm and dry. Neurological:      General: No focal deficit present. Mental Status: She is alert. Psychiatric:         Behavior: Behavior normal.      Blood pressure 134/70, pulse 80, resp.  rate 16, height 5' 5\" (1.651 m), weight 222 lb 8 oz (100.9 kg), last menstrual period 01/01/2008, not currently breastfeeding. An electronic signature was used to authenticate this note.     --Cindi Posada MD

## 2022-08-17 LAB
ABSOLUTE BASO #: 0.05 K/UL (ref 0–0.2)
ABSOLUTE EOS #: 0.17 K/UL (ref 0–0.5)
ABSOLUTE LYMPH #: 3.15 K/UL (ref 1–4)
ABSOLUTE MONO #: 0.49 K/UL (ref 0.2–1)
ABSOLUTE NEUT #: 4.73 K/UL (ref 1.5–7.5)
ALBUMIN SERPL-MCNC: 4.6 G/DL (ref 3.5–5.2)
ALK PHOSPHATASE: 105 U/L (ref 40–128)
ALT SERPL-CCNC: 17 U/L (ref 5–40)
AST SERPL-CCNC: 20 U/L (ref 9–40)
BASOPHILS RELATIVE PERCENT: 0.6 %
BILIRUB SERPL-MCNC: 0.3 MG/DL
BILIRUBIN DIRECT: <0.2 MG/DL (ref 0–0.3)
EOSINOPHILS RELATIVE PERCENT: 2 %
HCT VFR BLD CALC: 36.4 % (ref 34–45)
HEMOGLOBIN: 11.2 G/DL (ref 11.5–15.5)
LYMPHOCYTE %: 36.6 %
MCH RBC QN AUTO: 23.2 PG (ref 25–33)
MCHC RBC AUTO-ENTMCNC: 30.8 G/DL (ref 31–36)
MCV RBC AUTO: 75.5 FL (ref 80–99)
MONOCYTES # BLD: 5.7 %
NEUTROPHILS RELATIVE PERCENT: 54.9 %
PDW BLD-RTO: 14.7 % (ref 11.5–15)
PLATELETS: 441 K/UL (ref 130–400)
PMV BLD AUTO: 10.1 FL (ref 9.3–13)
RBC: 4.82 M/UL (ref 3.8–5.4)
TOTAL PROTEIN: 7.1 G/DL (ref 6.1–8.3)
WBC: 8.6 K/UL (ref 3.5–11)

## 2022-08-18 DIAGNOSIS — D64.9 ANEMIA, UNSPECIFIED TYPE: Primary | ICD-10-CM

## 2022-08-19 ENCOUNTER — TELEPHONE (OUTPATIENT)
Dept: FAMILY MEDICINE CLINIC | Age: 52
End: 2022-08-19

## 2022-08-19 DIAGNOSIS — D50.9 IRON DEFICIENCY ANEMIA, UNSPECIFIED IRON DEFICIENCY ANEMIA TYPE: Primary | ICD-10-CM

## 2022-08-19 NOTE — TELEPHONE ENCOUNTER
Rama informed by Phone. Labs faxed to [facility] [address]. She stated she is ok with seeing Geeta Pantoja again.  Please make referral.

## 2022-08-21 LAB — SOLUBLE TRANSFERRIN RECEPT: 7.3 MG/L (ref 1.9–4.4)

## 2022-08-22 ENCOUNTER — TELEPHONE (OUTPATIENT)
Dept: FAMILY MEDICINE CLINIC | Age: 52
End: 2022-08-22

## 2022-09-07 ENCOUNTER — TELEMEDICINE (OUTPATIENT)
Dept: PSYCHIATRY | Age: 52
End: 2022-09-07
Payer: COMMERCIAL

## 2022-09-07 DIAGNOSIS — G89.29 OTHER CHRONIC PAIN: ICD-10-CM

## 2022-09-07 DIAGNOSIS — F41.9 ANXIETY: ICD-10-CM

## 2022-09-07 DIAGNOSIS — F33.42 RECURRENT MAJOR DEPRESSIVE DISORDER, IN FULL REMISSION (HCC): Primary | ICD-10-CM

## 2022-09-07 DIAGNOSIS — G47.00 INSOMNIA, UNSPECIFIED TYPE: ICD-10-CM

## 2022-09-07 PROCEDURE — 99214 OFFICE O/P EST MOD 30 MIN: CPT | Performed by: PSYCHIATRY & NEUROLOGY

## 2022-09-07 RX ORDER — DESVENLAFAXINE 50 MG/1
50 TABLET, EXTENDED RELEASE ORAL DAILY
Qty: 30 TABLET | Refills: 5 | Status: SHIPPED | OUTPATIENT
Start: 2022-09-07

## 2022-09-07 RX ORDER — BUPROPION HYDROCHLORIDE 300 MG/1
300 TABLET ORAL EVERY MORNING
Qty: 30 TABLET | Refills: 5 | Status: SHIPPED | OUTPATIENT
Start: 2022-09-07 | End: 2023-03-06

## 2022-09-07 RX ORDER — DESVENLAFAXINE 100 MG/1
100 TABLET, EXTENDED RELEASE ORAL DAILY
Qty: 30 TABLET | Refills: 5 | Status: SHIPPED | OUTPATIENT
Start: 2022-09-07

## 2022-09-07 RX ORDER — HYDROXYZINE HYDROCHLORIDE 25 MG/1
25 TABLET, FILM COATED ORAL 2 TIMES DAILY PRN
Qty: 60 TABLET | Refills: 2 | Status: SHIPPED | OUTPATIENT
Start: 2022-09-07

## 2022-09-07 RX ORDER — BUPROPION HYDROCHLORIDE 150 MG/1
150 TABLET ORAL EVERY MORNING
Qty: 30 TABLET | Refills: 5 | Status: SHIPPED | OUTPATIENT
Start: 2022-09-07

## 2022-09-07 NOTE — PROGRESS NOTES
[address]  [place] [address] [phone]    Progress Note    Patient:  Hunter Jane  YOB: 1970  PCP:  Enid Figueroa MD  Visit Date:  9/7/2022    TELEHEALTH EVALUATION -- Audio/Visual (During ZTSOX-27 public health emergency)    Patient location: home  Physician location: [place], [facility]  This virtual visit was conducted via interactive, real-time video. Chief Complaint   Patient presents with    Follow-up    Medication Check    Depression    Anxiety    Insomnia       SUBJECTIVE:      Hunter Jane, a 49 y. o. female, presents for a follow up visit. Patient reports she is doing well. Patient is compliant with medication regimen. She presents alone. Doing well overall. Busy watching her 3 grandkids. Handful. Ages 11, 3, and 2. After this week the 2 older ones will be in . Will be a relief. She will keep the 3 yo through the day. Her dtr started working 3rds and sleeps in daytime while she has the kids. Her son had a good graduation party. Wishes more people could have attended noting several were on vacation. Mood is \"pretty good\". Not depressed. Started chewing ice and found to be anemic. Sees GI specialist soon to r/o causes. Got her own apartment which was a stressful process. Binge eating in evenings and gained 7 lbs just since moving in. Her son lives there as well. They had been living with her friends who backed into their car but said nothing which was upsetting. Then moved into her dtr's house for a short period. Disliked the first apartment she found. Lost some money on the deposit noting it was very dark. Had a lot of issues she didn't notice initially. Found one she does like and moved there. Was glad she had saved money back. \"I'm very happy. \"  Anxiety fluctuates and can be \"up there\". Chewing at her fingers more.    Hair is falling out.   Discussed tx options and we agree to a trial of Atarax prn anxiety. Med Trials: Wellbutrin, Pristiq, Xanax, Viibryd, Abilify, Buspar, Prozac, Zoloft, Lexapro, Cymbalta, ? Effexor    OBJECTIVE:  Vitals: LMP 01/01/2008     MENTAL STATUS EXAM:    GENERAL  Build: Normal    Hygiene:  Appropriate   SENSORIUM Orientation: Place, Person, Time, & Situation     Consciousness: Alert    ATTENTION   Focused    RELATEDNESS  Cooperative    EYE CONTACT   Good    PSYCHOMOTOR  Normal    SPEECH Volume: Normal     Rate: Normal rate and tone    Amplitude: Within normal limits   MOOD  \"Pretty good\"     AFFECT Range: Full, bright    THOUGHT Process:  Goal-Directed     Content: no evidence of psychosis    COGNITION Insight: Good    Judgement:  Intact    MEMORY  Intact    INTELLIGENCE  Average       Mobility/Gait: Independently, has used a cane in the past    Controlled SubstancesMonitoring:   not done      ASSESSMENT: Will start Atarax prn anxiety. Overall doing well. Mood good and stable. Typically feels worse around Oct around time of her mom's death and Eriberto when she lost her son. Pristiq with mood and tearfulness benefit. Diagnosis Orders   1. Recurrent major depressive disorder, in full remission ([facility] [address]        2. Anxiety        3. Insomnia, unspecified type        4. Other chronic pain              PLAN:     Medications: Wellbutrin  mg QAM  Pristiq 150 mg QAM  Start Atarax 25 mg BID prn anxiety - discussed r/b/se/a  Therapy: previously saw Tila Gomez in [facility].FRANCES, declines therapy for now  Labs/Tests/Imaging: none   Records Reviewed: [facility]  Patient advised to call if patient has any difficulties with treatment  Return in about 3 months (around 12/7/2022) for med check, follow up. Shweta Lee, was evaluated through a synchronous (real-time) audio-video encounter. The patient (or guardian if applicable) is aware that this is a billable service, which includes applicable copays.  This virtual visit was conducted with patient's (and/or legal guardian's) consent. The visit was conducted pursuant to the emergency declaration under the [address] [place] [address] authority and the [employer] and [employer] General Act. Patient identification was verified, and a caregiver was present when appropriate. The patient was located in a state where the provider was licensed to provide care.       Total time spent for this encounter: not billed by time    Electronically signed by Sakina Astorga MD on 9/7/2022 at 12:19 PM

## 2022-09-16 ENCOUNTER — TELEPHONE (OUTPATIENT)
Dept: FAMILY MEDICINE CLINIC | Age: 52
End: 2022-09-16

## 2022-09-16 DIAGNOSIS — J01.90 ACUTE BACTERIAL SINUSITIS: Primary | ICD-10-CM

## 2022-09-16 DIAGNOSIS — B96.89 ACUTE BACTERIAL SINUSITIS: Primary | ICD-10-CM

## 2022-09-16 RX ORDER — AZITHROMYCIN 250 MG/1
250 TABLET, FILM COATED ORAL SEE ADMIN INSTRUCTIONS
Qty: 6 TABLET | Refills: 0 | Status: SHIPPED | OUTPATIENT
Start: 2022-09-16 | End: 2022-09-21

## 2022-09-16 NOTE — TELEPHONE ENCOUNTER
Pt called to req an Z-pack she is having sore throat no fever and congestion upset stomach pt has pain in ears pt isn't coughing anything up     Send to [address]

## 2022-09-16 NOTE — TELEPHONE ENCOUNTER
Per Verbal order of  Sent prescription to pharmacy for pt.      Date of last visit:  8/16/2022  Date of next visit:  3/13/2023    Requested Prescriptions     Signed Prescriptions Disp Refills    azithromycin (ZITHROMAX) 250 MG tablet 6 tablet 0     Sig: Take 1 tablet by mouth See Admin Instructions for 5 days 500mg on day 1 followed by 250mg on days 2 - 5     Authorizing Provider: Blake More     Ordering User: Gildardo Tellez

## 2022-09-22 ENCOUNTER — TELEMEDICINE (OUTPATIENT)
Dept: FAMILY MEDICINE CLINIC | Age: 52
End: 2022-09-22

## 2022-09-22 DIAGNOSIS — B34.9 VIRAL ILLNESS: Primary | ICD-10-CM

## 2022-09-22 PROBLEM — G43.009 MIGRAINE WITHOUT AURA AND WITHOUT STATUS MIGRAINOSUS, NOT INTRACTABLE: Status: ACTIVE | Noted: 2022-07-07

## 2022-09-22 PROCEDURE — 99213 OFFICE O/P EST LOW 20 MIN: CPT | Performed by: FAMILY MEDICINE

## 2022-09-22 RX ORDER — PREDNISONE 20 MG/1
20 TABLET ORAL DAILY
Qty: 7 TABLET | Refills: 0 | Status: SHIPPED | OUTPATIENT
Start: 2022-09-22 | End: 2022-09-29

## 2022-09-22 ASSESSMENT — ENCOUNTER SYMPTOMS
CONSTIPATION: 0
COUGH: 1
SHORTNESS OF BREATH: 0
SORE THROAT: 1
SINUS PRESSURE: 0

## 2022-09-22 NOTE — PROGRESS NOTES
Lakesha Hill (:  1970) is a Established patient, here for evaluation of the following:    Assessment & Plan   Below is the assessment and plan developed based on review of pertinent history, physical exam, labs, studies, and medications. Diagnosis Orders   1. Viral illness  predniSONE (DELTASONE) 20 MG tablet             Robitussin DM  Keep trying not to smoke  Use the prednisone    Subjective   HPI  Her last visit here was 2022  She states being ill 8 days ago. She had a zpak that may have made slight difference. She did not check herself for covid  She is a smoker  Review of Systems   Constitutional:  Positive for fatigue and fever. HENT:  Positive for congestion, ear pain, postnasal drip and sore throat. Negative for sinus pressure. Eyes:  Negative for visual disturbance. Respiratory:  Positive for cough (loose). Negative for shortness of breath. Cardiovascular:  Negative for chest pain. Gastrointestinal:  Negative for constipation. Genitourinary: Negative. Musculoskeletal:  Negative for arthralgias. Skin:  Negative for rash. Neurological:  Positive for dizziness, weakness and headaches. The patient's medications, allergies, past medical problems, surgical, social, and family histories were reviewed and updated as needed.       Objective   Patient-Reported Vitals  No data recorded     Physical Exam  [INSTRUCTIONS:  \"[x]\" Indicates a positive item  \"[]\" Indicates a negative item  -- DELETE ALL ITEMS NOT EXAMINED]    Constitutional: [x] Appears well-developed and well-nourished [x] No apparent distress      [] Abnormal -     Mental status: [x] Alert and awake  [x] Oriented to person/place/time [x] Able to follow commands    [] Abnormal -     Eyes:   EOM    [x]  Normal    [] Abnormal -   Sclera  [x]  Normal    [] Abnormal -          Discharge [x]  None visible   [] Abnormal -     HENT: [x] Normocephalic, atraumatic  [] Abnormal -   [x] Mouth/Throat: Mucous membranes are moist    External Ears [x] Normal  [] Abnormal -    Neck: [x] No visualized mass [] Abnormal -     Pulmonary/Chest: [x] Respiratory effort normal   [x] No visualized signs of difficulty breathing or respiratory distress        [] Abnormal -      Musculoskeletal:   [] Normal gait with no signs of ataxia         [x] Normal range of motion of neck        [] Abnormal -     Neurological:        [x] No Facial Asymmetry (Cranial nerve 7 motor function) (limited exam due to video visit)          [x] No gaze palsy        [] Abnormal -          Skin:        [x] No significant exanthematous lesions or discoloration noted on facial skin         [] Abnormal -            Psychiatric:       [x] Normal Affect [] Abnormal -        [x] No Hallucinations    Other pertinent observable physical exam findings:-             Lebron Haji, was evaluated through a synchronous (real-time) audio-video encounter. The patient (or guardian if applicable) is aware that this is a billable service, which includes applicable co-pays. This Virtual Visit was conducted with patient's (and/or legal guardian's) consent. The visit was conducted pursuant to the emergency declaration under the [facility], [address] [place] [address] authority and the [employer] and [employer] General Act. Patient identification was verified, and a caregiver was present when appropriate. The patient was located at Home: [address] [place] [address]. Provider was located at [place] [address] (Appt Dept): [facility],  [address] [facility].         --Ree Fagan MD

## 2022-09-22 NOTE — PROGRESS NOTES
Lis Gallegos agreed to Video Chat/Exam in presence of Dr Peter Espinal and myself. Verified who was present in room with Lis Gallegos. Lis Gallegos informed the e-mail address used to Face Time cannot be used to contact the Provider, if they are any questions or concerns they need to call the office directly. Lis Gallegos stated understanding.

## 2022-11-01 ENCOUNTER — TELEPHONE (OUTPATIENT)
Dept: FAMILY MEDICINE CLINIC | Age: 52
End: 2022-11-01

## 2022-11-01 DIAGNOSIS — R53.83 FATIGUE, UNSPECIFIED TYPE: ICD-10-CM

## 2022-11-01 DIAGNOSIS — D50.9 IRON DEFICIENCY ANEMIA, UNSPECIFIED IRON DEFICIENCY ANEMIA TYPE: Primary | ICD-10-CM

## 2022-11-01 NOTE — TELEPHONE ENCOUNTER
Pt called asking if you feel a TSH is needed to check for   Loss of hair,dry skin,brittle hair,binge eating,chewing of ice,weight gain.     Pt stated these symptoms just started in Sept.    [facility] [address]    Please call pt once addressed

## 2022-11-01 NOTE — TELEPHONE ENCOUNTER
I will order some non fasting labs. Is she still working with Dr Harpal Ellis? Taking iron and if so how much?

## 2022-11-02 LAB
ABSOLUTE BASO #: 0.04 K/UL (ref 0–0.2)
ABSOLUTE EOS #: 0.16 K/UL (ref 0–0.5)
ABSOLUTE LYMPH #: 2.92 K/UL (ref 1–4)
ABSOLUTE MONO #: 0.6 K/UL (ref 0.2–1)
ABSOLUTE NEUT #: 3.82 K/UL (ref 1.5–7.5)
BASOPHILS RELATIVE PERCENT: 0.5 %
EOSINOPHILS RELATIVE PERCENT: 2.1 %
HCT VFR BLD CALC: 41.9 % (ref 34–45)
HEMOGLOBIN: 13.7 G/DL (ref 11.5–15.5)
LYMPHOCYTE %: 38.5 %
MCH RBC QN AUTO: 25.9 PG (ref 25–33)
MCHC RBC AUTO-ENTMCNC: 32.7 G/DL (ref 31–36)
MCV RBC AUTO: 79.2 FL (ref 80–99)
MONOCYTES # BLD: 7.9 %
NEUTROPHILS RELATIVE PERCENT: 50.5 %
PDW BLD-RTO: 18.3 % (ref 11.5–15)
PLATELETS: 378 K/UL (ref 130–400)
PMV BLD AUTO: 9.4 FL (ref 9.3–13)
RBC: 5.29 M/UL (ref 3.8–5.4)
WBC: 7.6 K/UL (ref 3.5–11)

## 2022-11-03 LAB — TSH SERPL DL<=0.05 MIU/L-ACNC: 1.3 UIU/ML (ref 0.4–4.1)

## 2022-11-04 LAB — SOLUBLE TRANSFERRIN RECEPT: 4.5 MG/L (ref 1.9–4.4)

## 2022-11-08 ENCOUNTER — TELEPHONE (OUTPATIENT)
Dept: FAMILY MEDICINE CLINIC | Age: 52
End: 2022-11-08

## 2022-11-08 NOTE — TELEPHONE ENCOUNTER
----- Message from Mina Zamarripa MD sent at 11/8/2022  1:59 PM EST -----  Let her know that the iron status is better and so is the hemoglobin. If she is taking any iron she could stop.

## 2022-11-08 NOTE — RESULT ENCOUNTER NOTE
Let her know that the iron status is better and so is the hemoglobin. If she is taking any iron she could stop.

## 2022-12-27 ENCOUNTER — TELEPHONE (OUTPATIENT)
Dept: FAMILY MEDICINE CLINIC | Age: 52
End: 2022-12-27

## 2022-12-27 DIAGNOSIS — J01.90 ACUTE BACTERIAL SINUSITIS: Primary | ICD-10-CM

## 2022-12-27 DIAGNOSIS — B96.89 ACUTE BACTERIAL SINUSITIS: Primary | ICD-10-CM

## 2022-12-27 RX ORDER — AZITHROMYCIN 250 MG/1
TABLET, FILM COATED ORAL
Qty: 1 PACKET | Refills: 0 | Status: SHIPPED | OUTPATIENT
Start: 2022-12-27 | End: 2023-01-06

## 2022-12-27 NOTE — TELEPHONE ENCOUNTER
Pt called to Req an Z-pack she is having clogged ears sore throat fever on lam[address] kerrie sore inside nose mucus has no color to it    Send to [address]

## 2023-01-05 ENCOUNTER — HOSPITAL ENCOUNTER (EMERGENCY)
Age: 53
Discharge: HOME | End: 2023-01-05
Payer: COMMERCIAL

## 2023-01-05 VITALS
HEART RATE: 99 BPM | RESPIRATION RATE: 16 BRPM | DIASTOLIC BLOOD PRESSURE: 60 MMHG | OXYGEN SATURATION: 95 % | TEMPERATURE: 97.6 F | SYSTOLIC BLOOD PRESSURE: 120 MMHG | WEIGHT: 230 LBS | BODY MASS INDEX: 38.27 KG/M2

## 2023-01-05 DIAGNOSIS — J02.0 STREP PHARYNGITIS: Primary | ICD-10-CM

## 2023-01-05 LAB — S PYO AG THROAT QL: POSITIVE

## 2023-01-05 PROCEDURE — 87651 STREP A DNA AMP PROBE: CPT

## 2023-01-05 PROCEDURE — 99213 OFFICE O/P EST LOW 20 MIN: CPT

## 2023-01-05 RX ORDER — AMOXICILLIN 875 MG/1
875 TABLET, COATED ORAL 2 TIMES DAILY
Qty: 20 TABLET | Refills: 0 | Status: SHIPPED | OUTPATIENT
Start: 2023-01-05 | End: 2023-01-15

## 2023-01-05 ASSESSMENT — ENCOUNTER SYMPTOMS
VOMITING: 0
COUGH: 0
EYE PAIN: 0
SHORTNESS OF BREATH: 0
SORE THROAT: 1
RHINORRHEA: 0
ABDOMINAL PAIN: 0
WHEEZING: 0
DIARRHEA: 0
BLOOD IN STOOL: 0
CONSTIPATION: 0
NAUSEA: 0

## 2023-01-05 ASSESSMENT — PAIN - FUNCTIONAL ASSESSMENT: PAIN_FUNCTIONAL_ASSESSMENT: 0-10

## 2023-01-05 ASSESSMENT — PAIN DESCRIPTION - LOCATION: LOCATION: THROAT

## 2023-01-05 ASSESSMENT — PAIN SCALES - GENERAL: PAINLEVEL_OUTOF10: 10

## 2023-01-05 NOTE — DISCHARGE INSTRUCTIONS
Go to ER for worsening symptoms, inability to swallow, inability to keep liquids down, inability to urinate for greater than 8 hours or difficulty breathing.    Follow-up with your primary care provider.   Increase oral intake.   Warm salt water gargles after meals and at bedtime to help with sore throat.  May take tylenol or ibuprofen as needed for pain or fever.

## 2023-01-05 NOTE — ED TRIAGE NOTES
Tex Zaragoza arrives to room with complaint of  sore throat, bilateral ear fullness  symptoms started 3 weeks ago. Denies headache, fever, headache. Got a Z-zachary from Dr Cristi Lunsford 2 weeks ago, which gave relief for 2-3 days, symptoms then returned.

## 2023-01-05 NOTE — ED PROVIDER NOTES
[facility] URGENT CARE  UrgentCare Encounter      CHIEFCOMPLAINT       Chief Complaint   Patient presents with    Pharyngitis        Nurses Notes reviewed and I agree except as noted in the HPI.  HISTORY OF PRESENT ILLNESS   Esther Valle is a 52 y.o. female who presents to urgent care with complaint of sore throat and bilateral ear pain.  She states that she has had a sore throat for about 3 weeks.  She states that she called her primary care provider and requested a Z-Bubba which she did provide her with.  She states that she completed that course of antibiotics, but continues to have a sore throat.  Patient does have a history of tonsillectomy.  Patient denies other symptoms including chest pain, shortness of breath, nausea, vomiting, diarrhea or fever.    REVIEW OF SYSTEMS     Review of Systems   Constitutional:  Negative for appetite change, chills, fatigue, fever and unexpected weight change.   HENT:  Positive for ear pain and sore throat. Negative for rhinorrhea.    Eyes:  Negative for pain and visual disturbance.   Respiratory:  Negative for cough, shortness of breath and wheezing.    Cardiovascular:  Negative for chest pain, palpitations and leg swelling.   Gastrointestinal:  Negative for abdominal pain, blood in stool, constipation, diarrhea, nausea and vomiting.   Genitourinary:  Negative for dysuria, frequency and hematuria.   Musculoskeletal:  Negative for arthralgias, joint swelling and neck stiffness.   Skin:  Negative for rash.   Neurological:  Negative for dizziness, syncope, weakness, light-headedness and headaches.   Hematological:  Does not bruise/bleed easily.     PAST MEDICAL HISTORY         Diagnosis Date    Anxiety     Arthritis     Chronic gastritis 01/2015    Depression     Deviated septum     GERD (gastroesophageal reflux disease)     chronic    Hiatal hernia 2009    Intraductal papilloma of breast 07/2020    right    Numbness and tingling 2007    left leg and foot-chronic     Ovarian cyst     PONV (postoperative nausea and vomiting)     Pulmonary nodule 10/01/2013    Pure hypercholesterolemia 02/14/2022    Schwannoma of spinal cord ([facility] [address] 09/17/2013       SURGICAL HISTORY     Patient  has a past surgical history that includes Hysterectomy (06/16/2006); bladder suspension (2006); Tonsillectomy (1982); Breast reduction surgery (1995); Cervical spine surgery (2007); Nerve Block (04/07/2014); Colonoscopy (1049,1581); Upper gastrointestinal endoscopy (2015); pr septoplasty/submucous resecj w/wo cartilage grf (Bilateral, 12/20/2017); MRI BIOPSY BREAST W LOC DEVICE RIGHT 1ST LESION (07/23/2020); Cystoscopy (06/16/2006); Breast lumpectomy (Right, 08/12/2020); Carpal tunnel release (Bilateral); and Anterior cruciate ligament repair (Left). CURRENT MEDICATIONS       Discharge Medication List as of 1/5/2023  9:03 AM        CONTINUE these medications which have NOT CHANGED    Details   !! desvenlafaxine succinate (PRISTIQ) 50 MG TB24 extended release tablet Take 1 tablet by mouth daily Along with 100 mg tab for total dose 150 mg in morning, Disp-30 tablet, R-5Normal      !! desvenlafaxine succinate (PRISTIQ) 100 MG TB24 extended release tablet Take 1 tablet by mouth daily Along with 50 mg tab for total dose 150 mg in the morning., Disp-30 tablet, R-5Normal      !! buPROPion (WELLBUTRIN XL) 300 MG extended release tablet Take 1 tablet by mouth every morning Along with 150 mg for total 450 mg in morning, Disp-30 tablet, R-5Normal      !!  buPROPion (WELLBUTRIN XL) 150 MG extended release tablet Take 1 tablet by mouth every morning Along with 300 mg for total 450 mg in morning, Disp-30 tablet, R-5Normal      hydrOXYzine HCl (ATARAX) 25 MG tablet Take 1 tablet by mouth 2 times daily as needed for Anxiety, Disp-60 tablet, R-2Normal      gabapentin (NEURONTIN) 600 MG tablet Historical Med      rizatriptan (MAXALT) 10 MG tablet TAKE 1 TABLET BY MOUTH AS NEEDED FOR MIGRAINE; MAY REPEAT IN 2 HOURS IF NEEDED, MAX DOSE OF 30MG PER DAYHistorical Med      bisoprolol-hydroCHLOROthiazide (ZIAC) 5-6.25 MG per tablet Take 1 tablet by mouth in the morning., Disp-90 tablet, R-3Normal      rosuvastatin (CRESTOR) 10 MG tablet Take 1 tablet by mouth nightly, Disp-90 tablet, R-3Normal      Omeprazole Magnesium (PRILOSEC OTC PO) Take by mouth dailyHistorical Med      meloxicam (MOBIC) 15 MG tablet Take 15 mg by mouth dailyHistorical Med      tiZANidine (ZANAFLEX) 4 MG tablet Take 1 tablet by mouth every 6 hours as needed (muscle spasm), Disp-40 tablet, R-1Normal      gabapentin (NEURONTIN) 400 MG capsule TAKE 1 CAPSULE BY MOUTH 3 TIMES A DAY, R-2       !! - Potential duplicate medications found. Please discuss with provider. ALLERGIES     Patient is has No Known Allergies. FAMILY HISTORY     Patient'sfamily history includes Brain Cancer in her mother; Breast Cancer (age of onset: 28) in her mother; Breast Cancer (age of onset: 29) in her maternal aunt; Cancer in her brother and maternal grandfather; Diabetes in her father; High Blood Pressure in her father; [place] in her mother; No Known Problems in her maternal grandmother, paternal grandfather, paternal grandmother, and sister; Stroke in her father. SOCIAL HISTORY     Patient  reports that she has been smoking cigarettes. She started smoking about 33 years ago. She has a 10.00 pack-year smoking history. She has never used smokeless tobacco. She reports current alcohol use. She reports that she does not use drugs. PHYSICAL EXAM     ED TRIAGE VITALS  BP: 120/60, Temp: 97.6 °F (36.4 °C), Heart Rate: 99, Resp: 16, SpO2: 95 %  Physical Exam  Vitals and nursing note reviewed. Constitutional:       Appearance: She is well-developed. HENT:      Head: Normocephalic and atraumatic.       Right Ear: Tympanic membrane and ear canal normal.      Left Ear: Tympanic membrane and ear canal normal.      Mouth/Throat:      Mouth: Mucous membranes are moist. Pharynx: Posterior oropharyngeal erythema present. No oropharyngeal exudate. Tonsils: No tonsillar exudate. 0 on the right. 0 on the left. Eyes:      Conjunctiva/sclera: Conjunctivae normal.      Pupils: Pupils are equal, round, and reactive to light. Cardiovascular:      Rate and Rhythm: Normal rate and regular rhythm. Heart sounds: Normal heart sounds. No murmur heard. No gallop. Pulmonary:      Effort: Pulmonary effort is normal. No respiratory distress. Breath sounds: Normal breath sounds. No stridor. No decreased breath sounds, wheezing, rhonchi or rales. Chest:      Chest wall: No tenderness. Musculoskeletal:         General: Normal range of motion. Cervical back: Normal range of motion and neck supple. Skin:     General: Skin is warm and dry. Neurological:      Mental Status: She is alert and oriented to person, place, and time. DIAGNOSTIC RESULTS   Labs:  Results for orders placed or performed during the hospital encounter of 01/05/23   Strep Screen Group A Throat   Result Value Ref Range    Rapid Strep A Screen POSITIVE (A)        IMAGING:  No orders to display     URGENT CARE COURSE:        MDM      Patient presents to urgent care with complaint of sore throat and bilateral ear pain. Differential diagnosis include not limited to  strep throat or viral illness. Rapid strep is positive. We will treat this with oral amoxicillin. Patient to follow-up with primary care provider. Patient instructed to go to ER for worsening symptoms, inability to swallow, inability to keep liquids down, inability to urinate for greater than 8 hours or difficulty breathing. Follow-up with your primary care provider. Increase oral intake. Warm salt water gargles after meals and at bedtime to help with sore throat. May take tylenol or ibuprofen as needed for pain or fever. Medications - No data to display  PROCEDURES:    Procedures    FINALIMPRESSION      1.  Strep pharyngitis        DISPOSITION/PLAN   DISPOSITION Decision To Discharge 01/05/2023 09:03:29 AM    PATIENT REFERRED TO:  Tariq Tipton MD  [facility] [phone]    In 4 days    DISCHARGE MEDICATIONS:  Discharge Medication List as of 1/5/2023  9:03 AM        START taking these medications    Details   amoxicillin (AMOXIL) 875 MG tablet Take 1 tablet by mouth 2 times daily for 10 days, Disp-20 tablet, R-0Normal           Discharge Medication List as of 1/5/2023  9:03 AM          CATIE Thorpe - CATIE Washburn CNP  01/05/23 6809

## 2023-03-12 ASSESSMENT — ENCOUNTER SYMPTOMS
SHORTNESS OF BREATH: 0
CONSTIPATION: 0
SINUS PRESSURE: 0

## 2023-03-12 NOTE — PROGRESS NOTES
Elinor Leon (:  1970) is a 47 y.o. female,Established patient, here for evaluation of the following chief complaint(s):  No chief complaint on file. ASSESSMENT/PLAN:   Diagnosis Orders   1. Visit for screening mammogram  KOLE DIGITAL SCREEN W OR WO CAD BILATERAL      2. Primary hypertension  Lipid, Fasting    Comprehensive Metabolic Panel, Fasting      3. Pure hypercholesterolemia  Lipid, Fasting    Comprehensive Metabolic Panel, Fasting             Ask Dr Anne Mcknight about the binge eating problem  Consider seeing the psychologist  Do the fasting labs soon  See me in 3 months    Subjective   SUBJECTIVE/OBJECTIVE:  HPI  We discussed the BP being better on the second check  She see Dr Anne Mcknight for depression and I suggested she could talk with her about the binge eating  Review of Systems   Constitutional:  Negative for fatigue. HENT:  Negative for sinus pressure. Eyes:  Negative for visual disturbance. Respiratory:  Negative for shortness of breath. Cardiovascular:  Negative for chest pain. Gastrointestinal:  Negative for constipation. Genitourinary: Negative. Musculoskeletal:  Negative for arthralgias. Skin:  Negative for rash. Neurological:  Negative for headaches. The patient's medications, allergies, past medical problems, surgical, social, and family histories were reviewed and updated as needed. Objective   Physical Exam  Constitutional:       Appearance: Normal appearance. She is well-developed. HENT:      Head: Normocephalic and atraumatic. Eyes:      General: No scleral icterus. Conjunctiva/sclera: Conjunctivae normal.   Neck:      Trachea: No tracheal deviation. Cardiovascular:      Rate and Rhythm: Normal rate and regular rhythm. Heart sounds: Normal heart sounds. Pulmonary:      Effort: Pulmonary effort is normal.      Breath sounds: Normal breath sounds. Skin:     General: Skin is warm and dry.    Neurological:      General: No focal deficit present. Mental Status: She is alert. Psychiatric:         Behavior: Behavior normal.              An electronic signature was used to authenticate this note.     --Brennan Mcintosh MD

## 2023-03-13 ENCOUNTER — TELEPHONE (OUTPATIENT)
Dept: FAMILY MEDICINE CLINIC | Age: 53
End: 2023-03-13

## 2023-03-13 ENCOUNTER — OFFICE VISIT (OUTPATIENT)
Dept: FAMILY MEDICINE CLINIC | Age: 53
End: 2023-03-13

## 2023-03-13 VITALS
SYSTOLIC BLOOD PRESSURE: 138 MMHG | WEIGHT: 236.25 LBS | DIASTOLIC BLOOD PRESSURE: 80 MMHG | BODY MASS INDEX: 39.36 KG/M2 | HEART RATE: 80 BPM | RESPIRATION RATE: 18 BRPM | HEIGHT: 65 IN

## 2023-03-13 DIAGNOSIS — E78.00 PURE HYPERCHOLESTEROLEMIA: ICD-10-CM

## 2023-03-13 DIAGNOSIS — Z12.31 VISIT FOR SCREENING MAMMOGRAM: Primary | ICD-10-CM

## 2023-03-13 DIAGNOSIS — I10 PRIMARY HYPERTENSION: ICD-10-CM

## 2023-03-13 PROCEDURE — 99213 OFFICE O/P EST LOW 20 MIN: CPT | Performed by: FAMILY MEDICINE

## 2023-03-13 SDOH — ECONOMIC STABILITY: INCOME INSECURITY: HOW HARD IS IT FOR YOU TO PAY FOR THE VERY BASICS LIKE FOOD, HOUSING, MEDICAL CARE, AND HEATING?: NOT HARD AT ALL

## 2023-03-13 SDOH — ECONOMIC STABILITY: FOOD INSECURITY: WITHIN THE PAST 12 MONTHS, THE FOOD YOU BOUGHT JUST DIDN'T LAST AND YOU DIDN'T HAVE MONEY TO GET MORE.: NEVER TRUE

## 2023-03-13 SDOH — ECONOMIC STABILITY: HOUSING INSECURITY
IN THE LAST 12 MONTHS, WAS THERE A TIME WHEN YOU DID NOT HAVE A STEADY PLACE TO SLEEP OR SLEPT IN A SHELTER (INCLUDING NOW)?: NO

## 2023-03-13 SDOH — ECONOMIC STABILITY: FOOD INSECURITY: WITHIN THE PAST 12 MONTHS, YOU WORRIED THAT YOUR FOOD WOULD RUN OUT BEFORE YOU GOT MONEY TO BUY MORE.: NEVER TRUE

## 2023-03-13 ASSESSMENT — PATIENT HEALTH QUESTIONNAIRE - PHQ9
4. FEELING TIRED OR HAVING LITTLE ENERGY: 1
SUM OF ALL RESPONSES TO PHQ QUESTIONS 1-9: 6
7. TROUBLE CONCENTRATING ON THINGS, SUCH AS READING THE NEWSPAPER OR WATCHING TELEVISION: 1
8. MOVING OR SPEAKING SO SLOWLY THAT OTHER PEOPLE COULD HAVE NOTICED. OR THE OPPOSITE, BEING SO FIGETY OR RESTLESS THAT YOU HAVE BEEN MOVING AROUND A LOT MORE THAN USUAL: 0
3. TROUBLE FALLING OR STAYING ASLEEP: 3
1. LITTLE INTEREST OR PLEASURE IN DOING THINGS: 0
6. FEELING BAD ABOUT YOURSELF - OR THAT YOU ARE A FAILURE OR HAVE LET YOURSELF OR YOUR FAMILY DOWN: 0
10. IF YOU CHECKED OFF ANY PROBLEMS, HOW DIFFICULT HAVE THESE PROBLEMS MADE IT FOR YOU TO DO YOUR WORK, TAKE CARE OF THINGS AT HOME, OR GET ALONG WITH OTHER PEOPLE: 0
SUM OF ALL RESPONSES TO PHQ9 QUESTIONS 1 & 2: 0
SUM OF ALL RESPONSES TO PHQ QUESTIONS 1-9: 6
SUM OF ALL RESPONSES TO PHQ QUESTIONS 1-9: 6
9. THOUGHTS THAT YOU WOULD BE BETTER OFF DEAD, OR OF HURTING YOURSELF: 0
SUM OF ALL RESPONSES TO PHQ QUESTIONS 1-9: 6
5. POOR APPETITE OR OVEREATING: 1
2. FEELING DOWN, DEPRESSED OR HOPELESS: 0

## 2023-03-13 NOTE — PROGRESS NOTES
No components found for: CHLPL  Lab Results   Component Value Date    TRIG 147 02/18/2022     Lab Results   Component Value Date    HDL 60 02/18/2022     Lab Results   Component Value Date    LDLCALC 143 02/18/2022     No results found for: LABVLDL    Lab Results   Component Value Date    ALT 17 08/16/2022    AST 20 08/16/2022    ALKPHOS 105 08/16/2022    BILITOT 0.3 08/16/2022           Is patient currently taking any cholesterol medications? Yes   If yes, see med list as above    Is the patient reporting any side effects of cholesterol medications? No    Is the patient taking any over the counter medications? Yes   If yes, see med list as above    Is the patient taking a daily aspirin? No      Patient Self-Management Goal for Chronic Condition  Goal: I will take all medications as prescribed by my doctor, and I will call the office if I am having any medication problems. Barriers to success: none  Plan for overcoming my barriers: N/A     Confidence: 9/10  Date goal set: 3/13/23  Date goal attained:     Have you seen any other physician or provider since your last visit no    Have you had any other diagnostic tests since your last visit? no    Have you changed or stopped any medications since your last visit including any over-the-counter medicines, vitamins, or herbal medicines? no     Are you taking all your prescribed medications?  Yes    If NO, why?

## 2023-03-13 NOTE — PATIENT INSTRUCTIONS
Ask Dr Quinonez about the binge eating problem  Consider seeing the psychologist  Do the fasting labs soon  See me in 3 months

## 2023-03-13 NOTE — TELEPHONE ENCOUNTER
Mammogram scheduled for 3/15/2023 at [address] [place] [address],[address]. Patient to arrive at 130pm for a 2pm appt. Go to [address] [place] [address]. Ti Hennessy informed by Phone. No callie,lotion,perfume.

## 2023-03-14 LAB
ALBUMIN SERPL-MCNC: 4.6 G/DL (ref 3.5–5.2)
ALK PHOSPHATASE: 114 U/L (ref 40–130)
ALT SERPL-CCNC: 16 U/L (ref 5–40)
ANION GAP SERPL CALCULATED.3IONS-SCNC: 9 MEQ/L (ref 7–16)
AST SERPL-CCNC: 16 U/L (ref 9–40)
BILIRUB SERPL-MCNC: 0.2 MG/DL
BUN BLDV-MCNC: 14 MG/DL (ref 6–20)
CALCIUM SERPL-MCNC: 9.8 MG/DL (ref 8.5–10.5)
CHLORIDE BLD-SCNC: 103 MEQ/L (ref 95–107)
CHOLESTEROL/HDL RATIO: 3.9 RATIO
CHOLESTEROL: 165 MG/DL
CO2: 27 MEQ/L (ref 19–31)
CREAT SERPL-MCNC: 0.64 MG/DL (ref 0.6–1.3)
EGFR IF NONAFRICAN AMERICAN: 107 ML/MIN/1.73
GLUCOSE: 141 MG/DL (ref 70–99)
HDLC SERPL-MCNC: 42 MG/DL
LDL CHOLESTEROL CALCULATED: 75 MG/DL
LDL/HDL RATIO: 1.8 RATIO
POTASSIUM SERPL-SCNC: 4.1 MEQ/L (ref 3.5–5.4)
SODIUM BLD-SCNC: 139 MEQ/L (ref 133–146)
TOTAL PROTEIN: 7.2 G/DL (ref 6.1–8.3)
TRIGL SERPL-MCNC: 240 MG/DL
VLDLC SERPL CALC-MCNC: 48 MG/DL

## 2023-03-15 ENCOUNTER — TELEPHONE (OUTPATIENT)
Dept: FAMILY MEDICINE CLINIC | Age: 53
End: 2023-03-15

## 2023-03-15 DIAGNOSIS — R73.9 HYPERGLYCEMIA: Primary | ICD-10-CM

## 2023-03-15 NOTE — TELEPHONE ENCOUNTER
----- Message from Miguel A Moran MD sent at 3/14/2023 10:05 PM EDT -----  See if [facility] can add on an A1c to the recent blood draw

## 2023-03-15 NOTE — TELEPHONE ENCOUNTER
Pt labs stated they would not be able to add. They do not have the right tube. Pt would need to go back.

## 2023-03-15 NOTE — TELEPHONE ENCOUNTER
Let her know that on the recent lab the glucose was up and so I would like her to get a non fasting A1c done at the lab now.

## 2023-03-17 LAB
AVERAGE GLUCOSE: 143 MG/DL
HBA1C MFR BLD: 6.6 % (ref 4.2–5.6)

## 2023-03-19 DIAGNOSIS — E11.9 TYPE 2 DIABETES MELLITUS TREATED WITHOUT INSULIN (HCC): Primary | ICD-10-CM

## 2023-03-20 ENCOUNTER — TELEPHONE (OUTPATIENT)
Dept: FAMILY MEDICINE CLINIC | Age: 53
End: 2023-03-20

## 2023-03-20 NOTE — TELEPHONE ENCOUNTER
----- Message from Lizzy Alexander MD sent at 3/19/2023  7:01 PM EDT -----  Let her know that the A1c showed early DM. She does not need meds now but she needs to strictly reduce the carbs. Check the non fasting A1c in late June and see me then.

## 2023-03-21 ENCOUNTER — HOSPITAL ENCOUNTER (OUTPATIENT)
Dept: WOMENS IMAGING | Age: 53
Discharge: HOME | End: 2023-03-21
Payer: COMMERCIAL

## 2023-03-21 DIAGNOSIS — Z12.31 VISIT FOR SCREENING MAMMOGRAM: ICD-10-CM

## 2023-03-21 PROCEDURE — 77063 BREAST TOMOSYNTHESIS BI: CPT

## 2023-03-29 ENCOUNTER — HOSPITAL ENCOUNTER (OUTPATIENT)
Dept: WOMENS IMAGING | Age: 53
Discharge: HOME | End: 2023-03-29
Payer: COMMERCIAL

## 2023-03-29 DIAGNOSIS — R92.2 BREAST DENSITY: ICD-10-CM

## 2023-03-29 PROCEDURE — G0279 TOMOSYNTHESIS, MAMMO: HCPCS

## 2023-03-29 PROCEDURE — 76642 ULTRASOUND BREAST LIMITED: CPT

## 2023-04-03 ENCOUNTER — HOSPITAL ENCOUNTER (OUTPATIENT)
Dept: WOMENS IMAGING | Age: 53
Discharge: HOME | End: 2023-04-03
Payer: COMMERCIAL

## 2023-04-03 DIAGNOSIS — N63.25 MASS OVERLAPPING MULTIPLE QUADRANTS OF LEFT BREAST: ICD-10-CM

## 2023-04-03 DIAGNOSIS — N63.21 MASS OF UPPER OUTER QUADRANT OF LEFT BREAST: ICD-10-CM

## 2023-04-03 PROCEDURE — 88360 TUMOR IMMUNOHISTOCHEM/MANUAL: CPT

## 2023-04-03 PROCEDURE — 88305 TISSUE EXAM BY PATHOLOGIST: CPT

## 2023-04-03 PROCEDURE — 19084 BX BREAST ADD LESION US IMAG: CPT

## 2023-04-03 PROCEDURE — 19083 BX BREAST 1ST LESION US IMAG: CPT

## 2023-04-03 PROCEDURE — G0279 TOMOSYNTHESIS, MAMMO: HCPCS

## 2023-04-03 PROCEDURE — 88342 IMHCHEM/IMCYTCHM 1ST ANTB: CPT

## 2023-04-03 NOTE — PROGRESS NOTES
Breast Biopsy Flowsheet/Post-Operative Care    Date of Procedure: 4/3/2023  Physician: Dr. Kait Devi  Technologist: Boston Lofton guided breast biopsy X3  Lesion type: Non-palpable  Breast: left    Clock face position: Site #1: 2:30 tribell clip     Site #2: 1:00 U clip     Site #3 12:00 barbell clip    Primary Method of Detection: Mammogram      Microcalcification's: no   Distribution: N/A      Biopsy Method:   Sertera:    Site # 1    Gauge: 14    # of Passes: 4     Clip: Darrelyn Pickup:    Site # 2    Gauge: 14    # of Passes: 4     Clip:  U      Sertera:    Site # 3    Gauge: 14    # of Passes: 4     Clip: Barbell    Pre-Op Assessment: (BI-RADS)   5.  Highly Suggestive of Malignancy    Patient Tolerated Procedure: good  Complications: n/a  Comments: n/a    Post Operative Care  Steri strips: Yes  Dressing: Gauze, Tape   Ice Applied to Site:  No  Evidence of Bleeding:  No    Pain Verbalized: No      Written Discharge Instructions: Yes  Condition at Discharge: good  Time of Discharge: 1031    Electronically signed by Nalini Crocker on 4/3/2023 at 10:40 AM

## 2023-04-03 NOTE — PROGRESS NOTES
[facility] [address]  Pre-Biopsy Assessment      Patient Education    Written information about procedure Yes  left   Procedural steps explained Yes Ultrasound Biopsy X3   Post-op potential: bruising, hematoma, pain Yes    Self-care: activity, care of dressing Yes    Patient verbalized understanding Yes    Consent signed and witnessed Yes      Hormone Therapy Status: n/a    Recent Medication: N/A Last Dose: n/a                                     Hormone Replacement Therapy: no    Previous Breast Biopsy: yes - papilloma    Previous Diagnosis Cancer: no    Hysterectomy:yes - ovaries remain    Emotional Status: Calm    Language or Physical Barriers: n/a    Comments: n/a      Electronically signed by Musa Cabrera on 4/3/2023 at 9:21 AM

## 2023-04-04 PROBLEM — C50.912 INVASIVE DUCTAL CARCINOMA OF BREAST, FEMALE, LEFT (HCC): Status: ACTIVE | Noted: 2023-04-04

## 2023-04-05 ENCOUNTER — CLINICAL DOCUMENTATION (OUTPATIENT)
Dept: CASE MANAGEMENT | Age: 53
End: 2023-04-05

## 2023-04-05 ENCOUNTER — CLINICAL DOCUMENTATION (OUTPATIENT)
Dept: WOMENS IMAGING | Age: 53
End: 2023-04-05

## 2023-04-05 ENCOUNTER — TELEMEDICINE (OUTPATIENT)
Dept: PSYCHIATRY | Age: 53
End: 2023-04-05
Payer: COMMERCIAL

## 2023-04-05 DIAGNOSIS — G89.29 OTHER CHRONIC PAIN: ICD-10-CM

## 2023-04-05 DIAGNOSIS — F41.9 ANXIETY: ICD-10-CM

## 2023-04-05 DIAGNOSIS — G47.00 INSOMNIA, UNSPECIFIED TYPE: ICD-10-CM

## 2023-04-05 DIAGNOSIS — F33.42 RECURRENT MAJOR DEPRESSIVE DISORDER, IN FULL REMISSION (HCC): Primary | ICD-10-CM

## 2023-04-05 PROCEDURE — 99214 OFFICE O/P EST MOD 30 MIN: CPT | Performed by: PSYCHIATRY & NEUROLOGY

## 2023-04-05 RX ORDER — DESVENLAFAXINE SUCCINATE 50 MG/1
50 TABLET, EXTENDED RELEASE ORAL DAILY
Qty: 30 TABLET | Refills: 5 | Status: SHIPPED | OUTPATIENT
Start: 2023-04-05

## 2023-04-05 RX ORDER — BUPROPION HYDROCHLORIDE 300 MG/1
300 TABLET ORAL EVERY MORNING
Qty: 30 TABLET | Refills: 5 | Status: SHIPPED | OUTPATIENT
Start: 2023-04-05 | End: 2023-10-02

## 2023-04-05 RX ORDER — DESVENLAFAXINE 100 MG/1
100 TABLET, EXTENDED RELEASE ORAL DAILY
Qty: 30 TABLET | Refills: 5 | Status: SHIPPED | OUTPATIENT
Start: 2023-04-05

## 2023-04-05 RX ORDER — BUPROPION HYDROCHLORIDE 150 MG/1
150 TABLET ORAL EVERY MORNING
Qty: 30 TABLET | Refills: 5 | Status: SHIPPED | OUTPATIENT
Start: 2023-04-05

## 2023-04-05 NOTE — PROGRESS NOTES
Build: Normal    Hygiene:  Appropriate in casual dress   SENSORIUM Orientation: Place, Person, Time, & Situation     Consciousness: Alert    ATTENTION   Focused    RELATEDNESS  Cooperative    EYE CONTACT   Good    PSYCHOMOTOR  Normal    SPEECH Volume: Normal     Rate: Normal rate and somewhat down tone    Amplitude: Within normal limits   MOOD  Anxious    AFFECT Range: Limited, mood congruent , social smile present   THOUGHT Process:  Goal-Directed     Content: no evidence of psychosis    COGNITION Insight: Good    Judgement:  Intact    MEMORY  Intact    INTELLIGENCE  Average       Mobility/Gait: Independently, has used a cane in the past    Controlled SubstancesMonitoring:   not done      ASSESSMENT: Will increase Atarax for anxiety and might try for sleep. Feeling worse in context of breast cancer dx. Typically feels worse around Oct around time of her mom's death and Eriberto when she lost her son. Pristiq with mood and tearfulness benefit. Diagnosis Orders   1. Recurrent major depressive disorder, in full remission ([facility] [address]        2. Anxiety        3. Insomnia, unspecified type        4. Other chronic pain            PLAN:     Medications: Wellbutrin  mg QAM  Pristiq 150 mg QAM  Increase tarax 50 mg BID prn anxiety to  mg BID prn anxiety or sleep  Therapy: previously saw Beatriz Nelson in [place], declines therapy for now  Labs/Tests/Imaging: none   Records Reviewed: [facility]  Patient advised to call if patient has any difficulties with treatment  Return in about 8 weeks (around 5/31/2023) for follow up, med check. Elinor Leon, was evaluated through a synchronous (real-time) audio-video encounter. The patient (or guardian if applicable) is aware that this is a billable service, which includes applicable copays. This virtual visit was conducted with patient's (and/or legal guardian's) consent.  The visit was conducted pursuant to the emergency declaration under the [address]

## 2023-04-05 NOTE — PROGRESS NOTES
Name: Vickie Llamas  : 1970  MRN: [id number]    Oncology Navigation- Initial Note:    Intake-  Contact Type:  [facility] for teaching    Diagnosis: Breast-malignant, left IDC ER+ CT+ HER2+ (site C), (site B-pending, site A result-negative)    Home Disposition:  , has adult children to assist as needed    Patient needs and barriers to care: Coordination of Care, Knowledge deficit, and Emotional Issues/ Fear/ Anxiety     Referral Source: Outside Provider    Receptive to Advanced Care Planning/ Palliative Care:  deferred, stage unknown at this time    Interventions-   General Interventions: Arrived alone for initial breast cancer teaching with navigation. Pleasant mood, states \"I was expecting cancer at some point in my life based on my family history. \" Denies feeling overwhelmed. Asking good questions and offers information. Genetics/Family History: Mother breast cancer, age 32-became metastatic to lung and brain and  by age 44; maternal aunt metastatic breast cancer, age 34- by age 43; brother pancreatic cancer,age 41-; MGF bone cancer, father skin cancer. Meets criteria for genetic evaluation testing. Interested but will pursue at [facility]. Recommended since we do not have genetic counseling at this time. Education/Screenings:  yes - Breast Cancer Information Packet, Navigation packet, Pre-op: Lymphedema, exercises after breast surgery, post-op pillow, pre-hab, genetic testing, breast MRI. Deferred sentinel node, needle localization and magseed to surgeon. Currently on HRT: no     Referrals: Pastoral Care-per protocol    Continuum of Care: Diagnosis/Active Treatment    Notes:  Teaching completed and verbalized understanding. Questions addressed. Emotional support offered. Has our contact numbers for questions or concerns anytime. Aware that navigation does follow her to [facility] but can return for any modality of treatment for local care anytime.  Expressed appreciation for

## 2023-04-05 NOTE — PROGRESS NOTES
Contact Type: [facility]    Diagnosis:  Invasive Ductal Carcinoma    Contact Information: Arrived with alone for biopsy results. Dr. Zayda Veronica discussed pathology. All cards given for surgeons. She has seen Dr. Eric Cheng in the past, so I asked Chema if she would like to make an appointment with . She is undecided and declines at this time. Patient Expresses Need(s) For: n/a    Requests Referral to Doctor(s) with appointment(s): n/a    Additional Referral(s): Finesse Padilla/Sandrita Grey: [facility]     Integrated breast cancer clinic appointment: n/a    Biopsy site status: Fine    Teaching Sheets provided: Cancer Type: Invasive Ductal Carcinoma, What is Breast Cancer, Tumor Markers, Needle Localization, Wireless Localzation, [place] Lymph Node Surgery, Lumpectomy/Mastectomy Comparison, Radiation Therapy, Breast Cancer Navigation Packet, Nurse Navigation contact information, and map. Currently on HRT: no    If yes, was patient instructed to stop immediately: N/A     Notes: Explained terms doctor and navigators will discuss at next appointments. Questions addressed and encouraged patient to ask [facility]. Referral emailed to Navigation. Answered yes on Genetic Risk Assessment: yes     Additional information: Best contact number [phone] She did mention checking into oncology in [facility]. Results Faxed to:  Dr. Nga Rivera and NATANAEL Madrigal

## 2023-04-12 PROBLEM — C50.412 CARCINOMA OF UPPER-OUTER QUADRANT OF LEFT BREAST IN FEMALE, ESTROGEN RECEPTOR POSITIVE (HCC): Status: ACTIVE | Noted: 2023-04-12

## 2023-04-12 PROBLEM — Z17.0 CARCINOMA OF UPPER-OUTER QUADRANT OF LEFT BREAST IN FEMALE, ESTROGEN RECEPTOR POSITIVE (HCC): Status: ACTIVE | Noted: 2023-04-12

## 2023-04-14 ENCOUNTER — TELEPHONE (OUTPATIENT)
Dept: SURGERY | Age: 53
End: 2023-04-14

## 2023-04-17 ENCOUNTER — HOSPITAL ENCOUNTER (OUTPATIENT)
Dept: MRI IMAGING | Age: 53
Discharge: HOME | End: 2023-04-17
Payer: COMMERCIAL

## 2023-04-17 DIAGNOSIS — Z17.0 CARCINOMA OF UPPER-OUTER QUADRANT OF LEFT BREAST IN FEMALE, ESTROGEN RECEPTOR POSITIVE (HCC): ICD-10-CM

## 2023-04-17 DIAGNOSIS — C50.412 CARCINOMA OF UPPER-OUTER QUADRANT OF LEFT BREAST IN FEMALE, ESTROGEN RECEPTOR POSITIVE (HCC): ICD-10-CM

## 2023-04-17 PROCEDURE — 6360000004 HC RX CONTRAST MEDICATION: Performed by: SURGERY

## 2023-04-17 PROCEDURE — A9579 GAD-BASE MR CONTRAST NOS,1ML: HCPCS | Performed by: SURGERY

## 2023-04-17 PROCEDURE — C8908 MRI W/O FOL W/CONT, BREAST,: HCPCS

## 2023-04-17 RX ADMIN — GADOTERIDOL 20 ML: 279.3 INJECTION, SOLUTION INTRAVENOUS at 16:58

## 2023-04-20 ENCOUNTER — TELEPHONE (OUTPATIENT)
Dept: SURGERY | Age: 53
End: 2023-04-20

## 2023-04-20 NOTE — TELEPHONE ENCOUNTER
Spoke w/ pt, ALEX approved echo and is scheduled for 4/25, arrive at 1 pm Heart & Vascular 2nd floor [facility].   She voiced understanding

## 2023-04-21 ENCOUNTER — TELEPHONE (OUTPATIENT)
Dept: FAMILY MEDICINE CLINIC | Age: 53
End: 2023-04-21

## 2023-04-21 ENCOUNTER — OFFICE VISIT (OUTPATIENT)
Dept: FAMILY MEDICINE CLINIC | Age: 53
End: 2023-04-21

## 2023-04-21 ENCOUNTER — SOCIAL WORK (OUTPATIENT)
Dept: INFUSION THERAPY | Age: 53
End: 2023-04-21

## 2023-04-21 VITALS
BODY MASS INDEX: 38.32 KG/M2 | WEIGHT: 230 LBS | DIASTOLIC BLOOD PRESSURE: 80 MMHG | RESPIRATION RATE: 16 BRPM | HEIGHT: 65 IN | HEART RATE: 68 BPM | SYSTOLIC BLOOD PRESSURE: 138 MMHG

## 2023-04-21 DIAGNOSIS — C50.912 MALIGNANT NEOPLASM OF LEFT FEMALE BREAST, UNSPECIFIED ESTROGEN RECEPTOR STATUS, UNSPECIFIED SITE OF BREAST (HCC): ICD-10-CM

## 2023-04-21 DIAGNOSIS — M25.50 POLYARTHRALGIA: Primary | ICD-10-CM

## 2023-04-21 RX ORDER — PREDNISONE 20 MG/1
40 TABLET ORAL DAILY
Qty: 14 TABLET | Refills: 0 | Status: SHIPPED | OUTPATIENT
Start: 2023-04-21 | End: 2023-04-28

## 2023-04-21 ASSESSMENT — ENCOUNTER SYMPTOMS
SINUS PRESSURE: 0
SHORTNESS OF BREATH: 0
CONSTIPATION: 0

## 2023-04-21 NOTE — PROGRESS NOTES
Torri Millan (:  1970) is a 47 y.o. female,Established patient, here for evaluation of the following chief complaint(s):  Hand Pain (Joint pain)         ASSESSMENT/PLAN:   Diagnosis Orders   1. Polyarthralgia  NM BONE SCAN WHOLE BODY    XR Hand Bilateral 2 VW    XR ANKLE LEFT (MIN 3 VIEWS)    predniSONE (DELTASONE) 20 MG tablet      2. Malignant neoplasm of left female breast, unspecified estrogen receptor status, unspecified site of breast ([facility] [address]  NM BONE SCAN WHOLE BODY               Get the plain xrays of the hands and left ankle. We will tell you the timing of the bone scan    Subjective   SUBJECTIVE/OBJECTIVE:  HPI  There has been off and on pain the thumbs and their ligaments over the past several weeks. It's hard to squeeze things and do laundry  There is pain at times in the left ankle last week  Her breast cancer has not started tx yet  Review of Systems   Constitutional:  Negative for fatigue. HENT:  Negative for sinus pressure. Eyes:  Negative for visual disturbance. Respiratory:  Negative for shortness of breath. Cardiovascular:  Negative for chest pain. Gastrointestinal:  Negative for constipation. Genitourinary: Negative. Musculoskeletal:  Positive for arthralgias and myalgias. Skin:  Negative for rash. Neurological:  Negative for headaches. The patient's medications, allergies, past medical problems, surgical, social, and family histories were reviewed and updated as needed. Objective   Physical Exam  Constitutional:       Appearance: Normal appearance. She is well-developed. HENT:      Head: Normocephalic and atraumatic. Eyes:      General: No scleral icterus. Conjunctiva/sclera: Conjunctivae normal.   Neck:      Trachea: No tracheal deviation. Cardiovascular:      Rate and Rhythm: Normal rate and regular rhythm. Heart sounds: Normal heart sounds. Pulmonary:      Effort: Pulmonary effort is normal.      Breath sounds: Normal breath sounds.

## 2023-04-21 NOTE — TELEPHONE ENCOUNTER
Nuclear medicine scan scheduled for 5/1/2023 at [address] [place] [address],[address]. Patient needs to arrive at 1130am for a 12pm appt. Go to main radiology. No pa needed called insurance. Juanita Hackett informed by Phone.

## 2023-04-21 NOTE — PROGRESS NOTES
Oncology Social Work    Date: 4/21/2023  Time: 2:18 PM  Name: Lidia Montemayor  MRN: [id number]     Contact Type: Follow-up    Note:   Situation: This staff contacted Lidia Montemayor (goes by Lyla Bernabe) via phone support to introduce myself as their assigned Oncology Social Worker. Background:  Lyla Kidd was recently diagnosed with breast cancer and continues to obtain treatment from the [facility]. This staff was providing a follow-up call based on elevated concerns indicated on her Distress Screening. Assessment: Lyla Kidd was babysitting for her grandchild during the call. . She shared how vital this relationship is to her healing as she tries to envision what will be happening over the next couple months. She wants so badly to be able to continue with this level of care. - She also explained that she is currently on disability and has a fixed income. She needs all the financial assistance she can obtain. We discussed a couple possible grants we can apply for but she isn't sure she wanted to do this just yet. Since this was new information, this staff asked her to consider where she feels the most help can be utilized. - This staff educated her as to the value of the SW as part of her care team. She expressed understanding and will review her options and get back with me at a more convenient time. Recommendation: Follow-up will be initiated by Lyla Kidd based on need.  provided her with my contact information and will remain available for support.         CHAGO Stoddard, EDELMIRA, ACE  Oncology Social Worker      Electronically signed by CHAGO Stoddard, ACE HICKEY on 4/21/2023 at 2:18 PM

## 2023-04-24 ENCOUNTER — ANESTHESIA (OUTPATIENT)
Dept: OPERATING ROOM | Age: 53
End: 2023-04-24
Payer: COMMERCIAL

## 2023-04-24 ENCOUNTER — ANESTHESIA EVENT (OUTPATIENT)
Dept: OPERATING ROOM | Age: 53
End: 2023-04-24
Payer: COMMERCIAL

## 2023-04-24 ENCOUNTER — TELEPHONE (OUTPATIENT)
Dept: CASE MANAGEMENT | Age: 53
End: 2023-04-24

## 2023-04-24 ENCOUNTER — HOSPITAL ENCOUNTER (OUTPATIENT)
Age: 53
Setting detail: OUTPATIENT SURGERY
Discharge: HOME | End: 2023-04-24
Attending: SURGERY | Admitting: SURGERY
Payer: COMMERCIAL

## 2023-04-24 ENCOUNTER — APPOINTMENT (OUTPATIENT)
Dept: GENERAL RADIOLOGY | Age: 53
End: 2023-04-24
Attending: SURGERY
Payer: COMMERCIAL

## 2023-04-24 VITALS
HEIGHT: 65 IN | WEIGHT: 230 LBS | DIASTOLIC BLOOD PRESSURE: 79 MMHG | HEART RATE: 79 BPM | RESPIRATION RATE: 20 BRPM | OXYGEN SATURATION: 94 % | TEMPERATURE: 96.8 F | BODY MASS INDEX: 38.32 KG/M2 | SYSTOLIC BLOOD PRESSURE: 130 MMHG

## 2023-04-24 DIAGNOSIS — C50.412 CARCINOMA OF UPPER-OUTER QUADRANT OF LEFT BREAST IN FEMALE, ESTROGEN RECEPTOR POSITIVE (HCC): Primary | ICD-10-CM

## 2023-04-24 DIAGNOSIS — Z15.02 BRCA1-ASSOCIATED PROTEIN-1 TUMOR PREDISPOSITION SYNDROME: Primary | ICD-10-CM

## 2023-04-24 DIAGNOSIS — Z15.01 BRCA2 GENE MUTATION POSITIVE IN FEMALE: ICD-10-CM

## 2023-04-24 DIAGNOSIS — Z15.01 BRCA1-ASSOCIATED PROTEIN-1 TUMOR PREDISPOSITION SYNDROME: Primary | ICD-10-CM

## 2023-04-24 DIAGNOSIS — Z17.0 CARCINOMA OF UPPER-OUTER QUADRANT OF LEFT BREAST IN FEMALE, ESTROGEN RECEPTOR POSITIVE (HCC): Primary | ICD-10-CM

## 2023-04-24 DIAGNOSIS — Z15.09 BRCA1-ASSOCIATED PROTEIN-1 TUMOR PREDISPOSITION SYNDROME: Primary | ICD-10-CM

## 2023-04-24 DIAGNOSIS — Z15.02 BRCA2 GENE MUTATION POSITIVE IN FEMALE: ICD-10-CM

## 2023-04-24 DIAGNOSIS — Z15.09 BRCA2 GENE MUTATION POSITIVE IN FEMALE: ICD-10-CM

## 2023-04-24 PROCEDURE — 7100000011 HC PHASE II RECOVERY - ADDTL 15 MIN: Performed by: SURGERY

## 2023-04-24 PROCEDURE — 6360000002 HC RX W HCPCS: Performed by: SURGERY

## 2023-04-24 PROCEDURE — C1788 PORT, INDWELLING, IMP: HCPCS | Performed by: SURGERY

## 2023-04-24 PROCEDURE — 3700000001 HC ADD 15 MINUTES (ANESTHESIA): Performed by: SURGERY

## 2023-04-24 PROCEDURE — 2500000003 HC RX 250 WO HCPCS: Performed by: SURGERY

## 2023-04-24 PROCEDURE — 77001 FLUOROGUIDE FOR VEIN DEVICE: CPT | Performed by: SURGERY

## 2023-04-24 PROCEDURE — 2709999900 HC NON-CHARGEABLE SUPPLY: Performed by: SURGERY

## 2023-04-24 PROCEDURE — 7100000010 HC PHASE II RECOVERY - FIRST 15 MIN: Performed by: SURGERY

## 2023-04-24 PROCEDURE — 3600000002 HC SURGERY LEVEL 2 BASE: Performed by: SURGERY

## 2023-04-24 PROCEDURE — 36561 INSERT TUNNELED CV CATH: CPT | Performed by: SURGERY

## 2023-04-24 PROCEDURE — 77001 FLUOROGUIDE FOR VEIN DEVICE: CPT

## 2023-04-24 PROCEDURE — 3700000000 HC ANESTHESIA ATTENDED CARE: Performed by: SURGERY

## 2023-04-24 PROCEDURE — 71045 X-RAY EXAM CHEST 1 VIEW: CPT

## 2023-04-24 PROCEDURE — 2500000003 HC RX 250 WO HCPCS

## 2023-04-24 PROCEDURE — 3600000012 HC SURGERY LEVEL 2 ADDTL 15MIN: Performed by: SURGERY

## 2023-04-24 PROCEDURE — 6360000002 HC RX W HCPCS

## 2023-04-24 PROCEDURE — 2580000003 HC RX 258: Performed by: SURGERY

## 2023-04-24 DEVICE — PORT INFUS 8FR PLAS ATTCH OPN END POLYUR CATH SIL FILL SUT: Type: IMPLANTABLE DEVICE | Status: FUNCTIONAL

## 2023-04-24 RX ORDER — ACETAMINOPHEN 325 MG/1
650 TABLET ORAL EVERY 4 HOURS PRN
Status: DISCONTINUED | OUTPATIENT
Start: 2023-04-24 | End: 2023-04-24 | Stop reason: HOSPADM

## 2023-04-24 RX ORDER — SODIUM CHLORIDE 0.9 % (FLUSH) 0.9 %
5-40 SYRINGE (ML) INJECTION EVERY 12 HOURS SCHEDULED
Status: DISCONTINUED | OUTPATIENT
Start: 2023-04-24 | End: 2023-04-24 | Stop reason: HOSPADM

## 2023-04-24 RX ORDER — LIDOCAINE HYDROCHLORIDE 10 MG/ML
INJECTION, SOLUTION EPIDURAL; INFILTRATION; INTRACAUDAL; PERINEURAL PRN
Status: DISCONTINUED | OUTPATIENT
Start: 2023-04-24 | End: 2023-04-24 | Stop reason: ALTCHOICE

## 2023-04-24 RX ORDER — ONDANSETRON 2 MG/ML
4 INJECTION INTRAMUSCULAR; INTRAVENOUS EVERY 6 HOURS PRN
Status: DISCONTINUED | OUTPATIENT
Start: 2023-04-24 | End: 2023-04-24 | Stop reason: HOSPADM

## 2023-04-24 RX ORDER — MORPHINE SULFATE 2 MG/ML
4 INJECTION, SOLUTION INTRAMUSCULAR; INTRAVENOUS
Status: DISCONTINUED | OUTPATIENT
Start: 2023-04-24 | End: 2023-04-24 | Stop reason: HOSPADM

## 2023-04-24 RX ORDER — SODIUM CHLORIDE 9 MG/ML
INJECTION, SOLUTION INTRAVENOUS PRN
Status: DISCONTINUED | OUTPATIENT
Start: 2023-04-24 | End: 2023-04-24 | Stop reason: HOSPADM

## 2023-04-24 RX ORDER — GLYCOPYRROLATE 0.2 MG/ML
INJECTION INTRAMUSCULAR; INTRAVENOUS PRN
Status: DISCONTINUED | OUTPATIENT
Start: 2023-04-24 | End: 2023-04-24 | Stop reason: SDUPTHER

## 2023-04-24 RX ORDER — ONDANSETRON 4 MG/1
4 TABLET, ORALLY DISINTEGRATING ORAL EVERY 8 HOURS PRN
Status: DISCONTINUED | OUTPATIENT
Start: 2023-04-24 | End: 2023-04-24 | Stop reason: HOSPADM

## 2023-04-24 RX ORDER — MORPHINE SULFATE 2 MG/ML
2 INJECTION, SOLUTION INTRAMUSCULAR; INTRAVENOUS
Status: DISCONTINUED | OUTPATIENT
Start: 2023-04-24 | End: 2023-04-24 | Stop reason: HOSPADM

## 2023-04-24 RX ORDER — KETAMINE HCL IN NACL, ISO-OSM 100MG/10ML
SYRINGE (ML) INJECTION PRN
Status: DISCONTINUED | OUTPATIENT
Start: 2023-04-24 | End: 2023-04-24 | Stop reason: SDUPTHER

## 2023-04-24 RX ORDER — PROPOFOL 10 MG/ML
INJECTION, EMULSION INTRAVENOUS PRN
Status: DISCONTINUED | OUTPATIENT
Start: 2023-04-24 | End: 2023-04-24 | Stop reason: SDUPTHER

## 2023-04-24 RX ORDER — HYDROCODONE BITARTRATE AND ACETAMINOPHEN 5; 325 MG/1; MG/1
1 TABLET ORAL EVERY 4 HOURS PRN
Qty: 18 TABLET | Refills: 0 | Status: SHIPPED | OUTPATIENT
Start: 2023-04-24 | End: 2023-04-27

## 2023-04-24 RX ORDER — OXYCODONE HYDROCHLORIDE 5 MG/1
5 TABLET ORAL EVERY 4 HOURS PRN
Status: DISCONTINUED | OUTPATIENT
Start: 2023-04-24 | End: 2023-04-24 | Stop reason: HOSPADM

## 2023-04-24 RX ORDER — OXYCODONE HYDROCHLORIDE 5 MG/1
10 TABLET ORAL EVERY 4 HOURS PRN
Status: DISCONTINUED | OUTPATIENT
Start: 2023-04-24 | End: 2023-04-24 | Stop reason: HOSPADM

## 2023-04-24 RX ORDER — SODIUM CHLORIDE 9 MG/ML
INJECTION, SOLUTION INTRAVENOUS CONTINUOUS
Status: DISCONTINUED | OUTPATIENT
Start: 2023-04-24 | End: 2023-04-24 | Stop reason: HOSPADM

## 2023-04-24 RX ORDER — SODIUM CHLORIDE 0.9 % (FLUSH) 0.9 %
5-40 SYRINGE (ML) INJECTION PRN
Status: DISCONTINUED | OUTPATIENT
Start: 2023-04-24 | End: 2023-04-24 | Stop reason: HOSPADM

## 2023-04-24 RX ORDER — HEPARIN SODIUM (PORCINE) LOCK FLUSH IV SOLN 100 UNIT/ML 100 UNIT/ML
SOLUTION INTRAVENOUS PRN
Status: DISCONTINUED | OUTPATIENT
Start: 2023-04-24 | End: 2023-04-24 | Stop reason: ALTCHOICE

## 2023-04-24 RX ADMIN — Medication 100 MG: at 08:54

## 2023-04-24 RX ADMIN — Medication 25 MG: at 08:54

## 2023-04-24 RX ADMIN — GLYCOPYRROLATE 0.2 MG: 0.2 INJECTION INTRAMUSCULAR; INTRAVENOUS at 08:54

## 2023-04-24 RX ADMIN — PROPOFOL 50 MG: 10 INJECTION, EMULSION INTRAVENOUS at 09:04

## 2023-04-24 RX ADMIN — SODIUM CHLORIDE: 9 INJECTION, SOLUTION INTRAVENOUS at 07:53

## 2023-04-24 RX ADMIN — Medication 25 MG: at 09:00

## 2023-04-24 RX ADMIN — PROPOFOL 50 MG: 10 INJECTION, EMULSION INTRAVENOUS at 08:59

## 2023-04-24 RX ADMIN — Medication 2000 MG: at 08:56

## 2023-04-24 RX ADMIN — PROPOFOL 100 MG: 10 INJECTION, EMULSION INTRAVENOUS at 08:54

## 2023-04-24 ASSESSMENT — PAIN SCALES - GENERAL
PAINLEVEL_OUTOF10: 2
PAINLEVEL_OUTOF10: 2
PAINLEVEL_OUTOF10: 7

## 2023-04-24 ASSESSMENT — PAIN - FUNCTIONAL ASSESSMENT: PAIN_FUNCTIONAL_ASSESSMENT: 0-10

## 2023-04-24 ASSESSMENT — LIFESTYLE VARIABLES: SMOKING_STATUS: 1

## 2023-04-24 NOTE — TELEPHONE ENCOUNTER
Name: Mariann Lloyd  : 1970  MRN: [id number]    Oncology Navigation Follow-Up Note    Contact Type:  Telephone    Notes: Called patient to inform genetic test are available. She knew the test results are positive and has a post session with Maciej Reach 23 at 1430. Suggest patient meet with [facility] for additional details. Patient is in agreement and is willing to drive to [place] to meet.   Miko Radford notified and will get referral from Dr. Hortencia White     Electronically signed by Kika Ochoa RN on 2023 at 3:14 PM

## 2023-04-24 NOTE — ANESTHESIA PRE PROCEDURE
Department of Anesthesiology  Preprocedure Note       Name:  Darren Watson   Age:  47 y.o.  :  1970                                          MRN:  [id number]         Date:  2023      Surgeon: Jillian Montemayor):  Denice Branham MD    Procedure: Procedure(s):  Single Lumen Smartport insertion right subclavian    Medications prior to admission:   Prior to Admission medications    Medication Sig Start Date End Date Taking? Authorizing Provider   predniSONE (DELTASONE) 20 MG tablet Take 2 tablets by mouth daily for 7 days 23  Jason Morales MD   buPROPion (WELLBUTRIN XL) 150 MG extended release tablet Take 1 tablet by mouth every morning Along with 300 mg for total 450 mg in morning 23   Ian Goodman MD   buPROPion (WELLBUTRIN XL) 300 MG extended release tablet Take 1 tablet by mouth every morning Along with 150 mg for total 450 mg in morning 4/5/23 10/2/23  Ian Goodman MD   desvenlafaxine succinate (PRISTIQ) 100 MG TB24 extended release tablet Take 1 tablet by mouth daily Along with 50 mg tab for total dose 150 mg in the morning. 23   Ian Goodman MD   desvenlafaxine succinate (PRISTIQ) 50 MG TB24 extended release tablet Take 1 tablet by mouth daily Along with 100 mg tab for total dose 150 mg in morning 23   Ian Goodman MD   hydrOXYzine HCl (ATARAX) 25 MG tablet Take 1 tablet by mouth 2 times daily as needed for Anxiety 22   Ian Goodman MD   gabapentin (NEURONTIN) 600 MG tablet Take 1 tablet by mouth daily. 22   Historical Provider, MD   bisoprolol-hydroCHLOROthiazide (ZIAC) 5-6.25 MG per tablet Take 1 tablet by mouth in the morning.  22   Jason Morales MD   rosuvastatin (CRESTOR) 10 MG tablet Take 1 tablet by mouth nightly 22   Jason Morales MD   Omeprazole Magnesium (PRILOSEC OTC PO) Take by mouth daily    Historical Provider, MD   meloxicam (MOBIC) 15 MG tablet Take 1 tablet by mouth daily    Historical Provider, MD   tiZANidine (Ricardo Angles)

## 2023-04-24 NOTE — ANESTHESIA POSTPROCEDURE EVALUATION
Department of Anesthesiology  Postprocedure Note    Patient: Piero Beverly  MRN: [id number]  YOB: 1970  Date of evaluation: 4/24/2023      Procedure Summary     Date: 04/24/23 Room / Location: [facility] Mike  Mirian Goodwinen    Anesthesia Start: [address] Anesthesia Stop: 3941    Procedure: Single Lumen Smartport insertion right subclavian (Chest) Diagnosis:       Infiltrating ductal carcinoma of left breast (HCC)      (Infiltrating ductal carcinoma of left breast ([facility] [address] [C50.912])    Surgeons: Scarlett Villeda MD Responsible Provider: Jack Jacobo DO    Anesthesia Type: MAC ASA Status: 3          Anesthesia Type: MAC    Andrea Phase I: Andrea Score: 10    Andrea Phase II: Andrea Score: 10      Anesthesia Post Evaluation    Patient location during evaluation: bedside  Patient participation: complete - patient participated  Level of consciousness: awake and alert  Airway patency: patent  Nausea & Vomiting: no vomiting and no nausea  Complications: no  Cardiovascular status: hemodynamically stable  Respiratory status: acceptable  Hydration status: stable

## 2023-04-24 NOTE — PROGRESS NOTES
[address] CANCER [address] [place] [address]  Dept: [phone]  Loc: [phone]   Hematology/Oncology Consult (Clinic)        4/24/23     Rima Lexaop   1970     MD Nguyen Zavala MD       Reason: Breast cancer  Chief Complaint   Patient presents with    New Patient     Carcinoma of upper- outer quadrant of left breast in female        HPI: Ms Cecilia Ng originally presented a 61-year-old female for screening mammography 3/21/23 which showed a new spiculated density in the upper outer left breast and possible density anterior to the first. Bilateral normal appearing axillary lymph nodes. - Diagnostic imaging ultrasound showed an upper central left breast new spiculated density around the patient's reduction scar (had breast reduction surgery 1990s). In our left breast there was a new spiculated density, additionally 2 small densities in the outer left breast on spot compression images. -4/30/2023 ultrasound-guided biopsy performed in this area as well. In 3 areas, 8 cm apart, largest lesion at 12:00 was 0.9 cm in maximal dimension with pathology revealing triple positive (ER +100%, NV +10%, Ki-67 was 20% with HER2 positive by IHC) invasive ductal carcinoma NOS, [place] 7 of 9, grade 2 (barbell clip). The other 2 smaller sites (measuring 7 mm and 4 mm respectively) were invasive ductal carcinoma NOS, Enderlin grade 2 with ER +100%, NV +80%, Ki-67 was 30% with HER2 equivocal by IHC (U clip), and the last was invasive carcinoma with lobular features ER +100%, NV +95%, Ki-67 was 40% with HER2 negative by IHC (tribell clip). 4/21/2023 she follow-up with her primary care doctor reporting pain off-and-on in the thumbs and the ligaments for the past over weeks, heart squeezing to do laundry, occasionally with ankle pain.   Bone scan was ordered as well as x-ray of the hand bilateral, x-ray of ankle

## 2023-04-24 NOTE — PROGRESS NOTES

## 2023-04-24 NOTE — DISCHARGE INSTRUCTIONS
DR AUGUSTE'S DISCHARGE INSTRUCTIONS    Pt Name: Veronica Schirmer  Medical Record Number: [id number]  Today's Date: 4/24/2023    GENERAL ANESTHESIA OR SEDATION  1. Do not drive or operate hazardous machinery for 24 hours. 2. Do not make important business or personal decisions for 24 hours. 3. Do not drink alcoholic beverages or use tobacco for 24 hours. ACTIVITY INSTRUCTIONS:  [] Rest today. Resume light to normal activity tomorrow. [x] You may resume normal activity tomorrow. Do not engage in strenuous activity that may place stress on your incision. [] Do not drive for 3-5 days and avoid heavy lifting, tugging, pullings greater than 10-20 lbs until seen in the office. DIET INSTRUCTIONS:  []Begin with clear liquids. If not nauseated, may increase to a low-fat diet when you desire. Greasy and spicy foods are not advised. [x]Regular diet as tolerated. []Other:     MEDICATIONS  [x]Prescription sent with you to be used as directed. []Lortab   [x]Norco   []Percocet   []Tylenol #3   []Oxycontin   Do not drink alcohol or drive while taking these medications. You may experience dizziness or drowsiness with these medications. You may also experience constipation which can be relieved with stool softners or laxatives. [x]You may resume your daily prescription medication schedule unless otherwise specified. [x]Do not take 325mg Aspirin or other blood thinners such as Coumadin or Plavix for 5 days. WOUND/DRESSING INSTRUCTIONS:  Always ensure you and your care giver clean hands before and after caring for the wound. [] Keep dressing clean and dry for 48 hours. Change when soiled or wet. [] Allow steri-strips to fall off on their own.   [] Ice operative site for 20 minutes 4 times a day. [x] Do not get surgical site wet  [] Take sitz bath for 20 minutes twice daily and after bowel movements. [] Keep the abdominal binder in place during the day.  May remove to shower and at night.  [] Remove packing from

## 2023-04-24 NOTE — OP NOTE
skin using a number 11 scalpel blade and tissue dilator and introducer  sheath placed over-the-top of the guidewire, again in the superior vena cava  via fluoroscopic guidance. The guidewire and dilator were then removed. Flushed catheter was placed down through the center of the introducer again in the  superior vena cava via fluoroscopic guidance. The sheath was then removed. After infiltrating with local anesthesia a pocket was fashioned on the right anterior chest wall after skin incision was made with #15 scalpel blade. The catheter was tunneled into the pocket, cut to appropriate length and attached to the port with the provided flange. The port was secured to the chest wall with 2-0 vicryl suture. The port was aspirated, flushed appropriately. The skin incisions were then closed using 4-0 Vicryl suture. The wound was then cleansed, sterile dressings were applied. The patient was brought out of sedation. The patient tolerated the procedure well without any immediate  complications evident. All sponge, instrument and needle counts were correct  at the end of the procedure. Postprocedure chest x-ray  was obtained and reveals the catheter tip in good position. No evidence of pneumothorax.         Tony Patiño MD  Electronically signed 4/24/2023 at 9:32 AM

## 2023-04-24 NOTE — H&P
[address] [address]  History and Physical Update    Pt Name: Jeanna Schirmer  MRN: [id number]  YOB: 1970  Date of evaluation: 4/24/2023    [x] I have examined the patient and reviewed the H&P/Consult and there are no changes to the patient or plans. [] I have examined the patient and reviewed the H&P/Consult and have noted the following changes:        Rosalva Rehman MD MD  Electronically signed 4/24/2023 at 7:20 Belem Sanders MD   General Surgery  New Patient  Problem Evaluation in Office  Pt Name: Jeanna Schirmer  Date of Birth 1970   Today's Date: 4/10/2023  Medical Record Number: [id number]  Referring Provider: Dr. Yuri Nguyen  Primary Care Provider: Hannah Huff MD  Chief Complaint:       Chief Complaint   Patient presents with    Surgical Consult       NP - ref by [facility] - Invasive ductal carcinoma X3 sites left breast         ASSESSMENT   1. Carcinoma of upper-outer quadrant of left breast in female, estrogen receptor positive ([facility] [address]    2. Type 2 diabetes mellitus treated without insulin ([facility] [address]    3. Tobacco dependence    4. Papilloma of right breast    5. Multiple sites   Clinical stage Ia, multifocal.  Different sites different receptor profiles. 1 is triple positive. PLANS   Patient with multifocal disease. Patient best suited for mastectomy with targeted axillary dissection when surgery is appropriate. Patient has no desire for reconstruction at this point. Family history of breast cancer. Genetic testing. 3.  Discussion at multidisciplinary breast clinic. Will discuss with medical oncology multifocal disease on imaging all lesions less than 2 cm. She was counseled she will need chemotherapy either neoadjuvant or adjuvant. Medical oncology is recommending neoadjuvant chemotherapy after discussion at multidisciplinary breast clinic. Patient will need echocardiogram for baseline ventricular function prior to starting Adriamycin based chemotherapy.    4.  Recent labs and

## 2023-04-25 ENCOUNTER — HOSPITAL ENCOUNTER (OUTPATIENT)
Dept: INFUSION THERAPY | Age: 53
Discharge: HOME | End: 2023-04-25
Payer: COMMERCIAL

## 2023-04-25 ENCOUNTER — CLINICAL DOCUMENTATION (OUTPATIENT)
Dept: CASE MANAGEMENT | Age: 53
End: 2023-04-25

## 2023-04-25 ENCOUNTER — HOSPITAL ENCOUNTER (OUTPATIENT)
Dept: NON INVASIVE DIAGNOSTICS | Age: 53
Discharge: HOME | End: 2023-04-25
Payer: COMMERCIAL

## 2023-04-25 ENCOUNTER — HOSPITAL ENCOUNTER (OUTPATIENT)
Dept: PHYSICAL THERAPY | Age: 53
Setting detail: THERAPIES SERIES
Discharge: HOME | End: 2023-04-25
Payer: COMMERCIAL

## 2023-04-25 ENCOUNTER — OFFICE VISIT (OUTPATIENT)
Dept: ONCOLOGY | Age: 53
End: 2023-04-25
Payer: COMMERCIAL

## 2023-04-25 VITALS
HEART RATE: 76 BPM | TEMPERATURE: 97.9 F | SYSTOLIC BLOOD PRESSURE: 154 MMHG | OXYGEN SATURATION: 95 % | HEIGHT: 65 IN | WEIGHT: 231.2 LBS | RESPIRATION RATE: 18 BRPM | BODY MASS INDEX: 38.52 KG/M2 | DIASTOLIC BLOOD PRESSURE: 85 MMHG

## 2023-04-25 VITALS
OXYGEN SATURATION: 95 % | HEART RATE: 76 BPM | DIASTOLIC BLOOD PRESSURE: 85 MMHG | TEMPERATURE: 97.9 F | RESPIRATION RATE: 18 BRPM | SYSTOLIC BLOOD PRESSURE: 154 MMHG

## 2023-04-25 DIAGNOSIS — T45.1X5A CHEMOTHERAPY-INDUCED NAUSEA: ICD-10-CM

## 2023-04-25 DIAGNOSIS — Z15.02 BRCA GENE MUTATION POSITIVE IN FEMALE: ICD-10-CM

## 2023-04-25 DIAGNOSIS — Z15.01 BRCA GENE MUTATION POSITIVE IN FEMALE: ICD-10-CM

## 2023-04-25 DIAGNOSIS — E11.9 TYPE 2 DIABETES MELLITUS TREATED WITHOUT INSULIN (HCC): ICD-10-CM

## 2023-04-25 DIAGNOSIS — Z01.818 ENCOUNTER FOR ECHOCARDIOGRAM BEFORE INITIATION OF CHEMOTHERAPY: ICD-10-CM

## 2023-04-25 DIAGNOSIS — R11.0 CHEMOTHERAPY-INDUCED NAUSEA: ICD-10-CM

## 2023-04-25 DIAGNOSIS — C50.412 CARCINOMA OF UPPER-OUTER QUADRANT OF LEFT BREAST IN FEMALE, ESTROGEN RECEPTOR POSITIVE (HCC): Primary | ICD-10-CM

## 2023-04-25 DIAGNOSIS — C50.912 INVASIVE DUCTAL CARCINOMA OF BREAST, FEMALE, LEFT (HCC): ICD-10-CM

## 2023-04-25 DIAGNOSIS — Z15.09 BRCA GENE MUTATION POSITIVE IN FEMALE: ICD-10-CM

## 2023-04-25 DIAGNOSIS — Z17.0 CARCINOMA OF UPPER-OUTER QUADRANT OF LEFT BREAST IN FEMALE, ESTROGEN RECEPTOR POSITIVE (HCC): Primary | ICD-10-CM

## 2023-04-25 DIAGNOSIS — C50.412 CARCINOMA OF UPPER-OUTER QUADRANT OF LEFT BREAST IN FEMALE, ESTROGEN RECEPTOR POSITIVE (HCC): ICD-10-CM

## 2023-04-25 DIAGNOSIS — Z17.0 CARCINOMA OF UPPER-OUTER QUADRANT OF LEFT BREAST IN FEMALE, ESTROGEN RECEPTOR POSITIVE (HCC): ICD-10-CM

## 2023-04-25 LAB
LV EF: 55 %
LVEF MODALITY: NORMAL

## 2023-04-25 PROCEDURE — 93356 MYOCRD STRAIN IMG SPCKL TRCK: CPT

## 2023-04-25 PROCEDURE — 3044F HG A1C LEVEL LT 7.0%: CPT | Performed by: INTERNAL MEDICINE

## 2023-04-25 PROCEDURE — 93306 TTE W/DOPPLER COMPLETE: CPT

## 2023-04-25 PROCEDURE — 99205 OFFICE O/P NEW HI 60 MIN: CPT | Performed by: INTERNAL MEDICINE

## 2023-04-25 PROCEDURE — 99211 OFF/OP EST MAY X REQ PHY/QHP: CPT

## 2023-04-25 PROCEDURE — 97161 PT EVAL LOW COMPLEX 20 MIN: CPT

## 2023-04-25 PROCEDURE — 97110 THERAPEUTIC EXERCISES: CPT

## 2023-04-25 RX ORDER — SODIUM CHLORIDE 9 MG/ML
5-250 INJECTION, SOLUTION INTRAVENOUS PRN
OUTPATIENT
Start: 2023-05-01

## 2023-04-25 RX ORDER — ACETAMINOPHEN 325 MG/1
650 TABLET ORAL
OUTPATIENT
Start: 2023-05-01

## 2023-04-25 RX ORDER — PALONOSETRON 0.05 MG/ML
0.25 INJECTION, SOLUTION INTRAVENOUS ONCE
OUTPATIENT
Start: 2023-05-01 | End: 2023-05-01

## 2023-04-25 RX ORDER — FAMOTIDINE 10 MG/ML
20 INJECTION, SOLUTION INTRAVENOUS
OUTPATIENT
Start: 2023-05-01

## 2023-04-25 RX ORDER — EPINEPHRINE 1 MG/ML
0.3 INJECTION, SOLUTION, CONCENTRATE INTRAVENOUS PRN
OUTPATIENT
Start: 2023-05-01

## 2023-04-25 RX ORDER — SODIUM CHLORIDE 9 MG/ML
INJECTION, SOLUTION INTRAVENOUS CONTINUOUS
OUTPATIENT
Start: 2023-05-01

## 2023-04-25 RX ORDER — HEPARIN SODIUM (PORCINE) LOCK FLUSH IV SOLN 100 UNIT/ML 100 UNIT/ML
500 SOLUTION INTRAVENOUS PRN
OUTPATIENT
Start: 2023-05-01

## 2023-04-25 RX ORDER — PROCHLORPERAZINE MALEATE 10 MG
10 TABLET ORAL EVERY 6 HOURS PRN
Qty: 120 TABLET | Refills: 3 | Status: SHIPPED | OUTPATIENT
Start: 2023-04-25

## 2023-04-25 RX ORDER — DIPHENHYDRAMINE HYDROCHLORIDE 50 MG/ML
50 INJECTION INTRAMUSCULAR; INTRAVENOUS
OUTPATIENT
Start: 2023-05-01

## 2023-04-25 RX ORDER — LIDOCAINE AND PRILOCAINE 25; 25 MG/G; MG/G
CREAM TOPICAL
Qty: 30 G | Refills: 2 | Status: SHIPPED | OUTPATIENT
Start: 2023-04-25

## 2023-04-25 RX ORDER — ALBUTEROL SULFATE 90 UG/1
4 AEROSOL, METERED RESPIRATORY (INHALATION) PRN
OUTPATIENT
Start: 2023-05-01

## 2023-04-25 RX ORDER — ONDANSETRON 4 MG/1
4 TABLET, FILM COATED ORAL EVERY 8 HOURS PRN
Qty: 60 TABLET | Refills: 3 | Status: SHIPPED | OUTPATIENT
Start: 2023-04-25

## 2023-04-25 RX ORDER — MEPERIDINE HYDROCHLORIDE 50 MG/ML
12.5 INJECTION INTRAMUSCULAR; INTRAVENOUS; SUBCUTANEOUS PRN
OUTPATIENT
Start: 2023-05-01

## 2023-04-25 RX ORDER — SODIUM CHLORIDE 0.9 % (FLUSH) 0.9 %
5-40 SYRINGE (ML) INJECTION PRN
OUTPATIENT
Start: 2023-05-01

## 2023-04-25 RX ORDER — ONDANSETRON 2 MG/ML
8 INJECTION INTRAMUSCULAR; INTRAVENOUS
OUTPATIENT
Start: 2023-05-01

## 2023-04-25 NOTE — PROGRESS NOTES
Name: Piero Beverly  : 1970  MRN: [id number]    Oncology Navigation Follow-Up Note    Contact Type:  Medical Oncology    Subjective: Seen in conjunction with Dr. Dell Villarreal for initial consult for left breast cancer. Arrived alone. Please refer to Dr. Angel Kimball notes for details. Objective: Left breast IDC in 3 sites, with different receptors of all sites, Genetic results BRCA 1+2 positive    Receptive to [address] [place] [address] / Palliative Care:  NA, stage 1A    Referrals: Referral faxed and called to OB-GYN office. Previous pt of Dr. Verona Tripathi, requesting to see Dr. Maia Newsome. History of hysterectomy, both ovaries intact. Consider oophorectomy due to BRCA 1 + 2 positivity. Office will call patient with appointment. Education: Treatment plan and potential side effects discussed by Dr. Dell Villarreal. Genetic results discussed. Copy provided. She is aware to expect a call from [facility] office. Notes: Blood drawn for Foundation 1 and shipped by [facility].     Electronically signed by Maris Cordoba RN on 2023 at 12:53 PM

## 2023-04-25 NOTE — DISCHARGE SUMMARY
* PLEASE SIGN, DATE AND TIME CERTIFICATION BELOW AND RETURN TO [facility] OUTPATIENT REHABILITATION (FAX #: [phone]). ATTEST/CO-SIGN IF ACCESSING VIA [employer]. THANK YOU.**    I certify that I have examined the patient below and determined that Physical Medicine and Rehabilitation service is necessary and that I approve the established plan of care for up to 90 days or as specifically noted. Attestation, signature or co-signature of physician indicates approval of certification requirements.    ________________________ ____________ __________  Physician Signature   Date   Time  [facility]  PHYSICAL THERAPY  [x] EVALUATION    [x] SPECIALIZED THERAPY SERVICES - [place]     Date: 2023  Patient Name:  Rolly Powell  : 1970  MRN: [id number]  CSN: [id number]    Referring Practitioner Roverto Tsai MD    Diagnosis Malignant neoplasm of upper-outer quadrant of left female breast [C50.412]    Treatment Diagnosis Chronic neck and midback pain, Z71.9   Date of Evaluation 23    Additional Pertinent History 2010 laminectomy - pain management, HTN, anxiety and arthritis, DM II      Functional Outcome Measure Used O: QuickDASH   Functional Outcome Score 38 (23)       Insurance: Primary: Payor: Darin Ramírez /  /  / ,   Secondary:    Authorization Information: PRE CERTIFICATION REQUIRED: YES AFTER THE 12TH VISIT Mike Gomez ( [phone] )  INSURANCE THERAPY BENEFIT:  [facility],   [address] THERAPY COVERED: YES  MODALITIES COVERED:  YES  TELEHEALTH COVERED:    Visit # 1, 1/10 for progress note   Visits Allowed: 12   Recertification Date: 3/94/63   Physician Follow-Up: 23 Eli Singh   Physician Orders: Prehab   History of Present Illness: 4/3/23 L IDC x3 sites ER/NV+, HER2-, R port 23, BRCA 1 and BRCA 2 +       SUBJECTIVE: Pt states has chronic neck and midback since  following nerve root tumor and laminectomy.  To meet with

## 2023-04-25 NOTE — PATIENT INSTRUCTIONS
Please schedule [facility]ote[facility]: please print out of chemocare for: carboplatin, docetaxel, trastuzumab, pertuzumab for patient   medications: nausea meds and EMLA cream  Start chemotherapy next week with Dr Shakir Moore  Pt has bone scan on 5/1 (PCP ordered)  Pt to get her pretreatment echo today (already scheduled

## 2023-04-28 ENCOUNTER — HOSPITAL ENCOUNTER (OUTPATIENT)
Dept: INFUSION THERAPY | Age: 53
Discharge: HOME | End: 2023-04-28
Payer: COMMERCIAL

## 2023-04-28 ENCOUNTER — SOCIAL WORK (OUTPATIENT)
Dept: INFUSION THERAPY | Age: 53
End: 2023-04-28

## 2023-04-28 PROCEDURE — 99211 OFF/OP EST MAY X REQ PHY/QHP: CPT

## 2023-04-28 PROCEDURE — 99212 OFFICE O/P EST SF 10 MIN: CPT

## 2023-04-28 RX ORDER — OMEPRAZOLE 20 MG/1
20 CAPSULE, DELAYED RELEASE ORAL DAILY
COMMUNITY

## 2023-04-28 NOTE — PROGRESS NOTES
Oncology Social Work    Date: 4/28/2023  Time: 2:33 PM  Name: Rima Maldonado  MRN: [id number]     Contact Type: Follow-up    Note:   Situation: This staff contacted Rima Maldonado (goes by Winifred Weathers) via phone support to introduce myself as their assigned Oncology Social Worker. Background:  Winifred Weathers had been into the [facility] earlier this week for treatment. She completed her Distress Screening during this visit. Assessment: A call was placed to Winifred Weathers this afternoon to review her Distress Screening. She indicated a couple areas of emotional concern. While trying to help determine a need to investigate further, she shared that it was just so overwhelming but a lot of answers were provided today at her teaching.   - This staff encouraged her to reach out during her next visit. She asked if there was any way I could come up to see her while she was getting her next treatment. I assured her of availability, and all she has to do is ask her nurse to contact me and I will work to meet with her.   - This staff took a bit more opportunity to educate her on the role of a  on her care team.      Recommendation: Follow-up will be initiated by Winifred Weathers based on need.  provided her with my contact information and will remain available for support.         CHAGO Calhoun, EDELMIRA, ACE  Oncology Social Worker      Electronically signed by CHAGO Calhoun LSW, ACHP-SW on 4/28/2023 at 2:33 PM

## 2023-04-28 NOTE — PLAN OF CARE
Care plan reviewed with patient. Patient  verbalize understanding of the plan of care and contribute to goal setting. Problem: Chronic Conditions and Co-morbidities  Goal: Patient's chronic conditions and co-morbidity symptoms are monitored and maintained or improved  Outcome: Adequate for Discharge  Flowsheets (Taken 4/28/2023 1426)  Care Plan - Patient's Chronic Conditions and Co-Morbidity Symptoms are Monitored and Maintained or Improved:   Monitor and assess patient's chronic conditions and comorbid symptoms for stability, deterioration, or improvement   Collaborate with multidisciplinary team to address chronic and comorbid conditions and prevent exacerbation or deterioration  Note: Patient verbalizes understanding to verbal information given on taxotere, carboplatin, perjeta and herceptin,action and possible side effects. Aware to call MD if develop complications. Problem: Safety - Adult  Goal: Free from fall injury  Outcome: Adequate for Discharge  Flowsheets (Taken 4/28/2023 1426)  Free From Fall Injury:   Based on caregiver fall risk screen, instruct family/caregiver to ask for assistance with transferring infant if caregiver noted to have fall risk factors   Instruct family/caregiver on patient safety  Note: Free from falls while in infusion center.  Verbalized understanding of fall prevention and to ask for assistance with ambulation      Problem: Discharge Planning  Goal: Discharge to home or other facility with appropriate resources  Outcome: Adequate for Discharge  Flowsheets (Taken 4/28/2023 1426)  Discharge to home or other facility with appropriate resources:   Identify barriers to discharge with patient and caregiver   Identify discharge learning needs (meds, wound care, etc)   Arrange for needed discharge resources and transportation as appropriate  Note: Verbalized understanding of discharge instructions, follow ups and when to call doctor

## 2023-04-28 NOTE — PROGRESS NOTES
Chemotherapy/Immunotherapy Teaching Checklist    Treatment Plan: taxotere, prejeta, carboplatin and herceptin  Frequency: every 3 weeks  Patient accompanied by  self  Patient interested in smoking cessation.  Packet given and consult ordered      Day of chemo instructions:  [x] Must have    [x] Eat light breakfast  [x] Bring pain medications/other routine medications scheduled during appointment time  [] Hold blood pressure medications morning of treatment    Treatment process:  [x] Flow of appointment  [x] IV access Peripheral start  or  PORT access process [x] EMLA cream  [x] Premedication/ Hydration  [x] Length of treatment  [x] Tour of clinic    Diet and hydration:  [x] Discuss [place] diet    [x] Eating Hints book provided  [x] Importance of hydration 48- 64 ounces daily   [x] Hydration handout provided     Side Effects:  [x] Chemotherapy and you book provided  [x] Side effects and management discussed   [x] Diarrhea[x]Nausea/Vomiting []home antiemetic [x]hair loss[x]Neuropathy[x] Constipation[x] Fatigue[x]Mouth sores[x] Dehydration[x] Skin/Nail Changes []Pneumonitis []Colitis [] Hepatitis []Thyroid changes  []Fertility issues (birth control, sperm/egg banking issues)    Labs:  [x]BMP- renal function and electrolytes discussed  [x] CBC- RBC, WBC with ANC, platelet counts discussed  [x]Understanding your Blood hand out given   [x]Neutropenia handout/Reduce infection handout [x]Thrombocytopenia handout   [x]Shiv discussed    Complications that require immediate attention from physician:  [x]Fever 100.3 [x]signs of infection- chills, fever, burning with urination, worsening cough   [x]Uncontrolled vomiting [x]Uncontrolled diarrhea/constipation   [x]Unable to drink 48 ounces [x]Uncontrolled pain  [x] When to notify provider handout given  [x] After hours contact process discussed    Home instructions:  [x] Instruct to shut the lid and double flush toilet for 48 hours after chemotherapy  [x] Instruct family

## 2023-05-01 ENCOUNTER — OFFICE VISIT (OUTPATIENT)
Dept: ONCOLOGY | Age: 53
End: 2023-05-01
Payer: COMMERCIAL

## 2023-05-01 ENCOUNTER — SOCIAL WORK (OUTPATIENT)
Dept: INFUSION THERAPY | Age: 53
End: 2023-05-01

## 2023-05-01 ENCOUNTER — HOSPITAL ENCOUNTER (OUTPATIENT)
Dept: INFUSION THERAPY | Age: 53
Discharge: HOME | End: 2023-05-01
Payer: COMMERCIAL

## 2023-05-01 VITALS
DIASTOLIC BLOOD PRESSURE: 70 MMHG | RESPIRATION RATE: 16 BRPM | HEIGHT: 65 IN | OXYGEN SATURATION: 94 % | BODY MASS INDEX: 38.99 KG/M2 | WEIGHT: 234 LBS | TEMPERATURE: 98.1 F | HEART RATE: 76 BPM | SYSTOLIC BLOOD PRESSURE: 122 MMHG

## 2023-05-01 VITALS
BODY MASS INDEX: 39.05 KG/M2 | WEIGHT: 234.4 LBS | DIASTOLIC BLOOD PRESSURE: 72 MMHG | OXYGEN SATURATION: 94 % | TEMPERATURE: 97.8 F | SYSTOLIC BLOOD PRESSURE: 139 MMHG | HEART RATE: 76 BPM | HEIGHT: 65 IN | RESPIRATION RATE: 16 BRPM

## 2023-05-01 DIAGNOSIS — C50.412 CARCINOMA OF UPPER-OUTER QUADRANT OF LEFT BREAST IN FEMALE, ESTROGEN RECEPTOR POSITIVE (HCC): ICD-10-CM

## 2023-05-01 DIAGNOSIS — Z17.0 CARCINOMA OF UPPER-OUTER QUADRANT OF LEFT BREAST IN FEMALE, ESTROGEN RECEPTOR POSITIVE (HCC): ICD-10-CM

## 2023-05-01 DIAGNOSIS — C50.912 INVASIVE DUCTAL CARCINOMA OF BREAST, FEMALE, LEFT (HCC): Primary | ICD-10-CM

## 2023-05-01 LAB
ABSOLUTE IMMATURE GRANULOCYTE: 0.09 THOU/MM3 (ref 0–0.07)
ALBUMIN SERPL BCG-MCNC: 4 G/DL (ref 3.5–5.1)
ALP SERPL-CCNC: 114 U/L (ref 38–126)
ALT SERPL W/O P-5'-P-CCNC: 13 U/L (ref 11–66)
AST SERPL-CCNC: 12 U/L (ref 5–40)
BASOPHILS ABSOLUTE: 0 THOU/MM3 (ref 0–0.1)
BASOPHILS NFR BLD AUTO: 0 % (ref 0–3)
BILIRUB CONJ SERPL-MCNC: < 0.2 MG/DL (ref 0–0.3)
BILIRUB SERPL-MCNC: 0.3 MG/DL (ref 0.3–1.2)
BUN BLDP-MCNC: 12 MG/DL (ref 8–26)
CHLORIDE BLD-SCNC: 101 MEQ/L (ref 98–109)
CORTIS SERPL-MCNC: 7.85 UG/DL
CORTISOL COLLECTION INFO: NORMAL
CREAT BLD-MCNC: 0.6 MG/DL (ref 0.5–1.2)
EOSINOPHIL NFR BLD AUTO: 2 % (ref 0–4)
EOSINOPHILS ABSOLUTE: 0.2 THOU/MM3 (ref 0–0.4)
ERYTHROCYTE [DISTWIDTH] IN BLOOD BY AUTOMATED COUNT: 12.5 % (ref 11.5–14.5)
GFR SERPL CREATININE-BSD FRML MDRD: > 60 ML/MIN/1.73M2
GLUCOSE BLD-MCNC: 153 MG/DL (ref 70–108)
HBV CORE IGG+IGM SERPL QL IA: NONREACTIVE
HBV SURFACE AB SER QL IA: POSITIVE
HBV SURFACE AG SERPL QL IA: NEGATIVE
HCT VFR BLD AUTO: 40.6 % (ref 37–47)
HGB BLD-MCNC: 13.2 GM/DL (ref 12–16)
IMMATURE GRANULOCYTES: 1 %
IONIZED CALCIUM, WHOLE BLOOD: 1.17 MMOL/L (ref 1.12–1.32)
LYMPHOCYTES ABSOLUTE: 3.1 THOU/MM3 (ref 1–4.8)
LYMPHOCYTES NFR BLD AUTO: 27 % (ref 15–47)
MCH RBC QN AUTO: 28.1 PG (ref 26–33)
MCHC RBC AUTO-ENTMCNC: 32.5 GM/DL (ref 32.2–35.5)
MCV RBC AUTO: 87 FL (ref 81–99)
MONOCYTES ABSOLUTE: 0.7 THOU/MM3 (ref 0.4–1.3)
MONOCYTES NFR BLD AUTO: 6 % (ref 0–12)
NEUTROPHILS NFR BLD AUTO: 64 % (ref 43–75)
PLATELET # BLD AUTO: 331 THOU/MM3 (ref 130–400)
PMV BLD AUTO: 8.9 FL (ref 9.4–12.4)
POTASSIUM BLD-SCNC: 3.7 MEQ/L (ref 3.5–4.9)
PROT SERPL-MCNC: 7 G/DL (ref 6.1–8)
RBC # BLD AUTO: 4.69 MILL/MM3 (ref 4.2–5.4)
SEGMENTED NEUTROPHILS ABSOLUTE COUNT: 7.4 THOU/MM3 (ref 1.8–7.7)
SODIUM BLD-SCNC: 142 MEQ/L (ref 138–146)
TOTAL CO2, WHOLE BLOOD: 27 MEQ/L (ref 23–33)
WBC # BLD AUTO: 11.5 THOU/MM3 (ref 4.8–10.8)

## 2023-05-01 PROCEDURE — 82533 TOTAL CORTISOL: CPT

## 2023-05-01 PROCEDURE — 86704 HEP B CORE ANTIBODY TOTAL: CPT

## 2023-05-01 PROCEDURE — 80076 HEPATIC FUNCTION PANEL: CPT

## 2023-05-01 PROCEDURE — 96415 CHEMO IV INFUSION ADDL HR: CPT

## 2023-05-01 PROCEDURE — 96367 TX/PROPH/DG ADDL SEQ IV INF: CPT

## 2023-05-01 PROCEDURE — 96417 CHEMO IV INFUS EACH ADDL SEQ: CPT

## 2023-05-01 PROCEDURE — 86706 HEP B SURFACE ANTIBODY: CPT

## 2023-05-01 PROCEDURE — 85025 COMPLETE CBC W/AUTO DIFF WBC: CPT

## 2023-05-01 PROCEDURE — 87340 HEPATITIS B SURFACE AG IA: CPT

## 2023-05-01 PROCEDURE — 96413 CHEMO IV INFUSION 1 HR: CPT

## 2023-05-01 PROCEDURE — 6360000002 HC RX W HCPCS: Performed by: INTERNAL MEDICINE

## 2023-05-01 PROCEDURE — 2580000003 HC RX 258: Performed by: INTERNAL MEDICINE

## 2023-05-01 PROCEDURE — 99211 OFF/OP EST MAY X REQ PHY/QHP: CPT

## 2023-05-01 PROCEDURE — 80047 BASIC METABLC PNL IONIZED CA: CPT

## 2023-05-01 PROCEDURE — 96375 TX/PRO/DX INJ NEW DRUG ADDON: CPT

## 2023-05-01 PROCEDURE — 36591 DRAW BLOOD OFF VENOUS DEVICE: CPT

## 2023-05-01 PROCEDURE — 99213 OFFICE O/P EST LOW 20 MIN: CPT | Performed by: INTERNAL MEDICINE

## 2023-05-01 RX ORDER — SODIUM CHLORIDE 0.9 % (FLUSH) 0.9 %
5-40 SYRINGE (ML) INJECTION PRN
Status: CANCELLED | OUTPATIENT
Start: 2023-05-01

## 2023-05-01 RX ORDER — SODIUM CHLORIDE 9 MG/ML
5-250 INJECTION, SOLUTION INTRAVENOUS PRN
Status: DISCONTINUED | OUTPATIENT
Start: 2023-05-01 | End: 2023-05-02 | Stop reason: HOSPADM

## 2023-05-01 RX ORDER — SODIUM CHLORIDE 9 MG/ML
25 INJECTION, SOLUTION INTRAVENOUS PRN
Status: CANCELLED | OUTPATIENT
Start: 2023-05-01

## 2023-05-01 RX ORDER — HEPARIN SODIUM (PORCINE) LOCK FLUSH IV SOLN 100 UNIT/ML 100 UNIT/ML
500 SOLUTION INTRAVENOUS PRN
Status: CANCELLED | OUTPATIENT
Start: 2023-05-01

## 2023-05-01 RX ORDER — PALONOSETRON 0.05 MG/ML
0.25 INJECTION, SOLUTION INTRAVENOUS ONCE
Status: COMPLETED | OUTPATIENT
Start: 2023-05-01 | End: 2023-05-01

## 2023-05-01 RX ORDER — SODIUM CHLORIDE 9 MG/ML
25 INJECTION, SOLUTION INTRAVENOUS PRN
OUTPATIENT
Start: 2023-05-01

## 2023-05-01 RX ORDER — HEPARIN SODIUM (PORCINE) LOCK FLUSH IV SOLN 100 UNIT/ML 100 UNIT/ML
500 SOLUTION INTRAVENOUS PRN
Status: DISCONTINUED | OUTPATIENT
Start: 2023-05-01 | End: 2023-05-02 | Stop reason: HOSPADM

## 2023-05-01 RX ORDER — SODIUM CHLORIDE 0.9 % (FLUSH) 0.9 %
5-40 SYRINGE (ML) INJECTION PRN
OUTPATIENT
Start: 2023-05-01

## 2023-05-01 RX ORDER — SODIUM CHLORIDE 0.9 % (FLUSH) 0.9 %
5-40 SYRINGE (ML) INJECTION PRN
Status: DISCONTINUED | OUTPATIENT
Start: 2023-05-01 | End: 2023-05-02 | Stop reason: HOSPADM

## 2023-05-01 RX ORDER — HEPARIN SODIUM (PORCINE) LOCK FLUSH IV SOLN 100 UNIT/ML 100 UNIT/ML
500 SOLUTION INTRAVENOUS PRN
OUTPATIENT
Start: 2023-05-01

## 2023-05-01 RX ADMIN — SODIUM CHLORIDE, PRESERVATIVE FREE 10 ML: 5 INJECTION INTRAVENOUS at 10:02

## 2023-05-01 RX ADMIN — SODIUM CHLORIDE, PRESERVATIVE FREE 10 ML: 5 INJECTION INTRAVENOUS at 16:59

## 2023-05-01 RX ADMIN — PERTUZUMAB 840 MG: 30 INJECTION, SOLUTION, CONCENTRATE INTRAVENOUS at 13:28

## 2023-05-01 RX ADMIN — SODIUM CHLORIDE, PRESERVATIVE FREE 20 ML: 5 INJECTION INTRAVENOUS at 08:15

## 2023-05-01 RX ADMIN — FOSAPREPITANT 150 MG: 150 INJECTION, POWDER, LYOPHILIZED, FOR SOLUTION INTRAVENOUS at 10:43

## 2023-05-01 RX ADMIN — SODIUM CHLORIDE 20 ML/HR: 9 INJECTION, SOLUTION INTRAVENOUS at 10:02

## 2023-05-01 RX ADMIN — HEPARIN 500 UNITS: 100 SYRINGE at 16:59

## 2023-05-01 RX ADMIN — DOCETAXEL ANHYDROUS 164 MG: 10 INJECTION, SOLUTION INTRAVENOUS at 14:50

## 2023-05-01 RX ADMIN — SODIUM CHLORIDE, PRESERVATIVE FREE 10 ML: 5 INJECTION INTRAVENOUS at 08:14

## 2023-05-01 RX ADMIN — DEXAMETHASONE SODIUM PHOSPHATE 12 MG: 4 INJECTION, SOLUTION INTRA-ARTICULAR; INTRALESIONAL; INTRAMUSCULAR; INTRAVENOUS; SOFT TISSUE at 10:08

## 2023-05-01 RX ADMIN — PALONOSETRON 0.25 MG: 0.05 INJECTION, SOLUTION INTRAVENOUS at 10:39

## 2023-05-01 RX ADMIN — SODIUM CHLORIDE 840 MG: 9 INJECTION, SOLUTION INTRAVENOUS at 11:41

## 2023-05-01 RX ADMIN — CARBOPLATIN 700.5 MG: 600 INJECTION, SOLUTION INTRAVENOUS at 15:58

## 2023-05-01 NOTE — PLAN OF CARE
Problem: Chronic Conditions and Co-morbidities  Goal: Patient's chronic conditions and co-morbidity symptoms are monitored and maintained or improved  Outcome: Progressing  Flowsheets (Taken 5/1/2023 1724)  Care Plan - Patient's Chronic Conditions and Co-Morbidity Symptoms are Monitored and Maintained or Improved: Monitor and assess patient's chronic conditions and comorbid symptoms for stability, deterioration, or improvement  Note: Chemotherapy Teaching     What is Chemotherapy   Drug action [x]   Method of Administration [x]   Handouts given []     Side Effects  Nausea/vomiting [x]   Diarrhea [x]   Fatigue [x]   Signs / Symptoms of infection [x]   Neutropenia [x]   Thrombocytopenia [x]   Alopecia [x]   neuropathy [x]   Cache diet &  the importance of fluids [x]       Micellaneous  Importance of nutrition [x]   Importance of oral hygiene [x]   When to call the MD [x]   Monitoring labs [x]   Use of supportive services []     Explanation of Drug Regimen / Frequency  Trazimera/Perjeta/Taxotere/Carboplatin     Comments  Verbalized understanding to drug,action,side effects and when to call MD         Problem: Safety - Adult  Goal: Free from fall injury  Outcome: Progressing  Flowsheets (Taken 5/1/2023 1724)  Free From Fall Injury: Instruct family/caregiver on patient safety  Note: No falls occurred with visit today. Problem: Discharge Planning  Goal: Discharge to home or other facility with appropriate resources  Outcome: Progressing  Flowsheets (Taken 5/1/2023 1724)  Discharge to home or other facility with appropriate resources: Identify barriers to discharge with patient and caregiver  Note: Verbalized understanding of discharge instructions, follow-up appointments, and when to call the physician. Care plan reviewed with patient. Patient verbalizes understanding of the plan of care and contribute to goal setting.

## 2023-05-01 NOTE — PROGRESS NOTES
Patient assessed for the following post chemotherapy:    Dizziness   No  Lightheadedness  No      Acute nausea/vomiting No  Headache   No  Chest pain/pressure  No  Rash/itching   No  Shortness of breath  No    Patient kept for 20 minutes observation post infusion chemotherapy. Patient tolerated chemotherapy treatment Trazimera/Perjeta/Taxotere/Carboplatin without any complications. Last vital signs:   /72   Pulse 76   Temp 97.8 °F (36.6 °C) (Oral)   Resp 16   Ht 5' 5\" (1.651 m)   Wt 234 lb 6.4 oz (106.3 kg)   LMP 01/01/2008   SpO2 94%   BMI 39.01 kg/m²     Patient instructed if experience any of the above symptoms following today's infusion,he/she is to notify MD immediately or go to the emergency department. Discharge instructions given to patient. Verbalizes understanding. Ambulated off unit per self with belongings.

## 2023-05-01 NOTE — PATIENT INSTRUCTIONS
TCHP c1 today  Genetics counsellor this week  F/u with GYN about planning ovary removal after chemo is done  Bone scan 5/15  RTC 21 days with labs same day for tox check prior to C2 [facility]

## 2023-05-01 NOTE — PROGRESS NOTES
New chemotherapy validation note:    Diagnosis for chemotherapy: ER+ ND+ HER2+ Breast cancer    Regimen ordered:   21 day cycle  Traztuzumab 8 mg/kg D1C1 then 6 mg/kg D1 for Cycles 2-6  Pertuzumab 840 mg D1C1 then 420 mg D1 cycles 2-6  Docetaxel 75 mg/m2 D1 x 6 cycles  Carboplatin AUC 5 D1 x 6 cycles  Followed by  Traztuzumab 6 mg/kg D1 for total 52 weeks  Pertuzumab 420 mg D1 for total 52 weeks    Reference or literature used for validation: NCCN       Date literature or guideline last updated 2/17/22     Deviation from literature or guideline used: AUC of 5 vs 6    Summary of any verbal or telephone information obtained: Discussed AUC 6 vs 5 with Dr. Gloria Caldwell. She prefers an AUC of 5 to improve tolerability of treatment.      Mora Prather PharmD, BCPS 5/1/2023 8:18 AM

## 2023-05-01 NOTE — PROGRESS NOTES
[address] CANCER [address] [place] [address]  Dept: [phone]  Loc: [phone]   Hematology/Oncology Consult (Clinic)        5/1/23     Evins Other   1970     No ref. provider found   Sarah Beth Mcgovern MD       Reason:   Chief Complaint   Patient presents with    Follow-up     Carcinoma of upper-outer quadrant of left breast in female, estrogen receptor positive [facility])          HPI: Ms Erica Majano originally presented a 50-year-old female for screening mammography 3/21/23 which showed a new spiculated density in the upper outer left breast and possible density anterior to the first. Bilateral normal appearing axillary lymph nodes. - Diagnostic imaging ultrasound showed an upper central left breast new spiculated density around the patient's reduction scar (had breast reduction surgery 1990s). In our left breast there was a new spiculated density, additionally 2 small densities in the outer left breast on spot compression images. -4/30/2023 ultrasound-guided biopsy performed in this area as well. In 3 areas, 8 cm apart, largest lesion at 12:00 was 0.9 cm in maximal dimension with pathology revealing triple positive (ER +100%, HI +10%, Ki-67 was 20% with HER2 positive by IHC) invasive ductal carcinoma NOS, Bird In Hand 7 of 9, grade 2 (barbell clip). The other 2 smaller sites (measuring 7 mm and 4 mm respectively) were invasive ductal carcinoma NOS, Bird In Hand grade 2 with ER +100%, HI +80%, Ki-67 was 30% with HER2 equivocal by IHC (U clip), and the last was invasive carcinoma with lobular features ER +100%, HI +95%, Ki-67 was 40% with HER2 negative by IHC (tribell clip). 4/21/2023 she follow-up with her primary care doctor reporting pain off-and-on in the thumbs and the ligaments for the past over weeks, heart squeezing to do laundry, occasionally with ankle pain.   Bone scan was ordered as well as x-ray of the hand

## 2023-05-01 NOTE — PROGRESS NOTES
Oncology Social Work    Date: 5/1/2023  Time: 1:22 PM  Name: Fiorella Whitfield  MRN: [id number]     Contact Type: Follow-up    Note:   Situation: This staff contacted Fiorella Whitfield (goes by Murtaza Fernandes) to introduce myself as their assigned Oncology Social Worker while she was in the Oncology treatment area     Background:  Spoke with Murtaza Fernandes last week after reviewing her Distress Screening tool with her on the phone    Assessment: Murtaza Fernandes began her chemo treatments recently and had asked that I reach out to her while she was in the [employer]. Since she was here today, this staff dropped by her area to introduce myself. Li Braswell shared that she has no areas of concern at this time and was appreciative of my visit. We spoke briefly of her situation and education about my role in her care plan was provided. Recommendation: Murtaza Fernandes will initiate further follow-up based on need.  provided her with my contact information and will continue to follow up as needed.       CHAGO Jernigan LSW, ACE  Oncology Social Worker      Electronically signed by CHAGO Jernigan LSW, ACHP-SW on 5/1/2023 at 1:22 PM

## 2023-05-02 ENCOUNTER — HOSPITAL ENCOUNTER (OUTPATIENT)
Dept: INFUSION THERAPY | Age: 53
Discharge: HOME | End: 2023-05-02
Payer: COMMERCIAL

## 2023-05-02 ENCOUNTER — INITIAL CONSULT (OUTPATIENT)
Dept: ONCOLOGY | Age: 53
End: 2023-05-02
Payer: COMMERCIAL

## 2023-05-02 VITALS
SYSTOLIC BLOOD PRESSURE: 122 MMHG | DIASTOLIC BLOOD PRESSURE: 80 MMHG | RESPIRATION RATE: 16 BRPM | TEMPERATURE: 97.7 F | HEART RATE: 85 BPM | OXYGEN SATURATION: 95 %

## 2023-05-02 DIAGNOSIS — Z15.09 BRCA1 GENE MUTATION POSITIVE IN FEMALE: Primary | ICD-10-CM

## 2023-05-02 DIAGNOSIS — Z15.02 BRCA2 GENE MUTATION POSITIVE IN FEMALE: ICD-10-CM

## 2023-05-02 DIAGNOSIS — Z15.02 BRCA1 GENE MUTATION POSITIVE IN FEMALE: Primary | ICD-10-CM

## 2023-05-02 DIAGNOSIS — Z17.0 CARCINOMA OF UPPER-OUTER QUADRANT OF LEFT BREAST IN FEMALE, ESTROGEN RECEPTOR POSITIVE (HCC): Primary | ICD-10-CM

## 2023-05-02 DIAGNOSIS — C50.912 INVASIVE DUCTAL CARCINOMA OF BREAST, FEMALE, LEFT (HCC): ICD-10-CM

## 2023-05-02 DIAGNOSIS — Z15.01 BRCA2 GENE MUTATION POSITIVE IN FEMALE: ICD-10-CM

## 2023-05-02 DIAGNOSIS — Z15.01 BRCA1 GENE MUTATION POSITIVE IN FEMALE: Primary | ICD-10-CM

## 2023-05-02 DIAGNOSIS — C50.412 CARCINOMA OF UPPER-OUTER QUADRANT OF LEFT BREAST IN FEMALE, ESTROGEN RECEPTOR POSITIVE (HCC): Primary | ICD-10-CM

## 2023-05-02 DIAGNOSIS — Z15.09 BRCA2 GENE MUTATION POSITIVE IN FEMALE: ICD-10-CM

## 2023-05-02 PROCEDURE — 99999 PR OFFICE/OUTPT VISIT,PROCEDURE ONLY: CPT | Performed by: GENETIC COUNSELOR, MS

## 2023-05-02 PROCEDURE — 6360000002 HC RX W HCPCS: Performed by: INTERNAL MEDICINE

## 2023-05-02 PROCEDURE — 96040 PR MEDICAL GENETICS COUNSELING EACH 30 MINUTES: CPT | Performed by: GENETIC COUNSELOR, MS

## 2023-05-02 PROCEDURE — 96372 THER/PROPH/DIAG INJ SC/IM: CPT

## 2023-05-02 RX ADMIN — PEGFILGRASTIM-BMEZ: 6 INJECTION SUBCUTANEOUS at 10:58

## 2023-05-02 NOTE — DISCHARGE INSTRUCTIONS
Please contact your Oncologist if you have any questions regarding the Ziexterzo that you received today. You are instructed to call the office or go to the Emergency Dept. If you experience any of the following symptoms:    Dizziness/lightheadedness   Acute nausea or vomiting-not relieved by medications  Headaches-not relieved by medications  New chest pain or pressure  New rash /itching  New shortness of breath  Fever,chills or signs or symptoms of infection    Make sure you are drinking 48 to 64 ounces of water daily-if you are unable to drink fluids let us know right away.

## 2023-05-02 NOTE — PLAN OF CARE
Problem: Chronic Conditions and Co-morbidities  Goal: Patient's chronic conditions and co-morbidity symptoms are monitored and maintained or improved  Outcome: Adequate for Discharge  Flowsheets (Taken 5/2/2023 1206)  Care Plan - Patient's Chronic Conditions and Co-Morbidity Symptoms are Monitored and Maintained or Improved: Monitor and assess patient's chronic conditions and comorbid symptoms for stability, deterioration, or improvement  Note: Patient verbalizes understanding to verbal information given on Ziextenzo SQ injection,action and possible side effects. Aware to call MD if develop complications. Problem: Discharge Planning  Goal: Discharge to home or other facility with appropriate resources  Outcome: Adequate for Discharge  Flowsheets (Taken 5/2/2023 1206)  Discharge to home or other facility with appropriate resources: Identify barriers to discharge with patient and caregiver  Note: Verbalize understanding of discharge instructions, follow up appointments, and when to call Physician. Care plan reviewed with patient. Patient  verbalize understanding of the plan of care and contribute to goal setting.

## 2023-05-02 NOTE — PROGRESS NOTES
Patient tolerated ziextenzo SQ injection without any complications. Discharge instructions given to patient-verbalizes understanding. Ambulated off unit per self with belongings.

## 2023-05-03 NOTE — PROGRESS NOTES
3 [facility]   Hereditary Cancer Risk Assessment      Name: Melissa Metzger  YOB: 1970  Date of Consultation: 5/2/23     HISTORY   Ms. Virgen Curry was seen for genetic counseling at the request of Elias Barrera MD due to her personal and family history of cancer and to review her genetic test results. A summary of Ms. Alaina Alvarado results and recommendations are below. RESULTS  [facility] Hereditary Cancer Panel:   POSITIVE - PATHOGENIC MUTATION DETECTED IN BRCA1 c.5266dup ([id number])   POSITIVE - PATHOGENIC MUTATION DETECTED IN BRCA2 c.5946del ([id number])  This panel included the analysis of 81 genes associated with hereditary cancer including: AIP, ALK, APC, SHERLYN, AXIN2, BAP1, BARD1, BMPR1A, BRCA1, BRCA2, BRIP1, CDC73, CDH1, CDK4, CDKN1B, CDKN1C, CDKN2A, CEBPA, CHEK2, CYLD, DDX41, DICER1, EGFR, EPCAM, EXT1, EXT2, FH, FLCN, GATA2, GREM1, HOXB13, KIT, LZTR1, MAX, MEN1, MET, MITF, MLH1, MSH2, MSH3, MSH6, MUTYH, NBN, NF1, NF2, NTHL1, PALB2, PDGFRA, PHOX2B, PMS2, POLD1, POLE, POT1, COLRY9I, PTCH1, PTEN, RAD51C, RAD51D, RB1, RET, RHBDF2, RUNX1, SDHA, SDHAF2, SDHB, SDHC, SDHD, SMAD4, SMARCA4, SMARCB1, SMARCE1, STK11, SUFU, TERC , TERT, XUVF800, TP53, TSC1, TSC2, VHL, WT1. Please refer to genetic test report for technical details. Additional findings:   VARIANT OF UNCERTAIN SIGNIFICANCE - SHERLYN c.5821G>C ([id number])   VARIANT OF UNCERTAIN SIGNIFICANCE - POT1 c.670G>A (p.D224N)  A variant of uncertain significance (VUS) occurs when the laboratory does not have enough data to determine whether the genetic variant is benign (not associated with cancer) or pathogenic (associated with cancer). Because the significance of the SHERLYN and POT1 gene VUS is unknown, medical management must be based on Ms. Valle's personal history and family history of cancer. Ms. Alaina Alvarado results identified two pathogenic hereditary gene mutations, one in the BRCA1 gene and one in the BRCA2 gene.  Ms.

## 2023-05-08 ENCOUNTER — TELEPHONE (OUTPATIENT)
Dept: ONCOLOGY | Age: 53
End: 2023-05-08

## 2023-05-08 RX ORDER — AZITHROMYCIN 250 MG/1
250 TABLET, FILM COATED ORAL SEE ADMIN INSTRUCTIONS
Qty: 6 TABLET | Refills: 0 | Status: SHIPPED | OUTPATIENT
Start: 2023-05-08 | End: 2023-05-13

## 2023-05-08 NOTE — TELEPHONE ENCOUNTER
Called and discussed symptoms with the patient. She states she had not had fever. She reports onset of sore throat, congestion, cough, ear pain over the last 2-3 days. Denies chills, worsening fatigue. Instructed patient to monitor her symptoms closely as she received cycle #1 of TCHP on 5/1/23. Instructed to go to the ED if temperature 100.4F or higher, worsening fatigue, chills. Instructed to call back in 2 days if symptoms not improving. Zpak sent to pharmacy.

## 2023-05-08 NOTE — TELEPHONE ENCOUNTER
Patient calling with symptoms of a sore throat, congestion, productive cough and ear pain/fluid. She denies fevers and does not have seasonal allergies. She had chemotherapy last week. She has had these symptoms frequently in the past and has gone to urgent care where they've prescribed her a z-pack. She is just curious if she should go to her PCP, urgent care or if we could help manage her symptoms.

## 2023-05-12 ENCOUNTER — TELEPHONE (OUTPATIENT)
Dept: ONCOLOGY | Age: 53
End: 2023-05-12

## 2023-05-12 NOTE — TELEPHONE ENCOUNTER
Patient was prescribed a z-pack on 5/8/23. She was told to call back if symptoms did not improve. She said she is feeling better, but she's still having the congestion, cough, and ear pain. She denies having a fever. She is wanting to know if she can get another z-pack or if something else can be sent in.

## 2023-05-15 ENCOUNTER — HOSPITAL ENCOUNTER (OUTPATIENT)
Dept: NUCLEAR MEDICINE | Age: 53
Discharge: HOME | End: 2023-05-15
Payer: COMMERCIAL

## 2023-05-15 ENCOUNTER — TELEPHONE (OUTPATIENT)
Dept: SURGERY | Age: 53
End: 2023-05-15

## 2023-05-15 ENCOUNTER — TELEPHONE (OUTPATIENT)
Dept: FAMILY MEDICINE CLINIC | Age: 53
End: 2023-05-15

## 2023-05-15 DIAGNOSIS — C50.912 MALIGNANT NEOPLASM OF LEFT FEMALE BREAST, UNSPECIFIED ESTROGEN RECEPTOR STATUS, UNSPECIFIED SITE OF BREAST (HCC): ICD-10-CM

## 2023-05-15 DIAGNOSIS — M25.50 POLYARTHRALGIA: ICD-10-CM

## 2023-05-15 DIAGNOSIS — Z17.0 CARCINOMA OF UPPER-OUTER QUADRANT OF LEFT BREAST IN FEMALE, ESTROGEN RECEPTOR POSITIVE (HCC): Primary | ICD-10-CM

## 2023-05-15 DIAGNOSIS — C50.412 CARCINOMA OF UPPER-OUTER QUADRANT OF LEFT BREAST IN FEMALE, ESTROGEN RECEPTOR POSITIVE (HCC): Primary | ICD-10-CM

## 2023-05-15 PROCEDURE — 3430000000 HC RX DIAGNOSTIC RADIOPHARMACEUTICAL: Performed by: FAMILY MEDICINE

## 2023-05-15 PROCEDURE — A9503 TC99M MEDRONATE: HCPCS | Performed by: FAMILY MEDICINE

## 2023-05-15 PROCEDURE — 78306 BONE IMAGING WHOLE BODY: CPT | Performed by: FAMILY MEDICINE

## 2023-05-15 RX ORDER — TC 99M MEDRONATE 20 MG/10ML
30 INJECTION, POWDER, LYOPHILIZED, FOR SOLUTION INTRAVENOUS
Status: COMPLETED | OUTPATIENT
Start: 2023-05-15 | End: 2023-05-15

## 2023-05-15 RX ADMIN — TC 99M MEDRONATE 30 MILLICURIE: 20 INJECTION, POWDER, LYOPHILIZED, FOR SOLUTION INTRAVENOUS at 08:53

## 2023-05-15 NOTE — RESULT ENCOUNTER NOTE
Let her know the bone scan showed no indication of cancer in the bones. It did show arthritis in the shoulders and legs. Joint area soreness from other reasons besides arthritis would not necessarily show.

## 2023-05-15 NOTE — TELEPHONE ENCOUNTER
Spoke w/ pt and let her know Dr. Charles Cheng wants US after 2nd chemo to evaluate response to chemo and US is scheduled 5/30, arrive at 10:15 am [facility].  Pt voiced understanding

## 2023-05-15 NOTE — TELEPHONE ENCOUNTER
----- Message from Demetrice Aj MD sent at 5/15/2023  2:29 PM EDT -----  Let her know the bone scan showed no indication of cancer in the bones. It did show arthritis in the shoulders and legs. Joint area soreness from other reasons besides arthritis would not necessarily show.

## 2023-05-21 PROBLEM — Z15.09 BRCA POSITIVE: Status: ACTIVE | Noted: 2023-05-21

## 2023-05-21 PROBLEM — Z15.01 BRCA POSITIVE: Status: ACTIVE | Noted: 2023-05-21

## 2023-05-22 ENCOUNTER — HOSPITAL ENCOUNTER (OUTPATIENT)
Dept: INFUSION THERAPY | Age: 53
Discharge: HOME | End: 2023-05-22
Payer: COMMERCIAL

## 2023-05-22 ENCOUNTER — OFFICE VISIT (OUTPATIENT)
Dept: ONCOLOGY | Age: 53
End: 2023-05-22
Payer: COMMERCIAL

## 2023-05-22 VITALS
BODY MASS INDEX: 37.29 KG/M2 | WEIGHT: 223.8 LBS | SYSTOLIC BLOOD PRESSURE: 130 MMHG | TEMPERATURE: 98.7 F | HEART RATE: 82 BPM | DIASTOLIC BLOOD PRESSURE: 74 MMHG | HEIGHT: 65 IN | OXYGEN SATURATION: 93 % | RESPIRATION RATE: 16 BRPM

## 2023-05-22 VITALS
RESPIRATION RATE: 16 BRPM | OXYGEN SATURATION: 95 % | DIASTOLIC BLOOD PRESSURE: 74 MMHG | HEIGHT: 65 IN | HEART RATE: 84 BPM | BODY MASS INDEX: 37.15 KG/M2 | SYSTOLIC BLOOD PRESSURE: 126 MMHG | WEIGHT: 223 LBS | TEMPERATURE: 98.3 F

## 2023-05-22 DIAGNOSIS — Z15.09 BRCA POSITIVE: ICD-10-CM

## 2023-05-22 DIAGNOSIS — T45.1X5A CHEMOTHERAPY INDUCED DIARRHEA: ICD-10-CM

## 2023-05-22 DIAGNOSIS — C50.412 CARCINOMA OF UPPER-OUTER QUADRANT OF LEFT BREAST IN FEMALE, ESTROGEN RECEPTOR POSITIVE (HCC): Primary | ICD-10-CM

## 2023-05-22 DIAGNOSIS — T45.1X5A CHEMOTHERAPY INDUCED NAUSEA AND VOMITING: ICD-10-CM

## 2023-05-22 DIAGNOSIS — C50.912 INVASIVE DUCTAL CARCINOMA OF BREAST, FEMALE, LEFT (HCC): ICD-10-CM

## 2023-05-22 DIAGNOSIS — C50.412 CARCINOMA OF UPPER-OUTER QUADRANT OF LEFT BREAST IN FEMALE, ESTROGEN RECEPTOR POSITIVE (HCC): ICD-10-CM

## 2023-05-22 DIAGNOSIS — Z17.0 CARCINOMA OF UPPER-OUTER QUADRANT OF LEFT BREAST IN FEMALE, ESTROGEN RECEPTOR POSITIVE (HCC): ICD-10-CM

## 2023-05-22 DIAGNOSIS — K52.1 CHEMOTHERAPY INDUCED DIARRHEA: ICD-10-CM

## 2023-05-22 DIAGNOSIS — R11.2 CHEMOTHERAPY INDUCED NAUSEA AND VOMITING: ICD-10-CM

## 2023-05-22 DIAGNOSIS — C50.912 INVASIVE DUCTAL CARCINOMA OF BREAST, FEMALE, LEFT (HCC): Primary | ICD-10-CM

## 2023-05-22 DIAGNOSIS — Z17.0 CARCINOMA OF UPPER-OUTER QUADRANT OF LEFT BREAST IN FEMALE, ESTROGEN RECEPTOR POSITIVE (HCC): Primary | ICD-10-CM

## 2023-05-22 DIAGNOSIS — Z15.01 BRCA POSITIVE: ICD-10-CM

## 2023-05-22 LAB
ABSOLUTE IMMATURE GRANULOCYTE: 0.02 THOU/MM3 (ref 0–0.07)
ALBUMIN SERPL BCG-MCNC: 4.2 G/DL (ref 3.5–5.1)
ALP SERPL-CCNC: 110 U/L (ref 38–126)
ALT SERPL W/O P-5'-P-CCNC: 12 U/L (ref 11–66)
AST SERPL-CCNC: 15 U/L (ref 5–40)
BASOPHILS ABSOLUTE: 0.1 THOU/MM3 (ref 0–0.1)
BASOPHILS NFR BLD AUTO: 1 % (ref 0–3)
BILIRUB CONJ SERPL-MCNC: < 0.2 MG/DL (ref 0–0.3)
BILIRUB SERPL-MCNC: 0.2 MG/DL (ref 0.3–1.2)
BUN BLDP-MCNC: 7 MG/DL (ref 8–26)
CHLORIDE BLD-SCNC: 104 MEQ/L (ref 98–109)
CREAT BLD-MCNC: 0.6 MG/DL (ref 0.5–1.2)
EOSINOPHIL NFR BLD AUTO: 0 % (ref 0–4)
EOSINOPHILS ABSOLUTE: 0 THOU/MM3 (ref 0–0.4)
ERYTHROCYTE [DISTWIDTH] IN BLOOD BY AUTOMATED COUNT: 13 % (ref 11.5–14.5)
GFR SERPL CREATININE-BSD FRML MDRD: > 60 ML/MIN/1.73M2
GLUCOSE BLD-MCNC: 129 MG/DL (ref 70–108)
HCT VFR BLD AUTO: 34.8 % (ref 37–47)
HGB BLD-MCNC: 11.7 GM/DL (ref 12–16)
IMMATURE GRANULOCYTES: 0 %
IONIZED CALCIUM, WHOLE BLOOD: 1.15 MMOL/L (ref 1.12–1.32)
LYMPHOCYTES ABSOLUTE: 2.4 THOU/MM3 (ref 1–4.8)
LYMPHOCYTES NFR BLD AUTO: 33 % (ref 15–47)
MCH RBC QN AUTO: 28.2 PG (ref 26–33)
MCHC RBC AUTO-ENTMCNC: 33.6 GM/DL (ref 32.2–35.5)
MCV RBC AUTO: 84 FL (ref 81–99)
MONOCYTES ABSOLUTE: 0.6 THOU/MM3 (ref 0.4–1.3)
MONOCYTES NFR BLD AUTO: 8 % (ref 0–12)
NEUTROPHILS NFR BLD AUTO: 59 % (ref 43–75)
PLATELET # BLD AUTO: 327 THOU/MM3 (ref 130–400)
PMV BLD AUTO: 8.9 FL (ref 9.4–12.4)
POTASSIUM BLD-SCNC: 2.8 MEQ/L (ref 3.5–4.9)
PROT SERPL-MCNC: 7.1 G/DL (ref 6.1–8)
RBC # BLD AUTO: 4.15 MILL/MM3 (ref 4.2–5.4)
SEGMENTED NEUTROPHILS ABSOLUTE COUNT: 4.3 THOU/MM3 (ref 1.8–7.7)
SODIUM BLD-SCNC: 144 MEQ/L (ref 138–146)
TOTAL CO2, WHOLE BLOOD: 29 MEQ/L (ref 23–33)
WBC # BLD AUTO: 7.3 THOU/MM3 (ref 4.8–10.8)

## 2023-05-22 PROCEDURE — 96367 TX/PROPH/DG ADDL SEQ IV INF: CPT

## 2023-05-22 PROCEDURE — 6370000000 HC RX 637 (ALT 250 FOR IP): Performed by: INTERNAL MEDICINE

## 2023-05-22 PROCEDURE — 80047 BASIC METABLC PNL IONIZED CA: CPT

## 2023-05-22 PROCEDURE — 99211 OFF/OP EST MAY X REQ PHY/QHP: CPT

## 2023-05-22 PROCEDURE — 96413 CHEMO IV INFUSION 1 HR: CPT

## 2023-05-22 PROCEDURE — 2580000003 HC RX 258: Performed by: INTERNAL MEDICINE

## 2023-05-22 PROCEDURE — 99214 OFFICE O/P EST MOD 30 MIN: CPT | Performed by: INTERNAL MEDICINE

## 2023-05-22 PROCEDURE — 96375 TX/PRO/DX INJ NEW DRUG ADDON: CPT

## 2023-05-22 PROCEDURE — 80076 HEPATIC FUNCTION PANEL: CPT

## 2023-05-22 PROCEDURE — 36591 DRAW BLOOD OFF VENOUS DEVICE: CPT

## 2023-05-22 PROCEDURE — 6360000002 HC RX W HCPCS: Performed by: INTERNAL MEDICINE

## 2023-05-22 PROCEDURE — 96417 CHEMO IV INFUS EACH ADDL SEQ: CPT

## 2023-05-22 PROCEDURE — 85025 COMPLETE CBC W/AUTO DIFF WBC: CPT

## 2023-05-22 RX ORDER — FAMOTIDINE 10 MG/ML
20 INJECTION, SOLUTION INTRAVENOUS
Status: CANCELLED | OUTPATIENT
Start: 2023-05-22

## 2023-05-22 RX ORDER — ONDANSETRON 2 MG/ML
8 INJECTION INTRAMUSCULAR; INTRAVENOUS
Status: CANCELLED | OUTPATIENT
Start: 2023-05-22

## 2023-05-22 RX ORDER — SODIUM CHLORIDE 9 MG/ML
5-250 INJECTION, SOLUTION INTRAVENOUS PRN
Status: DISCONTINUED | OUTPATIENT
Start: 2023-05-22 | End: 2023-05-23 | Stop reason: HOSPADM

## 2023-05-22 RX ORDER — SODIUM CHLORIDE 9 MG/ML
25 INJECTION, SOLUTION INTRAVENOUS PRN
OUTPATIENT
Start: 2023-05-22

## 2023-05-22 RX ORDER — ALBUTEROL SULFATE 90 UG/1
4 AEROSOL, METERED RESPIRATORY (INHALATION) PRN
Status: CANCELLED | OUTPATIENT
Start: 2023-05-22

## 2023-05-22 RX ORDER — POTASSIUM CHLORIDE 750 MG/1
40 TABLET, EXTENDED RELEASE ORAL ONCE
Status: CANCELLED | OUTPATIENT
Start: 2023-05-22 | End: 2023-05-22

## 2023-05-22 RX ORDER — ACETAMINOPHEN 325 MG/1
650 TABLET ORAL
OUTPATIENT
Start: 2023-05-22

## 2023-05-22 RX ORDER — EPINEPHRINE 1 MG/ML
0.3 INJECTION, SOLUTION INTRAMUSCULAR; SUBCUTANEOUS PRN
OUTPATIENT
Start: 2023-05-22

## 2023-05-22 RX ORDER — SODIUM CHLORIDE 9 MG/ML
5-250 INJECTION, SOLUTION INTRAVENOUS PRN
Status: CANCELLED | OUTPATIENT
Start: 2023-05-22

## 2023-05-22 RX ORDER — ACETAMINOPHEN 325 MG/1
650 TABLET ORAL
Status: CANCELLED | OUTPATIENT
Start: 2023-05-22

## 2023-05-22 RX ORDER — SODIUM CHLORIDE 0.9 % (FLUSH) 0.9 %
5-40 SYRINGE (ML) INJECTION PRN
OUTPATIENT
Start: 2023-05-22

## 2023-05-22 RX ORDER — HEPARIN SODIUM (PORCINE) LOCK FLUSH IV SOLN 100 UNIT/ML 100 UNIT/ML
500 SOLUTION INTRAVENOUS PRN
Status: CANCELLED | OUTPATIENT
Start: 2023-05-22

## 2023-05-22 RX ORDER — SODIUM CHLORIDE 9 MG/ML
5-250 INJECTION, SOLUTION INTRAVENOUS PRN
OUTPATIENT
Start: 2023-05-22

## 2023-05-22 RX ORDER — SODIUM CHLORIDE 0.9 % (FLUSH) 0.9 %
5-40 SYRINGE (ML) INJECTION PRN
Status: DISCONTINUED | OUTPATIENT
Start: 2023-05-22 | End: 2023-05-23 | Stop reason: HOSPADM

## 2023-05-22 RX ORDER — MEPERIDINE HYDROCHLORIDE 50 MG/ML
12.5 INJECTION INTRAMUSCULAR; INTRAVENOUS; SUBCUTANEOUS PRN
Status: CANCELLED | OUTPATIENT
Start: 2023-05-22

## 2023-05-22 RX ORDER — SODIUM CHLORIDE 9 MG/ML
INJECTION, SOLUTION INTRAVENOUS CONTINUOUS
OUTPATIENT
Start: 2023-05-22

## 2023-05-22 RX ORDER — ALBUTEROL SULFATE 90 UG/1
4 AEROSOL, METERED RESPIRATORY (INHALATION) PRN
OUTPATIENT
Start: 2023-05-22

## 2023-05-22 RX ORDER — SODIUM CHLORIDE 0.9 % (FLUSH) 0.9 %
5-40 SYRINGE (ML) INJECTION PRN
Status: CANCELLED | OUTPATIENT
Start: 2023-05-22

## 2023-05-22 RX ORDER — PALONOSETRON 0.05 MG/ML
0.25 INJECTION, SOLUTION INTRAVENOUS ONCE
Status: CANCELLED | OUTPATIENT
Start: 2023-05-22 | End: 2023-05-22

## 2023-05-22 RX ORDER — POTASSIUM CHLORIDE 29.8 MG/ML
20 INJECTION INTRAVENOUS ONCE
Status: COMPLETED | OUTPATIENT
Start: 2023-05-22 | End: 2023-05-22

## 2023-05-22 RX ORDER — EPINEPHRINE 1 MG/ML
0.3 INJECTION, SOLUTION, CONCENTRATE INTRAVENOUS PRN
Status: CANCELLED | OUTPATIENT
Start: 2023-05-22

## 2023-05-22 RX ORDER — HEPARIN SODIUM (PORCINE) LOCK FLUSH IV SOLN 100 UNIT/ML 100 UNIT/ML
500 SOLUTION INTRAVENOUS PRN
OUTPATIENT
Start: 2023-05-22

## 2023-05-22 RX ORDER — ONDANSETRON 2 MG/ML
8 INJECTION INTRAMUSCULAR; INTRAVENOUS
OUTPATIENT
Start: 2023-05-22

## 2023-05-22 RX ORDER — DIPHENHYDRAMINE HYDROCHLORIDE 50 MG/ML
50 INJECTION INTRAMUSCULAR; INTRAVENOUS
Status: CANCELLED | OUTPATIENT
Start: 2023-05-22

## 2023-05-22 RX ORDER — HEPARIN SODIUM (PORCINE) LOCK FLUSH IV SOLN 100 UNIT/ML 100 UNIT/ML
500 SOLUTION INTRAVENOUS PRN
Status: DISCONTINUED | OUTPATIENT
Start: 2023-05-22 | End: 2023-05-23 | Stop reason: HOSPADM

## 2023-05-22 RX ORDER — POTASSIUM CHLORIDE 29.8 MG/ML
20 INJECTION INTRAVENOUS ONCE
Status: CANCELLED | OUTPATIENT
Start: 2023-05-22 | End: 2023-05-22

## 2023-05-22 RX ORDER — FAMOTIDINE 10 MG/ML
20 INJECTION, SOLUTION INTRAVENOUS
OUTPATIENT
Start: 2023-05-22

## 2023-05-22 RX ORDER — POTASSIUM CHLORIDE 20 MEQ/1
40 TABLET, EXTENDED RELEASE ORAL ONCE
Status: COMPLETED | OUTPATIENT
Start: 2023-05-22 | End: 2023-05-22

## 2023-05-22 RX ORDER — EPINEPHRINE 1 MG/ML
0.3 INJECTION, SOLUTION, CONCENTRATE INTRAVENOUS PRN
OUTPATIENT
Start: 2023-05-22

## 2023-05-22 RX ORDER — DIPHENHYDRAMINE HYDROCHLORIDE 50 MG/ML
50 INJECTION INTRAMUSCULAR; INTRAVENOUS
OUTPATIENT
Start: 2023-05-22

## 2023-05-22 RX ORDER — POTASSIUM CHLORIDE 20 MEQ/1
40 TABLET, EXTENDED RELEASE ORAL ONCE
Status: CANCELLED | OUTPATIENT
Start: 2023-05-22 | End: 2023-05-22

## 2023-05-22 RX ORDER — PALONOSETRON 0.05 MG/ML
0.25 INJECTION, SOLUTION INTRAVENOUS ONCE
Status: COMPLETED | OUTPATIENT
Start: 2023-05-22 | End: 2023-05-22

## 2023-05-22 RX ORDER — SODIUM CHLORIDE 9 MG/ML
INJECTION, SOLUTION INTRAVENOUS CONTINUOUS
Status: CANCELLED | OUTPATIENT
Start: 2023-05-22

## 2023-05-22 RX ADMIN — DEXAMETHASONE SODIUM PHOSPHATE 12 MG: 4 INJECTION, SOLUTION INTRA-ARTICULAR; INTRALESIONAL; INTRAMUSCULAR; INTRAVENOUS; SOFT TISSUE at 09:58

## 2023-05-22 RX ADMIN — FOSAPREPITANT DIMEGLUMINE 150 MG: 150 INJECTION, POWDER, LYOPHILIZED, FOR SOLUTION INTRAVENOUS at 10:22

## 2023-05-22 RX ADMIN — SODIUM CHLORIDE 25 ML/HR: 9 INJECTION, SOLUTION INTRAVENOUS at 09:56

## 2023-05-22 RX ADMIN — PERTUZUMAB 420 MG: 30 INJECTION, SOLUTION, CONCENTRATE INTRAVENOUS at 12:59

## 2023-05-22 RX ADMIN — SODIUM CHLORIDE 630 MG: 9 INJECTION, SOLUTION INTRAVENOUS at 12:10

## 2023-05-22 RX ADMIN — POTASSIUM CHLORIDE 20 MEQ: 29.8 INJECTION, SOLUTION INTRAVENOUS at 11:01

## 2023-05-22 RX ADMIN — DOCETAXEL ANHYDROUS 164 MG: 10 INJECTION, SOLUTION INTRAVENOUS at 13:47

## 2023-05-22 RX ADMIN — SODIUM CHLORIDE, PRESERVATIVE FREE 10 ML: 5 INJECTION INTRAVENOUS at 16:13

## 2023-05-22 RX ADMIN — HEPARIN 500 UNITS: 100 SYRINGE at 16:13

## 2023-05-22 RX ADMIN — SODIUM CHLORIDE, PRESERVATIVE FREE 20 ML: 5 INJECTION INTRAVENOUS at 08:36

## 2023-05-22 RX ADMIN — SODIUM CHLORIDE, PRESERVATIVE FREE 10 ML: 5 INJECTION INTRAVENOUS at 08:35

## 2023-05-22 RX ADMIN — POTASSIUM CHLORIDE 40 MEQ: 1500 TABLET, EXTENDED RELEASE ORAL at 14:14

## 2023-05-22 RX ADMIN — CARBOPLATIN 686.5 MG: 10 INJECTION, SOLUTION INTRAVENOUS at 14:55

## 2023-05-22 RX ADMIN — PALONOSETRON 0.25 MG: 0.05 INJECTION, SOLUTION INTRAVENOUS at 10:19

## 2023-05-22 ASSESSMENT — PAIN DESCRIPTION - DESCRIPTORS: DESCRIPTORS: ACHING

## 2023-05-22 ASSESSMENT — PAIN DESCRIPTION - LOCATION: LOCATION: HEAD

## 2023-05-22 ASSESSMENT — PAIN SCALES - GENERAL: PAINLEVEL_OUTOF10: 5

## 2023-05-22 NOTE — PROGRESS NOTES
Patient assessed for the following post chemotherapy:    Dizziness   No  Lightheadedness  No      Acute nausea/vomiting No  Headache   No  Chest pain/pressure  No  Rash/itching   No  Shortness of breath  No    Patient kept for 20 minutes observation post infusion chemotherapy. Patient tolerated chemotherapy treatment Perjeta/Herceptin/Taxotere/Carboplatin without any complications. Last vital signs:   /74   Pulse 84   Temp 98.3 °F (36.8 °C) (Oral)   Resp 16   Ht 5' 5\" (1.651 m)   Wt 223 lb 12.8 oz (101.5 kg)   LMP 01/01/2008   SpO2 95%   BMI 37.24 kg/m²     Physician's instructions:  Proceed with txt today add on potassium  Use PRN immodium as dicussed for diarrhea, keep with eating and fluids, work on eating potassium foods or fluids, call in if get dizzy, lightheaded  Kids getting genetic testing  Keep up with Dr Maricarmen Waters and Dr Jude Michelle about doing both surgeries at same time  RTC in 21 days for C3    Patient instructed if experience any of the above symptoms following today's infusion, she is to notify MD immediately or go to the emergency department. Discharge instructions given to patient. Verbalizes understanding. Ambulated off unit per self, with belongings.

## 2023-05-22 NOTE — DISCHARGE INSTRUCTIONS
Please contact your Oncologist if you have any questions regarding the chemotherapy Taxotere/Carboplatin/Perjeta/Herceptin that you received today. Patient instructed if experience any of the symptoms following today's chemotherapy treatment to notify MD immediately or go to emergency department.     * dizziness/lightheadedness  *acute nausea/vomiting - not relieved with medication  *headache - not relieved from Tylenol/pain medication  *chest pain/pressure  *rash/itching  *shortness of breath        Drink fluids - 48oz fluids daily  Call if develop fever/ chills/ signs or symptoms of infection     Physician's instructions:  Proceed with txt today add on potassium  Use PRN immodium as dicussed for diarrhea, keep with eating and fluids, work on eating potassium foods or fluids, call in if get dizzy, lightheaded  Kids getting genetic testing  Keep up with Dr Earline Rascon and Dr Gerardo Ferrara about doing both surgeries at same time  RTC in 21 days for C3

## 2023-05-22 NOTE — PLAN OF CARE
Problem: Chronic Conditions and Co-morbidities  Goal: Patient's chronic conditions and co-morbidity symptoms are monitored and maintained or improved  Outcome: Adequate for Discharge  Flowsheets (Taken 5/22/2023 1625)  Care Plan - Patient's Chronic Conditions and Co-Morbidity Symptoms are Monitored and Maintained or Improved: Collaborate with multidisciplinary team to address chronic and comorbid conditions and prevent exacerbation or deterioration  Note: Chemotherapy Teaching     What is Chemotherapy   Drug action [x]   Method of Administration [x]   Handouts given []     Side Effects  Nausea/vomiting [x]   Diarrhea [x]   Fatigue [x]   Signs / Symptoms of infection [x]   Neutropenia [x]   Thrombocytopenia [x]   Alopecia [x]   neuropathy [x]   [place] diet &  the importance of fluids [x]       Micellaneous  Importance of nutrition [x]   Importance of oral hygiene [x]   When to call the MD [x]   Monitoring labs [x]   Use of supportive services []     Explanation of Drug Regimen / Frequency  Herceptin/Perjeta/Taxotere/Carboplatin     Comments  Verbalized understanding to drug,action,side effects and when to call MD         Problem: Pain  Goal: Verbalizes/displays adequate comfort level or baseline comfort level  Outcome: Adequate for Discharge  Flowsheets (Taken 5/22/2023 1625)  Verbalizes/displays adequate comfort level or baseline comfort level: Encourage patient to monitor pain and request assistance     Problem: Discharge Planning  Goal: Discharge to home or other facility with appropriate resources  Outcome: Adequate for Discharge  Flowsheets (Taken 5/22/2023 1625)  Discharge to home or other facility with appropriate resources: Identify barriers to discharge with patient and caregiver     Problem: Safety - Adult  Goal: Free from fall injury  Outcome: Adequate for Discharge  Flowsheets (Taken 5/22/2023 1625)  Free From Fall Injury: Instruct family/caregiver on patient safety     Care plan reviewed with patient.

## 2023-05-22 NOTE — PATIENT INSTRUCTIONS
Proceed with txt today add on potassium  Use PRN immodium as dicussed for diarrhea, keep with eating and fluids, work on eating potassium foods or fluids, call in if get dizzy, lightheaded  Kids getting genetic testing  Keep up with Dr Dia Oden and Dr Ava Romero about doing both surgeries at same time  RTC in 21 days for C3

## 2023-05-22 NOTE — ONCOLOGY
Chemotherapy Administration    Pre-assessment Data: Antineoplastic Agents  See toxicity flow sheet for assessment                                          [x]         Interventions:   Chemotherapy SQ injection given []   Taxol administered-VS per protocol []   Blood pressure meds held 12 hours prior to Rituxan/Ruxience []   Rituxan/Ruxience administered- VS and precautions per guidelines []   Emergency drugs available as appropriate [x]   Anaphylaxis assessment completed [x]   Pre-medications administered as ordered [x]   Blood return noted upon initiation of chemotherapy [x]   Blood return noted each 1-2ml of a vesicant medication if given IV push []   Navelbine, Vincristine and Velban given as a monitored wide open drip, blood return noted before during and after infusion.  []   Blood return noted each 2-3ml of a non-vesicant medication if given IV push []   Patient aware of potential Immunotherapy toxicities []   Monitor for signs / symptoms of hypersensitivity reaction [x]   Chemotherapy orders (drug/dose/rate) verified by 2 Chemo certified RNs [x]   Monitor IV site and blood return throughout the infusion of the medication [x]   Document IV site checks on the IV assessment form [x]   Document chemotherapy teaching on the Patient Education tab [x]   Document patient verbalizes understanding of medications being administered [x]   If IV infiltration, see ONS Guidelines []   Other:     Perjeta/Herceptin/Taxotere/Carboplatin []

## 2023-05-23 ENCOUNTER — HOSPITAL ENCOUNTER (OUTPATIENT)
Dept: INFUSION THERAPY | Age: 53
Discharge: HOME | End: 2023-05-23
Payer: COMMERCIAL

## 2023-05-23 VITALS
DIASTOLIC BLOOD PRESSURE: 71 MMHG | OXYGEN SATURATION: 95 % | RESPIRATION RATE: 16 BRPM | SYSTOLIC BLOOD PRESSURE: 124 MMHG | TEMPERATURE: 98.3 F | HEART RATE: 87 BPM

## 2023-05-23 DIAGNOSIS — C50.412 CARCINOMA OF UPPER-OUTER QUADRANT OF LEFT BREAST IN FEMALE, ESTROGEN RECEPTOR POSITIVE (HCC): Primary | ICD-10-CM

## 2023-05-23 DIAGNOSIS — Z17.0 CARCINOMA OF UPPER-OUTER QUADRANT OF LEFT BREAST IN FEMALE, ESTROGEN RECEPTOR POSITIVE (HCC): Primary | ICD-10-CM

## 2023-05-23 DIAGNOSIS — C50.912 INVASIVE DUCTAL CARCINOMA OF BREAST, FEMALE, LEFT (HCC): ICD-10-CM

## 2023-05-23 PROBLEM — T45.1X5A CHEMOTHERAPY INDUCED NAUSEA AND VOMITING: Status: ACTIVE | Noted: 2023-05-23

## 2023-05-23 PROBLEM — K52.1 CHEMOTHERAPY INDUCED DIARRHEA: Status: ACTIVE | Noted: 2023-05-23

## 2023-05-23 PROBLEM — R11.2 CHEMOTHERAPY INDUCED NAUSEA AND VOMITING: Status: ACTIVE | Noted: 2023-05-23

## 2023-05-23 PROBLEM — T45.1X5A CHEMOTHERAPY INDUCED DIARRHEA: Status: ACTIVE | Noted: 2023-05-23

## 2023-05-23 PROCEDURE — 96372 THER/PROPH/DIAG INJ SC/IM: CPT

## 2023-05-23 PROCEDURE — 6360000002 HC RX W HCPCS: Performed by: INTERNAL MEDICINE

## 2023-05-23 RX ADMIN — PEGFILGRASTIM-BMEZ 6 MG: 6 INJECTION SUBCUTANEOUS at 13:25

## 2023-05-23 NOTE — PLAN OF CARE
Problem: Chronic Conditions and Co-morbidities  Goal: Patient's chronic conditions and co-morbidity symptoms are monitored and maintained or improved  Outcome: Adequate for Discharge  Flowsheets (Taken 5/23/2023 1327)  Care Plan - Patient's Chronic Conditions and Co-Morbidity Symptoms are Monitored and Maintained or Improved: Monitor and assess patient's chronic conditions and comorbid symptoms for stability, deterioration, or improvement  Note: Patient tolerated Ziextenzo without complications. Discussed discharge instructions- patient verbalized understanding. Ambulated off unit per self     Problem: Safety - Adult  Goal: Free from fall injury  Outcome: Adequate for Discharge  Flowsheets (Taken 5/23/2023 1327)  Free From Fall Injury: Instruct family/caregiver on patient safety  Note: Verbalized understanding of fall prevention to ask for assistance with ambulation. Call light within reach. No falls occurred with visit today. Problem: Discharge Planning  Goal: Discharge to home or other facility with appropriate resources  Outcome: Adequate for Discharge  Flowsheets (Taken 5/23/2023 1327)  Discharge to home or other facility with appropriate resources: Identify barriers to discharge with patient and caregiver  Note: Discuss understanding of discharge instructions,follow-up appointments, and when to call the physician. Verbalized understanding of discharge instructions, follow-up appointments, and when to call the physician. Care plan reviewed with patient. Patient verbalize understanding of the plan of care and contribute to goal setting.

## 2023-05-23 NOTE — PROGRESS NOTES
Patient assessed for the following post injection:    Dizziness   No  Lightheadedness  No      Acute nausea/vomiting No  Headache   No  Chest pain/pressure  No  Rash/itching   No  Shortness of breath  No    Patient declined 20 minutes observation post injection. Patient tolerated injection treatment Lui Lara without any complications. Last vital signs:   /71   Pulse 87   Temp 98.3 °F (36.8 °C) (Oral)   Resp 16   LMP 01/01/2008   SpO2 95%       Patient instructed if experience any of the above symptoms following today's infusion,he/she is to notify MD immediately or go to the emergency department. Discharge instructions given to patient. Verbalizes understanding. Ambulated off unit per self with belongings.

## 2023-05-23 NOTE — DISCHARGE INSTRUCTIONS
Please contact your Oncologist if you have any questions regarding the Berl Li that you received today. Patient instructed if experience any of the symptoms following today's Berl Li to notify MD immediately or go to emergency department.     * dizziness/lightheadedness  *acute nausea/vomiting - not relieved with medication  *headache - not relieved from Tylenol/pain medication  *chest pain/pressure  *rash/itching  *shortness of breath        Drink fluids - 48oz fluids daily  Call if develop fever/ chills/ signs or symptoms of infection

## 2023-05-30 ENCOUNTER — HOSPITAL ENCOUNTER (OUTPATIENT)
Dept: WOMENS IMAGING | Age: 53
Discharge: HOME | End: 2023-05-30
Payer: COMMERCIAL

## 2023-05-30 DIAGNOSIS — Z17.0 CARCINOMA OF UPPER-OUTER QUADRANT OF LEFT BREAST IN FEMALE, ESTROGEN RECEPTOR POSITIVE (HCC): ICD-10-CM

## 2023-05-30 DIAGNOSIS — C50.412 CARCINOMA OF UPPER-OUTER QUADRANT OF LEFT BREAST IN FEMALE, ESTROGEN RECEPTOR POSITIVE (HCC): ICD-10-CM

## 2023-05-30 PROCEDURE — 76642 ULTRASOUND BREAST LIMITED: CPT

## 2023-06-02 DIAGNOSIS — E78.00 PURE HYPERCHOLESTEROLEMIA: ICD-10-CM

## 2023-06-02 RX ORDER — ROSUVASTATIN CALCIUM 10 MG/1
10 TABLET, COATED ORAL NIGHTLY
Qty: 30 TABLET | Refills: 0 | Status: SHIPPED | OUTPATIENT
Start: 2023-06-02

## 2023-06-02 NOTE — TELEPHONE ENCOUNTER
Date of last visit:  4/21/2023  Date of next visit:  6/14/2023    Requested Prescriptions     Signed Prescriptions Disp Refills    rosuvastatin (CRESTOR) 10 MG tablet 30 tablet 0     Sig: Take 1 tablet by mouth nightly     Authorizing Provider: Noé Savage     Ordering User: [facility] over script to Pharmacy per verbal order from Dr Hilda Dinh.

## 2023-06-02 NOTE — TELEPHONE ENCOUNTER
Pt called requesting 90 day Rx for Rosuvastatin 10 mg one nightly    [address]    Reminded pt of appt here on 6/14/23

## 2023-06-02 NOTE — TELEPHONE ENCOUNTER
Date of last visit:  4/21/2023  Date of next visit:  6/14/2023    Requested Prescriptions     Pending Prescriptions Disp Refills    rosuvastatin (CRESTOR) 10 MG tablet 90 tablet 3     Sig: Take 1 tablet by mouth nightly

## 2023-06-07 ENCOUNTER — TELEMEDICINE (OUTPATIENT)
Dept: PSYCHIATRY | Age: 53
End: 2023-06-07
Payer: COMMERCIAL

## 2023-06-07 DIAGNOSIS — F41.9 ANXIETY: ICD-10-CM

## 2023-06-07 DIAGNOSIS — G47.00 INSOMNIA, UNSPECIFIED TYPE: ICD-10-CM

## 2023-06-07 DIAGNOSIS — G89.29 OTHER CHRONIC PAIN: ICD-10-CM

## 2023-06-07 DIAGNOSIS — F33.42 RECURRENT MAJOR DEPRESSIVE DISORDER, IN FULL REMISSION (HCC): Primary | ICD-10-CM

## 2023-06-07 PROCEDURE — 99214 OFFICE O/P EST MOD 30 MIN: CPT | Performed by: PSYCHIATRY & NEUROLOGY

## 2023-06-07 RX ORDER — LORAZEPAM 0.5 MG/1
0.5 TABLET ORAL DAILY PRN
Qty: 10 TABLET | Refills: 0 | Status: SHIPPED | OUTPATIENT
Start: 2023-06-07 | End: 2023-06-17

## 2023-06-12 ENCOUNTER — HOSPITAL ENCOUNTER (OUTPATIENT)
Dept: INFUSION THERAPY | Age: 53
Discharge: HOME | End: 2023-06-12
Payer: COMMERCIAL

## 2023-06-12 VITALS
OXYGEN SATURATION: 98 % | SYSTOLIC BLOOD PRESSURE: 131 MMHG | BODY MASS INDEX: 36.32 KG/M2 | DIASTOLIC BLOOD PRESSURE: 65 MMHG | WEIGHT: 218 LBS | TEMPERATURE: 98.2 F | HEIGHT: 65 IN | RESPIRATION RATE: 16 BRPM | HEART RATE: 89 BPM

## 2023-06-12 DIAGNOSIS — C50.912 INVASIVE DUCTAL CARCINOMA OF BREAST, FEMALE, LEFT (HCC): Primary | ICD-10-CM

## 2023-06-12 DIAGNOSIS — Z17.0 CARCINOMA OF UPPER-OUTER QUADRANT OF LEFT BREAST IN FEMALE, ESTROGEN RECEPTOR POSITIVE (HCC): ICD-10-CM

## 2023-06-12 DIAGNOSIS — C50.412 CARCINOMA OF UPPER-OUTER QUADRANT OF LEFT BREAST IN FEMALE, ESTROGEN RECEPTOR POSITIVE (HCC): ICD-10-CM

## 2023-06-12 LAB
ABSOLUTE IMMATURE GRANULOCYTE: 0.01 THOU/MM3 (ref 0–0.07)
ALBUMIN SERPL BCG-MCNC: 3.9 G/DL (ref 3.5–5.1)
ALP SERPL-CCNC: 117 U/L (ref 38–126)
ALT SERPL W/O P-5'-P-CCNC: 9 U/L (ref 11–66)
AST SERPL-CCNC: 12 U/L (ref 5–40)
BASOPHILS ABSOLUTE: 0 THOU/MM3 (ref 0–0.1)
BASOPHILS NFR BLD AUTO: 0 % (ref 0–3)
BILIRUB CONJ SERPL-MCNC: < 0.2 MG/DL (ref 0–0.3)
BILIRUB SERPL-MCNC: 0.2 MG/DL (ref 0.3–1.2)
BUN BLDP-MCNC: 11 MG/DL (ref 8–26)
CHLORIDE BLD-SCNC: 104 MEQ/L (ref 98–109)
CREAT BLD-MCNC: 0.6 MG/DL (ref 0.5–1.2)
EOSINOPHIL NFR BLD AUTO: 0 % (ref 0–4)
EOSINOPHILS ABSOLUTE: 0 THOU/MM3 (ref 0–0.4)
ERYTHROCYTE [DISTWIDTH] IN BLOOD BY AUTOMATED COUNT: 16.1 % (ref 11.5–14.5)
GFR SERPL CREATININE-BSD FRML MDRD: > 60 ML/MIN/1.73M2
GLUCOSE BLD-MCNC: 130 MG/DL (ref 70–108)
HCT VFR BLD AUTO: 31.5 % (ref 37–47)
HGB BLD-MCNC: 10.5 GM/DL (ref 12–16)
IMMATURE GRANULOCYTES: 0 %
IONIZED CALCIUM, WHOLE BLOOD: 1.18 MMOL/L (ref 1.12–1.32)
LYMPHOCYTES ABSOLUTE: 1.8 THOU/MM3 (ref 1–4.8)
LYMPHOCYTES NFR BLD AUTO: 26 % (ref 15–47)
MCH RBC QN AUTO: 28.8 PG (ref 26–33)
MCHC RBC AUTO-ENTMCNC: 33.3 GM/DL (ref 32.2–35.5)
MCV RBC AUTO: 87 FL (ref 81–99)
MONOCYTES ABSOLUTE: 0.5 THOU/MM3 (ref 0.4–1.3)
MONOCYTES NFR BLD AUTO: 8 % (ref 0–12)
NEUTROPHILS NFR BLD AUTO: 66 % (ref 43–75)
PLATELET # BLD AUTO: 317 THOU/MM3 (ref 130–400)
PMV BLD AUTO: 9.1 FL (ref 9.4–12.4)
POTASSIUM BLD-SCNC: 3.2 MEQ/L (ref 3.5–4.9)
PROT SERPL-MCNC: 6.7 G/DL (ref 6.1–8)
RBC # BLD AUTO: 3.64 MILL/MM3 (ref 4.2–5.4)
SEGMENTED NEUTROPHILS ABSOLUTE COUNT: 4.4 THOU/MM3 (ref 1.8–7.7)
SODIUM BLD-SCNC: 144 MEQ/L (ref 138–146)
TOTAL CO2, WHOLE BLOOD: 30 MEQ/L (ref 23–33)
WBC # BLD AUTO: 6.7 THOU/MM3 (ref 4.8–10.8)

## 2023-06-12 PROCEDURE — 96367 TX/PROPH/DG ADDL SEQ IV INF: CPT

## 2023-06-12 PROCEDURE — 6370000000 HC RX 637 (ALT 250 FOR IP): Performed by: INTERNAL MEDICINE

## 2023-06-12 PROCEDURE — 96375 TX/PRO/DX INJ NEW DRUG ADDON: CPT

## 2023-06-12 PROCEDURE — 96413 CHEMO IV INFUSION 1 HR: CPT

## 2023-06-12 PROCEDURE — 2580000003 HC RX 258: Performed by: INTERNAL MEDICINE

## 2023-06-12 PROCEDURE — 36591 DRAW BLOOD OFF VENOUS DEVICE: CPT

## 2023-06-12 PROCEDURE — 85025 COMPLETE CBC W/AUTO DIFF WBC: CPT

## 2023-06-12 PROCEDURE — 80076 HEPATIC FUNCTION PANEL: CPT

## 2023-06-12 PROCEDURE — 99211 OFF/OP EST MAY X REQ PHY/QHP: CPT

## 2023-06-12 PROCEDURE — 6360000002 HC RX W HCPCS: Performed by: INTERNAL MEDICINE

## 2023-06-12 PROCEDURE — 96417 CHEMO IV INFUS EACH ADDL SEQ: CPT

## 2023-06-12 PROCEDURE — 80047 BASIC METABLC PNL IONIZED CA: CPT

## 2023-06-12 RX ORDER — ALBUTEROL SULFATE 90 UG/1
4 AEROSOL, METERED RESPIRATORY (INHALATION) PRN
OUTPATIENT
Start: 2023-06-12

## 2023-06-12 RX ORDER — ACETAMINOPHEN 325 MG/1
650 TABLET ORAL
OUTPATIENT
Start: 2023-06-12

## 2023-06-12 RX ORDER — SODIUM CHLORIDE 0.9 % (FLUSH) 0.9 %
5-40 SYRINGE (ML) INJECTION PRN
OUTPATIENT
Start: 2023-06-12

## 2023-06-12 RX ORDER — SODIUM CHLORIDE 9 MG/ML
5-250 INJECTION, SOLUTION INTRAVENOUS PRN
OUTPATIENT
Start: 2023-06-12

## 2023-06-12 RX ORDER — POTASSIUM CHLORIDE 20 MEQ/1
40 TABLET, EXTENDED RELEASE ORAL ONCE
Status: COMPLETED | OUTPATIENT
Start: 2023-06-12 | End: 2023-06-12

## 2023-06-12 RX ORDER — POTASSIUM CHLORIDE 20 MEQ/1
40 TABLET, EXTENDED RELEASE ORAL ONCE
Status: CANCELLED | OUTPATIENT
Start: 2023-06-12 | End: 2023-06-12

## 2023-06-12 RX ORDER — HEPARIN SODIUM 100 [USP'U]/ML
500 INJECTION, SOLUTION INTRAVENOUS PRN
OUTPATIENT
Start: 2023-06-12

## 2023-06-12 RX ORDER — SODIUM CHLORIDE 9 MG/ML
25 INJECTION, SOLUTION INTRAVENOUS PRN
OUTPATIENT
Start: 2023-06-12

## 2023-06-12 RX ORDER — ONDANSETRON 2 MG/ML
8 INJECTION INTRAMUSCULAR; INTRAVENOUS
OUTPATIENT
Start: 2023-06-12

## 2023-06-12 RX ORDER — SODIUM CHLORIDE 9 MG/ML
INJECTION, SOLUTION INTRAVENOUS CONTINUOUS
OUTPATIENT
Start: 2023-06-12

## 2023-06-12 RX ORDER — SODIUM CHLORIDE 9 MG/ML
5-250 INJECTION, SOLUTION INTRAVENOUS PRN
Status: DISCONTINUED | OUTPATIENT
Start: 2023-06-12 | End: 2023-06-13 | Stop reason: HOSPADM

## 2023-06-12 RX ORDER — PALONOSETRON 0.05 MG/ML
0.25 INJECTION, SOLUTION INTRAVENOUS ONCE
Status: COMPLETED | OUTPATIENT
Start: 2023-06-12 | End: 2023-06-12

## 2023-06-12 RX ORDER — EPINEPHRINE 1 MG/ML
0.3 INJECTION, SOLUTION INTRAMUSCULAR; SUBCUTANEOUS PRN
OUTPATIENT
Start: 2023-06-12

## 2023-06-12 RX ORDER — HEPARIN SODIUM 100 [USP'U]/ML
500 INJECTION, SOLUTION INTRAVENOUS PRN
Status: DISCONTINUED | OUTPATIENT
Start: 2023-06-12 | End: 2023-06-13 | Stop reason: HOSPADM

## 2023-06-12 RX ORDER — DIPHENHYDRAMINE HYDROCHLORIDE 50 MG/ML
50 INJECTION INTRAMUSCULAR; INTRAVENOUS
OUTPATIENT
Start: 2023-06-12

## 2023-06-12 RX ORDER — SODIUM CHLORIDE 0.9 % (FLUSH) 0.9 %
5-40 SYRINGE (ML) INJECTION PRN
Status: DISCONTINUED | OUTPATIENT
Start: 2023-06-12 | End: 2023-06-13 | Stop reason: HOSPADM

## 2023-06-12 RX ADMIN — SODIUM CHLORIDE, PRESERVATIVE FREE 20 ML: 5 INJECTION INTRAVENOUS at 08:39

## 2023-06-12 RX ADMIN — DEXAMETHASONE SODIUM PHOSPHATE 12 MG: 4 INJECTION, SOLUTION INTRA-ARTICULAR; INTRALESIONAL; INTRAMUSCULAR; INTRAVENOUS; SOFT TISSUE at 09:44

## 2023-06-12 RX ADMIN — DOCETAXEL 164 MG: 10 INJECTION, SOLUTION INTRAVENOUS at 12:04

## 2023-06-12 RX ADMIN — POTASSIUM CHLORIDE 40 MEQ: 1500 TABLET, EXTENDED RELEASE ORAL at 13:46

## 2023-06-12 RX ADMIN — SODIUM CHLORIDE 630 MG: 9 INJECTION, SOLUTION INTRAVENOUS at 10:46

## 2023-06-12 RX ADMIN — SODIUM CHLORIDE 20 ML/HR: 9 INJECTION, SOLUTION INTRAVENOUS at 09:40

## 2023-06-12 RX ADMIN — PALONOSETRON 0.25 MG: 0.05 INJECTION, SOLUTION INTRAVENOUS at 10:42

## 2023-06-12 RX ADMIN — CARBOPLATIN 679 MG: 10 INJECTION, SOLUTION INTRAVENOUS at 13:05

## 2023-06-12 RX ADMIN — FOSAPREPITANT DIMEGLUMINE 150 MG: 150 INJECTION, POWDER, LYOPHILIZED, FOR SOLUTION INTRAVENOUS at 10:12

## 2023-06-12 RX ADMIN — SODIUM CHLORIDE, PRESERVATIVE FREE 10 ML: 5 INJECTION INTRAVENOUS at 08:38

## 2023-06-12 RX ADMIN — PERTUZUMAB 420 MG: 30 INJECTION, SOLUTION, CONCENTRATE INTRAVENOUS at 11:21

## 2023-06-12 RX ADMIN — HEPARIN 500 UNITS: 100 SYRINGE at 14:10

## 2023-06-12 ASSESSMENT — PAIN DESCRIPTION - LOCATION: LOCATION: BACK

## 2023-06-12 ASSESSMENT — PAIN DESCRIPTION - DESCRIPTORS: DESCRIPTORS: DISCOMFORT

## 2023-06-12 ASSESSMENT — PAIN SCALES - GENERAL: PAINLEVEL_OUTOF10: 3

## 2023-06-12 NOTE — PLAN OF CARE
Problem: Chronic Conditions and Co-morbidities  Goal: Patient's chronic conditions and co-morbidity symptoms are monitored and maintained or improved  Outcome: Progressing  Flowsheets (Taken 6/12/2023 0941)  Care Plan - Patient's Chronic Conditions and Co-Morbidity Symptoms are Monitored and Maintained or Improved: Monitor and assess patient's chronic conditions and comorbid symptoms for stability, deterioration, or improvement  Note: Chemotherapy Teaching     What is Chemotherapy   Drug action [x]   Method of Administration [x]   Handouts given []     Side Effects  Nausea/vomiting [x]   Diarrhea [x]   Fatigue [x]   Signs / Symptoms of infection [x]   Neutropenia [x]   Thrombocytopenia [x]   Alopecia [x]   neuropathy [x]   [place] diet &  the importance of fluids [x]       Micellaneous  Importance of nutrition [x]   Importance of oral hygiene [x]   When to call the MD [x]   Monitoring labs [x]   Use of supportive services []     Explanation of Drug Regimen / Frequency  Trazimera/perjeta/taxotere/carboplatin     Comments  Verbalized understanding to drug,action,side effects and when to call MD         Problem: Safety - Adult  Goal: Free from fall injury  Outcome: Progressing  Flowsheets (Taken 6/12/2023 0941)  Free From Fall Injury: Instruct family/caregiver on patient safety  Note: No falls occurred with visit today. Problem: Discharge Planning  Goal: Discharge to home or other facility with appropriate resources  Outcome: Progressing  Flowsheets (Taken 6/12/2023 0941)  Discharge to home or other facility with appropriate resources: Identify barriers to discharge with patient and caregiver  Note: Verbalize understanding of discharge instructions, follow up appointments, and when to call Physician. Care plan reviewed with patient. Patient verbalizes understanding of the plan of care and contributes to goal setting.

## 2023-06-12 NOTE — PROGRESS NOTES
Patient assessed for the following post chemotherapy:    Dizziness   No  Lightheadedness  No      Acute nausea/vomiting No  Headache   No  Chest pain/pressure  No  Rash/itching   No  Shortness of breath  No    Patient kept for 20 minutes observation post infusion chemotherapy. Patient tolerated chemotherapy treatment perjeta/trazimera/taxotere/carboplatin without any complications. Last vital signs:   /65   Pulse 89   Temp 98.2 °F (36.8 °C) (Oral)   Resp 16   Ht 5' 5\" (1.651 m)   Wt 218 lb (98.9 kg)   LMP 01/01/2008   SpO2 98%   BMI 36.28 kg/m²         Patient instructed if experience any of the above symptoms following today's infusion,he/she is to notify MD immediately or go to the emergency department. Discharge instructions given to patient. Verbalizes understanding. Ambulated off unit per self with belongings.

## 2023-06-12 NOTE — DISCHARGE INSTRUCTIONS
Please contact your Oncologist if you have any questions regarding the taxotere/cytoxan/perjeta/herceptin that you received today. You are instructed to call the office or go to the Emergency Dept. If you experience any of the following symptoms:    Dizziness/lightheadedness   Acute nausea or vomiting-not relieved by medications  Headaches-not relieved by medications  New chest pain or pressure  New rash /itching  New shortness of breath  Fever,chills or signs or symptoms of infection    Make sure you are drinking 48 to 64 ounces of water daily-if you are unable to drink fluids let us know right away.

## 2023-06-13 ENCOUNTER — HOSPITAL ENCOUNTER (OUTPATIENT)
Dept: INFUSION THERAPY | Age: 53
Discharge: HOME | End: 2023-06-13
Payer: COMMERCIAL

## 2023-06-13 VITALS
SYSTOLIC BLOOD PRESSURE: 132 MMHG | DIASTOLIC BLOOD PRESSURE: 79 MMHG | HEIGHT: 65 IN | WEIGHT: 218 LBS | HEART RATE: 78 BPM | TEMPERATURE: 97.8 F | RESPIRATION RATE: 16 BRPM | OXYGEN SATURATION: 98 % | BODY MASS INDEX: 36.32 KG/M2

## 2023-06-13 DIAGNOSIS — Z17.0 CARCINOMA OF UPPER-OUTER QUADRANT OF LEFT BREAST IN FEMALE, ESTROGEN RECEPTOR POSITIVE (HCC): Primary | ICD-10-CM

## 2023-06-13 DIAGNOSIS — C50.912 INVASIVE DUCTAL CARCINOMA OF BREAST, FEMALE, LEFT (HCC): ICD-10-CM

## 2023-06-13 DIAGNOSIS — C50.412 CARCINOMA OF UPPER-OUTER QUADRANT OF LEFT BREAST IN FEMALE, ESTROGEN RECEPTOR POSITIVE (HCC): Primary | ICD-10-CM

## 2023-06-13 PROCEDURE — 6360000002 HC RX W HCPCS: Performed by: INTERNAL MEDICINE

## 2023-06-13 PROCEDURE — 96372 THER/PROPH/DIAG INJ SC/IM: CPT

## 2023-06-13 RX ADMIN — PEGFILGRASTIM-BMEZ 6 MG: 6 INJECTION SUBCUTANEOUS at 10:21

## 2023-06-13 ASSESSMENT — PAIN SCALES - GENERAL: PAINLEVEL_OUTOF10: 3

## 2023-06-13 ASSESSMENT — PAIN DESCRIPTION - LOCATION: LOCATION: NECK

## 2023-06-13 ASSESSMENT — PAIN DESCRIPTION - DESCRIPTORS: DESCRIPTORS: DISCOMFORT

## 2023-06-13 NOTE — DISCHARGE INSTRUCTIONS
Please contact your Oncologist if you have any questions regarding the injection that you received today. You are instructed to call the office or go to the Emergency Dept. If you experience any of the following symptoms:    Dizziness/lightheadedness   Acute nausea or vomiting-not relieved by medications  Headaches-not relieved by medications  New chest pain or pressure  New rash /itching  New shortness of breath  Fever,chills or signs or symptoms of infection    Make sure you are drinking 48 to 64 ounces of water daily-if you are unable to drink fluids let us know right away.

## 2023-06-13 NOTE — PROGRESS NOTES
3/18/21- RFA of CTI for atrial flutter.   Patient tolerated  zeixtenzo injection without any complications. Discharge instructions given to patient-verbalizes understanding. Ambulated off unit per self with belongings.

## 2023-06-20 ENCOUNTER — OFFICE VISIT (OUTPATIENT)
Dept: FAMILY MEDICINE CLINIC | Age: 53
End: 2023-06-20

## 2023-06-20 VITALS
HEIGHT: 65 IN | HEART RATE: 70 BPM | DIASTOLIC BLOOD PRESSURE: 60 MMHG | RESPIRATION RATE: 18 BRPM | SYSTOLIC BLOOD PRESSURE: 130 MMHG | BODY MASS INDEX: 34.57 KG/M2 | WEIGHT: 207.5 LBS

## 2023-06-20 DIAGNOSIS — Z00.00 MEDICARE ANNUAL WELLNESS VISIT, SUBSEQUENT: Primary | ICD-10-CM

## 2023-06-20 DIAGNOSIS — C50.912 MALIGNANT NEOPLASM OF LEFT FEMALE BREAST, UNSPECIFIED ESTROGEN RECEPTOR STATUS, UNSPECIFIED SITE OF BREAST (HCC): ICD-10-CM

## 2023-06-20 DIAGNOSIS — J34.0 NASAL ULCER: ICD-10-CM

## 2023-06-20 PROCEDURE — G0439 PPPS, SUBSEQ VISIT: HCPCS | Performed by: FAMILY MEDICINE

## 2023-06-20 PROCEDURE — 99213 OFFICE O/P EST LOW 20 MIN: CPT | Performed by: FAMILY MEDICINE

## 2023-06-20 ASSESSMENT — COLUMBIA-SUICIDE SEVERITY RATING SCALE - C-SSRS
2. HAVE YOU ACTUALLY HAD ANY THOUGHTS OF KILLING YOURSELF?: NO
1. WITHIN THE PAST MONTH, HAVE YOU WISHED YOU WERE DEAD OR WISHED YOU COULD GO TO SLEEP AND NOT WAKE UP?: YES
6. HAVE YOU EVER DONE ANYTHING, STARTED TO DO ANYTHING, OR PREPARED TO DO ANYTHING TO END YOUR LIFE?: NO

## 2023-06-20 ASSESSMENT — PATIENT HEALTH QUESTIONNAIRE - PHQ9
2. FEELING DOWN, DEPRESSED OR HOPELESS: 3
4. FEELING TIRED OR HAVING LITTLE ENERGY: 3
6. FEELING BAD ABOUT YOURSELF - OR THAT YOU ARE A FAILURE OR HAVE LET YOURSELF OR YOUR FAMILY DOWN: 0
10. IF YOU CHECKED OFF ANY PROBLEMS, HOW DIFFICULT HAVE THESE PROBLEMS MADE IT FOR YOU TO DO YOUR WORK, TAKE CARE OF THINGS AT HOME, OR GET ALONG WITH OTHER PEOPLE: 0
3. TROUBLE FALLING OR STAYING ASLEEP: 2
SUM OF ALL RESPONSES TO PHQ9 QUESTIONS 1 & 2: 6
SUM OF ALL RESPONSES TO PHQ QUESTIONS 1-9: 19
SUM OF ALL RESPONSES TO PHQ QUESTIONS 1-9: 17
8. MOVING OR SPEAKING SO SLOWLY THAT OTHER PEOPLE COULD HAVE NOTICED. OR THE OPPOSITE, BEING SO FIGETY OR RESTLESS THAT YOU HAVE BEEN MOVING AROUND A LOT MORE THAN USUAL: 2
9. THOUGHTS THAT YOU WOULD BE BETTER OFF DEAD, OR OF HURTING YOURSELF: 2
5. POOR APPETITE OR OVEREATING: 3
7. TROUBLE CONCENTRATING ON THINGS, SUCH AS READING THE NEWSPAPER OR WATCHING TELEVISION: 1
1. LITTLE INTEREST OR PLEASURE IN DOING THINGS: 3

## 2023-06-20 ASSESSMENT — LIFESTYLE VARIABLES
HOW OFTEN DO YOU HAVE A DRINK CONTAINING ALCOHOL: NEVER
HOW MANY STANDARD DRINKS CONTAINING ALCOHOL DO YOU HAVE ON A TYPICAL DAY: PATIENT DOES NOT DRINK

## 2023-06-20 NOTE — PROGRESS NOTES
Medicare Annual Wellness Visit    Lidia Montemayor is here for Medicare AWV    Assessment & Plan   Nasal ulcer  -     mupirocin (BACTROBAN) 2 % ointment; Apply topically 3 times daily. , Disp-1 each, R-1, Normal  Malignant neoplasm of left female breast, unspecified estrogen receptor status, unspecified site of breast ([facility] [address]    Recommendations for Preventive Services Due: see orders and patient instructions/AVS.  Recommended screening schedule for the next 5-10 years is provided to the patient in written form: see Patient Instructions/AVS.     No follow-ups on file. Subjective   The following acute and/or chronic problems were also addressed today:  We discussed the multiple doctors she is seeing now and the treatment  The right nostril seems to bleed or have a tearing feeling  She has been more depressed from not feeling well    Patient's complete Health Risk Assessment and screening values have been reviewed and are found in Flowsheets. The following problems were reviewed today and where indicated follow up appointments were made and/or referrals ordered. Positive Risk Factor Screenings with Interventions:        Depression:  PHQ-2 Score: 6  PHQ-9 Total Score: 19    Interpretation:   1-4 = minimal  5-9 = mild  10-14 = moderate  15-19 = moderately severe  20-27 = severe  Interventions:  Discussed the chemo being the cause            Weight and Activity:  Physical Activity: Inactive    Days of Exercise per Week: 0 days    Minutes of Exercise per Session: 0 min     On average, how many days per week do you engage in moderate to strenuous exercise (like a brisk walk)?: 0 days  Have you lost any weight without trying in the past 3 months?: No  Body mass index is 34.53 kg/m².  (!) Abnormal    Inactivity Interventions:  From the chemo  Obesity Interventions:  From the chemo               Advanced Directives:  Do you have a Living Will?: (!) No    Intervention:  We discussed it's use        Tobacco Use:  Tobacco Use:

## 2023-06-20 NOTE — PATIENT INSTRUCTIONS
Learning About Mindfulness for Stress  What are mindfulness and stress? Stress is your body's response to a hard situation. Your body can have a physical, emotional, or mental response. A lot of things can cause stress. You may feel stress when you go on a job interview, take a test, or run a race. This kind of short-term stress is normal and even useful. It can help you if you need to work hard or react quickly. Stress also can last a long time. Long-term stress is caused by stressful situations or events. Examples of long-term stress include long-term health problems, ongoing problems at work, and conflicts in your family. Long-term stress can harm your health. Mindfulness is a focus only on things happening in the present moment. It's a process of purposefully paying attention to and being aware of your surroundings, your emotions, your thoughts, and how your body feels. You are aware of these things, but you aren't judging these experiences as \"good\" or \"bad. \" Mindfulness can help you learn to calm your mind and body to help you cope with illness, pain, and stress. How does mindfulness help to relieve stress? Mindfulness can help quiet your mind and relax your body. Studies show that it can help some people sleep better, feel less anxious, and bring their blood pressure down. And it's been shown to help some people live and cope better with certain health problems like heart disease, depression, chronic pain, and cancer. How do you practice mindfulness? To be mindful is to pay attention, to be present, and to be accepting. Like any new skill or habit, being mindful can take practice. When you're mindful, you do just one thing and you pay close attention to that one thing. For example, you may sit quietly and notice your emotions or how your food tastes and smells. When you're present, you focus on the things that are happening right now. You let go of your thoughts about the past and the future.

## 2023-06-30 ENCOUNTER — OFFICE VISIT (OUTPATIENT)
Dept: FAMILY MEDICINE CLINIC | Age: 53
End: 2023-06-30

## 2023-06-30 ENCOUNTER — TELEPHONE (OUTPATIENT)
Dept: ONCOLOGY | Age: 53
End: 2023-06-30

## 2023-06-30 VITALS
WEIGHT: 207.6 LBS | DIASTOLIC BLOOD PRESSURE: 76 MMHG | SYSTOLIC BLOOD PRESSURE: 134 MMHG | BODY MASS INDEX: 34.59 KG/M2 | HEART RATE: 74 BPM | RESPIRATION RATE: 14 BRPM | HEIGHT: 65 IN

## 2023-06-30 DIAGNOSIS — Z17.0 CARCINOMA OF UPPER-OUTER QUADRANT OF LEFT BREAST IN FEMALE, ESTROGEN RECEPTOR POSITIVE (HCC): Primary | ICD-10-CM

## 2023-06-30 DIAGNOSIS — R35.0 URINARY FREQUENCY: Primary | ICD-10-CM

## 2023-06-30 DIAGNOSIS — E78.00 PURE HYPERCHOLESTEROLEMIA: ICD-10-CM

## 2023-06-30 DIAGNOSIS — N39.0 ACUTE UTI: ICD-10-CM

## 2023-06-30 DIAGNOSIS — C50.412 CARCINOMA OF UPPER-OUTER QUADRANT OF LEFT BREAST IN FEMALE, ESTROGEN RECEPTOR POSITIVE (HCC): Primary | ICD-10-CM

## 2023-06-30 LAB
BACTERIA URINE, POC: ABNORMAL
BILIRUBIN URINE: 0 MG/DL
BLOOD, URINE: POSITIVE
CASTS URINE, POC: ABNORMAL
CLARITY: CLEAR
COLOR: YELLOW
CRYSTALS URINE, POC: ABNORMAL
EPI CELLS URINE, POC: ABNORMAL
GLUCOSE URINE: NEGATIVE
KETONES, URINE: NEGATIVE
LEUKOCYTE EST, POC: NEGATIVE
NITRITE, URINE: NEGATIVE
PH UA: 5 (ref 4.5–8)
PROTEIN UA: POSITIVE
RBC URINE, POC: ABNORMAL
SPECIFIC GRAVITY UA: 1.02 (ref 1–1.03)
UROBILINOGEN, URINE: NORMAL
WBC URINE, POC: ABNORMAL
YEAST URINE, POC: ABNORMAL

## 2023-06-30 PROCEDURE — 81000 URINALYSIS NONAUTO W/SCOPE: CPT | Performed by: FAMILY MEDICINE

## 2023-06-30 PROCEDURE — 99213 OFFICE O/P EST LOW 20 MIN: CPT | Performed by: FAMILY MEDICINE

## 2023-06-30 RX ORDER — NITROFURANTOIN 25; 75 MG/1; MG/1
100 CAPSULE ORAL 2 TIMES DAILY
Qty: 14 CAPSULE | Refills: 0 | Status: SHIPPED | OUTPATIENT
Start: 2023-06-30 | End: 2023-07-07

## 2023-06-30 RX ORDER — ROSUVASTATIN CALCIUM 10 MG/1
10 TABLET, COATED ORAL NIGHTLY
Qty: 90 TABLET | Refills: 3 | Status: SHIPPED | OUTPATIENT
Start: 2023-06-30

## 2023-06-30 ASSESSMENT — ENCOUNTER SYMPTOMS
SINUS PRESSURE: 0
SHORTNESS OF BREATH: 0
CONSTIPATION: 0

## 2023-07-10 DIAGNOSIS — S39.012A LOW BACK STRAIN, INITIAL ENCOUNTER: ICD-10-CM

## 2023-07-10 RX ORDER — GABAPENTIN 600 MG/1
600 TABLET ORAL 3 TIMES DAILY
Qty: 90 TABLET | Refills: 5 | Status: SHIPPED | OUTPATIENT
Start: 2023-07-10 | End: 2024-01-06

## 2023-07-10 RX ORDER — TIZANIDINE 4 MG/1
8 TABLET ORAL NIGHTLY
Qty: 60 TABLET | Refills: 5 | Status: SHIPPED | OUTPATIENT
Start: 2023-07-10

## 2023-07-10 NOTE — TELEPHONE ENCOUNTER
Pt called stating she is no longer seeing Pain Management and ask if she could get refills of    Tizanidine 4 mg 2 tablets bedtime  Gabapentin 600 mg 1 tab TID.     [facility]    Please call pt if any issues

## 2023-07-10 NOTE — TELEPHONE ENCOUNTER
Renita Otero called requesting a refill of the below medication which has been pended for you:     Requested Prescriptions     Pending Prescriptions Disp Refills    tiZANidine (ZANAFLEX) 4 MG tablet 40 tablet 1     Sig: Take 1 tablet by mouth every 6 hours as needed (muscle spasm)    gabapentin (NEURONTIN) 600 MG tablet 90 tablet      Sig: Take 1 tablet by mouth daily.        Last Appointment Date: 6/30/2023  Next Appointment Date: 11/20/2023    No Known Allergies

## 2023-07-12 ENCOUNTER — HOSPITAL ENCOUNTER (OUTPATIENT)
Dept: INFUSION THERAPY | Age: 53
Discharge: HOME | End: 2023-07-12
Payer: COMMERCIAL

## 2023-07-12 ENCOUNTER — OFFICE VISIT (OUTPATIENT)
Dept: ONCOLOGY | Age: 53
End: 2023-07-12

## 2023-07-12 VITALS
SYSTOLIC BLOOD PRESSURE: 119 MMHG | BODY MASS INDEX: 34.99 KG/M2 | RESPIRATION RATE: 16 BRPM | WEIGHT: 210 LBS | HEART RATE: 87 BPM | HEIGHT: 65 IN | OXYGEN SATURATION: 95 % | DIASTOLIC BLOOD PRESSURE: 67 MMHG | TEMPERATURE: 98.1 F

## 2023-07-12 VITALS
SYSTOLIC BLOOD PRESSURE: 143 MMHG | HEIGHT: 65 IN | BODY MASS INDEX: 34.99 KG/M2 | DIASTOLIC BLOOD PRESSURE: 75 MMHG | RESPIRATION RATE: 16 BRPM | OXYGEN SATURATION: 95 % | HEART RATE: 92 BPM | TEMPERATURE: 98.3 F | WEIGHT: 210 LBS

## 2023-07-12 DIAGNOSIS — Z15.01 BRCA POSITIVE: ICD-10-CM

## 2023-07-12 DIAGNOSIS — Z15.09 BRCA POSITIVE: ICD-10-CM

## 2023-07-12 DIAGNOSIS — C50.912 INVASIVE DUCTAL CARCINOMA OF BREAST, FEMALE, LEFT (HCC): ICD-10-CM

## 2023-07-12 DIAGNOSIS — C50.412 CARCINOMA OF UPPER-OUTER QUADRANT OF LEFT BREAST IN FEMALE, ESTROGEN RECEPTOR POSITIVE (HCC): ICD-10-CM

## 2023-07-12 DIAGNOSIS — C50.412 CARCINOMA OF UPPER-OUTER QUADRANT OF LEFT BREAST IN FEMALE, ESTROGEN RECEPTOR POSITIVE (HCC): Primary | ICD-10-CM

## 2023-07-12 DIAGNOSIS — Z17.0 CARCINOMA OF UPPER-OUTER QUADRANT OF LEFT BREAST IN FEMALE, ESTROGEN RECEPTOR POSITIVE (HCC): Primary | ICD-10-CM

## 2023-07-12 DIAGNOSIS — Z17.0 CARCINOMA OF UPPER-OUTER QUADRANT OF LEFT BREAST IN FEMALE, ESTROGEN RECEPTOR POSITIVE (HCC): ICD-10-CM

## 2023-07-12 DIAGNOSIS — E87.6 HYPOKALEMIA: Primary | ICD-10-CM

## 2023-07-12 LAB
ABSOLUTE IMMATURE GRANULOCYTE: 0.01 THOU/MM3 (ref 0–0.07)
ALBUMIN SERPL BCG-MCNC: 3.9 G/DL (ref 3.5–5.1)
ALP SERPL-CCNC: 130 U/L (ref 38–126)
ALT SERPL W/O P-5'-P-CCNC: 9 U/L (ref 11–66)
AST SERPL-CCNC: 12 U/L (ref 5–40)
BASOPHILS ABSOLUTE: 0 THOU/MM3 (ref 0–0.1)
BASOPHILS NFR BLD AUTO: 0 % (ref 0–3)
BILIRUB CONJ SERPL-MCNC: < 0.2 MG/DL (ref 0–0.3)
BILIRUB SERPL-MCNC: 0.2 MG/DL (ref 0.3–1.2)
BUN BLDP-MCNC: 10 MG/DL (ref 8–26)
CHLORIDE BLD-SCNC: 105 MEQ/L (ref 98–109)
CREAT BLD-MCNC: 0.5 MG/DL (ref 0.5–1.2)
EOSINOPHIL NFR BLD AUTO: 3 % (ref 0–4)
EOSINOPHILS ABSOLUTE: 0.1 THOU/MM3 (ref 0–0.4)
ERYTHROCYTE [DISTWIDTH] IN BLOOD BY AUTOMATED COUNT: 17.3 % (ref 11.5–14.5)
GFR SERPL CREATININE-BSD FRML MDRD: > 60 ML/MIN/1.73M2
GLUCOSE BLD-MCNC: 136 MG/DL (ref 70–108)
HCT VFR BLD AUTO: 33 % (ref 37–47)
HGB BLD-MCNC: 10.8 GM/DL (ref 12–16)
IMMATURE GRANULOCYTES: 0 %
IONIZED CALCIUM, WHOLE BLOOD: 1.18 MMOL/L (ref 1.12–1.32)
LYMPHOCYTES ABSOLUTE: 1.9 THOU/MM3 (ref 1–4.8)
LYMPHOCYTES NFR BLD AUTO: 38 % (ref 15–47)
MAGNESIUM SERPL-MCNC: 1.7 MG/DL (ref 1.6–2.4)
MCH RBC QN AUTO: 29.6 PG (ref 26–33)
MCHC RBC AUTO-ENTMCNC: 32.7 GM/DL (ref 32.2–35.5)
MCV RBC AUTO: 90 FL (ref 81–99)
MONOCYTES ABSOLUTE: 0.4 THOU/MM3 (ref 0.4–1.3)
MONOCYTES NFR BLD AUTO: 7 % (ref 0–12)
NEUTROPHILS NFR BLD AUTO: 52 % (ref 43–75)
PLATELET # BLD AUTO: 496 THOU/MM3 (ref 130–400)
PMV BLD AUTO: 8.4 FL (ref 9.4–12.4)
POTASSIUM BLD-SCNC: 2.9 MEQ/L (ref 3.5–4.9)
PROT SERPL-MCNC: 7.2 G/DL (ref 6.1–8)
RBC # BLD AUTO: 3.65 MILL/MM3 (ref 4.2–5.4)
SEGMENTED NEUTROPHILS ABSOLUTE COUNT: 2.6 THOU/MM3 (ref 1.8–7.7)
SODIUM BLD-SCNC: 146 MEQ/L (ref 138–146)
TOTAL CO2, WHOLE BLOOD: 30 MEQ/L (ref 23–33)
WBC # BLD AUTO: 5.1 THOU/MM3 (ref 4.8–10.8)

## 2023-07-12 PROCEDURE — 83735 ASSAY OF MAGNESIUM: CPT

## 2023-07-12 PROCEDURE — 96413 CHEMO IV INFUSION 1 HR: CPT

## 2023-07-12 PROCEDURE — 80076 HEPATIC FUNCTION PANEL: CPT

## 2023-07-12 PROCEDURE — 96367 TX/PROPH/DG ADDL SEQ IV INF: CPT

## 2023-07-12 PROCEDURE — 85025 COMPLETE CBC W/AUTO DIFF WBC: CPT

## 2023-07-12 PROCEDURE — 2580000003 HC RX 258: Performed by: INTERNAL MEDICINE

## 2023-07-12 PROCEDURE — 36591 DRAW BLOOD OFF VENOUS DEVICE: CPT

## 2023-07-12 PROCEDURE — 96375 TX/PRO/DX INJ NEW DRUG ADDON: CPT

## 2023-07-12 PROCEDURE — 96417 CHEMO IV INFUS EACH ADDL SEQ: CPT

## 2023-07-12 PROCEDURE — 80047 BASIC METABLC PNL IONIZED CA: CPT

## 2023-07-12 PROCEDURE — 99211 OFF/OP EST MAY X REQ PHY/QHP: CPT

## 2023-07-12 PROCEDURE — 6360000002 HC RX W HCPCS: Performed by: INTERNAL MEDICINE

## 2023-07-12 RX ORDER — MEPERIDINE HYDROCHLORIDE 50 MG/ML
12.5 INJECTION INTRAMUSCULAR; INTRAVENOUS; SUBCUTANEOUS PRN
Status: CANCELLED | OUTPATIENT
Start: 2023-07-12

## 2023-07-12 RX ORDER — DIPHENHYDRAMINE HYDROCHLORIDE 50 MG/ML
50 INJECTION INTRAMUSCULAR; INTRAVENOUS
Status: CANCELLED | OUTPATIENT
Start: 2023-07-12

## 2023-07-12 RX ORDER — ONDANSETRON 2 MG/ML
8 INJECTION INTRAMUSCULAR; INTRAVENOUS
Status: CANCELLED | OUTPATIENT
Start: 2023-07-12

## 2023-07-12 RX ORDER — SODIUM CHLORIDE 0.9 % (FLUSH) 0.9 %
5-40 SYRINGE (ML) INJECTION PRN
OUTPATIENT
Start: 2023-07-12

## 2023-07-12 RX ORDER — FAMOTIDINE 10 MG/ML
20 INJECTION, SOLUTION INTRAVENOUS
Status: CANCELLED | OUTPATIENT
Start: 2023-07-12

## 2023-07-12 RX ORDER — SODIUM CHLORIDE 9 MG/ML
25 INJECTION, SOLUTION INTRAVENOUS PRN
OUTPATIENT
Start: 2023-07-12

## 2023-07-12 RX ORDER — SODIUM CHLORIDE 9 MG/ML
5-250 INJECTION, SOLUTION INTRAVENOUS PRN
Status: CANCELLED | OUTPATIENT
Start: 2023-07-12

## 2023-07-12 RX ORDER — SODIUM CHLORIDE 0.9 % (FLUSH) 0.9 %
5-40 SYRINGE (ML) INJECTION PRN
Status: DISCONTINUED | OUTPATIENT
Start: 2023-07-12 | End: 2023-07-13 | Stop reason: HOSPADM

## 2023-07-12 RX ORDER — HEPARIN SODIUM 100 [USP'U]/ML
500 INJECTION, SOLUTION INTRAVENOUS PRN
OUTPATIENT
Start: 2023-07-12

## 2023-07-12 RX ORDER — EPINEPHRINE 1 MG/ML
0.3 INJECTION, SOLUTION, CONCENTRATE INTRAVENOUS PRN
Status: CANCELLED | OUTPATIENT
Start: 2023-07-12

## 2023-07-12 RX ORDER — ACETAMINOPHEN 325 MG/1
650 TABLET ORAL
Status: CANCELLED | OUTPATIENT
Start: 2023-07-12

## 2023-07-12 RX ORDER — SODIUM CHLORIDE 9 MG/ML
INJECTION, SOLUTION INTRAVENOUS CONTINUOUS
Status: CANCELLED | OUTPATIENT
Start: 2023-07-12

## 2023-07-12 RX ORDER — SODIUM CHLORIDE 0.9 % (FLUSH) 0.9 %
5-40 SYRINGE (ML) INJECTION PRN
Status: CANCELLED | OUTPATIENT
Start: 2023-07-12

## 2023-07-12 RX ORDER — HEPARIN SODIUM 100 [USP'U]/ML
500 INJECTION, SOLUTION INTRAVENOUS PRN
Status: DISCONTINUED | OUTPATIENT
Start: 2023-07-12 | End: 2023-07-13 | Stop reason: HOSPADM

## 2023-07-12 RX ORDER — SODIUM CHLORIDE 9 MG/ML
5-250 INJECTION, SOLUTION INTRAVENOUS PRN
Status: DISCONTINUED | OUTPATIENT
Start: 2023-07-12 | End: 2023-07-13 | Stop reason: HOSPADM

## 2023-07-12 RX ORDER — PALONOSETRON 0.05 MG/ML
0.25 INJECTION, SOLUTION INTRAVENOUS ONCE
Status: CANCELLED | OUTPATIENT
Start: 2023-07-12 | End: 2023-07-12

## 2023-07-12 RX ORDER — POTASSIUM CHLORIDE 20 MEQ/1
20 TABLET, EXTENDED RELEASE ORAL DAILY
Qty: 30 TABLET | Refills: 5 | Status: SHIPPED | OUTPATIENT
Start: 2023-07-12

## 2023-07-12 RX ORDER — ALBUTEROL SULFATE 90 UG/1
4 AEROSOL, METERED RESPIRATORY (INHALATION) PRN
Status: CANCELLED | OUTPATIENT
Start: 2023-07-12

## 2023-07-12 RX ORDER — PALONOSETRON 0.05 MG/ML
0.25 INJECTION, SOLUTION INTRAVENOUS ONCE
Status: COMPLETED | OUTPATIENT
Start: 2023-07-12 | End: 2023-07-12

## 2023-07-12 RX ORDER — POTASSIUM CHLORIDE 29.8 MG/ML
20 INJECTION INTRAVENOUS ONCE
Status: COMPLETED | OUTPATIENT
Start: 2023-07-12 | End: 2023-07-12

## 2023-07-12 RX ORDER — HEPARIN SODIUM (PORCINE) LOCK FLUSH IV SOLN 100 UNIT/ML 100 UNIT/ML
500 SOLUTION INTRAVENOUS PRN
Status: CANCELLED | OUTPATIENT
Start: 2023-07-12

## 2023-07-12 RX ADMIN — SODIUM CHLORIDE, PRESERVATIVE FREE 10 ML: 5 INJECTION INTRAVENOUS at 08:09

## 2023-07-12 RX ADMIN — SODIUM CHLORIDE 567 MG: 9 INJECTION, SOLUTION INTRAVENOUS at 11:43

## 2023-07-12 RX ADMIN — PERTUZUMAB 420 MG: 30 INJECTION, SOLUTION, CONCENTRATE INTRAVENOUS at 12:20

## 2023-07-12 RX ADMIN — DEXAMETHASONE SODIUM PHOSPHATE 12 MG: 4 INJECTION, SOLUTION INTRA-ARTICULAR; INTRALESIONAL; INTRAMUSCULAR; INTRAVENOUS; SOFT TISSUE at 09:32

## 2023-07-12 RX ADMIN — PALONOSETRON 0.25 MG: 0.05 INJECTION, SOLUTION INTRAVENOUS at 10:33

## 2023-07-12 RX ADMIN — SODIUM CHLORIDE, PRESERVATIVE FREE 20 ML: 5 INJECTION INTRAVENOUS at 08:10

## 2023-07-12 RX ADMIN — Medication 500 UNITS: at 15:13

## 2023-07-12 RX ADMIN — SODIUM CHLORIDE 25 ML/HR: 9 INJECTION, SOLUTION INTRAVENOUS at 09:31

## 2023-07-12 RX ADMIN — SODIUM CHLORIDE, PRESERVATIVE FREE 10 ML: 5 INJECTION INTRAVENOUS at 15:13

## 2023-07-12 RX ADMIN — CARBOPLATIN 667.5 MG: 10 INJECTION INTRAVENOUS at 14:03

## 2023-07-12 RX ADMIN — POTASSIUM CHLORIDE 20 MEQ: 29.8 INJECTION, SOLUTION INTRAVENOUS at 10:39

## 2023-07-12 RX ADMIN — FOSAPREPITANT DIMEGLUMINE 150 MG: 150 INJECTION, POWDER, LYOPHILIZED, FOR SOLUTION INTRAVENOUS at 09:51

## 2023-07-12 RX ADMIN — DOCETAXEL ANHYDROUS 157 MG: 10 INJECTION, SOLUTION INTRAVENOUS at 12:59

## 2023-07-12 ASSESSMENT — PAIN SCALES - GENERAL: PAINLEVEL_OUTOF10: 6

## 2023-07-12 ASSESSMENT — PAIN DESCRIPTION - LOCATION: LOCATION: BACK

## 2023-07-12 ASSESSMENT — PAIN DESCRIPTION - DESCRIPTORS: DESCRIPTORS: DISCOMFORT

## 2023-07-12 NOTE — DISCHARGE INSTRUCTIONS
Please contact your Oncologist if you have any questions regarding the chemotherapy Korina  Carolina Maffucci Marah Canary /Carboplatin and IV potassium replacement that you received today. Patient instructed if experience any of the symptoms following today's chemotherapy treatment and potassium replacement to notify MD immediately or go to emergency department.     * dizziness/lightheadedness  *acute nausea/vomiting - not relieved with medication  *headache - not relieved from Tylenol/pain medication  *chest pain/pressure  *rash/itching  *shortness of breath        Drink fluids - 48oz fluids daily  Call if develop fever/ chills/ signs or symptoms of infection     Physician's instructions:

## 2023-07-12 NOTE — PATIENT INSTRUCTIONS
Cycle 4 today with extra potassium  Get echo in the next few weeks  Will try to get low dose CT chest ordered - may not qualify from age  Start PO potassium  RTC in 3 weeks with labs for tox check prior to C5

## 2023-07-12 NOTE — PLAN OF CARE
Problem: Chronic Conditions and Co-morbidities  Goal: Patient's chronic conditions and co-morbidity symptoms are monitored and maintained or improved  Outcome: Adequate for Discharge  Flowsheets (Taken 7/12/2023 1201)  Care Plan - Patient's Chronic Conditions and Co-Morbidity Symptoms are Monitored and Maintained or Improved: Monitor and assess patient's chronic conditions and comorbid symptoms for stability, deterioration, or improvement  Note: Chemotherapy Teaching     What is Chemotherapy   Drug action [x]   Method of Administration [x]   Handouts given []     Side Effects  Nausea/vomiting [x]   Diarrhea [x]   Fatigue [x]   Signs / Symptoms of infection [x]   Neutropenia [x]   Thrombocytopenia [x]   Alopecia [x]   neuropathy [x]   [place] diet &  the importance of fluids [x]       Micellaneous  Importance of nutrition [x]   Importance of oral hygiene [x]   When to call the MD [x]   Monitoring labs [x]   Use of supportive services []     Explanation of Drug Regimen / Frequency  Taxotere/Carboplatin/Perjeta/Trazimera     Comments  Verbalized understanding to drug,action,side effects and when to call MD         Problem: Pain  Goal: Verbalizes/displays adequate comfort level or baseline comfort level  Outcome: Adequate for Discharge  Flowsheets (Taken 7/12/2023 1201)  Verbalizes/displays adequate comfort level or baseline comfort level: Encourage patient to monitor pain and request assistance     Problem: Safety - Adult  Goal: Free from fall injury  Outcome: Adequate for Discharge  Flowsheets (Taken 7/12/2023 1201)  Free From Fall Injury: Instruct family/caregiver on patient safety     Problem: Discharge Planning  Goal: Discharge to home or other facility with appropriate resources  Outcome: Adequate for Discharge  Flowsheets (Taken 7/12/2023 1201)  Discharge to home or other facility with appropriate resources: Identify barriers to discharge with patient and caregiver     Problem: Infection - Adult  Goal: Absence of

## 2023-07-13 ENCOUNTER — HOSPITAL ENCOUNTER (OUTPATIENT)
Dept: INFUSION THERAPY | Age: 53
Discharge: HOME | End: 2023-07-13
Payer: COMMERCIAL

## 2023-07-13 VITALS
TEMPERATURE: 98.4 F | HEART RATE: 91 BPM | RESPIRATION RATE: 16 BRPM | DIASTOLIC BLOOD PRESSURE: 66 MMHG | SYSTOLIC BLOOD PRESSURE: 120 MMHG | OXYGEN SATURATION: 94 %

## 2023-07-13 DIAGNOSIS — C50.912 INVASIVE DUCTAL CARCINOMA OF BREAST, FEMALE, LEFT (HCC): ICD-10-CM

## 2023-07-13 DIAGNOSIS — C50.412 CARCINOMA OF UPPER-OUTER QUADRANT OF LEFT BREAST IN FEMALE, ESTROGEN RECEPTOR POSITIVE (HCC): Primary | ICD-10-CM

## 2023-07-13 DIAGNOSIS — Z17.0 CARCINOMA OF UPPER-OUTER QUADRANT OF LEFT BREAST IN FEMALE, ESTROGEN RECEPTOR POSITIVE (HCC): Primary | ICD-10-CM

## 2023-07-13 PROCEDURE — 6360000002 HC RX W HCPCS: Performed by: INTERNAL MEDICINE

## 2023-07-13 PROCEDURE — 96372 THER/PROPH/DIAG INJ SC/IM: CPT

## 2023-07-13 RX ADMIN — PEGFILGRASTIM-BMEZ 6 MG: 6 INJECTION SUBCUTANEOUS at 12:30

## 2023-07-13 ASSESSMENT — PAIN SCALES - GENERAL: PAINLEVEL_OUTOF10: 3

## 2023-07-13 ASSESSMENT — PAIN DESCRIPTION - DESCRIPTORS: DESCRIPTORS: DULL;DISCOMFORT

## 2023-07-13 ASSESSMENT — PAIN DESCRIPTION - LOCATION: LOCATION: HEAD

## 2023-07-13 NOTE — PROGRESS NOTES
Pt tolerated ziextenzo without any complications. Discharge instructions given to patient. Patient verbalizes understanding. Pt ambulated off unit per self with belongings.

## 2023-07-13 NOTE — DISCHARGE INSTRUCTIONS
Call if any uncontrolled nausea or vomiting   Call if any fever,chills, problems or concerns  Call if any questions  Drink at least 6 - 8 ounces glasses of fluids a day    Patient to watch for any:    Dizziness     Lightheadedness        Acute nausea/vomiting   Headache     Chest pain/pressure    Rash/itching     Shortness of breath      Patient instructed if experience any of the above symptoms following today's ziextenzo injection, she is to notify MD immediately or go to the emergency department.

## 2023-07-13 NOTE — PLAN OF CARE
Problem: Chronic Conditions and Co-morbidities  Goal: Patient's chronic conditions and co-morbidity symptoms are monitored and maintained or improved  Outcome: Adequate for Discharge  Flowsheets (Taken 7/13/2023 1628)  Care Plan - Patient's Chronic Conditions and Co-Morbidity Symptoms are Monitored and Maintained or Improved:   Collaborate with multidisciplinary team to address chronic and comorbid conditions and prevent exacerbation or deterioration   Monitor and assess patient's chronic conditions and comorbid symptoms for stability, deterioration, or improvement  Note: Patient verbalizes understanding to verbal information given on ziextenzo injection, including action and possible side effects. Aware to call MD if develop complications. Problem: Safety - Adult  Goal: Free from fall injury  Outcome: Adequate for Discharge  Flowsheets (Taken 7/13/2023 1628)  Free From Fall Injury: Instruct family/caregiver on patient safety  Note: Patient free of falls this visit. Fall risks assessed. Precautions discussed. Problem: Discharge Planning  Goal: Discharge to home or other facility with appropriate resources  Outcome: Adequate for Discharge  Flowsheets (Taken 7/13/2023 1628)  Discharge to home or other facility with appropriate resources:   Identify barriers to discharge with patient and caregiver   Arrange for needed discharge resources and transportation as appropriate  Note: Patient verbalizes understanding of discharge instructions, follow up appointment, and when to call physician if needed      Care plan reviewed with patient. Patient verbalizes understanding of the plan of care and contributes to goal setting.

## 2023-07-28 ENCOUNTER — HOSPITAL ENCOUNTER (OUTPATIENT)
Dept: NON INVASIVE DIAGNOSTICS | Age: 53
Discharge: HOME | End: 2023-07-28
Attending: INTERNAL MEDICINE
Payer: COMMERCIAL

## 2023-07-28 ENCOUNTER — HOSPITAL ENCOUNTER (OUTPATIENT)
Dept: CT IMAGING | Age: 53
Discharge: HOME | End: 2023-07-28
Attending: INTERNAL MEDICINE
Payer: COMMERCIAL

## 2023-07-28 DIAGNOSIS — C50.412 CARCINOMA OF UPPER-OUTER QUADRANT OF LEFT BREAST IN FEMALE, ESTROGEN RECEPTOR POSITIVE (HCC): ICD-10-CM

## 2023-07-28 DIAGNOSIS — Z17.0 CARCINOMA OF UPPER-OUTER QUADRANT OF LEFT BREAST IN FEMALE, ESTROGEN RECEPTOR POSITIVE (HCC): ICD-10-CM

## 2023-07-28 LAB
LV EF: 55 %
LVEF MODALITY: NORMAL

## 2023-07-28 PROCEDURE — 93356 MYOCRD STRAIN IMG SPCKL TRCK: CPT

## 2023-07-28 PROCEDURE — 71271 CT THORAX LUNG CANCER SCR C-: CPT

## 2023-07-28 PROCEDURE — 93306 TTE W/DOPPLER COMPLETE: CPT

## 2023-08-01 NOTE — PROGRESS NOTES
appearance or behavior, continue annual screening 3.  6 month CT recommended 4A.  3 month CT recommended; may consider PET/CT 4B. Additional diagnostics and/or tissue sampling recommended 4X. Additional diagnostics and/or tissue sampling recommended  **This report has been created using voice recognition software. It may contain minor errors which are inherent in voice recognition technology. ** Final report electronically signed by Dr Monica Alvarado on 7/28/2023 4:22 PM       PATHOLOGY:   -4/3/23 one triple positive lobular carcinoma 1 cm in greatest dimension, two smaller (<1 cm in dimension) ER positive SD positive HER2 negative ductal carcinoma    GENETICS:  -4/10/23 positive for pathogenic variants in BRCA1 (G.8899 duplication heterozygous) and BRCA2 gene (C.5946 deletion, heterozygous), with variants of uncertain significance including SHERLYN C.5821 G > C (p.[id number]) heterozygous, POT1 c.670 G >A (p. D224N)      ASSESSMENT/PLAN:    Patient Active Problem List   Diagnosis    Family history of breast cancer in first degree relative, Mom    Rt. Nephrolithiasis, 2009    Hiatal hernia, 2009    Schwannoma of spinal cord (HCC)    Pulmonary nodule    Constipation    Bloating    Chronic gastritis    Acid reflux    Tobacco dependence    Deviated nasal septum    Rima bullosa    Hypertrophy of both inferior nasal turbinates    Moderate episode of recurrent major depressive disorder (HCC)    Recurrent major depressive disorder, in full remission ([address])    Papilloma of right breast    Vestibulopathy of right ear    Severe obesity (BMI 35.0-39. 9)    Cervicogenic headache    Essential tremor    Cervicogenic migraine    Pure hypercholesterolemia    Migraine without aura and without status migrainosus, not intractable    Type 2 diabetes mellitus treated without insulin (HCC)    Invasive ductal carcinoma of breast, female, left ([address])    Carcinoma of upper-outer quadrant of left breast in female, estrogen receptor positive ([address])

## 2023-08-02 ENCOUNTER — HOSPITAL ENCOUNTER (OUTPATIENT)
Dept: INFUSION THERAPY | Age: 53
Discharge: HOME | End: 2023-08-02
Payer: COMMERCIAL

## 2023-08-02 ENCOUNTER — OFFICE VISIT (OUTPATIENT)
Dept: ONCOLOGY | Age: 53
End: 2023-08-02
Payer: COMMERCIAL

## 2023-08-02 VITALS
OXYGEN SATURATION: 94 % | TEMPERATURE: 98.8 F | SYSTOLIC BLOOD PRESSURE: 134 MMHG | WEIGHT: 207 LBS | HEART RATE: 95 BPM | RESPIRATION RATE: 16 BRPM | BODY MASS INDEX: 34.49 KG/M2 | HEIGHT: 65 IN | DIASTOLIC BLOOD PRESSURE: 70 MMHG

## 2023-08-02 VITALS
BODY MASS INDEX: 34.49 KG/M2 | SYSTOLIC BLOOD PRESSURE: 120 MMHG | TEMPERATURE: 98.7 F | RESPIRATION RATE: 16 BRPM | HEART RATE: 83 BPM | HEIGHT: 65 IN | WEIGHT: 207 LBS | OXYGEN SATURATION: 98 % | DIASTOLIC BLOOD PRESSURE: 71 MMHG

## 2023-08-02 DIAGNOSIS — Z17.0 CARCINOMA OF UPPER-OUTER QUADRANT OF LEFT BREAST IN FEMALE, ESTROGEN RECEPTOR POSITIVE (HCC): Primary | ICD-10-CM

## 2023-08-02 DIAGNOSIS — C50.912 INVASIVE DUCTAL CARCINOMA OF BREAST, FEMALE, LEFT (HCC): ICD-10-CM

## 2023-08-02 DIAGNOSIS — C50.412 CARCINOMA OF UPPER-OUTER QUADRANT OF LEFT BREAST IN FEMALE, ESTROGEN RECEPTOR POSITIVE (HCC): Primary | ICD-10-CM

## 2023-08-02 DIAGNOSIS — E87.6 HYPOKALEMIA DUE TO EXCESSIVE GASTROINTESTINAL LOSS OF POTASSIUM: ICD-10-CM

## 2023-08-02 LAB
ABSOLUTE IMMATURE GRANULOCYTE: 0.03 THOU/MM3 (ref 0–0.07)
ALBUMIN SERPL BCG-MCNC: 3.9 G/DL (ref 3.5–5.1)
ALP SERPL-CCNC: 119 U/L (ref 38–126)
ALT SERPL W/O P-5'-P-CCNC: 11 U/L (ref 11–66)
AST SERPL-CCNC: 11 U/L (ref 5–40)
BASOPHILS ABSOLUTE: 0 THOU/MM3 (ref 0–0.1)
BASOPHILS NFR BLD AUTO: 0 % (ref 0–3)
BILIRUB CONJ SERPL-MCNC: < 0.2 MG/DL (ref 0–0.3)
BILIRUB SERPL-MCNC: 0.2 MG/DL (ref 0.3–1.2)
BUN BLDP-MCNC: 10 MG/DL (ref 8–26)
CHLORIDE BLD-SCNC: 106 MEQ/L (ref 98–109)
CREAT BLD-MCNC: 0.5 MG/DL (ref 0.5–1.2)
EOSINOPHIL NFR BLD AUTO: 0 % (ref 0–4)
EOSINOPHILS ABSOLUTE: 0 THOU/MM3 (ref 0–0.4)
ERYTHROCYTE [DISTWIDTH] IN BLOOD BY AUTOMATED COUNT: 16.2 % (ref 11.5–14.5)
GFR SERPL CREATININE-BSD FRML MDRD: > 60 ML/MIN/1.73M2
GLUCOSE BLD-MCNC: 113 MG/DL (ref 70–108)
HCT VFR BLD AUTO: 29.9 % (ref 37–47)
HGB BLD-MCNC: 10 GM/DL (ref 12–16)
IMMATURE GRANULOCYTES: 0 %
IONIZED CALCIUM, WHOLE BLOOD: 1.2 MMOL/L (ref 1.12–1.32)
LYMPHOCYTES ABSOLUTE: 2.3 THOU/MM3 (ref 1–4.8)
LYMPHOCYTES NFR BLD AUTO: 35 % (ref 15–47)
MAGNESIUM SERPL-MCNC: 1.5 MG/DL (ref 1.6–2.4)
MCH RBC QN AUTO: 30.1 PG (ref 26–33)
MCHC RBC AUTO-ENTMCNC: 33.4 GM/DL (ref 32.2–35.5)
MCV RBC AUTO: 90 FL (ref 81–99)
MONOCYTES ABSOLUTE: 0.6 THOU/MM3 (ref 0.4–1.3)
MONOCYTES NFR BLD AUTO: 9 % (ref 0–12)
NEUTROPHILS NFR BLD AUTO: 55 % (ref 43–75)
PLATELET # BLD AUTO: 246 THOU/MM3 (ref 130–400)
PMV BLD AUTO: 8.8 FL (ref 9.4–12.4)
POTASSIUM BLD-SCNC: 2.7 MEQ/L (ref 3.5–4.9)
PROT SERPL-MCNC: 6.9 G/DL (ref 6.1–8)
RBC # BLD AUTO: 3.32 MILL/MM3 (ref 4.2–5.4)
SEGMENTED NEUTROPHILS ABSOLUTE COUNT: 3.7 THOU/MM3 (ref 1.8–7.7)
SODIUM BLD-SCNC: 144 MEQ/L (ref 138–146)
TOTAL CO2, WHOLE BLOOD: 28 MEQ/L (ref 23–33)
WBC # BLD AUTO: 6.7 THOU/MM3 (ref 4.8–10.8)

## 2023-08-02 PROCEDURE — 80076 HEPATIC FUNCTION PANEL: CPT

## 2023-08-02 PROCEDURE — 83735 ASSAY OF MAGNESIUM: CPT

## 2023-08-02 PROCEDURE — 96368 THER/DIAG CONCURRENT INF: CPT

## 2023-08-02 PROCEDURE — 2580000003 HC RX 258: Performed by: INTERNAL MEDICINE

## 2023-08-02 PROCEDURE — 36591 DRAW BLOOD OFF VENOUS DEVICE: CPT

## 2023-08-02 PROCEDURE — 85025 COMPLETE CBC W/AUTO DIFF WBC: CPT

## 2023-08-02 PROCEDURE — 96413 CHEMO IV INFUSION 1 HR: CPT

## 2023-08-02 PROCEDURE — 99211 OFF/OP EST MAY X REQ PHY/QHP: CPT

## 2023-08-02 PROCEDURE — 96417 CHEMO IV INFUS EACH ADDL SEQ: CPT

## 2023-08-02 PROCEDURE — 6360000002 HC RX W HCPCS: Performed by: INTERNAL MEDICINE

## 2023-08-02 PROCEDURE — 80047 BASIC METABLC PNL IONIZED CA: CPT

## 2023-08-02 PROCEDURE — 96375 TX/PRO/DX INJ NEW DRUG ADDON: CPT

## 2023-08-02 PROCEDURE — 99214 OFFICE O/P EST MOD 30 MIN: CPT | Performed by: INTERNAL MEDICINE

## 2023-08-02 PROCEDURE — 96367 TX/PROPH/DG ADDL SEQ IV INF: CPT

## 2023-08-02 RX ORDER — SODIUM CHLORIDE 9 MG/ML
5-250 INJECTION, SOLUTION INTRAVENOUS PRN
Status: CANCELLED | OUTPATIENT
Start: 2023-08-02

## 2023-08-02 RX ORDER — SODIUM CHLORIDE 0.9 % (FLUSH) 0.9 %
5-40 SYRINGE (ML) INJECTION PRN
Status: CANCELLED | OUTPATIENT
Start: 2023-08-02

## 2023-08-02 RX ORDER — POTASSIUM CHLORIDE 29.8 MG/ML
20 INJECTION INTRAVENOUS ONCE
Status: COMPLETED | OUTPATIENT
Start: 2023-08-02 | End: 2023-08-02

## 2023-08-02 RX ORDER — HEPARIN 100 UNIT/ML
500 SYRINGE INTRAVENOUS PRN
OUTPATIENT
Start: 2023-08-02

## 2023-08-02 RX ORDER — ACETAMINOPHEN 325 MG/1
650 TABLET ORAL
Status: DISCONTINUED | OUTPATIENT
Start: 2023-08-02 | End: 2023-08-03 | Stop reason: HOSPADM

## 2023-08-02 RX ORDER — DIPHENHYDRAMINE HYDROCHLORIDE 50 MG/ML
50 INJECTION INTRAMUSCULAR; INTRAVENOUS
Status: CANCELLED | OUTPATIENT
Start: 2023-08-02

## 2023-08-02 RX ORDER — ALBUTEROL SULFATE 90 UG/1
4 AEROSOL, METERED RESPIRATORY (INHALATION) PRN
Status: CANCELLED | OUTPATIENT
Start: 2023-08-02

## 2023-08-02 RX ORDER — SODIUM CHLORIDE 9 MG/ML
INJECTION, SOLUTION INTRAVENOUS CONTINUOUS
Status: CANCELLED | OUTPATIENT
Start: 2023-08-02

## 2023-08-02 RX ORDER — DIPHENHYDRAMINE HYDROCHLORIDE 50 MG/ML
50 INJECTION INTRAMUSCULAR; INTRAVENOUS
Status: DISCONTINUED | OUTPATIENT
Start: 2023-08-02 | End: 2023-08-03 | Stop reason: HOSPADM

## 2023-08-02 RX ORDER — HEPARIN 100 UNIT/ML
500 SYRINGE INTRAVENOUS PRN
Status: DISCONTINUED | OUTPATIENT
Start: 2023-08-02 | End: 2023-08-03 | Stop reason: HOSPADM

## 2023-08-02 RX ORDER — POTASSIUM CHLORIDE 29.8 MG/ML
20 INJECTION INTRAVENOUS ONCE
Status: CANCELLED
Start: 2023-08-02

## 2023-08-02 RX ORDER — EPINEPHRINE 1 MG/ML
0.3 INJECTION, SOLUTION, CONCENTRATE INTRAVENOUS PRN
Status: CANCELLED | OUTPATIENT
Start: 2023-08-02

## 2023-08-02 RX ORDER — FAMOTIDINE 10 MG/ML
20 INJECTION, SOLUTION INTRAVENOUS
Status: CANCELLED | OUTPATIENT
Start: 2023-08-02

## 2023-08-02 RX ORDER — PALONOSETRON 0.05 MG/ML
0.25 INJECTION, SOLUTION INTRAVENOUS ONCE
Status: CANCELLED | OUTPATIENT
Start: 2023-08-02 | End: 2023-08-02

## 2023-08-02 RX ORDER — SODIUM CHLORIDE 0.9 % (FLUSH) 0.9 %
5-40 SYRINGE (ML) INJECTION PRN
OUTPATIENT
Start: 2023-08-02

## 2023-08-02 RX ORDER — MEPERIDINE HYDROCHLORIDE 50 MG/ML
12.5 INJECTION INTRAMUSCULAR; INTRAVENOUS; SUBCUTANEOUS PRN
Status: CANCELLED | OUTPATIENT
Start: 2023-08-02

## 2023-08-02 RX ORDER — ACETAMINOPHEN 325 MG/1
650 TABLET ORAL
Status: CANCELLED | OUTPATIENT
Start: 2023-08-02

## 2023-08-02 RX ORDER — PALONOSETRON 0.05 MG/ML
0.25 INJECTION, SOLUTION INTRAVENOUS ONCE
Status: COMPLETED | OUTPATIENT
Start: 2023-08-02 | End: 2023-08-02

## 2023-08-02 RX ORDER — ONDANSETRON 2 MG/ML
8 INJECTION INTRAMUSCULAR; INTRAVENOUS
Status: CANCELLED | OUTPATIENT
Start: 2023-08-02

## 2023-08-02 RX ORDER — SODIUM CHLORIDE 9 MG/ML
5-250 INJECTION, SOLUTION INTRAVENOUS PRN
Status: DISCONTINUED | OUTPATIENT
Start: 2023-08-02 | End: 2023-08-03 | Stop reason: HOSPADM

## 2023-08-02 RX ORDER — SODIUM CHLORIDE 0.9 % (FLUSH) 0.9 %
5-40 SYRINGE (ML) INJECTION PRN
Status: DISCONTINUED | OUTPATIENT
Start: 2023-08-02 | End: 2023-08-03 | Stop reason: HOSPADM

## 2023-08-02 RX ORDER — SODIUM CHLORIDE 9 MG/ML
25 INJECTION, SOLUTION INTRAVENOUS PRN
OUTPATIENT
Start: 2023-08-02

## 2023-08-02 RX ORDER — HEPARIN SODIUM (PORCINE) LOCK FLUSH IV SOLN 100 UNIT/ML 100 UNIT/ML
500 SOLUTION INTRAVENOUS PRN
Status: CANCELLED | OUTPATIENT
Start: 2023-08-02

## 2023-08-02 RX ADMIN — SODIUM CHLORIDE, PRESERVATIVE FREE 20 ML: 5 INJECTION INTRAVENOUS at 10:18

## 2023-08-02 RX ADMIN — SODIUM CHLORIDE, PRESERVATIVE FREE 10 ML: 5 INJECTION INTRAVENOUS at 16:39

## 2023-08-02 RX ADMIN — FOSAPREPITANT 150 MG: 150 INJECTION, POWDER, LYOPHILIZED, FOR SOLUTION INTRAVENOUS at 12:00

## 2023-08-02 RX ADMIN — HEPARIN 500 UNITS: 100 SYRINGE at 16:39

## 2023-08-02 RX ADMIN — PALONOSETRON 0.25 MG: 0.05 INJECTION, SOLUTION INTRAVENOUS at 12:40

## 2023-08-02 RX ADMIN — SODIUM CHLORIDE, PRESERVATIVE FREE 10 ML: 5 INJECTION INTRAVENOUS at 10:17

## 2023-08-02 RX ADMIN — PERTUZUMAB 420 MG: 30 INJECTION, SOLUTION, CONCENTRATE INTRAVENOUS at 13:40

## 2023-08-02 RX ADMIN — CARBOPLATIN 664 MG: 10 INJECTION INTRAVENOUS at 15:21

## 2023-08-02 RX ADMIN — SODIUM CHLORIDE 567 MG: 9 INJECTION, SOLUTION INTRAVENOUS at 12:57

## 2023-08-02 RX ADMIN — SODIUM CHLORIDE 20 ML/HR: 9 INJECTION, SOLUTION INTRAVENOUS at 11:32

## 2023-08-02 RX ADMIN — DEXAMETHASONE SODIUM PHOSPHATE 12 MG: 4 INJECTION, SOLUTION INTRA-ARTICULAR; INTRALESIONAL; INTRAMUSCULAR; INTRAVENOUS; SOFT TISSUE at 11:35

## 2023-08-02 RX ADMIN — DOCETAXEL ANHYDROUS 157 MG: 10 INJECTION, SOLUTION INTRAVENOUS at 14:18

## 2023-08-02 RX ADMIN — POTASSIUM CHLORIDE 20 MEQ: 29.8 INJECTION, SOLUTION INTRAVENOUS at 14:20

## 2023-08-02 ASSESSMENT — PAIN DESCRIPTION - LOCATION
LOCATION: BACK

## 2023-08-02 ASSESSMENT — PAIN SCALES - GENERAL
PAINLEVEL_OUTOF10: 1
PAINLEVEL_OUTOF10: 3
PAINLEVEL_OUTOF10: 3

## 2023-08-02 NOTE — PLAN OF CARE
Problem: Chronic Conditions and Co-morbidities  Goal: Patient's chronic conditions and co-morbidity symptoms are monitored and maintained or improved  Outcome: Adequate for Discharge  Flowsheets (Taken 8/2/2023 1508)  Care Plan - Patient's Chronic Conditions and Co-Morbidity Symptoms are Monitored and Maintained or Improved: Monitor and assess patient's chronic conditions and comorbid symptoms for stability, deterioration, or improvement  Note: Chemotherapy Teaching     What is Chemotherapy   Drug action [x]   Method of Administration [x]   Handouts given []     Side Effects  Nausea/vomiting [x]   Diarrhea [x]   Fatigue [x]   Signs / Symptoms of infection [x]   Neutropenia [x]   Thrombocytopenia [x]   Alopecia [x]   neuropathy [x]   [place] diet &  the importance of fluids [x]       Micellaneous  Importance of nutrition [x]   Importance of oral hygiene [x]   When to call the MD [x]   Monitoring labs [x]   Use of supportive services []     Explanation of Drug Regimen / Frequency  Carboplatin/Taxotere/Herceptin/Perjeta     Comments  Verbalized understanding to drug,action,side effects and when to call MD         Problem: Safety - Adult  Goal: Free from fall injury  Outcome: Adequate for Discharge  Flowsheets (Taken 8/2/2023 1500)  Free From Fall Injury: Instruct family/caregiver on patient safety  Note: No falls occurred with visit today. Verbalized understanding of fall prevention to ask for assistance with ambulation. Call light within reach. Problem: Discharge Planning  Goal: Discharge to home or other facility with appropriate resources  Outcome: Adequate for Discharge  Flowsheets (Taken 8/2/2023 1508)  Discharge to home or other facility with appropriate resources: Identify barriers to discharge with patient and caregiver  Note: Discuss understanding of discharge instructions,follow-up appointments, and when to call the physician.       Problem: Infection - Adult  Goal: Absence of infection at

## 2023-08-02 NOTE — DISCHARGE INSTRUCTIONS
Treatment today; I added 20 of K to her chemo order, pt promises to start retaking her daily potassium \"horsepills\" tonight  F/u with derm about other biopsies  Call Dr Dru Pete office about when she want to see you after your last chemo  RTC 3 weeks labs Dr Deana Navarrete (Will need to force a slot in AM)         Please contact your Oncologist if you have any questions regarding the chemotherapy Carboplatin/Taxotere/Trazimera/Perjeta that you received today. Patient instructed if experience any of the symptoms following today's chemotherapy / to notify MD immediately or go to emergency department.     * dizziness/lightheadedness  *acute nausea/vomiting - not relieved with medication  *headache - not relieved from Tylenol/pain medication  *chest pain/pressure  *rash/itching  *shortness of breath        Drink fluids - 48oz fluids daily  Call if develop fever/ chills/ signs or symptoms of infection

## 2023-08-02 NOTE — PATIENT INSTRUCTIONS
Treatment today; I added 20 of K to her chemo order, pt promises to start retaking her daily potassium \"horsepills\" tonight  F/u with derm about other biopsies  Call Dr Rachel Coffey office about when she want to see you after your last chemo  RTC 3 weeks labs Dr Maddison Alcocer (Will need to force a slot in AM)

## 2023-08-03 ENCOUNTER — HOSPITAL ENCOUNTER (OUTPATIENT)
Dept: INFUSION THERAPY | Age: 53
Discharge: HOME | End: 2023-08-03
Payer: COMMERCIAL

## 2023-08-03 VITALS
SYSTOLIC BLOOD PRESSURE: 128 MMHG | RESPIRATION RATE: 18 BRPM | TEMPERATURE: 98 F | OXYGEN SATURATION: 96 % | HEART RATE: 95 BPM | DIASTOLIC BLOOD PRESSURE: 76 MMHG

## 2023-08-03 DIAGNOSIS — C50.412 CARCINOMA OF UPPER-OUTER QUADRANT OF LEFT BREAST IN FEMALE, ESTROGEN RECEPTOR POSITIVE (HCC): Primary | ICD-10-CM

## 2023-08-03 DIAGNOSIS — C50.912 INVASIVE DUCTAL CARCINOMA OF BREAST, FEMALE, LEFT (HCC): ICD-10-CM

## 2023-08-03 DIAGNOSIS — Z17.0 CARCINOMA OF UPPER-OUTER QUADRANT OF LEFT BREAST IN FEMALE, ESTROGEN RECEPTOR POSITIVE (HCC): Primary | ICD-10-CM

## 2023-08-03 PROCEDURE — 96372 THER/PROPH/DIAG INJ SC/IM: CPT

## 2023-08-03 PROCEDURE — 6360000002 HC RX W HCPCS: Performed by: INTERNAL MEDICINE

## 2023-08-03 RX ADMIN — PEGFILGRASTIM 6 MG: 6 INJECTION SUBCUTANEOUS at 13:44

## 2023-08-03 NOTE — PLAN OF CARE
Problem: Safety - Adult  Goal: Free from fall injury  Outcome: Adequate for Discharge  Flowsheets (Taken 8/3/2023 1351)  Free From Fall Injury: Instruct family/caregiver on patient safety  Note: Patient verbalizes understanding of fall precautions. Patient free from falls this visit. Problem: Discharge Planning  Goal: Discharge to home or other facility with appropriate resources  Outcome: Adequate for Discharge  Flowsheets (Taken 8/3/2023 1351)  Discharge to home or other facility with appropriate resources: Identify barriers to discharge with patient and caregiver  Note: Verbalized understanding of discharge instructions, follow-up appointments, and when to call the physician. Care plan reviewed with patient. Patient verbalizes understanding of the plan of care and contribute to goal setting.

## 2023-08-03 NOTE — PROGRESS NOTES
Patient tolerated fulphila without any complications. Patient verbalizes understanding of discharge instructions, ambulated off unit per self with belongings.

## 2023-08-03 NOTE — DISCHARGE INSTRUCTIONS
Please contact your Oncologist if you have any questions regarding the fulphila that you received today. Patient instructed if experience any of the symptoms following today's injection / to notify MD immediately or go to emergency department.     * dizziness/lightheadedness  *acute nausea/vomiting - not relieved with medication  *headache - not relieved from Tylenol/pain medication  *chest pain/pressure  *rash/itching  *shortness of breath        Drink fluids - 48oz fluids daily  Call if develop fever/ chills/ signs or symptoms of infection

## 2023-08-04 DIAGNOSIS — C50.412 CARCINOMA OF UPPER-OUTER QUADRANT OF LEFT BREAST IN FEMALE, ESTROGEN RECEPTOR POSITIVE (HCC): Primary | ICD-10-CM

## 2023-08-04 DIAGNOSIS — Z40.01 ENCOUNTER FOR PROPHYLACTIC REMOVAL OF RIGHT BREAST: ICD-10-CM

## 2023-08-04 DIAGNOSIS — Z17.0 CARCINOMA OF UPPER-OUTER QUADRANT OF LEFT BREAST IN FEMALE, ESTROGEN RECEPTOR POSITIVE (HCC): Primary | ICD-10-CM

## 2023-08-04 PROBLEM — Z15.09 BRCA2 GENE MUTATION POSITIVE: Status: ACTIVE | Noted: 2023-08-04

## 2023-08-04 PROBLEM — Z15.01 BRCA2 GENE MUTATION POSITIVE: Status: ACTIVE | Noted: 2023-08-04

## 2023-08-04 PROBLEM — Z15.01 BRCA1 GENE MUTATION POSITIVE: Status: ACTIVE | Noted: 2023-08-04

## 2023-08-04 PROBLEM — Z15.09 BRCA1 GENE MUTATION POSITIVE: Status: ACTIVE | Noted: 2023-08-04

## 2023-08-14 NOTE — PLAN OF CARE
Problem: Chronic Conditions and Co-morbidities  Goal: Patient's chronic conditions and co-morbidity symptoms are monitored and maintained or improved  Outcome: Progressing  Flowsheets (Taken 6/13/2023 1018)  Care Plan - Patient's Chronic Conditions and Co-Morbidity Symptoms are Monitored and Maintained or Improved: Monitor and assess patient's chronic conditions and comorbid symptoms for stability, deterioration, or improvement  Note: Patient instructed on zeixtenzo injction     Problem: Safety - Adult  Goal: Free from fall injury  Outcome: Progressing  Flowsheets (Taken 6/13/2023 1018)  Free From Fall Injury: Instruct family/caregiver on patient safety  Note: No falls occurred with visit today. Problem: Discharge Planning  Goal: Discharge to home or other facility with appropriate resources  Outcome: Progressing  Flowsheets (Taken 6/13/2023 1018)  Discharge to home or other facility with appropriate resources: Identify barriers to discharge with patient and caregiver  Note: Verbalize understanding of discharge instructions, follow up appointments, and when to call Physician. Care plan reviewed with patient. Patient verbalizes understanding of the plan of care and contributes to goal setting. Deteriorating patient status - Patient was clinically upgraded due to deteriorating patient status.

## 2023-08-20 NOTE — PROGRESS NOTES
Left message for patient to call back regarding test results.
smaller (<1 cm in dimension) ER positive [place] positive HER2 negative ductal carcinoma, cT1, cN0 cM0= stage IA currently on neoadjuvant TCHP. -Plan for neoadjuvant systemic therapy prior to surgery  -Previous interdisciplinary discussion regarding getting genetic testing (4/10/2020), breast MRI (4/7/93), neoadjuvant chemotherapy (TCHP), mastectomy with sentinel lymph node mapping followed by radiation and AI for 5 years    We previously discussed the effects of chemotherapy with the patient up to and including but not inclusive: Myelosuppression increasing risk of infection, anemia, thrombocytopenia and patient may require blood transfusions, myocardial toxicity up to congestive heart failure, peripheral swelling, chemotherapy reaction, renal dysfunction, hepatic dysfunction, peripheral neuropathy, rash, nausea, vomiting, diarrhea, constipation, alopecia, anaphylactic reactions up to death, and fetal abnormalities. Trastuzumab and its biosimilars have increased risk of cardiotoxicity up to cardiac failure (will require frequent Echos-first 4/25/23 normal EF 55%), dyspnea, peripheral edema and can cause fetal abnormalities during pregnancy. Pertuzumab in particular has increased risk of diarrhea, myelosuppression, rash and fatigue. The patient will likely require Neulasta or bio similar to decrease risk of infection during this treatment side effects including not limited to bone aches, nausea, flushing, and rarely ARDS and splenic rupture. -s/p Mediport for therapy. (Got 4/24/23)  -Has EMLA cream    We previously discussed this therapy can be used in the neoadjuvant setting in which up to 60% of patients will show complete pathologic response, but there is no difference in mortality if we use this systemic therapy before or after her surgery.     Pt had bone scan 5/15/23 for arthralgias by PCP-confirmed degenerative disease, no areas concerning for metastatic disease noted    Previously discussed with patient

## 2023-08-23 ENCOUNTER — HOSPITAL ENCOUNTER (OUTPATIENT)
Dept: INFUSION THERAPY | Age: 53
Discharge: HOME | End: 2023-08-23
Payer: COMMERCIAL

## 2023-08-23 ENCOUNTER — OFFICE VISIT (OUTPATIENT)
Dept: ONCOLOGY | Age: 53
End: 2023-08-23
Payer: COMMERCIAL

## 2023-08-23 VITALS
HEIGHT: 65 IN | SYSTOLIC BLOOD PRESSURE: 121 MMHG | WEIGHT: 201 LBS | HEART RATE: 81 BPM | TEMPERATURE: 98.7 F | BODY MASS INDEX: 33.49 KG/M2 | RESPIRATION RATE: 18 BRPM | OXYGEN SATURATION: 94 % | DIASTOLIC BLOOD PRESSURE: 68 MMHG

## 2023-08-23 VITALS
DIASTOLIC BLOOD PRESSURE: 73 MMHG | SYSTOLIC BLOOD PRESSURE: 122 MMHG | HEART RATE: 87 BPM | WEIGHT: 201 LBS | TEMPERATURE: 97.9 F | OXYGEN SATURATION: 94 % | HEIGHT: 65 IN | RESPIRATION RATE: 16 BRPM | BODY MASS INDEX: 33.49 KG/M2

## 2023-08-23 DIAGNOSIS — C50.412 CARCINOMA OF UPPER-OUTER QUADRANT OF LEFT BREAST IN FEMALE, ESTROGEN RECEPTOR POSITIVE (HCC): Primary | ICD-10-CM

## 2023-08-23 DIAGNOSIS — Z15.02 BRCA1 GENE MUTATION POSITIVE IN FEMALE: ICD-10-CM

## 2023-08-23 DIAGNOSIS — Z17.0 CARCINOMA OF UPPER-OUTER QUADRANT OF LEFT BREAST IN FEMALE, ESTROGEN RECEPTOR POSITIVE (HCC): Primary | ICD-10-CM

## 2023-08-23 DIAGNOSIS — Z15.02 BRCA2 GENE MUTATION POSITIVE IN FEMALE: ICD-10-CM

## 2023-08-23 DIAGNOSIS — E87.6 HYPOKALEMIA DUE TO EXCESSIVE GASTROINTESTINAL LOSS OF POTASSIUM: ICD-10-CM

## 2023-08-23 DIAGNOSIS — Z15.01 BRCA2 GENE MUTATION POSITIVE IN FEMALE: ICD-10-CM

## 2023-08-23 DIAGNOSIS — Z15.09 BRCA1 GENE MUTATION POSITIVE IN FEMALE: ICD-10-CM

## 2023-08-23 DIAGNOSIS — C50.912 INVASIVE DUCTAL CARCINOMA OF BREAST, FEMALE, LEFT (HCC): ICD-10-CM

## 2023-08-23 DIAGNOSIS — Z15.01 BRCA1 GENE MUTATION POSITIVE IN FEMALE: ICD-10-CM

## 2023-08-23 DIAGNOSIS — Z15.09 BRCA2 GENE MUTATION POSITIVE IN FEMALE: ICD-10-CM

## 2023-08-23 LAB
ABSOLUTE IMMATURE GRANULOCYTE: 0.01 THOU/MM3 (ref 0–0.07)
ALBUMIN SERPL BCG-MCNC: 3.9 G/DL (ref 3.5–5.1)
ALP SERPL-CCNC: 108 U/L (ref 38–126)
ALT SERPL W/O P-5'-P-CCNC: 14 U/L (ref 11–66)
AST SERPL-CCNC: 15 U/L (ref 5–40)
BASOPHILS ABSOLUTE: 0 THOU/MM3 (ref 0–0.1)
BASOPHILS NFR BLD AUTO: 0 % (ref 0–3)
BILIRUB CONJ SERPL-MCNC: < 0.2 MG/DL (ref 0–0.3)
BILIRUB SERPL-MCNC: 0.2 MG/DL (ref 0.3–1.2)
BUN BLDP-MCNC: 13 MG/DL (ref 8–26)
CHLORIDE BLD-SCNC: 106 MEQ/L (ref 98–109)
CREAT BLD-MCNC: 0.5 MG/DL (ref 0.5–1.2)
EOSINOPHIL NFR BLD AUTO: 0 % (ref 0–4)
EOSINOPHILS ABSOLUTE: 0 THOU/MM3 (ref 0–0.4)
ERYTHROCYTE [DISTWIDTH] IN BLOOD BY AUTOMATED COUNT: 16.3 % (ref 11.5–14.5)
GFR SERPL CREATININE-BSD FRML MDRD: > 60 ML/MIN/1.73M2
GLUCOSE BLD-MCNC: 135 MG/DL (ref 70–108)
HCT VFR BLD AUTO: 29.9 % (ref 37–47)
HGB BLD-MCNC: 9.9 GM/DL (ref 12–16)
IMMATURE GRANULOCYTES: 0 %
IONIZED CALCIUM, WHOLE BLOOD: 1.17 MMOL/L (ref 1.12–1.32)
LYMPHOCYTES ABSOLUTE: 1.9 THOU/MM3 (ref 1–4.8)
LYMPHOCYTES NFR BLD AUTO: 37 % (ref 15–47)
MAGNESIUM SERPL-MCNC: 1.3 MG/DL (ref 1.6–2.4)
MCH RBC QN AUTO: 31.4 PG (ref 26–33)
MCHC RBC AUTO-ENTMCNC: 33.1 GM/DL (ref 32.2–35.5)
MCV RBC AUTO: 95 FL (ref 81–99)
MONOCYTES ABSOLUTE: 0.3 THOU/MM3 (ref 0.4–1.3)
MONOCYTES NFR BLD AUTO: 6 % (ref 0–12)
NEUTROPHILS NFR BLD AUTO: 56 % (ref 43–75)
PLATELET # BLD AUTO: 162 THOU/MM3 (ref 130–400)
PMV BLD AUTO: 9.9 FL (ref 9.4–12.4)
POTASSIUM BLD-SCNC: 3 MEQ/L (ref 3.5–4.9)
PROT SERPL-MCNC: 6.9 G/DL (ref 6.1–8)
RBC # BLD AUTO: 3.15 MILL/MM3 (ref 4.2–5.4)
SEGMENTED NEUTROPHILS ABSOLUTE COUNT: 2.9 THOU/MM3 (ref 1.8–7.7)
SODIUM BLD-SCNC: 144 MEQ/L (ref 138–146)
TOTAL CO2, WHOLE BLOOD: 26 MEQ/L (ref 23–33)
WBC # BLD AUTO: 5.1 THOU/MM3 (ref 4.8–10.8)

## 2023-08-23 PROCEDURE — 96375 TX/PRO/DX INJ NEW DRUG ADDON: CPT

## 2023-08-23 PROCEDURE — 6370000000 HC RX 637 (ALT 250 FOR IP): Performed by: INTERNAL MEDICINE

## 2023-08-23 PROCEDURE — 96367 TX/PROPH/DG ADDL SEQ IV INF: CPT

## 2023-08-23 PROCEDURE — 83735 ASSAY OF MAGNESIUM: CPT

## 2023-08-23 PROCEDURE — 96417 CHEMO IV INFUS EACH ADDL SEQ: CPT

## 2023-08-23 PROCEDURE — 99211 OFF/OP EST MAY X REQ PHY/QHP: CPT

## 2023-08-23 PROCEDURE — 6360000002 HC RX W HCPCS: Performed by: INTERNAL MEDICINE

## 2023-08-23 PROCEDURE — 96413 CHEMO IV INFUSION 1 HR: CPT

## 2023-08-23 PROCEDURE — 80076 HEPATIC FUNCTION PANEL: CPT

## 2023-08-23 PROCEDURE — 99214 OFFICE O/P EST MOD 30 MIN: CPT | Performed by: INTERNAL MEDICINE

## 2023-08-23 PROCEDURE — 85025 COMPLETE CBC W/AUTO DIFF WBC: CPT

## 2023-08-23 PROCEDURE — 80047 BASIC METABLC PNL IONIZED CA: CPT

## 2023-08-23 PROCEDURE — 2580000003 HC RX 258: Performed by: INTERNAL MEDICINE

## 2023-08-23 PROCEDURE — 36591 DRAW BLOOD OFF VENOUS DEVICE: CPT

## 2023-08-23 RX ORDER — HEPARIN 100 UNIT/ML
500 SYRINGE INTRAVENOUS PRN
Status: DISCONTINUED | OUTPATIENT
Start: 2023-08-23 | End: 2023-08-24 | Stop reason: HOSPADM

## 2023-08-23 RX ORDER — FAMOTIDINE 10 MG/ML
20 INJECTION, SOLUTION INTRAVENOUS
Status: CANCELLED | OUTPATIENT
Start: 2023-08-23

## 2023-08-23 RX ORDER — SODIUM CHLORIDE 0.9 % (FLUSH) 0.9 %
5-40 SYRINGE (ML) INJECTION PRN
Status: CANCELLED | OUTPATIENT
Start: 2023-08-23

## 2023-08-23 RX ORDER — ALBUTEROL SULFATE 90 UG/1
4 AEROSOL, METERED RESPIRATORY (INHALATION) PRN
Status: CANCELLED | OUTPATIENT
Start: 2023-08-23

## 2023-08-23 RX ORDER — DIPHENHYDRAMINE HYDROCHLORIDE 50 MG/ML
50 INJECTION INTRAMUSCULAR; INTRAVENOUS
Status: CANCELLED | OUTPATIENT
Start: 2023-08-23

## 2023-08-23 RX ORDER — SODIUM CHLORIDE 9 MG/ML
5-250 INJECTION, SOLUTION INTRAVENOUS PRN
Status: CANCELLED | OUTPATIENT
Start: 2023-08-23

## 2023-08-23 RX ORDER — ONDANSETRON 2 MG/ML
8 INJECTION INTRAMUSCULAR; INTRAVENOUS
Status: CANCELLED | OUTPATIENT
Start: 2023-08-23

## 2023-08-23 RX ORDER — PALONOSETRON 0.05 MG/ML
0.25 INJECTION, SOLUTION INTRAVENOUS ONCE
Status: CANCELLED | OUTPATIENT
Start: 2023-08-23 | End: 2023-08-23

## 2023-08-23 RX ORDER — ACETAMINOPHEN 325 MG/1
650 TABLET ORAL
Status: CANCELLED | OUTPATIENT
Start: 2023-08-23

## 2023-08-23 RX ORDER — POTASSIUM CHLORIDE 29.8 MG/ML
20 INJECTION INTRAVENOUS ONCE
Status: COMPLETED | OUTPATIENT
Start: 2023-08-23 | End: 2023-08-23

## 2023-08-23 RX ORDER — EPINEPHRINE 1 MG/ML
0.3 INJECTION, SOLUTION, CONCENTRATE INTRAVENOUS PRN
Status: CANCELLED | OUTPATIENT
Start: 2023-08-23

## 2023-08-23 RX ORDER — POTASSIUM CHLORIDE 20 MEQ/1
20 TABLET, EXTENDED RELEASE ORAL ONCE
Status: COMPLETED | OUTPATIENT
Start: 2023-08-23 | End: 2023-08-23

## 2023-08-23 RX ORDER — PALONOSETRON 0.05 MG/ML
0.25 INJECTION, SOLUTION INTRAVENOUS ONCE
Status: COMPLETED | OUTPATIENT
Start: 2023-08-23 | End: 2023-08-23

## 2023-08-23 RX ORDER — MEPERIDINE HYDROCHLORIDE 50 MG/ML
12.5 INJECTION INTRAMUSCULAR; INTRAVENOUS; SUBCUTANEOUS PRN
Status: CANCELLED | OUTPATIENT
Start: 2023-08-23

## 2023-08-23 RX ORDER — EPINEPHRINE 1 MG/ML
0.3 INJECTION, SOLUTION INTRAMUSCULAR; SUBCUTANEOUS PRN
Status: CANCELLED | OUTPATIENT
Start: 2023-08-23

## 2023-08-23 RX ORDER — SODIUM CHLORIDE 0.9 % (FLUSH) 0.9 %
5-40 SYRINGE (ML) INJECTION PRN
Status: DISCONTINUED | OUTPATIENT
Start: 2023-08-23 | End: 2023-08-24 | Stop reason: HOSPADM

## 2023-08-23 RX ORDER — HEPARIN SODIUM (PORCINE) LOCK FLUSH IV SOLN 100 UNIT/ML 100 UNIT/ML
500 SOLUTION INTRAVENOUS PRN
Status: CANCELLED | OUTPATIENT
Start: 2023-08-23

## 2023-08-23 RX ORDER — SODIUM CHLORIDE 9 MG/ML
5-250 INJECTION, SOLUTION INTRAVENOUS PRN
Status: DISCONTINUED | OUTPATIENT
Start: 2023-08-23 | End: 2023-08-24 | Stop reason: HOSPADM

## 2023-08-23 RX ORDER — HEPARIN 100 UNIT/ML
500 SYRINGE INTRAVENOUS PRN
Status: CANCELLED | OUTPATIENT
Start: 2023-08-23

## 2023-08-23 RX ORDER — SODIUM CHLORIDE 9 MG/ML
INJECTION, SOLUTION INTRAVENOUS CONTINUOUS
Status: CANCELLED | OUTPATIENT
Start: 2023-08-23

## 2023-08-23 RX ADMIN — CARBOPLATIN 655.5 MG: 10 INJECTION, SOLUTION INTRAVENOUS at 12:53

## 2023-08-23 RX ADMIN — PALONOSETRON 0.25 MG: 0.05 INJECTION, SOLUTION INTRAVENOUS at 09:25

## 2023-08-23 RX ADMIN — DOCETAXEL ANHYDROUS 157 MG: 10 INJECTION, SOLUTION INTRAVENOUS at 11:50

## 2023-08-23 RX ADMIN — SODIUM CHLORIDE, PRESERVATIVE FREE 10 ML: 5 INJECTION INTRAVENOUS at 14:10

## 2023-08-23 RX ADMIN — SODIUM CHLORIDE 25 ML/HR: 9 INJECTION, SOLUTION INTRAVENOUS at 09:16

## 2023-08-23 RX ADMIN — HEPARIN 500 UNITS: 100 SYRINGE at 14:10

## 2023-08-23 RX ADMIN — SODIUM CHLORIDE, PRESERVATIVE FREE 10 ML: 5 INJECTION INTRAVENOUS at 08:33

## 2023-08-23 RX ADMIN — POTASSIUM CHLORIDE 20 MEQ: 1500 TABLET, EXTENDED RELEASE ORAL at 09:29

## 2023-08-23 RX ADMIN — SODIUM CHLORIDE 567 MG: 9 INJECTION, SOLUTION INTRAVENOUS at 10:37

## 2023-08-23 RX ADMIN — PERTUZUMAB 420 MG: 30 INJECTION, SOLUTION, CONCENTRATE INTRAVENOUS at 11:13

## 2023-08-23 RX ADMIN — SODIUM CHLORIDE, PRESERVATIVE FREE 20 ML: 5 INJECTION INTRAVENOUS at 08:34

## 2023-08-23 RX ADMIN — POTASSIUM CHLORIDE 20 MEQ: 29.8 INJECTION, SOLUTION INTRAVENOUS at 09:29

## 2023-08-23 RX ADMIN — FOSAPREPITANT 150 MG: 150 INJECTION, POWDER, LYOPHILIZED, FOR SOLUTION INTRAVENOUS at 09:57

## 2023-08-23 RX ADMIN — DEXAMETHASONE SODIUM PHOSPHATE 12 MG: 4 INJECTION, SOLUTION INTRA-ARTICULAR; INTRALESIONAL; INTRAMUSCULAR; INTRAVENOUS; SOFT TISSUE at 09:25

## 2023-08-23 ASSESSMENT — PAIN SCALES - GENERAL: PAINLEVEL_OUTOF10: 4

## 2023-08-23 ASSESSMENT — PAIN DESCRIPTION - LOCATION: LOCATION: BACK

## 2023-08-23 NOTE — PATIENT INSTRUCTIONS
Treatment today with extra potassium  Sees Dr Maci Duran 9/14- at 9 AM please schedule follow up Dr Gable Halsted also on 9/14 and tentative HP only for somewhere around 11-1 as her surgery isn't until 10/11

## 2023-08-23 NOTE — PLAN OF CARE
Problem: Chronic Conditions and Co-morbidities  Goal: Patient's chronic conditions and co-morbidity symptoms are monitored and maintained or improved  Outcome: Adequate for Discharge  Flowsheets (Taken 8/23/2023 1146)  Care Plan - Patient's Chronic Conditions and Co-Morbidity Symptoms are Monitored and Maintained or Improved: Monitor and assess patient's chronic conditions and comorbid symptoms for stability, deterioration, or improvement  Note: Chemotherapy Teaching     What is Chemotherapy   Drug action [x]   Method of Administration [x]   Handouts given []     Side Effects  Nausea/vomiting [x]   Diarrhea [x]   Fatigue [x]   Signs / Symptoms of infection [x]   Neutropenia [x]   Thrombocytopenia [x]   Alopecia [x]   neuropathy [x]   [place] diet &  the importance of fluids [x]       Micellaneous  Importance of nutrition [x]   Importance of oral hygiene [x]   When to call the MD [x]   Monitoring labs [x]   Use of supportive services []     Explanation of Drug Regimen / Frequency  Trazimere, perjeta, taxotere, and carboplatin     Comments  Verbalized understanding to drug,action,side effects and when to call MD         Problem: Safety - Adult  Goal: Free from fall injury  Outcome: Adequate for Discharge  Flowsheets (Taken 8/23/2023 1146)  Free From Fall Injury: Instruct family/caregiver on patient safety  Note: Free from falls while in [facility]. Oncology. Discussed the need to use the call light for assistance when getting up to ambulate. Problem: Discharge Planning  Goal: Discharge to home or other facility with appropriate resources  Outcome: Adequate for Discharge  Flowsheets (Taken 8/23/2023 1146)  Discharge to home or other facility with appropriate resources: Identify barriers to discharge with patient and caregiver  Note: Verbalize understanding of discharge instructions, follow up appointments, and when to call Physician. Care plan reviewed with patient.   Patient verbalizes understanding of the plan of care and contribute to goal setting.

## 2023-08-24 ENCOUNTER — HOSPITAL ENCOUNTER (OUTPATIENT)
Dept: INFUSION THERAPY | Age: 53
Discharge: HOME | End: 2023-08-24
Payer: COMMERCIAL

## 2023-08-24 VITALS
DIASTOLIC BLOOD PRESSURE: 84 MMHG | HEART RATE: 89 BPM | SYSTOLIC BLOOD PRESSURE: 127 MMHG | RESPIRATION RATE: 16 BRPM | OXYGEN SATURATION: 97 % | TEMPERATURE: 97.8 F

## 2023-08-24 DIAGNOSIS — C50.412 CARCINOMA OF UPPER-OUTER QUADRANT OF LEFT BREAST IN FEMALE, ESTROGEN RECEPTOR POSITIVE (HCC): Primary | ICD-10-CM

## 2023-08-24 DIAGNOSIS — Z17.0 CARCINOMA OF UPPER-OUTER QUADRANT OF LEFT BREAST IN FEMALE, ESTROGEN RECEPTOR POSITIVE (HCC): Primary | ICD-10-CM

## 2023-08-24 DIAGNOSIS — C50.912 INVASIVE DUCTAL CARCINOMA OF BREAST, FEMALE, LEFT (HCC): ICD-10-CM

## 2023-08-24 PROCEDURE — 96372 THER/PROPH/DIAG INJ SC/IM: CPT

## 2023-08-24 PROCEDURE — 6360000002 HC RX W HCPCS: Performed by: INTERNAL MEDICINE

## 2023-08-24 RX ADMIN — PEGFILGRASTIM 6 MG: 6 INJECTION SUBCUTANEOUS at 13:58

## 2023-08-24 NOTE — PLAN OF CARE
Problem: Chronic Conditions and Co-morbidities  Goal: Patient's chronic conditions and co-morbidity symptoms are monitored and maintained or improved  Outcome: Adequate for Discharge  Flowsheets (Taken 8/24/2023 0049)  Care Plan - Patient's Chronic Conditions and Co-Morbidity Symptoms are Monitored and Maintained or Improved: Monitor and assess patient's chronic conditions and comorbid symptoms for stability, deterioration, or improvement  Note: Patient verbalizes understanding to verbal information given on fulphila injection, action and possible side effects. Aware to call MD if develop complications. Problem: Safety - Adult  Goal: Free from fall injury  Outcome: Adequate for Discharge  Flowsheets (Taken 8/24/2023 5939)  Free From Fall Injury: Instruct family/caregiver on patient safety  Note: Free from falls while in [facility] Oncology. Discussed the need to use the call light for assistance when getting up to ambulate. Problem: Discharge Planning  Goal: Discharge to home or other facility with appropriate resources  Outcome: Adequate for Discharge  Flowsheets (Taken 8/24/2023 0039)  Discharge to home or other facility with appropriate resources: Identify barriers to discharge with patient and caregiver  Note: Verbalize understanding of discharge instructions, follow up appointments, and when to call Physician. Care plan reviewed with patient. Patient verbalizes understanding of the plan of care and contribute to goal setting.

## 2023-08-24 NOTE — DISCHARGE INSTRUCTIONS
Please contact your Oncologist if you have any questions regarding the Fulphila injection that you received today. Patient instructed if experience any of the symptoms following today's Fulphila injection to notify MD immediately or go to emergency department.     * dizziness/lightheadedness  *acute nausea/vomiting - not relieved with medication  *headache - not relieved from Tylenol/pain medication  *chest pain/pressure  *rash/itching  *shortness of breath        Drink fluids - 48oz fluids daily  Call if develop fever/ chills/ signs or symptoms of infection

## 2023-08-24 NOTE — PROGRESS NOTES
Patient tolerated fulphila injection without any complications. Discharge instructions given to patient-verbalizes understanding. Ambulated off unit per self with belongings.

## 2023-08-31 ENCOUNTER — HOSPITAL ENCOUNTER (OUTPATIENT)
Dept: INFUSION THERAPY | Age: 53
Discharge: HOME | End: 2023-08-31
Payer: COMMERCIAL

## 2023-08-31 ENCOUNTER — TELEPHONE (OUTPATIENT)
Dept: ONCOLOGY | Age: 53
End: 2023-08-31

## 2023-08-31 VITALS
RESPIRATION RATE: 16 BRPM | HEART RATE: 87 BPM | OXYGEN SATURATION: 98 % | TEMPERATURE: 98.3 F | SYSTOLIC BLOOD PRESSURE: 112 MMHG | HEIGHT: 65 IN | BODY MASS INDEX: 32.79 KG/M2 | DIASTOLIC BLOOD PRESSURE: 63 MMHG | WEIGHT: 196.8 LBS

## 2023-08-31 DIAGNOSIS — C50.412 CARCINOMA OF UPPER-OUTER QUADRANT OF LEFT BREAST IN FEMALE, ESTROGEN RECEPTOR POSITIVE (HCC): Primary | ICD-10-CM

## 2023-08-31 DIAGNOSIS — Z17.0 CARCINOMA OF UPPER-OUTER QUADRANT OF LEFT BREAST IN FEMALE, ESTROGEN RECEPTOR POSITIVE (HCC): Primary | ICD-10-CM

## 2023-08-31 LAB
ANION GAP SERPL CALC-SCNC: 16 MEQ/L (ref 8–16)
BUN SERPL-MCNC: 13 MG/DL (ref 7–22)
CALCIUM SERPL-MCNC: 9.8 MG/DL (ref 8.5–10.5)
CHLORIDE SERPL-SCNC: 95 MEQ/L (ref 98–111)
CO2 SERPL-SCNC: 26 MEQ/L (ref 23–33)
CREAT SERPL-MCNC: 0.8 MG/DL (ref 0.4–1.2)
GFR SERPL CREATININE-BSD FRML MDRD: > 60 ML/MIN/1.73M2
GLUCOSE SERPL-MCNC: 130 MG/DL (ref 70–108)
MAGNESIUM SERPL-MCNC: 1.6 MG/DL (ref 1.6–2.4)
POTASSIUM SERPL-SCNC: 2.9 MEQ/L (ref 3.5–5.2)
SODIUM SERPL-SCNC: 137 MEQ/L (ref 135–145)

## 2023-08-31 PROCEDURE — 99211 OFF/OP EST MAY X REQ PHY/QHP: CPT

## 2023-08-31 PROCEDURE — 80048 BASIC METABOLIC PNL TOTAL CA: CPT

## 2023-08-31 PROCEDURE — 36591 DRAW BLOOD OFF VENOUS DEVICE: CPT

## 2023-08-31 PROCEDURE — 6370000000 HC RX 637 (ALT 250 FOR IP): Performed by: INTERNAL MEDICINE

## 2023-08-31 PROCEDURE — 6360000002 HC RX W HCPCS: Performed by: INTERNAL MEDICINE

## 2023-08-31 PROCEDURE — 96361 HYDRATE IV INFUSION ADD-ON: CPT

## 2023-08-31 PROCEDURE — 83735 ASSAY OF MAGNESIUM: CPT

## 2023-08-31 PROCEDURE — 2580000003 HC RX 258: Performed by: INTERNAL MEDICINE

## 2023-08-31 PROCEDURE — 96365 THER/PROPH/DIAG IV INF INIT: CPT

## 2023-08-31 RX ORDER — EPINEPHRINE 1 MG/ML
0.3 INJECTION, SOLUTION INTRAMUSCULAR; SUBCUTANEOUS PRN
OUTPATIENT
Start: 2023-08-31

## 2023-08-31 RX ORDER — 0.9 % SODIUM CHLORIDE 0.9 %
1000 INTRAVENOUS SOLUTION INTRAVENOUS ONCE
Status: COMPLETED | OUTPATIENT
Start: 2023-08-31 | End: 2023-08-31

## 2023-08-31 RX ORDER — SODIUM CHLORIDE 9 MG/ML
INJECTION, SOLUTION INTRAVENOUS CONTINUOUS
OUTPATIENT
Start: 2023-08-31

## 2023-08-31 RX ORDER — DIPHENHYDRAMINE HYDROCHLORIDE 50 MG/ML
50 INJECTION INTRAMUSCULAR; INTRAVENOUS
OUTPATIENT
Start: 2023-08-31

## 2023-08-31 RX ORDER — HEPARIN 100 UNIT/ML
500 SYRINGE INTRAVENOUS PRN
OUTPATIENT
Start: 2023-08-31

## 2023-08-31 RX ORDER — SODIUM CHLORIDE 9 MG/ML
5-250 INJECTION, SOLUTION INTRAVENOUS PRN
OUTPATIENT
Start: 2023-08-31

## 2023-08-31 RX ORDER — POTASSIUM CHLORIDE 20 MEQ/1
40 TABLET, EXTENDED RELEASE ORAL ONCE
Status: COMPLETED | OUTPATIENT
Start: 2023-08-31 | End: 2023-08-31

## 2023-08-31 RX ORDER — SODIUM CHLORIDE 0.9 % (FLUSH) 0.9 %
5-40 SYRINGE (ML) INJECTION PRN
OUTPATIENT
Start: 2023-08-31

## 2023-08-31 RX ORDER — SODIUM CHLORIDE 0.9 % (FLUSH) 0.9 %
5-40 SYRINGE (ML) INJECTION PRN
Status: DISCONTINUED | OUTPATIENT
Start: 2023-08-31 | End: 2023-09-01 | Stop reason: HOSPADM

## 2023-08-31 RX ORDER — POTASSIUM CHLORIDE 29.8 MG/ML
20 INJECTION INTRAVENOUS ONCE
Status: COMPLETED | OUTPATIENT
Start: 2023-08-31 | End: 2023-08-31

## 2023-08-31 RX ORDER — ACETAMINOPHEN 325 MG/1
650 TABLET ORAL
OUTPATIENT
Start: 2023-08-31

## 2023-08-31 RX ORDER — ALBUTEROL SULFATE 90 UG/1
4 AEROSOL, METERED RESPIRATORY (INHALATION) PRN
OUTPATIENT
Start: 2023-08-31

## 2023-08-31 RX ORDER — ONDANSETRON 2 MG/ML
8 INJECTION INTRAMUSCULAR; INTRAVENOUS
OUTPATIENT
Start: 2023-08-31

## 2023-08-31 RX ORDER — HEPARIN 100 UNIT/ML
500 SYRINGE INTRAVENOUS PRN
Status: DISCONTINUED | OUTPATIENT
Start: 2023-08-31 | End: 2023-09-01 | Stop reason: HOSPADM

## 2023-08-31 RX ADMIN — POTASSIUM CHLORIDE 40 MEQ: 1500 TABLET, EXTENDED RELEASE ORAL at 10:54

## 2023-08-31 RX ADMIN — SODIUM CHLORIDE 1000 ML: 9 INJECTION, SOLUTION INTRAVENOUS at 09:39

## 2023-08-31 RX ADMIN — SODIUM CHLORIDE, PRESERVATIVE FREE 10 ML: 5 INJECTION INTRAVENOUS at 12:08

## 2023-08-31 RX ADMIN — POTASSIUM CHLORIDE 20 MEQ: 29.8 INJECTION, SOLUTION INTRAVENOUS at 10:54

## 2023-08-31 RX ADMIN — SODIUM CHLORIDE, PRESERVATIVE FREE 10 ML: 5 INJECTION INTRAVENOUS at 09:30

## 2023-08-31 RX ADMIN — Medication 500 UNITS: at 12:08

## 2023-08-31 RX ADMIN — SODIUM CHLORIDE, PRESERVATIVE FREE 20 ML: 5 INJECTION INTRAVENOUS at 09:31

## 2023-08-31 NOTE — PROGRESS NOTES
Patient here for IV hydration of 1000 ml over 1 hr,  Due to dizziness, orthostatic BP with tachycardia. Per Dr. Gable Halsted.

## 2023-08-31 NOTE — DISCHARGE INSTRUCTIONS
Please contact your Oncologist if you have any questions regarding the IV hydration and electrolyte replacement that you received today. Patient instructed if experience any of the symptoms following today's IV hydration or potassium replacement to notify MD immediately or go to emergency department. * dizziness/lightheadedness  *acute nausea/vomiting - not relieved with medication  *headache - not relieved from Tylenol/pain medication  *chest pain/pressure  *rash/itching  *shortness of breath        Drink fluids - 48oz fluids daily  Call if develop fever/ chills/ signs or symptoms of infection     Physician's instructions:   Patient positive for orthostatic hypotension. 1000 ml of NS over 1 hour. Labs: Magnesium and BMP  No Magnesium today. Potassium replacement IV and oral.  If symptoms persist call clinic in am for further hydration and possible electrolyte replacement.

## 2023-08-31 NOTE — ONCOLOGY
Patient had been having multiple diarrhea, still taking p.o. potassium pills at home, but has felt very dehydrated, lightheaded, dizzy, feeling like she was in a fall multiple times. Brought her in today for evaluation, orthostatics positive for tachycardia in the 110s on standing. Giving her 1 L normal saline, potassium came back below 3, will give her 60 mill equivalents today. Magnesium 1.6. Continue to monitor; pt advised to take antiemetics 30 to 60 minutes prior to trying to eat so she can get more food in, if she is not able to eat, then to hydrate very well. She is to call our office tomorrow morning if she still feeling poorly needs to come in for more fluids.     [address]

## 2023-08-31 NOTE — TELEPHONE ENCOUNTER
Patient called in with complaints of falling x2 yesterday. She said when she stood up she fell, and then after her shower she fell again. I spoke with Dr Angela Acosta and she recommended she come in for a nurse evaluation and possible fluids. Patient will be coming in this morning.

## 2023-08-31 NOTE — PLAN OF CARE
Problem: Chronic Conditions and Co-morbidities  Goal: Patient's chronic conditions and co-morbidity symptoms are monitored and maintained or improved  Outcome: Adequate for Discharge  Flowsheets (Taken 8/31/2023 1105)  Care Plan - Patient's Chronic Conditions and Co-Morbidity Symptoms are Monitored and Maintained or Improved: Monitor and assess patient's chronic conditions and comorbid symptoms for stability, deterioration, or improvement  Note: IV hydration and potassium replacement PO/IV reviewed, patient verbalizes understanding of medication being administered and potential side effects. Problem: Safety - Adult  Goal: Free from fall injury  Outcome: Adequate for Discharge  Flowsheets (Taken 8/31/2023 1105)  Free From Fall Injury: Instruct family/caregiver on patient safety     Problem: Discharge Planning  Goal: Discharge to home or other facility with appropriate resources  Outcome: Adequate for Discharge  Flowsheets (Taken 8/31/2023 1105)  Discharge to home or other facility with appropriate resources: Identify barriers to discharge with patient and caregiver     Problem: Infection - Adult  Goal: Absence of infection at discharge  Outcome: Adequate for Discharge  Flowsheets (Taken 8/31/2023 1105)  Absence of infection at discharge: Monitor all insertion sites i.e., indwelling lines, tubes and drains  Note: Mediport site with no redness or warmth. Skin over port site intact with no signs of breakdown noted. Patient verbalizes signs/symptoms of port infection and when to notify the physician. Care plan reviewed with patient. Patient verbalize understanding of the plan of care and contribute to goal setting.

## 2023-09-05 DIAGNOSIS — I10 PRIMARY HYPERTENSION: ICD-10-CM

## 2023-09-05 RX ORDER — BISOPROLOL FUMARATE AND HYDROCHLOROTHIAZIDE 5; 6.25 MG/1; MG/1
1 TABLET ORAL DAILY
Qty: 90 TABLET | Refills: 3 | Status: SHIPPED | OUTPATIENT
Start: 2023-09-05

## 2023-09-05 NOTE — TELEPHONE ENCOUNTER
Date of last visit:  6/30/2023  Date of next visit:  11/20/2023    Requested Prescriptions     Pending Prescriptions Disp Refills    bisoprolol-hydroCHLOROthiazide (ZIAC) 5-6.25 MG per tablet 90 tablet 3     Sig: Take 1 tablet by mouth daily

## 2023-09-06 NOTE — DISCHARGE INSTRUCTIONS
TCHP c1 today  Genetics counsellor this week  F/u with GYN about planning ovary removal after chemo is done  Bone scan 5/15  RTC 21 days with labs same day for tox check prior to C2 TCHP    Please contact your Oncologist if you have any questions regarding the chemotherapy Trazimera/Perjeta/Taxotere/Carboplatin that you received today. Patient instructed if experience any of the symptoms following today's chemotherapy / to notify MD immediately or go to emergency department.     * dizziness/lightheadedness  *acute nausea/vomiting - not relieved with medication  *headache - not relieved from Tylenol/pain medication  *chest pain/pressure  *rash/itching  *shortness of breath        Drink fluids - 48oz fluids daily  Call if develop fever/ chills/ signs or symptoms of infection
Attending Attestation (For Attendings USE Only)...

## 2023-09-07 ENCOUNTER — TELEMEDICINE (OUTPATIENT)
Dept: PSYCHIATRY | Age: 53
End: 2023-09-07
Payer: COMMERCIAL

## 2023-09-07 DIAGNOSIS — G89.29 OTHER CHRONIC PAIN: ICD-10-CM

## 2023-09-07 DIAGNOSIS — F41.9 ANXIETY: ICD-10-CM

## 2023-09-07 DIAGNOSIS — F33.42 RECURRENT MAJOR DEPRESSIVE DISORDER, IN FULL REMISSION (HCC): Primary | ICD-10-CM

## 2023-09-07 DIAGNOSIS — G47.00 INSOMNIA, UNSPECIFIED TYPE: ICD-10-CM

## 2023-09-07 PROCEDURE — 99213 OFFICE O/P EST LOW 20 MIN: CPT | Performed by: PSYCHIATRY & NEUROLOGY

## 2023-09-07 RX ORDER — DESVENLAFAXINE SUCCINATE 50 MG/1
50 TABLET, EXTENDED RELEASE ORAL DAILY
Qty: 30 TABLET | Refills: 5 | Status: SHIPPED | OUTPATIENT
Start: 2023-09-07

## 2023-09-07 RX ORDER — BUPROPION HYDROCHLORIDE 150 MG/1
150 TABLET ORAL EVERY MORNING
Qty: 30 TABLET | Refills: 5 | Status: SHIPPED | OUTPATIENT
Start: 2023-09-07

## 2023-09-07 RX ORDER — BUPROPION HYDROCHLORIDE 300 MG/1
300 TABLET ORAL EVERY MORNING
Qty: 30 TABLET | Refills: 5 | Status: SHIPPED | OUTPATIENT
Start: 2023-09-07 | End: 2024-03-05

## 2023-09-07 RX ORDER — DESVENLAFAXINE 100 MG/1
100 TABLET, EXTENDED RELEASE ORAL DAILY
Qty: 30 TABLET | Refills: 5 | Status: SHIPPED | OUTPATIENT
Start: 2023-09-07

## 2023-09-07 NOTE — PROGRESS NOTES
[address] PSYCHIATRY  [address] [facility] [address]  [phone]    Progress Note    Patient:  Angi Castaneda  YOB: 1970  PCP:  Lainey Rodriguze MD  Visit Date:  9/7/2023      Chief Complaint   Patient presents with    Follow-up    Medication Check    Anxiety    Depression    Insomnia       SUBJECTIVE:      Angi Castaneda, a 47 y. o. female, presents for a follow up visit. She presents alone. Mood is stable, fair. Feeling ill today. GI issues, nausea from chemothx. Finished her last treatment 2 weeks ago. Oct 11 will have full mastectomy and oophorectomy. Will have to do chemo or radiation after surgery. Her son was giving her the most support until he left for his johnny year at [employer]. Notes he was at [facility] last year. Big adjustment with him being away from home and he's doing well. Wants to join a fraternity. Looking forward to being around her grandkids more noting limiting exposure to germs through chemo. Doing well with current psych meds. Hasn't needed Ativan because she's not been leaving the house d/t not feeling well. Missed 2 days of meds and felt worse, tearful. Discussed that's likely discontinuation sxs from [address] [place] [address]. She declines to make any med changes today. Med Trials: Wellbutrin, Pristiq, Xanax, Viibryd, Abilify, Buspar, Prozac, Zoloft, Lexapro, Cymbalta, ? Effexor, Atarax    OBJECTIVE:  Vitals: LMP 01/01/2008     MENTAL STATUS EXAM:    GENERAL  Build: Normal    Hygiene:  Appropriate in casual dress   SENSORIUM Orientation: Place, Person, Time, & Situation     Consciousness: Alert    ATTENTION   Focused    RELATEDNESS  Cooperative    EYE CONTACT   Good    PSYCHOMOTOR  Normal    SPEECH Volume: Normal     Rate: Normal rate and tone    Amplitude:  Within normal limits   MOOD  Euthymic     AFFECT Range: Full, mood congruent, social smile present   THOUGHT Process:  Goal-Directed

## 2023-09-11 ENCOUNTER — TELEPHONE (OUTPATIENT)
Dept: SPIRITUAL SERVICES | Facility: CLINIC | Age: 53
End: 2023-09-11

## 2023-09-11 NOTE — TELEPHONE ENCOUNTER
As a follow up to spiritual care, this  tried to call Nelson Ramey and did not get an answer. Our phone number is left on her answering machine. We hope she will reach out to call us if needed.

## 2023-09-12 NOTE — PROGRESS NOTES
to kids so they can get testing    6) depression, chronic, could be exacerbated by cancer diagnosis  - Currently being treated by Dr Nazario Madison with Wellbutrin, desvenlafaxine, hydroxyzine   -pt will be meeting with her more regularly 2/2 cancer diagnosis and treatment  -Doing better now that she is actually getting treatment, she has more anxiety when she does not feel like she has control    7) hypokalemia: 2/2 infusions and associated diarrhea  -home PO potassium as she was needing IV a lot; pt wasn't very compliant but now says she'll be better  -IV +PO K today with chemo, continue PO K at home (has not been the best at compliance)      8) Medications reviewed. Prescriptions today:            No orders of the defined types were placed in this encounter. OARRS:  Controlled Substance Monitoring:    Acute and Chronic Pain Monitoring:   RX Monitoring Periodic Controlled Substance Monitoring   9/7/2023  11:00 AM Possible medication side effects, risk of tolerance/dependence & alternative treatments discussed. ;No signs of potential drug abuse or diversion identified. 9) Follow Up:  Return in about 7 weeks (around 10/31/2023) for labs Dr Dilia Tiwari .      Derik Horton [facility]

## 2023-09-14 ENCOUNTER — HOSPITAL ENCOUNTER (OUTPATIENT)
Dept: INFUSION THERAPY | Age: 53
Discharge: HOME | End: 2023-09-14
Payer: COMMERCIAL

## 2023-09-14 ENCOUNTER — CLINICAL DOCUMENTATION (OUTPATIENT)
Dept: CASE MANAGEMENT | Age: 53
End: 2023-09-14

## 2023-09-14 ENCOUNTER — OFFICE VISIT (OUTPATIENT)
Dept: ONCOLOGY | Age: 53
End: 2023-09-14
Payer: COMMERCIAL

## 2023-09-14 ENCOUNTER — OFFICE VISIT (OUTPATIENT)
Dept: SURGERY | Age: 53
End: 2023-09-14
Payer: COMMERCIAL

## 2023-09-14 VITALS
WEIGHT: 197 LBS | TEMPERATURE: 97.4 F | RESPIRATION RATE: 18 BRPM | SYSTOLIC BLOOD PRESSURE: 116 MMHG | OXYGEN SATURATION: 97 % | DIASTOLIC BLOOD PRESSURE: 80 MMHG | BODY MASS INDEX: 32.82 KG/M2 | HEIGHT: 65 IN | HEART RATE: 80 BPM

## 2023-09-14 VITALS
DIASTOLIC BLOOD PRESSURE: 74 MMHG | HEART RATE: 80 BPM | OXYGEN SATURATION: 99 % | RESPIRATION RATE: 18 BRPM | SYSTOLIC BLOOD PRESSURE: 121 MMHG | TEMPERATURE: 98 F | WEIGHT: 197.8 LBS | BODY MASS INDEX: 32.92 KG/M2

## 2023-09-14 VITALS
BODY MASS INDEX: 32.82 KG/M2 | OXYGEN SATURATION: 97 % | TEMPERATURE: 98.5 F | RESPIRATION RATE: 18 BRPM | HEIGHT: 65 IN | HEART RATE: 82 BPM | WEIGHT: 197 LBS | SYSTOLIC BLOOD PRESSURE: 113 MMHG | DIASTOLIC BLOOD PRESSURE: 68 MMHG

## 2023-09-14 DIAGNOSIS — Z15.01 BRCA1 GENE MUTATION POSITIVE: ICD-10-CM

## 2023-09-14 DIAGNOSIS — C50.412 CARCINOMA OF UPPER-OUTER QUADRANT OF LEFT BREAST IN FEMALE, ESTROGEN RECEPTOR POSITIVE (HCC): Primary | ICD-10-CM

## 2023-09-14 DIAGNOSIS — Z17.0 CARCINOMA OF UPPER-OUTER QUADRANT OF LEFT BREAST IN FEMALE, ESTROGEN RECEPTOR POSITIVE (HCC): Primary | ICD-10-CM

## 2023-09-14 DIAGNOSIS — F17.200 TOBACCO DEPENDENCE: ICD-10-CM

## 2023-09-14 DIAGNOSIS — Z15.09 BRCA2 GENE MUTATION POSITIVE: ICD-10-CM

## 2023-09-14 DIAGNOSIS — E11.9 TYPE 2 DIABETES MELLITUS TREATED WITHOUT INSULIN (HCC): ICD-10-CM

## 2023-09-14 DIAGNOSIS — C50.912 INVASIVE DUCTAL CARCINOMA OF BREAST, FEMALE, LEFT (HCC): ICD-10-CM

## 2023-09-14 DIAGNOSIS — T45.1X5A CHEMOTHERAPY INDUCED DIARRHEA: ICD-10-CM

## 2023-09-14 DIAGNOSIS — K52.1 CHEMOTHERAPY INDUCED DIARRHEA: ICD-10-CM

## 2023-09-14 DIAGNOSIS — Z15.09 BRCA1 GENE MUTATION POSITIVE: ICD-10-CM

## 2023-09-14 DIAGNOSIS — Z40.01 ENCOUNTER FOR PROPHYLACTIC REMOVAL OF RIGHT BREAST: ICD-10-CM

## 2023-09-14 DIAGNOSIS — Z15.01 BRCA2 GENE MUTATION POSITIVE: ICD-10-CM

## 2023-09-14 LAB
ABSOLUTE IMMATURE GRANULOCYTE: 0.03 THOU/MM3 (ref 0–0.07)
ALBUMIN SERPL BCG-MCNC: 4.1 G/DL (ref 3.5–5.1)
ALP SERPL-CCNC: 101 U/L (ref 38–126)
ALT SERPL W/O P-5'-P-CCNC: 10 U/L (ref 11–66)
AST SERPL-CCNC: 12 U/L (ref 5–40)
BASOPHILS ABSOLUTE: 0 THOU/MM3 (ref 0–0.1)
BASOPHILS NFR BLD AUTO: 0 % (ref 0–3)
BILIRUB CONJ SERPL-MCNC: < 0.2 MG/DL (ref 0–0.3)
BILIRUB SERPL-MCNC: 0.2 MG/DL (ref 0.3–1.2)
BUN BLDP-MCNC: 11 MG/DL (ref 8–26)
CHLORIDE BLD-SCNC: 107 MEQ/L (ref 98–109)
CREAT BLD-MCNC: 0.4 MG/DL (ref 0.5–1.2)
EOSINOPHIL NFR BLD AUTO: 0 % (ref 0–4)
EOSINOPHILS ABSOLUTE: 0 THOU/MM3 (ref 0–0.4)
ERYTHROCYTE [DISTWIDTH] IN BLOOD BY AUTOMATED COUNT: 16.7 % (ref 11.5–14.5)
GFR SERPL CREATININE-BSD FRML MDRD: > 60 ML/MIN/1.73M2
GLUCOSE BLD-MCNC: 89 MG/DL (ref 70–108)
HCT VFR BLD AUTO: 29.5 % (ref 37–47)
HGB BLD-MCNC: 9.8 GM/DL (ref 12–16)
IMMATURE GRANULOCYTES: 0 %
IONIZED CALCIUM, WHOLE BLOOD: 1.13 MMOL/L (ref 1.12–1.32)
LYMPHOCYTES ABSOLUTE: 2.4 THOU/MM3 (ref 1–4.8)
LYMPHOCYTES NFR BLD AUTO: 33 % (ref 15–47)
MCH RBC QN AUTO: 31.7 PG (ref 26–33)
MCHC RBC AUTO-ENTMCNC: 33.2 GM/DL (ref 32.2–35.5)
MCV RBC AUTO: 96 FL (ref 81–99)
MONOCYTES ABSOLUTE: 0.6 THOU/MM3 (ref 0.4–1.3)
MONOCYTES NFR BLD AUTO: 8 % (ref 0–12)
NEUTROPHILS NFR BLD AUTO: 59 % (ref 43–75)
PLATELET # BLD AUTO: 215 THOU/MM3 (ref 130–400)
PMV BLD AUTO: 9.6 FL (ref 9.4–12.4)
POTASSIUM BLD-SCNC: 2.6 MEQ/L (ref 3.5–4.9)
PROT SERPL-MCNC: 6.7 G/DL (ref 6.1–8)
RBC # BLD AUTO: 3.09 MILL/MM3 (ref 4.2–5.4)
SEGMENTED NEUTROPHILS ABSOLUTE COUNT: 4.4 THOU/MM3 (ref 1.8–7.7)
SODIUM BLD-SCNC: 146 MEQ/L (ref 138–146)
TOTAL CO2, WHOLE BLOOD: 27 MEQ/L (ref 23–33)
WBC # BLD AUTO: 7.5 THOU/MM3 (ref 4.8–10.8)

## 2023-09-14 PROCEDURE — 3044F HG A1C LEVEL LT 7.0%: CPT | Performed by: SURGERY

## 2023-09-14 PROCEDURE — 4004F PT TOBACCO SCREEN RCVD TLK: CPT | Performed by: SURGERY

## 2023-09-14 PROCEDURE — 96413 CHEMO IV INFUSION 1 HR: CPT

## 2023-09-14 PROCEDURE — 2022F DILAT RTA XM EVC RTNOPTHY: CPT | Performed by: SURGERY

## 2023-09-14 PROCEDURE — G8427 DOCREV CUR MEDS BY ELIG CLIN: HCPCS | Performed by: SURGERY

## 2023-09-14 PROCEDURE — 2580000003 HC RX 258: Performed by: INTERNAL MEDICINE

## 2023-09-14 PROCEDURE — 99214 OFFICE O/P EST MOD 30 MIN: CPT | Performed by: INTERNAL MEDICINE

## 2023-09-14 PROCEDURE — 36591 DRAW BLOOD OFF VENOUS DEVICE: CPT

## 2023-09-14 PROCEDURE — 99215 OFFICE O/P EST HI 40 MIN: CPT | Performed by: SURGERY

## 2023-09-14 PROCEDURE — 6360000002 HC RX W HCPCS: Performed by: INTERNAL MEDICINE

## 2023-09-14 PROCEDURE — 96367 TX/PROPH/DG ADDL SEQ IV INF: CPT

## 2023-09-14 PROCEDURE — 6370000000 HC RX 637 (ALT 250 FOR IP): Performed by: INTERNAL MEDICINE

## 2023-09-14 PROCEDURE — G8417 CALC BMI ABV UP PARAM F/U: HCPCS | Performed by: SURGERY

## 2023-09-14 PROCEDURE — 80047 BASIC METABLC PNL IONIZED CA: CPT

## 2023-09-14 PROCEDURE — 99211 OFF/OP EST MAY X REQ PHY/QHP: CPT

## 2023-09-14 PROCEDURE — 80076 HEPATIC FUNCTION PANEL: CPT

## 2023-09-14 PROCEDURE — 85025 COMPLETE CBC W/AUTO DIFF WBC: CPT

## 2023-09-14 PROCEDURE — 3017F COLORECTAL CA SCREEN DOC REV: CPT | Performed by: SURGERY

## 2023-09-14 PROCEDURE — 96417 CHEMO IV INFUS EACH ADDL SEQ: CPT

## 2023-09-14 RX ORDER — SODIUM CHLORIDE 9 MG/ML
5-250 INJECTION, SOLUTION INTRAVENOUS PRN
Status: CANCELLED | OUTPATIENT
Start: 2023-09-14

## 2023-09-14 RX ORDER — FAMOTIDINE 10 MG/ML
20 INJECTION, SOLUTION INTRAVENOUS
Status: CANCELLED | OUTPATIENT
Start: 2023-09-14

## 2023-09-14 RX ORDER — HEPARIN 100 UNIT/ML
500 SYRINGE INTRAVENOUS PRN
Status: DISCONTINUED | OUTPATIENT
Start: 2023-09-14 | End: 2023-09-15 | Stop reason: HOSPADM

## 2023-09-14 RX ORDER — POTASSIUM CHLORIDE 750 MG/1
40 TABLET, EXTENDED RELEASE ORAL ONCE
Status: CANCELLED
Start: 2023-09-14

## 2023-09-14 RX ORDER — HEPARIN 100 UNIT/ML
500 SYRINGE INTRAVENOUS PRN
OUTPATIENT
Start: 2023-09-14

## 2023-09-14 RX ORDER — ALBUTEROL SULFATE 90 UG/1
4 AEROSOL, METERED RESPIRATORY (INHALATION) PRN
Status: CANCELLED | OUTPATIENT
Start: 2023-09-14

## 2023-09-14 RX ORDER — SODIUM CHLORIDE 0.9 % (FLUSH) 0.9 %
5-40 SYRINGE (ML) INJECTION PRN
Status: DISCONTINUED | OUTPATIENT
Start: 2023-09-14 | End: 2023-09-15 | Stop reason: HOSPADM

## 2023-09-14 RX ORDER — ONDANSETRON 2 MG/ML
8 INJECTION INTRAMUSCULAR; INTRAVENOUS
Status: CANCELLED | OUTPATIENT
Start: 2023-09-14

## 2023-09-14 RX ORDER — POTASSIUM CHLORIDE 29.8 MG/ML
20 INJECTION INTRAVENOUS ONCE
Status: COMPLETED | OUTPATIENT
Start: 2023-09-14 | End: 2023-09-14

## 2023-09-14 RX ORDER — SODIUM CHLORIDE 9 MG/ML
25 INJECTION, SOLUTION INTRAVENOUS PRN
OUTPATIENT
Start: 2023-09-14

## 2023-09-14 RX ORDER — DIPHENHYDRAMINE HYDROCHLORIDE 50 MG/ML
50 INJECTION INTRAMUSCULAR; INTRAVENOUS
Status: CANCELLED | OUTPATIENT
Start: 2023-09-14

## 2023-09-14 RX ORDER — HEPARIN SODIUM (PORCINE) LOCK FLUSH IV SOLN 100 UNIT/ML 100 UNIT/ML
500 SOLUTION INTRAVENOUS PRN
Status: CANCELLED | OUTPATIENT
Start: 2023-09-14

## 2023-09-14 RX ORDER — POTASSIUM CHLORIDE 20 MEQ/1
40 TABLET, EXTENDED RELEASE ORAL ONCE
Status: COMPLETED | OUTPATIENT
Start: 2023-09-14 | End: 2023-09-14

## 2023-09-14 RX ORDER — ACETAMINOPHEN 325 MG/1
650 TABLET ORAL
Status: CANCELLED | OUTPATIENT
Start: 2023-09-14

## 2023-09-14 RX ORDER — POTASSIUM CHLORIDE 14.9 MG/ML
20 INJECTION INTRAVENOUS ONCE
Status: DISCONTINUED | OUTPATIENT
Start: 2023-09-14 | End: 2023-09-14 | Stop reason: CLARIF

## 2023-09-14 RX ORDER — SODIUM CHLORIDE 9 MG/ML
5-250 INJECTION, SOLUTION INTRAVENOUS PRN
Status: DISCONTINUED | OUTPATIENT
Start: 2023-09-14 | End: 2023-09-15 | Stop reason: HOSPADM

## 2023-09-14 RX ORDER — SODIUM CHLORIDE 0.9 % (FLUSH) 0.9 %
5-40 SYRINGE (ML) INJECTION PRN
Status: CANCELLED | OUTPATIENT
Start: 2023-09-14

## 2023-09-14 RX ORDER — EPINEPHRINE 1 MG/ML
0.3 INJECTION, SOLUTION, CONCENTRATE INTRAVENOUS PRN
Status: CANCELLED | OUTPATIENT
Start: 2023-09-14

## 2023-09-14 RX ORDER — POTASSIUM CHLORIDE 14.9 MG/ML
20 INJECTION INTRAVENOUS ONCE
Status: CANCELLED
Start: 2023-09-14

## 2023-09-14 RX ORDER — SODIUM CHLORIDE 9 MG/ML
INJECTION, SOLUTION INTRAVENOUS CONTINUOUS
Status: CANCELLED | OUTPATIENT
Start: 2023-09-14

## 2023-09-14 RX ORDER — SODIUM CHLORIDE 0.9 % (FLUSH) 0.9 %
5-40 SYRINGE (ML) INJECTION PRN
OUTPATIENT
Start: 2023-09-14

## 2023-09-14 RX ORDER — MEPERIDINE HYDROCHLORIDE 50 MG/ML
12.5 INJECTION INTRAMUSCULAR; INTRAVENOUS; SUBCUTANEOUS PRN
Status: CANCELLED | OUTPATIENT
Start: 2023-09-14

## 2023-09-14 RX ADMIN — SODIUM CHLORIDE 567 MG: 9 INJECTION, SOLUTION INTRAVENOUS at 12:55

## 2023-09-14 RX ADMIN — PERTUZUMAB 420 MG: 30 INJECTION, SOLUTION, CONCENTRATE INTRAVENOUS at 13:31

## 2023-09-14 RX ADMIN — SODIUM CHLORIDE, PRESERVATIVE FREE 10 ML: 5 INJECTION INTRAVENOUS at 10:25

## 2023-09-14 RX ADMIN — SODIUM CHLORIDE 20 ML/HR: 9 INJECTION, SOLUTION INTRAVENOUS at 11:26

## 2023-09-14 RX ADMIN — POTASSIUM CHLORIDE 40 MEQ: 1500 TABLET, EXTENDED RELEASE ORAL at 11:28

## 2023-09-14 RX ADMIN — POTASSIUM CHLORIDE 20 MEQ: 29.8 INJECTION, SOLUTION INTRAVENOUS at 11:30

## 2023-09-14 RX ADMIN — SODIUM CHLORIDE, PRESERVATIVE FREE 10 ML: 5 INJECTION INTRAVENOUS at 14:10

## 2023-09-14 RX ADMIN — SODIUM CHLORIDE, PRESERVATIVE FREE 20 ML: 5 INJECTION INTRAVENOUS at 10:26

## 2023-09-14 RX ADMIN — SODIUM CHLORIDE, PRESERVATIVE FREE 10 ML: 5 INJECTION INTRAVENOUS at 11:23

## 2023-09-14 RX ADMIN — Medication 500 UNITS: at 14:10

## 2023-09-14 ASSESSMENT — ENCOUNTER SYMPTOMS
NAUSEA: 0
BACK PAIN: 1
COLOR CHANGE: 0
VOICE CHANGE: 0
COUGH: 0
WHEEZING: 0
VOMITING: 0
BLOOD IN STOOL: 0
SHORTNESS OF BREATH: 0
TROUBLE SWALLOWING: 0
SORE THROAT: 0

## 2023-09-14 ASSESSMENT — PAIN DESCRIPTION - ORIENTATION: ORIENTATION: RIGHT

## 2023-09-14 ASSESSMENT — PAIN DESCRIPTION - LOCATION: LOCATION: HIP

## 2023-09-14 ASSESSMENT — PAIN SCALES - GENERAL
PAINLEVEL_OUTOF10: 3
PAINLEVEL_OUTOF10: 5

## 2023-09-14 ASSESSMENT — PAIN DESCRIPTION - DESCRIPTORS: DESCRIPTORS: SHARP

## 2023-09-14 NOTE — PROGRESS NOTES
Name: Leah Cox  : 1970  MRN: [id number]    Oncology Navigation Follow-Up Note    Contact Type:  Medical Oncology    Subjective: Seen Dr. Ann Munoz for continuation of Herceptin / Perjeta     Objective: Scheduled for bilateral mastectomy with SLN on the left. Plan for delayed reconstruction. Assistance Needed: NA    Referrals: Referred to Prehab by     Education: Post op care discussed. Wound drain care demonstrated. Printed material provided on drain care and output record. Questions addressed. Discussed Knitted Knockers and set provided. Notes: Will follow through [facility].     Electronically signed by James Dang RN on 2023 at 11:16 AM

## 2023-09-14 NOTE — PLAN OF CARE
Problem: Chronic Conditions and Co-morbidities  Goal: Patient's chronic conditions and co-morbidity symptoms are monitored and maintained or improved  Outcome: Adequate for Discharge  Flowsheets (Taken 9/14/2023 1257)  Care Plan - Patient's Chronic Conditions and Co-Morbidity Symptoms are Monitored and Maintained or Improved:   Monitor and assess patient's chronic conditions and comorbid symptoms for stability, deterioration, or improvement   Collaborate with multidisciplinary team to address chronic and comorbid conditions and prevent exacerbation or deterioration  Note: Patient verbalizes understanding to verbal information given on trazimera and perjeta, including action and possible side effects. Aware to call MD if develop complications. Chemotherapy Teaching     What is Chemotherapy   Drug action [x]   Method of Administration [x]   Handouts given []     Side Effects  Nausea/vomiting [x]   Diarrhea [x]   Fatigue [x]   Signs / Symptoms of infection [x]   Neutropenia [x]   Thrombocytopenia [x]   Alopecia [x]   neuropathy [x]   [place] diet &  the importance of fluids [x]       Micellaneous  Importance of nutrition [x]   Importance of oral hygiene [x]   When to call the MD [x]   Monitoring labs [x]   Use of supportive services []     Explanation of Drug Regimen / Frequency  Trazimera and perjeta     Comments  Verbalized understanding to drug,action,side effects and when to call MD         Problem: Safety - Adult  Goal: Free from fall injury  Outcome: Adequate for Discharge  Flowsheets (Taken 9/14/2023 1257)  Free From Fall Injury: Instruct family/caregiver on patient safety  Note: Patient free of falls this visit. Fall risks assessed. Precautions discussed.       Problem: Discharge Planning  Goal: Discharge to home or other facility with appropriate resources  Outcome: Adequate for Discharge  Flowsheets (Taken 9/14/2023 1257)  Discharge to home or other facility with appropriate resources:   Identify barriers to discharge with patient and caregiver   Arrange for needed discharge resources and transportation as appropriate  Note: Patient verbalizes understanding of discharge instructions, follow up appointment, and when to call physician if needed      Problem: Infection - Adult  Goal: Absence of infection at discharge  Outcome: Adequate for Discharge  Flowsheets (Taken 9/14/2023 1257)  Absence of infection at discharge:   Assess and monitor for signs and symptoms of infection   Monitor all insertion sites i.e., indwelling lines, tubes and drains  Note: Mediport site with no redness or warmth. Skin over port site intact with no signs of breakdown noted. Patient verbalizes signs/symptoms of port infection and when to notify the physician. Care plan reviewed with patient. Patient verbalizes understanding of the plan of care and contributes to goal setting.

## 2023-09-14 NOTE — PATIENT INSTRUCTIONS
Proceed with HP today, with the extra potassium in  Will hold HP until 10/31 (2 weeks after her surgery) RTC with labs that day for pathology check, tox check prior to resuming therapy

## 2023-09-14 NOTE — PROGRESS NOTES
depth. This is seen on axial image 95 series 11. This correlates with biopsy-proven malignancy and postbiopsy change. 3. There is a 1 cm oval nodule with washout kinetics within the right retroareolar region correlating with known biopsy proven malignancy and postbiopsy change. This is seen on axial image 87 series 11. Follow-up with ACR/ACS guidelines. This patient has a history of breast cancer diagnosed when she was <50. Villarreal Corrente Based on the ACR guidelines for breast cancer screening for women with a higher than average risk for developing breast cancer, yearly screening breast MRI  with and without   contrast is recommended in addition to yearly mammography. BI-RADS CATEGORY 6: Known biopsy proven malignancy. Management: Surgical excision when clinically appropriate        **This report has been created using voice recognition software. It may contain minor errors which are inherent in voice recognition technology. **     Final report electronically signed by Dr. Haseeb Cortes on 4/18/2023 9:23 AM                Exam Ended: 04/17/23 16:56 EDT Last Resulted: 04/18/23 10:23 EDT              Reading Physician Reading Date Result Priority   [facility], [address] [place] [address] 5/15/2023      Narrative & Impression  PROCEDURE: NM BONE SCAN WHOLE BODY     CLINICAL INFORMATION: Breast cancer     Radiopharmaceutical: 30 mCi technetium 99m MDP, intravenously     TECHNIQUE: Delayed anterior and posterior whole-body planar images were obtained 3 hours following radiopharmaceutical administration. Additional images of the pelvis were obtained in multiple projections. COMPARISON: None     FINDINGS: There are no foci of abnormal radiotracer accumulation in the visualized skeleton to suggest osseous metastatic disease. Diffuse activity in the calvarium may be due to hyperostosis. Inter orbital, maxillary and mandibular activity is likely   infectious or inflammatory.  Probable degenerative arthropathic changes

## 2023-09-14 NOTE — DISCHARGE INSTRUCTIONS
Please contact your Oncologist if you have any questions regarding your chemotherapy treatments. The name of the chemotherapy that you received today was trazimera and perjeta. If you experience any of the following symptoms after  todays treatment, notify your physician immediately or go to the emergency department. * dizziness/lightheadedness  * acute nausea/vomiting that is not relieved with medication  * headache that is not relieved from Tylenol or your usual pain medication  * chest pain or pressure  * rash/itching  * shortness of breath    Drink plenty of fluids; at least 48-64 ounces daily    Call if you develop any fever, chills, or other signs or symptoms of an infection.

## 2023-09-17 ASSESSMENT — ENCOUNTER SYMPTOMS: ABDOMINAL PAIN: 0

## 2023-09-26 ENCOUNTER — HOSPITAL ENCOUNTER (OUTPATIENT)
Dept: PHYSICAL THERAPY | Age: 53
Setting detail: THERAPIES SERIES
Discharge: HOME | End: 2023-09-26
Payer: COMMERCIAL

## 2023-09-26 PROCEDURE — 97110 THERAPEUTIC EXERCISES: CPT

## 2023-09-26 PROCEDURE — 97161 PT EVAL LOW COMPLEX 20 MIN: CPT

## 2023-10-05 DIAGNOSIS — Z17.0 CARCINOMA OF UPPER-OUTER QUADRANT OF LEFT BREAST IN FEMALE, ESTROGEN RECEPTOR POSITIVE (HCC): Primary | ICD-10-CM

## 2023-10-05 DIAGNOSIS — C50.412 CARCINOMA OF UPPER-OUTER QUADRANT OF LEFT BREAST IN FEMALE, ESTROGEN RECEPTOR POSITIVE (HCC): Primary | ICD-10-CM

## 2023-10-05 DIAGNOSIS — Z01.818 PRE-OP TESTING: ICD-10-CM

## 2023-10-05 NOTE — PROGRESS NOTES
Follow all instructions given by your physician  NPO after midnight  Sips of water am of surgery with allowed medications  Bring insurance info and 's license  Wear clean comfortable , loose-fitting clothing  No jewelry or contact lenses to be worn day of surgery  Case for glasses. Shower the night before and the morning of surgery with a liquid antibacterial soap, dry with new fresh clean towel after each shower, no lotions, creams or powder. Clean sheets and pillowcase on bed night before surgery  Bring medications in original bottles      Our pharmacy has a Meds to [facility] program where they will deliver any new prescriptions you may have to your room before you leave. Our Pharmacy will clear it through your insurance; for example (same co pay). This enables you to take your new RX as soon as you need when you get home and avoids stop/wait delays on the way home. Please have a form of payment with you and have someone designated as your Pharmacy contact with their phone # as you may not feel well or still be under the influence of anesthesia. Please refer to the SSI-Surgical Site Infection Flyer you hopefully received in the mail-together we can prevent infections; signs and symptoms reviewed. When discharged be sure you understand how to care for your wound and that you have the Dr./office phone # to call if you have any concerns or questions about your wound.      needed at discharge and someone over 18 to stay with you for 24 hours overnight (surgery may be cancelled if you don't have this)  Report to [facility] on 2nd floor  If you would become ill prior to surgery, please call the surgeon  May have a visitor with you, we request that you limit to 2 visitors in pre-op area  Masks are recommended but not required, new masks at entrance desk  Call [phone] for any questions

## 2023-10-06 ENCOUNTER — HOSPITAL ENCOUNTER (OUTPATIENT)
Age: 53
Discharge: HOME | End: 2023-10-06
Payer: COMMERCIAL

## 2023-10-06 DIAGNOSIS — Z01.818 PRE-OP TESTING: ICD-10-CM

## 2023-10-06 DIAGNOSIS — C50.412 CARCINOMA OF UPPER-OUTER QUADRANT OF LEFT BREAST IN FEMALE, ESTROGEN RECEPTOR POSITIVE (HCC): Primary | ICD-10-CM

## 2023-10-06 DIAGNOSIS — C50.412 CARCINOMA OF UPPER-OUTER QUADRANT OF LEFT BREAST IN FEMALE, ESTROGEN RECEPTOR POSITIVE (HCC): ICD-10-CM

## 2023-10-06 DIAGNOSIS — Z17.0 CARCINOMA OF UPPER-OUTER QUADRANT OF LEFT BREAST IN FEMALE, ESTROGEN RECEPTOR POSITIVE (HCC): ICD-10-CM

## 2023-10-06 DIAGNOSIS — Z17.0 CARCINOMA OF UPPER-OUTER QUADRANT OF LEFT BREAST IN FEMALE, ESTROGEN RECEPTOR POSITIVE (HCC): Primary | ICD-10-CM

## 2023-10-06 LAB — POTASSIUM SERPL-SCNC: 3.9 MEQ/L (ref 3.5–5.2)

## 2023-10-06 PROCEDURE — 93005 ELECTROCARDIOGRAM TRACING: CPT

## 2023-10-06 PROCEDURE — 84132 ASSAY OF SERUM POTASSIUM: CPT

## 2023-10-06 PROCEDURE — 36415 COLL VENOUS BLD VENIPUNCTURE: CPT

## 2023-10-07 LAB
EKG ATRIAL RATE: 77 BPM
EKG P AXIS: 62 DEGREES
EKG P-R INTERVAL: 154 MS
EKG Q-T INTERVAL: 380 MS
EKG QRS DURATION: 82 MS
EKG QTC CALCULATION (BAZETT): 430 MS
EKG R AXIS: 18 DEGREES
EKG T AXIS: 54 DEGREES
EKG VENTRICULAR RATE: 77 BPM

## 2023-10-10 ENCOUNTER — HOSPITAL ENCOUNTER (OUTPATIENT)
Dept: NUCLEAR MEDICINE | Age: 53
Discharge: HOME | End: 2023-10-10
Attending: SURGERY
Payer: COMMERCIAL

## 2023-10-10 DIAGNOSIS — C50.412 CARCINOMA OF UPPER-OUTER QUADRANT OF LEFT BREAST IN FEMALE, ESTROGEN RECEPTOR POSITIVE (HCC): ICD-10-CM

## 2023-10-10 DIAGNOSIS — Z17.0 CARCINOMA OF UPPER-OUTER QUADRANT OF LEFT BREAST IN FEMALE, ESTROGEN RECEPTOR POSITIVE (HCC): ICD-10-CM

## 2023-10-10 PROCEDURE — 3430000000 HC RX DIAGNOSTIC RADIOPHARMACEUTICAL: Performed by: SURGERY

## 2023-10-10 PROCEDURE — A9541 TC99M SULFUR COLLOID: HCPCS | Performed by: SURGERY

## 2023-10-10 PROCEDURE — 38792 RA TRACER ID OF SENTINL NODE: CPT

## 2023-10-10 RX ADMIN — Medication 2.15 MILLICURIE: at 15:12

## 2023-10-10 NOTE — H&P
[place] [address]                       PREOPERATIVE HISTORY AND PHYSICAL    PATIENT NAME: Naya Fowler                     :        1970  MED REC NO:   [id number]                           ROOM:  ACCOUNT NO:   [de-identified]                           ADMIT DATE: 10/11/2023  PROVIDER:     Elizabeth Jones M.D.    Zbigniewsih Kincaid:  Currently with breast cancer, undergoing treatment  with retained ovaries. HISTORY OF PRESENT ILLNESS:  The patient is a 56-year-old G4, P3, who  was sent referral from her general surgeon for her retained ovaries. The patient is currently undergoing breast cancer treatment and is  scheduled for bilateral mastectomies by Dr. Urvashi Mathew tomorrow and is  recommended with her history of current breast cancer as well as BRCA1  and 2 positive, but she undergo oophorectomy. The patient has had  hysterectomy in the remote past.    PAST MEDICAL HISTORY:  She has the current breast cancer, depression,  BRCA1 and 2 positive. PAST SURGICAL HISTORY:  She has had ACL, bladder surgery, breast  reduction, cervical laminectomy, deviated septum, hysterectomy with a  [place] in , tonsillectomy, and sinus surgery. MEDICATIONS:  Currently taking Neurontin, Prilosec, Pristiq, and  rosuvastatin. ALLERGIES:  SHE HAS NO KNOWN DRUG ALLERGIES. FAMILY HISTORY:  Noncontributory. SOCIAL HISTORY:  Half-pack per day smoker. Denies illegal drugs or  alcohol. PHYSICAL EXAMINATION:  VITAL SIGNS:  The patient is 5 feet 6 inches and 220 pounds. HEENT:  Normocephalic and atraumatic. HEART:  Regular rate and rhythm. LUNGS:  Clear. ABDOMEN:  Soft. Preoperatively, the patient did undergo a transvaginal  ultrasound, which revealed the left ovary measuring 4.5 x 3 x 3, right  ovary was not visualized either vaginally or abdominally.     ASSESSMENT AND PLAN:  The patient has breast cancer and BRCA1 and 2  positive. She will now undergo bilateral robotically assisted BSO and  bilateral mastectomies by Dr. Soham Carpio covered under a separate dictation.         Lucas Meredith M.D.    D: 10/10/2023 9:07:36       T: 10/10/2023 10:10:54     [email or web address]  Job#: [id number]     Doc#: [id number]    CC:

## 2023-10-11 ENCOUNTER — ANESTHESIA (OUTPATIENT)
Dept: OPERATING ROOM | Age: 53
End: 2023-10-11
Payer: COMMERCIAL

## 2023-10-11 ENCOUNTER — HOSPITAL ENCOUNTER (INPATIENT)
Age: 53
LOS: 1 days | Discharge: HOME | DRG: 581 | End: 2023-10-12
Attending: OBSTETRICS & GYNECOLOGY | Admitting: SURGERY
Payer: COMMERCIAL

## 2023-10-11 ENCOUNTER — ANESTHESIA EVENT (OUTPATIENT)
Dept: OPERATING ROOM | Age: 53
End: 2023-10-11
Payer: COMMERCIAL

## 2023-10-11 DIAGNOSIS — Z15.09 BRCA POSITIVE: ICD-10-CM

## 2023-10-11 DIAGNOSIS — Z15.09 BRCA GENE POSITIVE: ICD-10-CM

## 2023-10-11 DIAGNOSIS — Z15.01 BRCA POSITIVE: ICD-10-CM

## 2023-10-11 DIAGNOSIS — C77.3 BREAST CANCER METASTASIZED TO AXILLARY LYMPH NODE, LEFT (HCC): Primary | ICD-10-CM

## 2023-10-11 DIAGNOSIS — C50.912 BREAST CANCER METASTASIZED TO AXILLARY LYMPH NODE, LEFT (HCC): Primary | ICD-10-CM

## 2023-10-11 DIAGNOSIS — Z15.01 BRCA GENE POSITIVE: ICD-10-CM

## 2023-10-11 PROCEDURE — 88331 PATH CONSLTJ SURG 1 BLK 1SPC: CPT

## 2023-10-11 PROCEDURE — 7100000010 HC PHASE II RECOVERY - FIRST 15 MIN: Performed by: OBSTETRICS & GYNECOLOGY

## 2023-10-11 PROCEDURE — 07T60ZZ RESECTION OF LEFT AXILLARY LYMPHATIC, OPEN APPROACH: ICD-10-PCS | Performed by: SURGERY

## 2023-10-11 PROCEDURE — 88302 TISSUE EXAM BY PATHOLOGIST: CPT

## 2023-10-11 PROCEDURE — 0HTV0ZZ RESECTION OF BILATERAL BREAST, OPEN APPROACH: ICD-10-PCS | Performed by: SURGERY

## 2023-10-11 PROCEDURE — 6370000000 HC RX 637 (ALT 250 FOR IP): Performed by: SURGERY

## 2023-10-11 PROCEDURE — 2580000003 HC RX 258: Performed by: NURSE ANESTHETIST, CERTIFIED REGISTERED

## 2023-10-11 PROCEDURE — 7100000000 HC PACU RECOVERY - FIRST 15 MIN: Performed by: OBSTETRICS & GYNECOLOGY

## 2023-10-11 PROCEDURE — 3700000000 HC ANESTHESIA ATTENDED CARE: Performed by: OBSTETRICS & GYNECOLOGY

## 2023-10-11 PROCEDURE — 8E0W4CZ ROBOTIC ASSISTED PROCEDURE OF TRUNK REGION, PERCUTANEOUS ENDOSCOPIC APPROACH: ICD-10-PCS | Performed by: OBSTETRICS & GYNECOLOGY

## 2023-10-11 PROCEDURE — 6360000002 HC RX W HCPCS: Performed by: OBSTETRICS & GYNECOLOGY

## 2023-10-11 PROCEDURE — 7100000011 HC PHASE II RECOVERY - ADDTL 15 MIN: Performed by: OBSTETRICS & GYNECOLOGY

## 2023-10-11 PROCEDURE — 0UT24ZZ RESECTION OF BILATERAL OVARIES, PERCUTANEOUS ENDOSCOPIC APPROACH: ICD-10-PCS | Performed by: OBSTETRICS & GYNECOLOGY

## 2023-10-11 PROCEDURE — 88342 IMHCHEM/IMCYTCHM 1ST ANTB: CPT

## 2023-10-11 PROCEDURE — 1200000000 HC SEMI PRIVATE

## 2023-10-11 PROCEDURE — 6360000002 HC RX W HCPCS: Performed by: SURGERY

## 2023-10-11 PROCEDURE — G0378 HOSPITAL OBSERVATION PER HR: HCPCS

## 2023-10-11 PROCEDURE — 3700000001 HC ADD 15 MINUTES (ANESTHESIA): Performed by: OBSTETRICS & GYNECOLOGY

## 2023-10-11 PROCEDURE — 0DNU4ZZ RELEASE OMENTUM, PERCUTANEOUS ENDOSCOPIC APPROACH: ICD-10-PCS | Performed by: OBSTETRICS & GYNECOLOGY

## 2023-10-11 PROCEDURE — 2709999900 HC NON-CHARGEABLE SUPPLY: Performed by: OBSTETRICS & GYNECOLOGY

## 2023-10-11 PROCEDURE — 88341 IMHCHEM/IMCYTCHM EA ADD ANTB: CPT

## 2023-10-11 PROCEDURE — 3600000019 HC SURGERY ROBOT ADDTL 15MIN: Performed by: OBSTETRICS & GYNECOLOGY

## 2023-10-11 PROCEDURE — 6360000002 HC RX W HCPCS: Performed by: ANESTHESIOLOGY

## 2023-10-11 PROCEDURE — 3600000009 HC SURGERY ROBOT BASE: Performed by: OBSTETRICS & GYNECOLOGY

## 2023-10-11 PROCEDURE — 2500000003 HC RX 250 WO HCPCS: Performed by: NURSE ANESTHETIST, CERTIFIED REGISTERED

## 2023-10-11 PROCEDURE — 0UT74ZZ RESECTION OF BILATERAL FALLOPIAN TUBES, PERCUTANEOUS ENDOSCOPIC APPROACH: ICD-10-PCS | Performed by: OBSTETRICS & GYNECOLOGY

## 2023-10-11 PROCEDURE — S2900 ROBOTIC SURGICAL SYSTEM: HCPCS | Performed by: OBSTETRICS & GYNECOLOGY

## 2023-10-11 PROCEDURE — 7100000001 HC PACU RECOVERY - ADDTL 15 MIN: Performed by: OBSTETRICS & GYNECOLOGY

## 2023-10-11 PROCEDURE — 2580000003 HC RX 258: Performed by: SURGERY

## 2023-10-11 PROCEDURE — 6360000002 HC RX W HCPCS: Performed by: NURSE ANESTHETIST, CERTIFIED REGISTERED

## 2023-10-11 PROCEDURE — 88307 TISSUE EXAM BY PATHOLOGIST: CPT

## 2023-10-11 RX ORDER — LIDOCAINE HYDROCHLORIDE 20 MG/ML
INJECTION, SOLUTION INTRAVENOUS PRN
Status: DISCONTINUED | OUTPATIENT
Start: 2023-10-11 | End: 2023-10-11 | Stop reason: SDUPTHER

## 2023-10-11 RX ORDER — SODIUM CHLORIDE 0.9 % (FLUSH) 0.9 %
5-40 SYRINGE (ML) INJECTION EVERY 12 HOURS SCHEDULED
Status: DISCONTINUED | OUTPATIENT
Start: 2023-10-11 | End: 2023-10-11 | Stop reason: HOSPADM

## 2023-10-11 RX ORDER — FENTANYL CITRATE 50 UG/ML
INJECTION, SOLUTION INTRAMUSCULAR; INTRAVENOUS PRN
Status: DISCONTINUED | OUTPATIENT
Start: 2023-10-11 | End: 2023-10-11 | Stop reason: SDUPTHER

## 2023-10-11 RX ORDER — MORPHINE SULFATE 2 MG/ML
2 INJECTION, SOLUTION INTRAMUSCULAR; INTRAVENOUS EVERY 5 MIN PRN
Status: DISCONTINUED | OUTPATIENT
Start: 2023-10-11 | End: 2023-10-11 | Stop reason: HOSPADM

## 2023-10-11 RX ORDER — ONDANSETRON 2 MG/ML
4 INJECTION INTRAMUSCULAR; INTRAVENOUS
Status: DISCONTINUED | OUTPATIENT
Start: 2023-10-11 | End: 2023-10-11 | Stop reason: HOSPADM

## 2023-10-11 RX ORDER — ROCURONIUM BROMIDE 10 MG/ML
INJECTION, SOLUTION INTRAVENOUS PRN
Status: DISCONTINUED | OUTPATIENT
Start: 2023-10-11 | End: 2023-10-11 | Stop reason: SDUPTHER

## 2023-10-11 RX ORDER — SODIUM CHLORIDE 9 MG/ML
INJECTION, SOLUTION INTRAVENOUS CONTINUOUS PRN
Status: DISCONTINUED | OUTPATIENT
Start: 2023-10-11 | End: 2023-10-11 | Stop reason: SDUPTHER

## 2023-10-11 RX ORDER — HYDROCODONE BITARTRATE AND ACETAMINOPHEN 5; 325 MG/1; MG/1
2 TABLET ORAL EVERY 4 HOURS PRN
Status: DISCONTINUED | OUTPATIENT
Start: 2023-10-11 | End: 2023-10-12

## 2023-10-11 RX ORDER — ONDANSETRON 2 MG/ML
INJECTION INTRAMUSCULAR; INTRAVENOUS PRN
Status: DISCONTINUED | OUTPATIENT
Start: 2023-10-11 | End: 2023-10-11 | Stop reason: SDUPTHER

## 2023-10-11 RX ORDER — ONDANSETRON 2 MG/ML
4 INJECTION INTRAMUSCULAR; INTRAVENOUS EVERY 6 HOURS PRN
Status: DISCONTINUED | OUTPATIENT
Start: 2023-10-11 | End: 2023-10-12 | Stop reason: HOSPADM

## 2023-10-11 RX ORDER — DROPERIDOL 2.5 MG/ML
0.62 INJECTION, SOLUTION INTRAMUSCULAR; INTRAVENOUS
Status: DISCONTINUED | OUTPATIENT
Start: 2023-10-11 | End: 2023-10-11 | Stop reason: HOSPADM

## 2023-10-11 RX ORDER — SODIUM CHLORIDE 9 MG/ML
INJECTION, SOLUTION INTRAVENOUS PRN
Status: DISCONTINUED | OUTPATIENT
Start: 2023-10-11 | End: 2023-10-11 | Stop reason: HOSPADM

## 2023-10-11 RX ORDER — ACETAMINOPHEN 325 MG/1
650 TABLET ORAL EVERY 4 HOURS PRN
Status: DISCONTINUED | OUTPATIENT
Start: 2023-10-11 | End: 2023-10-12 | Stop reason: HOSPADM

## 2023-10-11 RX ORDER — DEXAMETHASONE SODIUM PHOSPHATE 4 MG/ML
INJECTION, SOLUTION INTRA-ARTICULAR; INTRALESIONAL; INTRAMUSCULAR; INTRAVENOUS; SOFT TISSUE PRN
Status: DISCONTINUED | OUTPATIENT
Start: 2023-10-11 | End: 2023-10-11 | Stop reason: SDUPTHER

## 2023-10-11 RX ORDER — SODIUM CHLORIDE 0.9 % (FLUSH) 0.9 %
5-40 SYRINGE (ML) INJECTION PRN
Status: DISCONTINUED | OUTPATIENT
Start: 2023-10-11 | End: 2023-10-11 | Stop reason: HOSPADM

## 2023-10-11 RX ORDER — ONDANSETRON 4 MG/1
4 TABLET, ORALLY DISINTEGRATING ORAL EVERY 8 HOURS PRN
Status: DISCONTINUED | OUTPATIENT
Start: 2023-10-11 | End: 2023-10-12 | Stop reason: HOSPADM

## 2023-10-11 RX ORDER — HYDROMORPHONE HYDROCHLORIDE 2 MG/ML
INJECTION, SOLUTION INTRAMUSCULAR; INTRAVENOUS; SUBCUTANEOUS PRN
Status: DISCONTINUED | OUTPATIENT
Start: 2023-10-11 | End: 2023-10-11 | Stop reason: SDUPTHER

## 2023-10-11 RX ORDER — MORPHINE SULFATE 2 MG/ML
2 INJECTION, SOLUTION INTRAMUSCULAR; INTRAVENOUS
Status: DISCONTINUED | OUTPATIENT
Start: 2023-10-11 | End: 2023-10-12 | Stop reason: HOSPADM

## 2023-10-11 RX ORDER — SODIUM CHLORIDE 9 MG/ML
INJECTION, SOLUTION INTRAVENOUS CONTINUOUS
Status: DISCONTINUED | OUTPATIENT
Start: 2023-10-11 | End: 2023-10-12

## 2023-10-11 RX ORDER — SODIUM CHLORIDE 0.9 % (FLUSH) 0.9 %
5-40 SYRINGE (ML) INJECTION EVERY 12 HOURS SCHEDULED
Status: DISCONTINUED | OUTPATIENT
Start: 2023-10-11 | End: 2023-10-12 | Stop reason: HOSPADM

## 2023-10-11 RX ORDER — SODIUM CHLORIDE 9 MG/ML
INJECTION, SOLUTION INTRAVENOUS CONTINUOUS
Status: DISCONTINUED | OUTPATIENT
Start: 2023-10-11 | End: 2023-10-11 | Stop reason: HOSPADM

## 2023-10-11 RX ORDER — BUPIVACAINE HYDROCHLORIDE 5 MG/ML
INJECTION, SOLUTION PERINEURAL PRN
Status: DISCONTINUED | OUTPATIENT
Start: 2023-10-11 | End: 2023-10-11 | Stop reason: ALTCHOICE

## 2023-10-11 RX ORDER — DIPHENHYDRAMINE HYDROCHLORIDE 50 MG/ML
12.5 INJECTION INTRAMUSCULAR; INTRAVENOUS
Status: DISCONTINUED | OUTPATIENT
Start: 2023-10-11 | End: 2023-10-11 | Stop reason: HOSPADM

## 2023-10-11 RX ORDER — IBUPROFEN 600 MG/1
600 TABLET ORAL EVERY 8 HOURS PRN
Status: DISCONTINUED | OUTPATIENT
Start: 2023-10-11 | End: 2023-10-12 | Stop reason: HOSPADM

## 2023-10-11 RX ORDER — PHENYLEPHRINE HCL IN 0.9% NACL 1 MG/10 ML
SYRINGE (ML) INTRAVENOUS PRN
Status: DISCONTINUED | OUTPATIENT
Start: 2023-10-11 | End: 2023-10-11 | Stop reason: SDUPTHER

## 2023-10-11 RX ORDER — PROPOFOL 10 MG/ML
INJECTION, EMULSION INTRAVENOUS PRN
Status: DISCONTINUED | OUTPATIENT
Start: 2023-10-11 | End: 2023-10-11 | Stop reason: SDUPTHER

## 2023-10-11 RX ORDER — LABETALOL HYDROCHLORIDE 5 MG/ML
10 INJECTION INTRAVENOUS
Status: DISCONTINUED | OUTPATIENT
Start: 2023-10-11 | End: 2023-10-11 | Stop reason: HOSPADM

## 2023-10-11 RX ORDER — SODIUM CHLORIDE 9 MG/ML
INJECTION, SOLUTION INTRAVENOUS PRN
Status: DISCONTINUED | OUTPATIENT
Start: 2023-10-11 | End: 2023-10-12 | Stop reason: HOSPADM

## 2023-10-11 RX ORDER — MIDAZOLAM HYDROCHLORIDE 1 MG/ML
INJECTION INTRAMUSCULAR; INTRAVENOUS PRN
Status: DISCONTINUED | OUTPATIENT
Start: 2023-10-11 | End: 2023-10-11 | Stop reason: SDUPTHER

## 2023-10-11 RX ORDER — HYDROCODONE BITARTRATE AND ACETAMINOPHEN 5; 325 MG/1; MG/1
1 TABLET ORAL EVERY 4 HOURS PRN
Status: DISCONTINUED | OUTPATIENT
Start: 2023-10-11 | End: 2023-10-12

## 2023-10-11 RX ORDER — MEPERIDINE HYDROCHLORIDE 25 MG/ML
12.5 INJECTION INTRAMUSCULAR; INTRAVENOUS; SUBCUTANEOUS EVERY 5 MIN PRN
Status: DISCONTINUED | OUTPATIENT
Start: 2023-10-11 | End: 2023-10-11 | Stop reason: HOSPADM

## 2023-10-11 RX ORDER — FENTANYL CITRATE 50 UG/ML
50 INJECTION, SOLUTION INTRAMUSCULAR; INTRAVENOUS EVERY 5 MIN PRN
Status: DISCONTINUED | OUTPATIENT
Start: 2023-10-11 | End: 2023-10-11 | Stop reason: HOSPADM

## 2023-10-11 RX ORDER — MORPHINE SULFATE 4 MG/ML
4 INJECTION, SOLUTION INTRAMUSCULAR; INTRAVENOUS
Status: DISCONTINUED | OUTPATIENT
Start: 2023-10-11 | End: 2023-10-12 | Stop reason: HOSPADM

## 2023-10-11 RX ORDER — LORAZEPAM 2 MG/ML
0.5 INJECTION INTRAMUSCULAR
Status: DISCONTINUED | OUTPATIENT
Start: 2023-10-11 | End: 2023-10-11 | Stop reason: HOSPADM

## 2023-10-11 RX ORDER — HYDRALAZINE HYDROCHLORIDE 20 MG/ML
10 INJECTION INTRAMUSCULAR; INTRAVENOUS
Status: DISCONTINUED | OUTPATIENT
Start: 2023-10-11 | End: 2023-10-11 | Stop reason: HOSPADM

## 2023-10-11 RX ORDER — SODIUM CHLORIDE 0.9 % (FLUSH) 0.9 %
5-40 SYRINGE (ML) INJECTION PRN
Status: DISCONTINUED | OUTPATIENT
Start: 2023-10-11 | End: 2023-10-12 | Stop reason: HOSPADM

## 2023-10-11 RX ORDER — ISOSULFAN BLUE 50 MG/5ML
INJECTION, SOLUTION SUBCUTANEOUS PRN
Status: DISCONTINUED | OUTPATIENT
Start: 2023-10-11 | End: 2023-10-11 | Stop reason: ALTCHOICE

## 2023-10-11 RX ADMIN — FENTANYL CITRATE 100 MCG: 50 INJECTION, SOLUTION INTRAMUSCULAR; INTRAVENOUS at 07:40

## 2023-10-11 RX ADMIN — Medication 100 MCG: at 08:45

## 2023-10-11 RX ADMIN — HYDROCODONE BITARTRATE AND ACETAMINOPHEN 2 TABLET: 5; 325 TABLET ORAL at 12:05

## 2023-10-11 RX ADMIN — Medication 100 MCG: at 10:00

## 2023-10-11 RX ADMIN — LIDOCAINE HYDROCHLORIDE 100 MG: 20 INJECTION, SOLUTION INTRAVENOUS at 07:40

## 2023-10-11 RX ADMIN — ONDANSETRON 4 MG: 2 INJECTION INTRAMUSCULAR; INTRAVENOUS at 09:55

## 2023-10-11 RX ADMIN — DEXAMETHASONE SODIUM PHOSPHATE 8 MG: 4 INJECTION, SOLUTION INTRAMUSCULAR; INTRAVENOUS at 08:03

## 2023-10-11 RX ADMIN — MORPHINE SULFATE 4 MG: 4 INJECTION, SOLUTION INTRAMUSCULAR; INTRAVENOUS at 13:47

## 2023-10-11 RX ADMIN — SUGAMMADEX 200 MG: 100 INJECTION, SOLUTION INTRAVENOUS at 10:21

## 2023-10-11 RX ADMIN — ROCURONIUM BROMIDE 50 MG: 10 INJECTION INTRAVENOUS at 07:40

## 2023-10-11 RX ADMIN — FENTANYL CITRATE 50 MCG: 50 INJECTION, SOLUTION INTRAMUSCULAR; INTRAVENOUS at 10:40

## 2023-10-11 RX ADMIN — MORPHINE SULFATE 4 MG: 4 INJECTION, SOLUTION INTRAMUSCULAR; INTRAVENOUS at 21:15

## 2023-10-11 RX ADMIN — FENTANYL CITRATE 50 MCG: 50 INJECTION, SOLUTION INTRAMUSCULAR; INTRAVENOUS at 10:35

## 2023-10-11 RX ADMIN — ROCURONIUM BROMIDE 20 MG: 10 INJECTION INTRAVENOUS at 08:20

## 2023-10-11 RX ADMIN — PROPOFOL 200 MG: 10 INJECTION, EMULSION INTRAVENOUS at 07:40

## 2023-10-11 RX ADMIN — HYDROCODONE BITARTRATE AND ACETAMINOPHEN 2 TABLET: 5; 325 TABLET ORAL at 20:03

## 2023-10-11 RX ADMIN — HYDROMORPHONE HYDROCHLORIDE 0.4 MG: 2 INJECTION INTRAMUSCULAR; INTRAVENOUS; SUBCUTANEOUS at 09:19

## 2023-10-11 RX ADMIN — SODIUM CHLORIDE: 9 INJECTION, SOLUTION INTRAVENOUS at 07:32

## 2023-10-11 RX ADMIN — ROCURONIUM BROMIDE 20 MG: 10 INJECTION INTRAVENOUS at 09:18

## 2023-10-11 RX ADMIN — HYDROMORPHONE HYDROCHLORIDE 0.4 MG: 2 INJECTION INTRAMUSCULAR; INTRAVENOUS; SUBCUTANEOUS at 08:59

## 2023-10-11 RX ADMIN — Medication 2000 MG: at 15:08

## 2023-10-11 RX ADMIN — ONDANSETRON 4 MG: 2 INJECTION INTRAMUSCULAR; INTRAVENOUS at 22:32

## 2023-10-11 RX ADMIN — Medication 100 MCG: at 08:23

## 2023-10-11 RX ADMIN — SODIUM CHLORIDE: 9 INJECTION, SOLUTION INTRAVENOUS at 15:07

## 2023-10-11 RX ADMIN — Medication 2000 MG: at 07:32

## 2023-10-11 RX ADMIN — Medication 2000 MG: at 22:47

## 2023-10-11 RX ADMIN — Medication 100 MCG: at 09:43

## 2023-10-11 RX ADMIN — SODIUM CHLORIDE: 9 INJECTION, SOLUTION INTRAVENOUS at 06:51

## 2023-10-11 RX ADMIN — FENTANYL CITRATE 100 MCG: 50 INJECTION, SOLUTION INTRAMUSCULAR; INTRAVENOUS at 08:14

## 2023-10-11 RX ADMIN — ONDANSETRON 4 MG: 2 INJECTION INTRAMUSCULAR; INTRAVENOUS at 16:30

## 2023-10-11 RX ADMIN — HYDROMORPHONE HYDROCHLORIDE 0.4 MG: 2 INJECTION INTRAMUSCULAR; INTRAVENOUS; SUBCUTANEOUS at 10:36

## 2023-10-11 RX ADMIN — MIDAZOLAM 2 MG: 1 INJECTION INTRAMUSCULAR; INTRAVENOUS at 07:32

## 2023-10-11 RX ADMIN — Medication 100 MCG: at 07:53

## 2023-10-11 ASSESSMENT — PAIN SCALES - GENERAL
PAINLEVEL_OUTOF10: 7
PAINLEVEL_OUTOF10: 5
PAINLEVEL_OUTOF10: 7
PAINLEVEL_OUTOF10: 7
PAINLEVEL_OUTOF10: 4
PAINLEVEL_OUTOF10: 5
PAINLEVEL_OUTOF10: 7
PAINLEVEL_OUTOF10: 5
PAINLEVEL_OUTOF10: 6
PAINLEVEL_OUTOF10: 4
PAINLEVEL_OUTOF10: 7
PAINLEVEL_OUTOF10: 9

## 2023-10-11 ASSESSMENT — PAIN DESCRIPTION - DESCRIPTORS
DESCRIPTORS: ACHING
DESCRIPTORS: SHARP
DESCRIPTORS: ACHING
DESCRIPTORS: ACHING;SORE
DESCRIPTORS: ACHING
DESCRIPTORS: ACHING;SORE
DESCRIPTORS: SORE

## 2023-10-11 ASSESSMENT — PAIN DESCRIPTION - FREQUENCY
FREQUENCY: INTERMITTENT
FREQUENCY: INTERMITTENT

## 2023-10-11 ASSESSMENT — PAIN DESCRIPTION - LOCATION
LOCATION: ABDOMEN
LOCATION: BACK
LOCATION: BACK
LOCATION: ABDOMEN
LOCATION: BACK
LOCATION: BACK
LOCATION: ABDOMEN
LOCATION: ABDOMEN;BACK
LOCATION: BACK

## 2023-10-11 ASSESSMENT — PAIN DESCRIPTION - ORIENTATION
ORIENTATION: LOWER
ORIENTATION: LOWER
ORIENTATION: LEFT
ORIENTATION: LOWER
ORIENTATION: MID
ORIENTATION: LEFT
ORIENTATION: LEFT

## 2023-10-11 ASSESSMENT — PAIN DESCRIPTION - PAIN TYPE
TYPE: CHRONIC PAIN
TYPE: SURGICAL PAIN
TYPE: SURGICAL PAIN
TYPE: CHRONIC PAIN

## 2023-10-11 ASSESSMENT — PAIN - FUNCTIONAL ASSESSMENT
PAIN_FUNCTIONAL_ASSESSMENT: 0-10
PAIN_FUNCTIONAL_ASSESSMENT: ACTIVITIES ARE NOT PREVENTED

## 2023-10-11 ASSESSMENT — PAIN DESCRIPTION - ONSET
ONSET: ON-GOING
ONSET: ON-GOING

## 2023-10-11 NOTE — BRIEF OP NOTE
Brief Operative Report      Pre-operative Diagnosis:  Breast cancer currently undergoing treatment, BRCA1 and 2 positive, retained tubes and ovaries    Post-operative Diagnosis:  Same    Procedure:  Robotic BSO with COSTA    Surgeon: Sergio Slade    Assistant Surgeons: none     Anesthesia:  General endotrachial anesthesia    Estimated blood loss: 10 cc's     Findings: See Operative Dictation    Complications:  none      See dictated operative report for full details.       Blane Cadena MD

## 2023-10-11 NOTE — PROGRESS NOTES
Patient oriented to Same Day department and admitted to Same Day Surgery room 16. Patient verbalized approval for first name, last initial with physician name on unit whiteboard. Plan of care reviewed with patient. Patient room whiteboard filled out and discussed with patient and responsible adult. Patient and responsible adult offered Same Day Welcome Packet to review. Call light in reach. Bed in lowest position, locked, with one bed rail up. SCDs and warming blanket in place. Appropriate arm bands on patient. Bathroom offered. All questions and concerns of patient addressed. Meds to Beds:   Patient informed of [facility] program during admission.  Patient is agreeable to program.   Contact information for the pharmacy and the Med[facility] to [facility] program:   Name: heriberto   Relationship to patient:friend/acquaintance   Phone number: [phone]

## 2023-10-11 NOTE — PROGRESS NOTES
Pt returned to [facility] room 16. Vitals and assessment as charted. 0.9 infusing, @100ml to count from PACU. Pt has crackers and water. Family at the bedside. Pt and family verbalized understanding of discharge criteria and call light use. Call light in reach.

## 2023-10-11 NOTE — OP NOTE
[place] [address]                                OPERATIVE REPORT    PATIENT NAME: Linda Dougherty                     :        1970  MED REC NO:   [id number]                           ROOM:       0014  ACCOUNT NO:   [de-identified]                           ADMIT DATE: 10/11/2023  PROVIDER:     Jesus Ramirez. Tera Perry M.D.    Kamron Drown:  10/11/2023    PREOPERATIVE DIAGNOSIS:  Personal history of breast cancer, currently  undergoing treatment with positive BRCA1 and 2 with retained ovaries and  tubes. POSTOPERATIVE DIAGNOSIS:  Personal history of breast cancer, currently  undergoing treatment with positive BRCA1 and 2 with retained ovaries and  tubes. PROCEDURE PERFORMED:  Robotically-assisted laparoscopic bilateral  salpingo-oophorectomy with lysis of adhesions of omentum to vaginal cuff  as well as sigmoid colon to left infundibulopelvic ligament. SURGEON:  Joslyn Sacks, MD    ASSISTANT:  Josh Velazquez. HORACE Girard, surgical first assist.    ANESTHESIA:  General.    ESTIMATED BLOOD LOSS:  Less than 10 mL. PATHOLOGY SPECIMEN:  Bilateral tubes and ovaries with right ovary being  streaked. OPERATIVE PROCEDURE:  The patient was taken to the operating room at  which time she underwent general anesthetic. She was placed in dorsal  lithotomy position, sterilely prepped and draped in the usual manner. A  Vasques was placed to straight drain. The scalpel was used to make a  supraumbilical stab incision. This was elevated and Veress needle  introduced. Proper positioning was confirmed by the drop test.  Once  approximately 4.5 liters of CO2 was instilled, the Veress needle was  removed and a trocar placed. Proper positioning was reconfirmed by the  camera. The remaining laparoscopic ports were then placed without  complication under direct visualization. The robot was then brought in  and docked.   The hot shruthi were placed in the right hand and the Force  bipolar on the left. The attention was then turned to the console. On  inspection of the pelvis, there were omental adhesions to the top of the  vaginal cuff. These were filmy in nature. These were then lysed with  hot shruthi freeing the area. On inspection, the left tube and ovary  were noted to be normal.  The right tube and ovary, the tube was normal,  the right ovary was noted to be somewhat streaked and this is the reason  that it was not visualized on ultrasound. The peritoneum of the right  psoas muscle was then incised. The retroperitoneal space was then  entered. The retroperitoneal space was then developed both laterally  and along the infundibulopelvic ligament. The ureter was identified  well out of the operative field. A window was made beneath the  infundibulopelvic ligament on the right. This was then reflected up and  over the pelvic rim. Once this was isolated, this was then thoroughly  cauterized and transected. The peritoneal reflection and utero-ovarian  suspensory ligament was then lysed on the peritoneum, freeing the right  tube and ovary. The sigmoid was noted to be adhesed to the  infundibulopelvic ligament on the left. These were all lysed at the  peritoneum, freeing up the infundibulopelvic ligament on the left. The  peritoneum over the psoas on the left was then incised and the  retroperitoneum was then entered. These was then further developed  laterally as well as along the infundibulopelvic ligament over the  pelvic rim. An incision was then made beneath the infundibulopelvic  ligament and this was then reflected up and over the pelvic rim. Uterus  was identified, well out of the operative field, peristalsing in normal  manner and not dilated. The left infundibulopelvic ligament was then  thoroughly cauterized and transected, removing the left adnexa.   The  round ligament and utero-ovarian suspensory ligament were then freed  from the peritoneum, freeing the left tube and ovary. An EndoCatch bag  was then placed into the field and both ovaries were then brought  through the assistant port and sent for pathologic diagnosis. All  surgical sites were inspected and found to be hemostatic. A #1 Vicryl  stitch was then placed through the fascia with the Endo Closure device,  closing the 8-mm assistant ports fascia. Once this was accomplished,  the gas was allowed to escape. Camera ports were removed together. The  incisions were then closed with buried interrupted 4-0 Vicryl, had  Marcaine injected in each as well as Steri-Strips and Band-Aids applied. The patient then was then repositioned for  _____.         Joseline Rangel M.D.    D: 10/11/2023 8:49:42       T: 10/11/2023 12:08:49     ARIADNA/SANDY  Job#: [id number]     Doc#: [id number]    CC:

## 2023-10-11 NOTE — H&P
[facility]  History and Physical Update    Pt Name: Deepthi Valverde  MRN: [id number]  YOB: 1970  Date of evaluation: 10/11/2023    [x] I have examined the patient and reviewed the H&P/Consult and there are no changes to the patient or plans. [] I have examined the patient and reviewed the H&P/Consult and have noted the following changes:        Arcadio Starr MD MD  Electronically signed 10/11/2023 at 6:32 Lianet Durbin MD   General Surgery  Follow up Evaluation in Office  Pt Name: Deepthi Valverde  Date of Birth 1970   Today's Date: 9/14/2023  Medical Record Number: [id number]  Referring Provider: Dr. Mireille Morgan  Primary Care Provider: Jumana Rashid MD  Chief Complaint:       Chief Complaint   Patient presents with    Surgical Consult        Last Chemo Tx 8/23. Pt wanting to discuss bilateral Mastectomy, order Oncology Rehab. Carcinoma of upper-outer quadrant of left breast in female, estrogen receptor positive ([address]),  Papilloma of right breast.          ASSESSMENT   1. Carcinoma of upper-outer quadrant of left breast in female, estrogen receptor positive ([address])    2. Encounter for prophylactic removal of right breast    3. Type 2 diabetes mellitus treated without insulin ([address])    4. Tobacco dependence    5. BRCA1 gene mutation positive    6. BRCA2 gene mutation positive    5. Multiple sites   Clinical stage Ia, multifocal.  Different sites different receptor profiles. 1 is triple positive. PLANS   Patient with multifocal disease. Patient best suited for mastectomy with targeted axillary dissection when surgery is appropriate. Patient has no desire for reconstruction at this point. Family history of breast cancer. Genetic testing. Patient positive for BRCA 1 and 2.  3.  Neoadjuvant chemotherapy recommended and completed TC. Completed end of August.   4.  Recent labs and imaging reviewed. 5. encourage smoke cessation.   6.  Discussed with patient surgical techniques of Society    guidelines, she may be a candidate for screening breast MRI with and    without contrast.           BI-RADS CATEGORY 0: INCOMPLETE -NEED ADDITIONAL IMAGING EVALUATION AND/OR    PRIOR MAMMOGRAMS FOR COMPARISON. Management Recommendation: Recall for additional imaging. The patient will be contacted by our department for additional    imaging/scheduling. **This report has been created using voice recognition software. It may    contain minor errors which are inherent in voice recognition technology. **       Final report electronically signed by Dr. Jessi Yanes on 3/21/2023 10:40    AM               US BREAST LIMITED LEFT (Order [id number]) - Reflex for Order [id number]  Narrative   LOCATION: [place]       PROCEDURE: KOLE PRINCESS DIGITAL DIAGNOSTIC UNILATERAL LEFT, US BREAST LIMITED    LEFT       CLINICAL INFORMATION: Breast density . Tomosynthesis. Beto Fernandez CLINICAL:     Diagnostic    PATIENT MEDICAL HISTORY:  No relevant medical history has been documented    for this patient. FAMILY HISTORY:   Family medical history includes breast cancer in 2    relatives (maternal aunt, mother). RISK VALUES: Tyrer-Cuzick 10yr.: 6.4%, Tyrer-Cuzick life:   22.4%       COMPARISON: 3/21/2023, 2/28/2021, 6/27/2019. MAMMOGRAM:       TECHNIQUE: Images of the left include an LM view and spot compression CC    and MLO views  . Tomosynthesis and CAD were utilized. BREAST COMPOSITION: There are scattered fibroglandular elements in the    breast(s) that could obscure a lesion on mammography. FINDINGS:        In the upper central left breast, along the patient's reduction scar,    there is a new spiculated density. This is confirmed on the additional    images. In the upper outer left breast, there is a new spiculated nodular density. This was seen on the patient's screening mammogram. This is confirmed on    additional images.        Additionally there are 2 small nodular node    is seen. IMPRESSION:       1. Spiculated 9 mm mass at the 12:00 location. An ultrasound-guided biopsy    is recommended. 2. Spiculated 1.1 cm mass in the upper outer left breast. An ultrasound    guided biopsy is recommended. 3. Indeterminate 3 mm nodule in the outer central left breast. An    ultrasound guided biopsy is recommended. An ultrasound-guided biopsy is recommended. These results were discussed with the patient. The tech navigator was    notified. We will arrange scheduling the patient for her procedure per    department protocol. Diamond Hamman BI-RADS CATEGORY 5 - Highly suggestive of malignancy. Management: Tissue diagnosis. Biopsy should be performed in the absence    of clinical contraindication. **This report has been created using voice recognition software. It may    contain minor errors which are inherent in voice recognition technology. **       Final report electronically signed by Dr. Ryan Morocho on 3/29/2023 10:16    AM              Martínez Jacobson MD PhD on 8/1/2023 07:02      CT LUNG SCREEN [Initial/Annual]  Order: [id number]  Status: Final result     Visible to patient: Yes (seen)     Next appt: 10/10/2023 at 03:00 PM in Radiology (STR NM INJECT RM1)     Dx: Carcinoma of upper-outer quadrant of . ..     0 Result Notes  Recommendations       Due   Annual CT lung screening 7/28/2024      Details     Reading Physician Reading Date Result Priority   Carolyn Koyanagi, MD  [phone] 7/28/2023        Narrative & Impression  NONCONTRAST SCREENING CT CHEST:     HISTORY: Personal history of tobacco     TECHNIQUE:   1 mm axial imaging of the chest and upper abdomen without IV contrast.      All CT scans at this facility use dose modulation, iterative reconstruction, and/or weight-based dosing when appropriate to reduce radiation dose to as low as reasonably achievable. COMPARISON: Low-dose CT chest 4/24/2014. FINDINGS:  Heart/mediastinum: A right chest Port-A-Cath is present. The thyroid gland is unremarkable. The heart size is normal. A small pericardial effusion has decreased in volume. The aorta is not dilated. No mediastinal, hilar, or axillary lymphadenopathy is   visualized accounting for lack of IV contrast.     Lungs: Mild scarring at the lung apices is unchanged. No focal consolidation, pleural effusion, or pneumothorax is identified. A 3 mm right upper lobe pulmonary nodule is present (series 3, image 115). A benign calcified granuloma in the left upper lobe   anteriorly (series 3, image 219) is stable. A few scattered punctate benign calcified granuloma are observed. Bullous changes in the left upper lobe anteriorly (series 3, image 210) are stable. Upper abdomen: No acute findings are visualized in the limited images through the upper abdomen. Musculoskeletal: The visualized skeletal structures appear intact. IMPRESSION:  1. No suspicious pulmonary mass or nodule. A 3 mm right upper lobe pulmonary nodule and a benign calcified granuloma are visualized. 2. Chronic findings are discussed. 3. LUNGRADS ASSESSMENT VALUE: 2     LUNG RADS: Category 2     RECOMMENDATION: Annual low-dose noncontrast CT thorax for lung cancer screening. The LUNG RADS RECOMMENDATIONS for monitoring lung nodules listed below (ACR- Lung-RADS Version 1.0 Assessment Categories)     LUNG RADS RECOMMENDATIONS;  1.  Normal, continue annual screening  2. Benign appearance or behavior, continue annual screening  3.  6 month CT recommended  4A.  3 month CT recommended; may consider PET/CT  4B. Additional diagnostics and/or tissue sampling recommended  4X. Additional diagnostics and/or tissue sampling recommended       **This report has been created using voice recognition software. It may contain minor errors which are inherent in voice recognition technology. **     Final report electronically signed by Dr Aliyah Ibrahim Result Priority   Sienna Rodas, [address] 5/15/2023        Narrative & Impression  PROCEDURE: NM BONE SCAN WHOLE BODY     CLINICAL INFORMATION: Breast cancer     Radiopharmaceutical: 30 mCi technetium 99m MDP, intravenously     TECHNIQUE: Delayed anterior and posterior whole-body planar images were obtained 3 hours following radiopharmaceutical administration. Additional images of the pelvis were obtained in multiple projections. COMPARISON: None     FINDINGS: There are no foci of abnormal radiotracer accumulation in the visualized skeleton to suggest osseous metastatic disease. Diffuse activity in the calvarium may be due to hyperostosis. Inter orbital, maxillary and mandibular activity is likely   infectious or inflammatory. Probable degenerative arthropathic changes are present at the bilateral shoulders, knees, ankles and feet. Physiologic activity is seen in the soft tissues, kidneys and urinary bladder. IMPRESSION:  1. No scintigraphic evidence of osseous metastatic disease. 2. Probable degenerative arthropathic changes as detailed above.      Final report electronically signed by Dr. Manuel Ndiaye on 5/15/2023 1:08 PM                  Exam Ended: 05/15/23 12:45 EDT Last Resulted: 05/15/23 13:08 EDT       Order Details     View Encounter     Lab and Collection Details     Routing     Result History     View All Conversations on this Encounter              Result Care Coordination       Result Notes and Patient Communication         Follow-up Encounters

## 2023-10-11 NOTE — ANESTHESIA PRE PROCEDURE
Department of Anesthesiology  Preprocedure Note       Name:  Salvador Prater   Age:  47 y.o.  :  1970                                          MRN:  [id number]         Date:  10/11/2023      Surgeon: Kalia Gonzales):  MD Augustus Hicks MD    Procedure: Procedure(s):  Robotic Bilateral Salpingo Oophorectomy  Left Breast Mastectomy, Axillary Node Dissection, SLN bx, Prophylactic Right Breast Mastectomy    Medications prior to admission:   Prior to Admission medications    Medication Sig Start Date End Date Taking? Authorizing Provider   desvenlafaxine succinate (PRISTIQ) 50 MG TB24 extended release tablet Take 1 tablet by mouth daily Along with 100 mg tab for total dose 150 mg in morning 23   Faustina Quinonez MD   desvenlafaxine succinate (PRISTIQ) 100 MG TB24 extended release tablet Take 1 tablet by mouth daily Along with 50 mg tab for total dose 150 mg in the morning. 23   Faustina Quinonez MD   buPROPion (WELLBUTRIN XL) 300 MG extended release tablet Take 1 tablet by mouth every morning Along with 150 mg for total 450 mg in morning 9/7/23 3/5/24  Faustina Quinonez MD   buPROPion (WELLBUTRIN XL) 150 MG extended release tablet Take 1 tablet by mouth every morning Along with 300 mg for total 450 mg in morning 23   Faustina Quinonez MD   bisoprolol-hydroCHLOROthiazide [facility]) 5-6.25 MG per tablet Take 1 tablet by mouth daily 23   Shravan Jackson MD   potassium chloride (KLOR-CON M) 20 MEQ extended release tablet Take 1 tablet by mouth daily 23   Sofya Carballo MD PhD   tiZANidine (ZANAFLEX) 4 MG tablet Take 2 tablets by mouth at bedtime 7/10/23   Shravan Jackson MD   gabapentin (NEURONTIN) 600 MG tablet Take 1 tablet by mouth 3 times daily for 180 days.  7/10/23 1/6/24  Shravan Jackson MD   rosuvastatin (CRESTOR) 10 MG tablet Take 1 tablet by mouth nightly 23   Shravan Jackson MD   omeprazole (PRILOSEC) 20 MG delayed release capsule Take 1 capsule by mouth

## 2023-10-11 NOTE — PROGRESS NOTES
Patient admitted to  room 030 70 25 13 with daughter present.   Patient came on room air and with an iv of 0.9 ns running at 100 ml/hr    admission orders gone over with patient and daughter, including diet, activity, medications, and drain care   they voiced understanding,  no concerns noted at this time

## 2023-10-11 NOTE — PROGRESS NOTES
Pt returned to [facility] room 20 from CT scan. Vitals and assessment as charted. 0.9 infusing, @950ml to count from PACU. Pt has crackers and water. wife at the bedside. Pt and wife verbalized understanding of discharge criteria and call light use. Call light in reach.

## 2023-10-11 NOTE — PROGRESS NOTES
1026- pt to pacu, oral airway present, VSS, pt appears in no acute distress  1030- oral airway removed, resp easy and unlabored, VSS, pt states \"my back hurts but minimal pain in breast and abdomen\", pt medicated by CRNA  1035- fentanyl given  1040- fentanyl given  1050- pt asleep in bed, resp easy and unlabored, VSS, pt appears in no acute distress, pt states pain is improving  1100- pt meets criteria for discharge from pacu, pt transported to [facility] to await inpatient bed placement

## 2023-10-11 NOTE — PROGRESS NOTES
Report called at [facility] on 6A at 1215pm. Pt transferred to 6A  per transport team.

## 2023-10-12 ENCOUNTER — APPOINTMENT (OUTPATIENT)
Dept: WOMENS IMAGING | Age: 53
DRG: 581 | End: 2023-10-12
Attending: SURGERY
Payer: COMMERCIAL

## 2023-10-12 VITALS
HEART RATE: 86 BPM | HEIGHT: 65 IN | SYSTOLIC BLOOD PRESSURE: 126 MMHG | TEMPERATURE: 98.1 F | DIASTOLIC BLOOD PRESSURE: 96 MMHG | WEIGHT: 200.6 LBS | BODY MASS INDEX: 33.42 KG/M2 | OXYGEN SATURATION: 96 % | RESPIRATION RATE: 16 BRPM

## 2023-10-12 PROCEDURE — 96372 THER/PROPH/DIAG INJ SC/IM: CPT

## 2023-10-12 PROCEDURE — 6360000002 HC RX W HCPCS: Performed by: SURGERY

## 2023-10-12 PROCEDURE — G0378 HOSPITAL OBSERVATION PER HR: HCPCS

## 2023-10-12 PROCEDURE — 76098 X-RAY EXAM SURGICAL SPECIMEN: CPT

## 2023-10-12 PROCEDURE — 6370000000 HC RX 637 (ALT 250 FOR IP): Performed by: SURGERY

## 2023-10-12 PROCEDURE — 96374 THER/PROPH/DIAG INJ IV PUSH: CPT

## 2023-10-12 PROCEDURE — 2580000003 HC RX 258: Performed by: SURGERY

## 2023-10-12 RX ORDER — OXYCODONE HYDROCHLORIDE AND ACETAMINOPHEN 5; 325 MG/1; MG/1
1 TABLET ORAL EVERY 4 HOURS PRN
Status: DISCONTINUED | OUTPATIENT
Start: 2023-10-12 | End: 2023-10-12 | Stop reason: HOSPADM

## 2023-10-12 RX ORDER — DESVENLAFAXINE 100 MG/1
100 TABLET, EXTENDED RELEASE ORAL DAILY
Status: DISCONTINUED | OUTPATIENT
Start: 2023-10-12 | End: 2023-10-12 | Stop reason: HOSPADM

## 2023-10-12 RX ORDER — BUPROPION HYDROCHLORIDE 300 MG/1
300 TABLET ORAL DAILY
Status: DISCONTINUED | OUTPATIENT
Start: 2023-10-12 | End: 2023-10-12 | Stop reason: HOSPADM

## 2023-10-12 RX ORDER — DESVENLAFAXINE SUCCINATE 50 MG/1
50 TABLET, EXTENDED RELEASE ORAL DAILY
Status: DISCONTINUED | OUTPATIENT
Start: 2023-10-12 | End: 2023-10-12 | Stop reason: HOSPADM

## 2023-10-12 RX ORDER — OXYCODONE HYDROCHLORIDE AND ACETAMINOPHEN 5; 325 MG/1; MG/1
1 TABLET ORAL EVERY 6 HOURS PRN
Qty: 24 TABLET | Refills: 0 | Status: SHIPPED | OUTPATIENT
Start: 2023-10-12 | End: 2023-10-19

## 2023-10-12 RX ORDER — BISOPROLOL FUMARATE AND HYDROCHLOROTHIAZIDE 5; 6.25 MG/1; MG/1
1 TABLET ORAL DAILY
Status: DISCONTINUED | OUTPATIENT
Start: 2023-10-12 | End: 2023-10-12 | Stop reason: HOSPADM

## 2023-10-12 RX ORDER — GABAPENTIN 600 MG/1
600 TABLET ORAL 3 TIMES DAILY
Status: DISCONTINUED | OUTPATIENT
Start: 2023-10-12 | End: 2023-10-12 | Stop reason: HOSPADM

## 2023-10-12 RX ORDER — ENOXAPARIN SODIUM 100 MG/ML
40 INJECTION SUBCUTANEOUS DAILY
Status: DISCONTINUED | OUTPATIENT
Start: 2023-10-12 | End: 2023-10-12 | Stop reason: HOSPADM

## 2023-10-12 RX ORDER — BUPROPION HYDROCHLORIDE 150 MG/1
150 TABLET, EXTENDED RELEASE ORAL DAILY
Status: DISCONTINUED | OUTPATIENT
Start: 2023-10-12 | End: 2023-10-12 | Stop reason: HOSPADM

## 2023-10-12 RX ORDER — ROSUVASTATIN CALCIUM 10 MG/1
10 TABLET, COATED ORAL NIGHTLY
Status: DISCONTINUED | OUTPATIENT
Start: 2023-10-12 | End: 2023-10-12 | Stop reason: HOSPADM

## 2023-10-12 RX ORDER — TIZANIDINE 4 MG/1
8 TABLET ORAL NIGHTLY
Status: DISCONTINUED | OUTPATIENT
Start: 2023-10-12 | End: 2023-10-12 | Stop reason: HOSPADM

## 2023-10-12 RX ORDER — OXYCODONE HYDROCHLORIDE AND ACETAMINOPHEN 5; 325 MG/1; MG/1
2 TABLET ORAL EVERY 4 HOURS PRN
Status: DISCONTINUED | OUTPATIENT
Start: 2023-10-12 | End: 2023-10-12 | Stop reason: HOSPADM

## 2023-10-12 RX ORDER — NICOTINE POLACRILEX 4 MG/1
20 GUM, CHEWING ORAL DAILY
Status: DISCONTINUED | OUTPATIENT
Start: 2023-10-12 | End: 2023-10-12 | Stop reason: HOSPADM

## 2023-10-12 RX ADMIN — DESVENLAFAXINE 100 MG: 100 TABLET, EXTENDED RELEASE ORAL at 08:27

## 2023-10-12 RX ADMIN — MORPHINE SULFATE 4 MG: 4 INJECTION, SOLUTION INTRAMUSCULAR; INTRAVENOUS at 02:07

## 2023-10-12 RX ADMIN — DESVENLAFAXINE SUCCINATE 50 MG: 50 TABLET, EXTENDED RELEASE ORAL at 08:28

## 2023-10-12 RX ADMIN — BUPROPION HYDROCHLORIDE 150 MG: 150 TABLET, EXTENDED RELEASE ORAL at 08:26

## 2023-10-12 RX ADMIN — SODIUM CHLORIDE: 9 INJECTION, SOLUTION INTRAVENOUS at 07:20

## 2023-10-12 RX ADMIN — GABAPENTIN 600 MG: 600 TABLET ORAL at 08:28

## 2023-10-12 RX ADMIN — OXYCODONE AND ACETAMINOPHEN 2 TABLET: 5; 325 TABLET ORAL at 08:36

## 2023-10-12 RX ADMIN — ENOXAPARIN SODIUM 40 MG: 100 INJECTION SUBCUTANEOUS at 08:36

## 2023-10-12 RX ADMIN — OXYCODONE AND ACETAMINOPHEN 1 TABLET: 5; 325 TABLET ORAL at 13:38

## 2023-10-12 RX ADMIN — BUPROPION HYDROCHLORIDE 300 MG: 300 TABLET ORAL at 08:27

## 2023-10-12 RX ADMIN — BISOPROLOL FUMARATE AND HYDROCHLOROTHIAZIDE 1 TABLET: 5; 6.25 TABLET ORAL at 08:26

## 2023-10-12 RX ADMIN — NICOTINE POLACRILEX 20 MG: 4 GUM, CHEWING ORAL at 08:28

## 2023-10-12 ASSESSMENT — PAIN - FUNCTIONAL ASSESSMENT
PAIN_FUNCTIONAL_ASSESSMENT: ACTIVITIES ARE NOT PREVENTED

## 2023-10-12 ASSESSMENT — PAIN DESCRIPTION - ONSET
ONSET: ON-GOING
ONSET: ON-GOING

## 2023-10-12 ASSESSMENT — PAIN DESCRIPTION - ORIENTATION
ORIENTATION: LEFT;MID
ORIENTATION: RIGHT;LEFT
ORIENTATION: LEFT
ORIENTATION: LEFT
ORIENTATION: RIGHT;LEFT

## 2023-10-12 ASSESSMENT — PAIN SCALES - GENERAL
PAINLEVEL_OUTOF10: 5
PAINLEVEL_OUTOF10: 4
PAINLEVEL_OUTOF10: 8
PAINLEVEL_OUTOF10: 7
PAINLEVEL_OUTOF10: 3
PAINLEVEL_OUTOF10: 4
PAINLEVEL_OUTOF10: 5

## 2023-10-12 ASSESSMENT — PAIN DESCRIPTION - DESCRIPTORS
DESCRIPTORS: ACHING;SHARP
DESCRIPTORS: SHARP;SORE
DESCRIPTORS: SHARP;ACHING
DESCRIPTORS: ACHING;SORE
DESCRIPTORS: ACHING;SORE

## 2023-10-12 ASSESSMENT — PAIN DESCRIPTION - PAIN TYPE
TYPE: SURGICAL PAIN
TYPE: SURGICAL PAIN

## 2023-10-12 ASSESSMENT — PAIN DESCRIPTION - FREQUENCY
FREQUENCY: INTERMITTENT
FREQUENCY: INTERMITTENT

## 2023-10-12 ASSESSMENT — PAIN DESCRIPTION - LOCATION
LOCATION: ABDOMEN;BREAST
LOCATION: ABDOMEN
LOCATION: ABDOMEN
LOCATION: ABDOMEN;BREAST
LOCATION: ABDOMEN

## 2023-10-12 NOTE — CARE COORDINATION
Case Management Assessment  Initial Evaluation    Date/Time of Evaluation: 10/12/2023 10:49 AM  Assessment Completed by: Jacklyn Sanchez RN    If patient is discharged prior to next notation, then this note serves as note for discharge by case management. Patient Name: Conor Peraza                   YOB: 1970  Diagnosis: BRCA gene positive [Z15.01, Z15.09]  Breast cancer metastasized to axillary lymph node, left ([address]) [C50.912, C77.3]                   Date / Time: 10/11/2023  5:42 AM  Location: 6A14/014-A     Patient Admission Status: Inpatient   Readmission Risk Low 0-14, Mod 15-19), High > 20: Readmission Risk Score: 7.4    Current PCP: Jay Levi MD  PCP verified by CM? Yes    Chart Reviewed: Yes      History Provided by: Patient  Patient Orientation: Alert and Oriented    Patient Cognition: Alert    Hospitalization in the last 30 days (Readmission):  No    If yes, Readmission Assessment in CM Navigator will be completed. Advance Directives:      Code Status: Full Code   Patient's Primary Decision Maker is: Legal Next of Kin      Discharge Planning:    Patient lives with: Alone Type of Home: House ([facility])  Primary Care Giver: Self  Patient Support Systems include: Children, Family Members, None   Current Financial resources: Other (Comment) (John)  Current community resources: None  Current services prior to admission: None            Current DME:              Type of Home Care services:  None    ADLS  Prior functional level: Independent in ADLs/IADLs  Current functional level: Independent in ADLs/IADLs    Family can provide assistance at DC: Yes  Would you like Case Management to discuss the discharge plan with any other family members/significant others, and if so, who?  No  Plans to Return to Present Housing: Yes  Other Identified Issues/Barriers to RETURNING to current housing: No  Potential Assistance needed at discharge: N/A            Potential DME:    Patient expects to discharge to: [address] [place] [address] for transportation at discharge: Self    Financial    Payor: Nahomi Lin / Plan: Margaclara[employer] MALDONADO MED ADV / Product Type: *No Product type* /     Does insurance require precert for SNF: Yes    Potential assistance Purchasing Medications: No  Meds-to-Beds request: Yes      [address] [phone]  [address]  [place] [address]  Phone: [phone] Fax: [phone]      Notes:    Factors facilitating achievement of predicted outcomes: Family support, Friend support, and Cooperative    Barriers to discharge: None. Pt being discharged. Additional Case Management Notes: Admitted from [facility] for above procedures. IVF at 125/hr. Procedure: 10-11-23 Robotic BSO with COSTA and  Left modified radical mastectomy with injection Lymphazurin for sentinel lymph node mapping targeted axillary dissection. 2.  Right contralateral prophylactic mastectomy    The Plan for Transition of Care is related to the following treatment goals of BRCA gene positive [Z15.01, Z15.09]  Breast cancer metastasized to axillary lymph node, left ([address]) [C50.912, C77.3]    Patient Goals/Plan/Treatment Preferences: Met with Rafi Garza and a friend is present who will transport her home at discharge. Rafi Garza acknowledges that she can manage her drains and does not need Home Health services. Discharge order in place. Transportation/Food Security/Housekeeping Addressed: No issues identified.      Anali Hampton RN  Case Management Department

## 2023-10-12 NOTE — DISCHARGE INSTRUCTIONS
DR AUGUSTE'S DISCHARGE INSTRUCTIONS    Pt Name: [facility]  Medical Record Number: [id number]  Today's Date: 10/12/2023    GENERAL ANESTHESIA OR SEDATION  1. Do not drive or operate hazardous machinery for 24 hours. 2. Do not make important business or personal decisions for 24 hours. 3. Do not drink alcoholic beverages or use tobacco for 24 hours. ACTIVITY INSTRUCTIONS:  [] Rest today. Resume light to normal activity tomorrow.   [] You may resume normal activity tomorrow. Do not engage in strenuous activity that may place stress on your incision. [x] Do not drive for 3-5 days and avoid heavy lifting, tugging, pullings greater than 10-20 lbs until seen in the office. DIET INSTRUCTIONS:  []Begin with clear liquids. If not nauseated, may increase to a low-fat diet when you desire. Greasy and spicy foods are not advised. [x]Regular diet as tolerated. []Other:     MEDICATIONS  [x]Prescription sent with you to be used as directed. []Lortab   []Norco   [x]Percocet   []Tylenol #3   []Oxycontin   Do not drink alcohol or drive while taking these medications. You may experience dizziness or drowsiness with these medications. You may also experience constipation which can be relieved with stool softners or laxatives. [x]You may resume your daily prescription medication schedule unless otherwise specified. [x]Do not take 325mg Aspirin or other blood thinners such as Coumadin or Plavix for 5 days. WOUND/DRESSING INSTRUCTIONS:  Always ensure you and your care giver clean hands before and after caring for the wound. [] Keep dressing clean and dry for 48 hours. Change when soiled or wet. [] Allow steri-strips to fall off on their own.   [] Ice operative site for 20 minutes 4 times a day. [x] May wash over incision in shower in 48 hours, but do not soak in a bath.  [] Take sitz bath for 20 minutes twice daily and after bowel movements. [] Keep the abdominal binder in place during the day.  May remove to shower and at night.  [] Remove packing from wound in 24 hours and replace with AMD dressings daily. [] Empty BRANDI drain daily and record the amounts. BREAST PROCEDURES  [x]Following a breast procedure, it is important to continue to where supportive garments. [x]Following a sentinal lymph node biopsy, you should not be alarmed if your urine has a blue color to it. This is your body eliminating the dye used for the procedure. ABDOMINAL/LAPAROSCOPIC SURGERY  [x]You are encouraged to get up and move around as this helps with the circulation and speeds up the healing process. [x]Breath deeply and cough from time to time. This helps to clear your lungs and helps prevent pneumonia. []Supporting your incision with a pillow or your hand helps to minimize discomfort and pain. []Laparoscopic patients may develop shoulder pain in the first 48 hours from the gas used during the procedure. FOLLOW-UP CARE. SPECIFICALLY WATCH FOR:   Fever over 101 degrees by mouth   Increased redness, warmth, hardness at operative site. Blood soaked dressing (small amounts of oozing may be normal.)   Increased or progressive drainage from the surgical area   Inability to urinate or blood in the urine   Pain not relieved by the medications ordered   Persistent nausea and/or vomiting, unable to retain fluids. FOLLOW-UP APPOINTMENT   [x]1 week   []2 weeks   []Other    Call my office if you have any problem that concerns you [phone]. After hours, you can reach the answering service via the office phone number. IF YOU NEED IMMEDIATE ATTENTION, GO TO THE EMERGENCY ROOM AND YOUR DOCTOR WILL BE CONTACTED. Denise George MD MD  [address] [place] [phone]  [facility], [address] [place] [address]  Electronically signed 10/12/2023 at 8:24 AM   You have a closed suction drain in your operative wound to remove fluid and/or blood that would otherwise collect. Care is as follows.     Every eight (8) hours, and as needed in between:    Open the stopper on the drain

## 2023-10-12 NOTE — PROGRESS NOTES
Dyan Sunshine MD called this RN and gave a verbal order for patient home medications to be resumed while in the hospital.

## 2023-10-12 NOTE — FLOWSHEET NOTE
10/11/23 2155   Treatment Team Notification   Reason for Communication Medication concern  (patient brought home medications to take)   Name of Team Member Notified Jerrell Hodges MD   Treatment Team Role Attending Provider   Method of Communication Secure Message   Response Waiting for response   Notification Time 2146     Patient brought home medications with her to take while she is here in the hospital. This RN filled out the med waiver form and had the patient sign the form. This RN messaged the attending provider, Jerrell Hodges MD, per [employer] about getting her home medications ordered for her to take while in the hospital. No response at this time. No new orders. Patient states that she is fine with not taking her home medications tonight.

## 2023-10-12 NOTE — PROGRESS NOTES
Discharge instructions gone over with patient, including follow up appointments,  rx called into pharmacy,  post op instructions gone over with patient, including using chg soap until gone on abdominal incisions.   Patient voiced understanding,  no concerns noted at this time

## 2023-10-12 NOTE — PLAN OF CARE
Problem: Discharge Planning  Goal: Discharge to home or other facility with appropriate resources  10/12/2023 0141 by Malena Monahan RN  Outcome: Progressing  Flowsheets (Taken 10/11/2023 2057)  Discharge to home or other facility with appropriate resources:   Arrange for needed discharge resources and transportation as appropriate   Identify barriers to discharge with patient and caregiver   Identify discharge learning needs (meds, wound care, etc)   Refer to discharge planning if patient needs post-hospital services based on physician order or complex needs related to functional status, cognitive ability or social support system  10/11/2023 1338 by Celeste Ortiz RN  Flowsheets (Taken 10/11/2023 1338)  Discharge to home or other facility with appropriate resources:   Identify barriers to discharge with patient and caregiver   Arrange for needed discharge resources and transportation as appropriate   Identify discharge learning needs (meds, wound care, etc)     Problem: Chronic Conditions and Co-morbidities  Goal: Patient's chronic conditions and co-morbidity symptoms are monitored and maintained or improved  10/12/2023 0141 by Malena Monahan RN  Outcome: Progressing  Flowsheets (Taken 10/11/2023 2057)  Care Plan - Patient's Chronic Conditions and Co-Morbidity Symptoms are Monitored and Maintained or Improved:   Collaborate with multidisciplinary team to address chronic and comorbid conditions and prevent exacerbation or deterioration   Update acute care plan with appropriate goals if chronic or comorbid symptoms are exacerbated and prevent overall improvement and discharge   Monitor and assess patient's chronic conditions and comorbid symptoms for stability, deterioration, or improvement  10/11/2023 1338 by Celeste Ortiz RN  Flowsheets (Taken 10/11/2023 1338)  Care Plan - Patient's Chronic Conditions and Co-Morbidity Symptoms are Monitored and Maintained or Improved:   Monitor and assess patient's

## 2023-10-12 NOTE — DISCHARGE SUMMARY
GYN Surgery  Discharge Summary     Patient ID:  Angi Castaneda  [id number]  89 y.o.  1970    Admit date: 10/11/2023    Admitting Physician: James Rodriguez MD    Discharge Diagnoses: BRCA gene positive [Z15.01, Z15.09]  Breast cancer metastasized to axillary lymph node, left ([address]) [C50.912, C77.3]    Discharged Condition: good      Procedures Performed: See Dictation    Hospital Course: Patient was admitted on the day of surgery, and underwent an uncomplicated procedure. See dictated op note. Disposition: home    Patient Instructions: Activity: activity as tolerated, no sex for 6 weeks, no driving while on analgesics, and no heavy lifting for 6 weeks  Diet: regular  Wound Care: keep wound clean and dry and as directed    Discharge Medication:      Medication List        START taking these medications      oxyCODONE-acetaminophen 5-325 MG per tablet  Commonly known as: PERCOCET  Take 1 tablet by mouth every 6 hours as needed for Pain for up to 7 days. Max Daily Amount: 4 tablets            CONTINUE taking these medications      bisoprolol-hydroCHLOROthiazide 5-6.25 MG per tablet  Commonly known as: Ziac  Take 1 tablet by mouth daily     * buPROPion 300 MG extended release tablet  Commonly known as: WELLBUTRIN XL  Take 1 tablet by mouth every morning Along with 150 mg for total 450 mg in morning     * buPROPion 150 MG extended release tablet  Commonly known as:  Wellbutrin XL  Take 1 tablet by mouth every morning Along with 300 mg for total 450 mg in morning     * desvenlafaxine succinate 50 MG Tb24 extended release tablet  Commonly known as: Pristiq  Take 1 tablet by mouth daily Along with 100 mg tab for total dose 150 mg in morning     * desvenlafaxine succinate 100 MG Tb24 extended release tablet  Commonly known as: PRISTIQ  Take 1 tablet by mouth daily Along with 50 mg tab for total dose 150 mg in the morning.     gabapentin 600 MG tablet  Commonly known as: NEURONTIN  Take 1 tablet by mouth 3 times

## 2023-10-19 ENCOUNTER — OFFICE VISIT (OUTPATIENT)
Dept: SURGERY | Age: 53
End: 2023-10-19

## 2023-10-19 VITALS
DIASTOLIC BLOOD PRESSURE: 72 MMHG | TEMPERATURE: 97.7 F | OXYGEN SATURATION: 96 % | HEART RATE: 78 BPM | HEIGHT: 65 IN | BODY MASS INDEX: 32.42 KG/M2 | WEIGHT: 194.6 LBS | SYSTOLIC BLOOD PRESSURE: 116 MMHG

## 2023-10-19 DIAGNOSIS — Z48.89 ENCOUNTER FOR POSTOPERATIVE CARE: ICD-10-CM

## 2023-10-19 DIAGNOSIS — Z15.09 BRCA1 GENE MUTATION POSITIVE: ICD-10-CM

## 2023-10-19 DIAGNOSIS — Z15.01 BRCA2 GENE MUTATION POSITIVE: ICD-10-CM

## 2023-10-19 DIAGNOSIS — C50.412 CARCINOMA OF UPPER-OUTER QUADRANT OF LEFT BREAST IN FEMALE, ESTROGEN RECEPTOR POSITIVE (HCC): Primary | ICD-10-CM

## 2023-10-19 DIAGNOSIS — Z15.01 BRCA1 GENE MUTATION POSITIVE: ICD-10-CM

## 2023-10-19 DIAGNOSIS — Z90.13 H/O BILATERAL MASTECTOMY: ICD-10-CM

## 2023-10-19 DIAGNOSIS — Z15.09 BRCA2 GENE MUTATION POSITIVE: ICD-10-CM

## 2023-10-19 DIAGNOSIS — Z17.0 CARCINOMA OF UPPER-OUTER QUADRANT OF LEFT BREAST IN FEMALE, ESTROGEN RECEPTOR POSITIVE (HCC): Primary | ICD-10-CM

## 2023-10-19 PROBLEM — J34.3 HYPERTROPHY OF BOTH INFERIOR NASAL TURBINATES: Status: RESOLVED | Noted: 2017-12-19 | Resolved: 2023-10-19

## 2023-10-19 PROCEDURE — 99024 POSTOP FOLLOW-UP VISIT: CPT | Performed by: SURGERY

## 2023-10-19 NOTE — PROGRESS NOTES
Franchesca Smith MD   General Surgery  Postprocedure Evaluation in Office  Pt Name: Yoli Cao  Date of Birth 1970   Today's Date: 10/19/2023  Medical Record Number: [id number]  Primary Care Provider: Lucho Almendarez MD  Chief Complaint   Patient presents with    Post-Op Check     S/P Left modified radical mastectomy with injection Lymphazurin for sentinel lymph node mapping targeted axillary dissection, right contralateral prophylactic mastectomy 10/11/23-Drains in place     ASSESSMENT      1. Carcinoma of upper-outer quadrant of left breast in female, estrogen receptor positive ([address])    2. BRCA1 gene mutation positive    3. BRCA2 gene mutation positive    4. Encounter for postoperative care    5. H/O bilateral mastectomy       Patient with complete pathologic response status post neoadjuvant chemotherapy  PLAN       Drain output marginal for removal.  Keep accurate I's and O's and follow-up Monday for potential removal.  Discussed pathology with patient. BSO-benign pathology as well. Breasts pathologic complete response. 3.  Patient has follow-up with medical oncology in a few weeks. Lawrence Denney is seen today for post-op follow-up. She is 1 week(s) status post bilateral mastectomy. She had neoadjuvant chemotherapy. She is BRCA 1 and 2 positive. She had a pathologic complete  response.     Medications    Current Outpatient Medications:     desvenlafaxine succinate (PRISTIQ) 50 MG TB24 extended release tablet, Take 1 tablet by mouth daily Along with 100 mg tab for total dose 150 mg in morning, Disp: 30 tablet, Rfl: 5    desvenlafaxine succinate (PRISTIQ) 100 MG TB24 extended release tablet, Take 1 tablet by mouth daily Along with 50 mg tab for total dose 150 mg in the morning., Disp: 30 tablet, Rfl: 5    buPROPion (WELLBUTRIN XL) 300 MG extended release tablet, Take 1 tablet by mouth every morning Along with 150 mg for total 450 mg in morning, Disp: 30 tablet, Rfl: 5    buPROPion

## 2023-10-20 RX ORDER — GABAPENTIN 600 MG/1
600 TABLET ORAL 3 TIMES DAILY
Qty: 270 TABLET | Refills: 1 | Status: SHIPPED | OUTPATIENT
Start: 2023-10-20 | End: 2024-04-17

## 2023-10-20 NOTE — TELEPHONE ENCOUNTER
Date of last visit:  6/30/2023  Date of next visit:  11/20/2023    Requested Prescriptions     Pending Prescriptions Disp Refills    gabapentin (NEURONTIN) 600 MG tablet 90 tablet 5     Sig: Take 1 tablet by mouth 3 times daily for 180 days.

## 2023-10-23 ENCOUNTER — OFFICE VISIT (OUTPATIENT)
Dept: SURGERY | Age: 53
End: 2023-10-23

## 2023-10-23 VITALS
WEIGHT: 196 LBS | SYSTOLIC BLOOD PRESSURE: 128 MMHG | DIASTOLIC BLOOD PRESSURE: 82 MMHG | TEMPERATURE: 97.5 F | RESPIRATION RATE: 18 BRPM | HEART RATE: 97 BPM | BODY MASS INDEX: 32.65 KG/M2 | OXYGEN SATURATION: 97 % | HEIGHT: 65 IN

## 2023-10-23 DIAGNOSIS — C50.412 CARCINOMA OF UPPER-OUTER QUADRANT OF LEFT BREAST IN FEMALE, ESTROGEN RECEPTOR POSITIVE (HCC): Primary | ICD-10-CM

## 2023-10-23 DIAGNOSIS — Z17.0 CARCINOMA OF UPPER-OUTER QUADRANT OF LEFT BREAST IN FEMALE, ESTROGEN RECEPTOR POSITIVE (HCC): Primary | ICD-10-CM

## 2023-10-23 DIAGNOSIS — Z15.01 BRCA2 GENE MUTATION POSITIVE: ICD-10-CM

## 2023-10-23 DIAGNOSIS — Z90.13 H/O BILATERAL MASTECTOMY: ICD-10-CM

## 2023-10-23 DIAGNOSIS — Z48.89 ENCOUNTER FOR POSTOPERATIVE CARE: ICD-10-CM

## 2023-10-23 DIAGNOSIS — Z15.09 BRCA2 GENE MUTATION POSITIVE: ICD-10-CM

## 2023-10-23 PROCEDURE — 99024 POSTOP FOLLOW-UP VISIT: CPT | Performed by: SURGERY

## 2023-10-23 NOTE — PROGRESS NOTES
Gi Quevedo MD   General Surgery  Postprocedure Evaluation in Office  Pt Name: Mortimer Salinas  Date of Birth 1970   Today's Date: 10/23/2023  Medical Record Number: [id number]  Primary Care Provider: Ranjeet Gonzales MD  Chief Complaint   Patient presents with    Post-Op Check     S/P Left modified radical mastectomy with injection Lymphazurin for sentinel lymph node mapping targeted axillary dissection, right contralateral prophylactic mastectomy 10/11/23-Last seen 10/19/23-Drains in place       ASSESSMENT      1. Carcinoma of upper-outer quadrant of left breast in female, estrogen receptor positive ([address])    2. BRCA2 gene mutation positive    3. Encounter for postoperative care    4. H/O bilateral mastectomy         Patient with complete pathologic response status post neoadjuvant chemotherapy  PLAN       Both drains removed today without complication. Follow-up clinic 1 week   3. Patient has follow-up with medical oncology in a few weeks. Eden Morrison is seen today for post-op follow-up. She is 10 days week(s) status post bilateral mastectomy. She had neoadjuvant chemotherapy. She is BRCA 1 and 2 positive. She had a pathologic complete  response. Drain output is serous. Output low enough to remove both drains. Patient tolerated well. Medications    Current Outpatient Medications:     gabapentin (NEURONTIN) 600 MG tablet, Take 1 tablet by mouth 3 times daily for 180 days. , Disp: 270 tablet, Rfl: 1    desvenlafaxine succinate (PRISTIQ) 50 MG TB24 extended release tablet, Take 1 tablet by mouth daily Along with 100 mg tab for total dose 150 mg in morning, Disp: 30 tablet, Rfl: 5    desvenlafaxine succinate (PRISTIQ) 100 MG TB24 extended release tablet, Take 1 tablet by mouth daily Along with 50 mg tab for total dose 150 mg in the morning., Disp: 30 tablet, Rfl: 5    buPROPion (WELLBUTRIN XL) 300 MG extended release tablet, Take 1 tablet by mouth every morning Along with 150 mg for total

## 2023-10-29 NOTE — PROGRESS NOTES
[facility]  [address] [place] [address]  Dept: [phone]  Loc: [phone]   Hematology/Oncology Consult (Clinic)        10/31/23       Beulah Rajanbarry   1970     No ref. provider found   Justin Penn MD       Reason:   Chief Complaint   Patient presents with    Follow-up     Carcinoma of upper-outer quadrant of left breast in female, estrogen receptor positive [facility])          HPI: Ms Nicolasa Neves is a 46 y/o who originally presented a 81-year-old female for screening mammography 3/21/23 which showed a new spiculated density in the upper outer left breast and possible density anterior to the first. Bilateral normal appearing axillary lymph nodes. - Diagnostic imaging ultrasound showed an upper central left breast new spiculated density around the patient's reduction scar (had breast reduction surgery 1990s). In our left breast there was a new spiculated density, additionally 2 small densities in the outer left breast on spot compression images. -4/30/2023 ultrasound-guided biopsy upper central left breast performed in this area as well. In 3 areas, 8 cm apart, largest lesion at 12:00 was 0.9 cm in maximal dimension with pathology revealing triple positive (ER +100%, ND +10%, Ki-67 was 20% with HER2 positive by IHC) invasive ductal carcinoma NOS, Mikhail 7 of 9, grade 2 (barbell clip). The other 2 smaller sites (measuring 7 mm and 4 mm respectively) were invasive ductal carcinoma NOS, Mikhail grade 2 with ER +100%, ND +80%, Ki-67 was 30% with HER2 equivocal by IHC (U clip), and the last was invasive carcinoma with lobular features ER +100%, ND +95%, Ki-67 was 40% with HER2 negative by IHC (tribell clip). 4/21/2023 she follow-up with her primary care doctor reporting pain off-and-on in the thumbs and the ligaments for the past over weeks, heart squeezing to do laundry, occasionally with ankle pain.   Bone scan

## 2023-10-30 ENCOUNTER — OFFICE VISIT (OUTPATIENT)
Dept: SURGERY | Age: 53
End: 2023-10-30

## 2023-10-30 VITALS
HEART RATE: 85 BPM | TEMPERATURE: 97.5 F | HEIGHT: 65 IN | BODY MASS INDEX: 32.65 KG/M2 | WEIGHT: 196 LBS | SYSTOLIC BLOOD PRESSURE: 117 MMHG | DIASTOLIC BLOOD PRESSURE: 72 MMHG | OXYGEN SATURATION: 98 %

## 2023-10-30 DIAGNOSIS — Z15.09 BRCA1 GENE MUTATION POSITIVE: ICD-10-CM

## 2023-10-30 DIAGNOSIS — Z15.01 BRCA1 GENE MUTATION POSITIVE: ICD-10-CM

## 2023-10-30 DIAGNOSIS — Z90.13 H/O BILATERAL MASTECTOMY: ICD-10-CM

## 2023-10-30 DIAGNOSIS — C50.412 CARCINOMA OF UPPER-OUTER QUADRANT OF LEFT BREAST IN FEMALE, ESTROGEN RECEPTOR POSITIVE (HCC): Primary | ICD-10-CM

## 2023-10-30 DIAGNOSIS — Z48.89 ENCOUNTER FOR POSTOPERATIVE CARE: ICD-10-CM

## 2023-10-30 DIAGNOSIS — E11.9 TYPE 2 DIABETES MELLITUS TREATED WITHOUT INSULIN (HCC): ICD-10-CM

## 2023-10-30 DIAGNOSIS — Z15.09 BRCA2 GENE MUTATION POSITIVE: ICD-10-CM

## 2023-10-30 DIAGNOSIS — Z15.01 BRCA2 GENE MUTATION POSITIVE: ICD-10-CM

## 2023-10-30 DIAGNOSIS — Z17.0 CARCINOMA OF UPPER-OUTER QUADRANT OF LEFT BREAST IN FEMALE, ESTROGEN RECEPTOR POSITIVE (HCC): Primary | ICD-10-CM

## 2023-10-30 PROCEDURE — 99024 POSTOP FOLLOW-UP VISIT: CPT | Performed by: SURGERY

## 2023-10-31 ENCOUNTER — HOSPITAL ENCOUNTER (OUTPATIENT)
Dept: INFUSION THERAPY | Age: 53
Discharge: HOME | End: 2023-10-31
Payer: COMMERCIAL

## 2023-10-31 ENCOUNTER — OFFICE VISIT (OUTPATIENT)
Dept: ONCOLOGY | Age: 53
End: 2023-10-31
Payer: COMMERCIAL

## 2023-10-31 VITALS
RESPIRATION RATE: 18 BRPM | WEIGHT: 196.8 LBS | OXYGEN SATURATION: 96 % | HEIGHT: 65 IN | HEART RATE: 71 BPM | SYSTOLIC BLOOD PRESSURE: 133 MMHG | DIASTOLIC BLOOD PRESSURE: 83 MMHG | TEMPERATURE: 98.5 F | BODY MASS INDEX: 32.79 KG/M2

## 2023-10-31 VITALS
TEMPERATURE: 99 F | HEIGHT: 65 IN | OXYGEN SATURATION: 96 % | SYSTOLIC BLOOD PRESSURE: 128 MMHG | WEIGHT: 196.8 LBS | RESPIRATION RATE: 18 BRPM | DIASTOLIC BLOOD PRESSURE: 84 MMHG | BODY MASS INDEX: 32.79 KG/M2 | HEART RATE: 78 BPM

## 2023-10-31 DIAGNOSIS — Z15.09 BRCA GENE MUTATION POSITIVE IN FEMALE: Primary | ICD-10-CM

## 2023-10-31 DIAGNOSIS — Z15.02 BRCA GENE MUTATION POSITIVE IN FEMALE: Primary | ICD-10-CM

## 2023-10-31 DIAGNOSIS — T45.1X5A CHEMOTHERAPY INDUCED DIARRHEA: ICD-10-CM

## 2023-10-31 DIAGNOSIS — Z79.811 AROMATASE INHIBITOR USE: ICD-10-CM

## 2023-10-31 DIAGNOSIS — E87.6 HYPOKALEMIA DUE TO EXCESSIVE GASTROINTESTINAL LOSS OF POTASSIUM: ICD-10-CM

## 2023-10-31 DIAGNOSIS — Z17.0 CARCINOMA OF UPPER-OUTER QUADRANT OF LEFT BREAST IN FEMALE, ESTROGEN RECEPTOR POSITIVE (HCC): ICD-10-CM

## 2023-10-31 DIAGNOSIS — Z15.01 BRCA GENE MUTATION POSITIVE IN FEMALE: Primary | ICD-10-CM

## 2023-10-31 DIAGNOSIS — C50.412 CARCINOMA OF UPPER-OUTER QUADRANT OF LEFT BREAST IN FEMALE, ESTROGEN RECEPTOR POSITIVE (HCC): ICD-10-CM

## 2023-10-31 DIAGNOSIS — C50.412 CARCINOMA OF UPPER-OUTER QUADRANT OF LEFT BREAST IN FEMALE, ESTROGEN RECEPTOR POSITIVE (HCC): Primary | ICD-10-CM

## 2023-10-31 DIAGNOSIS — Z51.81 ENCOUNTER FOR MONITORING CARDIOTOXIC DRUG THERAPY: ICD-10-CM

## 2023-10-31 DIAGNOSIS — Z79.899 ENCOUNTER FOR MONITORING CARDIOTOXIC DRUG THERAPY: ICD-10-CM

## 2023-10-31 DIAGNOSIS — K52.1 CHEMOTHERAPY INDUCED DIARRHEA: ICD-10-CM

## 2023-10-31 DIAGNOSIS — C50.912 INVASIVE DUCTAL CARCINOMA OF BREAST, FEMALE, LEFT (HCC): ICD-10-CM

## 2023-10-31 DIAGNOSIS — Z17.0 CARCINOMA OF UPPER-OUTER QUADRANT OF LEFT BREAST IN FEMALE, ESTROGEN RECEPTOR POSITIVE (HCC): Primary | ICD-10-CM

## 2023-10-31 LAB
ABSOLUTE IMMATURE GRANULOCYTE: 0.01 THOU/MM3 (ref 0–0.07)
ALBUMIN SERPL BCG-MCNC: 4 G/DL (ref 3.5–5.1)
ALP SERPL-CCNC: 118 U/L (ref 38–126)
ALT SERPL W/O P-5'-P-CCNC: 10 U/L (ref 11–66)
AST SERPL-CCNC: 12 U/L (ref 5–40)
BASOPHILS ABSOLUTE: 0 THOU/MM3 (ref 0–0.1)
BASOPHILS NFR BLD AUTO: 0 % (ref 0–3)
BILIRUB CONJ SERPL-MCNC: < 0.2 MG/DL (ref 0–0.3)
BILIRUB SERPL-MCNC: 0.2 MG/DL (ref 0.3–1.2)
BUN BLDP-MCNC: 11 MG/DL (ref 8–26)
CHLORIDE BLD-SCNC: 105 MEQ/L (ref 98–109)
CREAT BLD-MCNC: 0.5 MG/DL (ref 0.5–1.2)
EOSINOPHIL NFR BLD AUTO: 5 % (ref 0–4)
EOSINOPHILS ABSOLUTE: 0.3 THOU/MM3 (ref 0–0.4)
ERYTHROCYTE [DISTWIDTH] IN BLOOD BY AUTOMATED COUNT: 13.3 % (ref 11.5–14.5)
GFR SERPL CREATININE-BSD FRML MDRD: > 60 ML/MIN/1.73M2
GLUCOSE BLD-MCNC: 120 MG/DL (ref 70–108)
HCT VFR BLD AUTO: 34.8 % (ref 37–47)
HGB BLD-MCNC: 11.3 GM/DL (ref 12–16)
IMMATURE GRANULOCYTES: 0 %
IONIZED CALCIUM, WHOLE BLOOD: 1.21 MMOL/L (ref 1.12–1.32)
LYMPHOCYTES ABSOLUTE: 2.2 THOU/MM3 (ref 1–4.8)
LYMPHOCYTES NFR BLD AUTO: 33 % (ref 15–47)
MAGNESIUM SERPL-MCNC: 1.7 MG/DL (ref 1.6–2.4)
MCH RBC QN AUTO: 30.1 PG (ref 26–33)
MCHC RBC AUTO-ENTMCNC: 32.5 GM/DL (ref 32.2–35.5)
MCV RBC AUTO: 93 FL (ref 81–99)
MONOCYTES ABSOLUTE: 0.5 THOU/MM3 (ref 0.4–1.3)
MONOCYTES NFR BLD AUTO: 7 % (ref 0–12)
NEUTROPHILS NFR BLD AUTO: 55 % (ref 43–75)
PLATELET # BLD AUTO: 310 THOU/MM3 (ref 130–400)
PMV BLD AUTO: 8.8 FL (ref 9.4–12.4)
POTASSIUM BLD-SCNC: 3.6 MEQ/L (ref 3.5–4.9)
PROT SERPL-MCNC: 7.1 G/DL (ref 6.1–8)
RBC # BLD AUTO: 3.76 MILL/MM3 (ref 4.2–5.4)
SEGMENTED NEUTROPHILS ABSOLUTE COUNT: 3.7 THOU/MM3 (ref 1.8–7.7)
SODIUM BLD-SCNC: 143 MEQ/L (ref 138–146)
TOTAL CO2, WHOLE BLOOD: 27 MEQ/L (ref 23–33)
WBC # BLD AUTO: 6.7 THOU/MM3 (ref 4.8–10.8)

## 2023-10-31 PROCEDURE — 85025 COMPLETE CBC W/AUTO DIFF WBC: CPT

## 2023-10-31 PROCEDURE — 96417 CHEMO IV INFUS EACH ADDL SEQ: CPT

## 2023-10-31 PROCEDURE — 96375 TX/PRO/DX INJ NEW DRUG ADDON: CPT

## 2023-10-31 PROCEDURE — 1111F DSCHRG MED/CURRENT MED MERGE: CPT | Performed by: INTERNAL MEDICINE

## 2023-10-31 PROCEDURE — G8427 DOCREV CUR MEDS BY ELIG CLIN: HCPCS | Performed by: INTERNAL MEDICINE

## 2023-10-31 PROCEDURE — 83735 ASSAY OF MAGNESIUM: CPT

## 2023-10-31 PROCEDURE — 99214 OFFICE O/P EST MOD 30 MIN: CPT | Performed by: INTERNAL MEDICINE

## 2023-10-31 PROCEDURE — 6360000002 HC RX W HCPCS: Performed by: INTERNAL MEDICINE

## 2023-10-31 PROCEDURE — 3017F COLORECTAL CA SCREEN DOC REV: CPT | Performed by: INTERNAL MEDICINE

## 2023-10-31 PROCEDURE — 99211 OFF/OP EST MAY X REQ PHY/QHP: CPT

## 2023-10-31 PROCEDURE — G8417 CALC BMI ABV UP PARAM F/U: HCPCS | Performed by: INTERNAL MEDICINE

## 2023-10-31 PROCEDURE — 80076 HEPATIC FUNCTION PANEL: CPT

## 2023-10-31 PROCEDURE — 2580000003 HC RX 258: Performed by: INTERNAL MEDICINE

## 2023-10-31 PROCEDURE — G9899 SCRN MAM PERF RSLTS DOC: HCPCS | Performed by: INTERNAL MEDICINE

## 2023-10-31 PROCEDURE — 6370000000 HC RX 637 (ALT 250 FOR IP): Performed by: INTERNAL MEDICINE

## 2023-10-31 PROCEDURE — 96413 CHEMO IV INFUSION 1 HR: CPT

## 2023-10-31 PROCEDURE — 4004F PT TOBACCO SCREEN RCVD TLK: CPT | Performed by: INTERNAL MEDICINE

## 2023-10-31 PROCEDURE — 36591 DRAW BLOOD OFF VENOUS DEVICE: CPT

## 2023-10-31 PROCEDURE — G8484 FLU IMMUNIZE NO ADMIN: HCPCS | Performed by: INTERNAL MEDICINE

## 2023-10-31 PROCEDURE — 80047 BASIC METABLC PNL IONIZED CA: CPT

## 2023-10-31 RX ORDER — HEPARIN SODIUM (PORCINE) LOCK FLUSH IV SOLN 100 UNIT/ML 100 UNIT/ML
500 SOLUTION INTRAVENOUS PRN
Status: CANCELLED | OUTPATIENT
Start: 2023-10-31

## 2023-10-31 RX ORDER — ONDANSETRON 2 MG/ML
8 INJECTION INTRAMUSCULAR; INTRAVENOUS
Status: CANCELLED | OUTPATIENT
Start: 2023-10-31

## 2023-10-31 RX ORDER — HEPARIN 100 UNIT/ML
500 SYRINGE INTRAVENOUS PRN
Status: DISCONTINUED | OUTPATIENT
Start: 2023-10-31 | End: 2023-11-01 | Stop reason: HOSPADM

## 2023-10-31 RX ORDER — FAMOTIDINE 10 MG/ML
20 INJECTION, SOLUTION INTRAVENOUS
Status: CANCELLED | OUTPATIENT
Start: 2023-10-31

## 2023-10-31 RX ORDER — ACETAMINOPHEN 325 MG/1
650 TABLET ORAL
Status: COMPLETED | OUTPATIENT
Start: 2023-10-31 | End: 2023-10-31

## 2023-10-31 RX ORDER — LETROZOLE 2.5 MG/1
2.5 TABLET, FILM COATED ORAL DAILY
Qty: 30 TABLET | Refills: 3 | Status: SHIPPED | OUTPATIENT
Start: 2023-10-31

## 2023-10-31 RX ORDER — SODIUM CHLORIDE 9 MG/ML
25 INJECTION, SOLUTION INTRAVENOUS PRN
OUTPATIENT
Start: 2023-10-31

## 2023-10-31 RX ORDER — SODIUM CHLORIDE 0.9 % (FLUSH) 0.9 %
5-40 SYRINGE (ML) INJECTION PRN
Status: DISCONTINUED | OUTPATIENT
Start: 2023-10-31 | End: 2023-11-01 | Stop reason: HOSPADM

## 2023-10-31 RX ORDER — SODIUM CHLORIDE 9 MG/ML
5-250 INJECTION, SOLUTION INTRAVENOUS PRN
Status: CANCELLED | OUTPATIENT
Start: 2023-10-31

## 2023-10-31 RX ORDER — SODIUM CHLORIDE 9 MG/ML
5-250 INJECTION, SOLUTION INTRAVENOUS PRN
Status: DISCONTINUED | OUTPATIENT
Start: 2023-10-31 | End: 2023-11-01 | Stop reason: HOSPADM

## 2023-10-31 RX ORDER — DIPHENHYDRAMINE HYDROCHLORIDE 50 MG/ML
50 INJECTION INTRAMUSCULAR; INTRAVENOUS
Status: COMPLETED | OUTPATIENT
Start: 2023-10-31 | End: 2023-10-31

## 2023-10-31 RX ORDER — HEPARIN 100 UNIT/ML
500 SYRINGE INTRAVENOUS PRN
OUTPATIENT
Start: 2023-10-31

## 2023-10-31 RX ORDER — ACETAMINOPHEN 325 MG/1
650 TABLET ORAL
Status: CANCELLED | OUTPATIENT
Start: 2023-10-31

## 2023-10-31 RX ORDER — SODIUM CHLORIDE 0.9 % (FLUSH) 0.9 %
5-40 SYRINGE (ML) INJECTION PRN
OUTPATIENT
Start: 2023-10-31

## 2023-10-31 RX ORDER — DIPHENHYDRAMINE HYDROCHLORIDE 50 MG/ML
50 INJECTION INTRAMUSCULAR; INTRAVENOUS
Status: CANCELLED | OUTPATIENT
Start: 2023-10-31

## 2023-10-31 RX ORDER — SODIUM CHLORIDE 0.9 % (FLUSH) 0.9 %
5-40 SYRINGE (ML) INJECTION PRN
Status: CANCELLED | OUTPATIENT
Start: 2023-10-31

## 2023-10-31 RX ORDER — EPINEPHRINE 1 MG/ML
0.3 INJECTION, SOLUTION, CONCENTRATE INTRAVENOUS PRN
Status: CANCELLED | OUTPATIENT
Start: 2023-10-31

## 2023-10-31 RX ORDER — MEPERIDINE HYDROCHLORIDE 50 MG/ML
12.5 INJECTION INTRAMUSCULAR; INTRAVENOUS; SUBCUTANEOUS PRN
Status: CANCELLED | OUTPATIENT
Start: 2023-10-31

## 2023-10-31 RX ORDER — SODIUM CHLORIDE 9 MG/ML
INJECTION, SOLUTION INTRAVENOUS CONTINUOUS
Status: CANCELLED | OUTPATIENT
Start: 2023-10-31

## 2023-10-31 RX ORDER — ALBUTEROL SULFATE 90 UG/1
4 AEROSOL, METERED RESPIRATORY (INHALATION) PRN
Status: CANCELLED | OUTPATIENT
Start: 2023-10-31

## 2023-10-31 RX ADMIN — SODIUM CHLORIDE, PRESERVATIVE FREE 10 ML: 5 INJECTION INTRAVENOUS at 09:47

## 2023-10-31 RX ADMIN — SODIUM CHLORIDE 756 MG: 9 INJECTION, SOLUTION INTRAVENOUS at 10:29

## 2023-10-31 RX ADMIN — PERTUZUMAB 420 MG: 30 INJECTION, SOLUTION, CONCENTRATE INTRAVENOUS at 12:10

## 2023-10-31 RX ADMIN — HEPARIN 500 UNITS: 100 SYRINGE at 12:47

## 2023-10-31 RX ADMIN — SODIUM CHLORIDE, PRESERVATIVE FREE 10 ML: 5 INJECTION INTRAVENOUS at 08:24

## 2023-10-31 RX ADMIN — ACETAMINOPHEN 650 MG: 325 TABLET ORAL at 09:48

## 2023-10-31 RX ADMIN — SODIUM CHLORIDE 20 ML/HR: 9 INJECTION, SOLUTION INTRAVENOUS at 09:48

## 2023-10-31 RX ADMIN — SODIUM CHLORIDE, PRESERVATIVE FREE 10 ML: 5 INJECTION INTRAVENOUS at 08:23

## 2023-10-31 RX ADMIN — SODIUM CHLORIDE, PRESERVATIVE FREE 10 ML: 5 INJECTION INTRAVENOUS at 12:47

## 2023-10-31 RX ADMIN — DIPHENHYDRAMINE HYDROCHLORIDE 50 MG: 50 INJECTION INTRAMUSCULAR; INTRAVENOUS at 09:48

## 2023-10-31 NOTE — PLAN OF CARE
Problem: Chronic Conditions and Co-morbidities  Goal: Patient's chronic conditions and co-morbidity symptoms are monitored and maintained or improved  Outcome: Adequate for Discharge  Flowsheets (Taken 10/31/2023 6530)  Care Plan - Patient's Chronic Conditions and Co-Morbidity Symptoms are Monitored and Maintained or Improved: Monitor and assess patient's chronic conditions and comorbid symptoms for stability, deterioration, or improvement  Note:   Chemotherapy Teaching     What is Chemotherapy   Drug action [x]   Method of Administration [x]   Handouts given []     Side Effects  Nausea/vomiting [x]   Diarrhea [x]   Fatigue [x]   Signs / Symptoms of infection [x]   Neutropenia [x]   Thrombocytopenia [x]   Alopecia [x]   neuropathy [x]   Jasper diet &  the importance of fluids [x]       Micellaneous  Importance of nutrition [x]   Importance of oral hygiene [x]   When to call the MD [x]   Monitoring labs [x]   Use of supportive services []     Explanation of Drug Regimen / Frequency  Sai Zhang     Comments  Verbalized understanding to drug,action,side effects and when to call MD         Problem: Safety - Adult  Goal: Free from fall injury  Outcome: Adequate for Discharge  Flowsheets (Taken 10/31/2023 0838)  Free From Fall Injury: Instruct family/caregiver on patient safety  Note: Verbalized understanding of fall prevention to ask for assistance with ambulation. Call light within reach. No falls occurred with visit today. Problem: Discharge Planning  Goal: Discharge to home or other facility with appropriate resources  Outcome: Adequate for Discharge  Flowsheets (Taken 10/31/2023 0745)  Discharge to home or other facility with appropriate resources: Identify barriers to discharge with patient and caregiver  Note: Discuss understanding of discharge instructions,follow-up appointments, and when to call the physician.  Verbalized understanding of discharge instructions, follow-up appointments, and when to

## 2023-10-31 NOTE — DISCHARGE INSTRUCTIONS
Please contact your Oncologist if you have any questions regarding the chemotherapy Kaylah Currie that you received today. Patient instructed if experience any of the symptoms following today's chemotherapy / to notify MD immediately or go to emergency department.     * dizziness/lightheadedness  *acute nausea/vomiting - not relieved with medication  *headache - not relieved from Tylenol/pain medication  *chest pain/pressure  *rash/itching  *shortness of breath        Drink fluids - 48oz fluids daily  Call if develop fever/ chills/ signs or symptoms of infection        Notes  Treatment today   letrozole and starting taking  Echo in the next three week please  RTC 3 weeks with labs (approx) for next txt

## 2023-10-31 NOTE — PATIENT INSTRUCTIONS
Treatment today   letrozole and starting taking  Echo in the next three week please  RTC 3 weeks with labs (approx) for next txt

## 2023-11-02 ENCOUNTER — TELEPHONE (OUTPATIENT)
Dept: ONCOLOGY | Age: 53
End: 2023-11-02

## 2023-11-02 ENCOUNTER — HOSPITAL ENCOUNTER (OUTPATIENT)
Dept: PHYSICAL THERAPY | Age: 53
Setting detail: THERAPIES SERIES
End: 2023-11-02
Payer: COMMERCIAL

## 2023-11-02 NOTE — TELEPHONE ENCOUNTER
Patient has a runny nose, congestion, and a low grade fever. She said she was told to call us if she gets sick. She is wanting to know if we can do anything for her, or if she should contact her PCP.

## 2023-11-09 ENCOUNTER — HOSPITAL ENCOUNTER (OUTPATIENT)
Dept: PHYSICAL THERAPY | Age: 53
Setting detail: THERAPIES SERIES
Discharge: HOME | End: 2023-11-09
Payer: COMMERCIAL

## 2023-11-09 PROCEDURE — 97110 THERAPEUTIC EXERCISES: CPT

## 2023-11-09 NOTE — PROGRESS NOTES
[facility]  ONCOLOGY REHABILITATION  PHYSICAL THERAPY  [x] PROGRESS NOTE    [x] SPECIALIZED THERAPY SERVICES - [place]     Date: 2023  Patient Name:  Beulah Strickland  : 1970  MRN: [id number]  CSN: [id number]    Referring Practitioner Gabriela Purvis MD    Diagnosis Malignant neoplasm of upper-outer quadrant of left female breast [C50.412]  Estrogen receptor positive status (ER+) [Z17.0]    Treatment Diagnosis At risk for lymphedema on left side, left breast cancer, Z71.9   Date of Evaluation 23    Additional Pertinent History High BP, Anxiety Disorder, Breast Cancer      Functional Outcome Measure Used O: QuickDASH   Functional Outcome Score 23 (23); 18 (23)      Insurance: Primary: Payor: Travon Ward /  /  / ,   Secondary: Yolette Hampton Information: Gabi Brandonnt: Ld Bob 12 VISIT ( [phone] )   INSURANCE THERAPY BENEFIT:  UNLIMITED VISITS BASED ON MEDICAL NECESSITY   AQUATIC THERAPY COVERED: YES  MODALITIES COVERED:  YES   Visit # 2, 2/10 for progress note   Visits Allowed: 12   Recertification Date:    Physician Follow-Up: Dr. Kevin Daley, Dr. Barfield-   Physician Orders: Prehab   History of Present Illness: Dx: L Breast Cancer May 2023. Has had chemo until . Will have bilateral mastectomy 10/11/23 and ovaries removed. Will have chemo again after surgery and may need radiation as well. SUBJECTIVE: Patient presents to therapy today after having B mastectomy and ovaries removed on 10/11/23. Is unsure how many lymph nodes she had removed. She does not need radiation. Is currently having chemo every 3 weeks. Overall she feels she is doing well.         OBJECTIVE:     Circumferential Measurements:   Upper Extremity Circumferential Measurements    Right (cm) Left (cm) Comments   Met Heads 19.0 19.2    Ulnar Styloid Process (1) 17.8 17.1 Distance to next

## 2023-11-14 ENCOUNTER — HOSPITAL ENCOUNTER (OUTPATIENT)
Age: 53
Discharge: HOME | End: 2023-11-16
Attending: INTERNAL MEDICINE
Payer: COMMERCIAL

## 2023-11-14 VITALS
BODY MASS INDEX: 32.65 KG/M2 | HEIGHT: 65 IN | WEIGHT: 196 LBS | DIASTOLIC BLOOD PRESSURE: 84 MMHG | SYSTOLIC BLOOD PRESSURE: 128 MMHG

## 2023-11-14 DIAGNOSIS — Z17.0 CARCINOMA OF UPPER-OUTER QUADRANT OF LEFT BREAST IN FEMALE, ESTROGEN RECEPTOR POSITIVE (HCC): ICD-10-CM

## 2023-11-14 DIAGNOSIS — C50.412 CARCINOMA OF UPPER-OUTER QUADRANT OF LEFT BREAST IN FEMALE, ESTROGEN RECEPTOR POSITIVE (HCC): ICD-10-CM

## 2023-11-14 DIAGNOSIS — Z51.81 ENCOUNTER FOR MONITORING CARDIOTOXIC DRUG THERAPY: ICD-10-CM

## 2023-11-14 DIAGNOSIS — Z79.899 ENCOUNTER FOR MONITORING CARDIOTOXIC DRUG THERAPY: ICD-10-CM

## 2023-11-14 LAB
ECHO AO ASC DIAM: 3.1 CM
ECHO AO ASCENDING AORTA INDEX: 1.58 CM/M2
ECHO AV CUSP MM: 2.2 CM
ECHO AV PEAK GRADIENT: 6 MMHG
ECHO AV PEAK VELOCITY: 1.2 M/S
ECHO AV VELOCITY RATIO: 0.83
ECHO BSA: 2.02 M2
ECHO IVC PROX: 1.3 CM
ECHO LA AREA 2C: 12.6 CM2
ECHO LA AREA 4C: 13.9 CM2
ECHO LA DIAMETER INDEX: 1.68 CM/M2
ECHO LA DIAMETER: 3.3 CM
ECHO LA MAJOR AXIS: 5.1 CM
ECHO LA MINOR AXIS: 4.2 CM
ECHO LA VOL BP: 32 ML (ref 22–52)
ECHO LA VOL MOD A2C: 29 ML (ref 22–52)
ECHO LA VOL MOD A4C: 30 ML (ref 22–52)
ECHO LA VOL/BSA BIPLANE: 16 ML/M2 (ref 16–34)
ECHO LA VOLUME INDEX MOD A2C: 15 ML/M2 (ref 16–34)
ECHO LA VOLUME INDEX MOD A4C: 15 ML/M2 (ref 16–34)
ECHO LV E' LATERAL VELOCITY: 8 CM/S
ECHO LV E' SEPTAL VELOCITY: 9 CM/S
ECHO LV FRACTIONAL SHORTENING: 32 % (ref 28–44)
ECHO LV GLOBAL LONGITUDINAL STRAIN (GLS): -14.8 %
ECHO LV GLOBAL LONGITUDINAL STRAIN (GLS): -15.1 %
ECHO LV GLOBAL LONGITUDINAL STRAIN (GLS): -15.2 %
ECHO LV GLOBAL LONGITUDINAL STRAIN (GLS): -15.3 %
ECHO LV INTERNAL DIMENSION DIASTOLE INDEX: 1.94 CM/M2
ECHO LV INTERNAL DIMENSION DIASTOLIC: 3.8 CM (ref 3.9–5.3)
ECHO LV INTERNAL DIMENSION SYSTOLIC INDEX: 1.33 CM/M2
ECHO LV INTERNAL DIMENSION SYSTOLIC: 2.6 CM
ECHO LV ISOVOLUMETRIC RELAXATION TIME (IVRT): 74 MS
ECHO LV IVSD: 1.2 CM (ref 0.6–0.9)
ECHO LV MASS 2D: 143.8 G (ref 67–162)
ECHO LV MASS INDEX 2D: 73.4 G/M2 (ref 43–95)
ECHO LV POSTERIOR WALL DIASTOLIC: 1.1 CM (ref 0.6–0.9)
ECHO LV RELATIVE WALL THICKNESS RATIO: 0.58
ECHO LVOT PEAK GRADIENT: 4 MMHG
ECHO LVOT PEAK VELOCITY: 1 M/S
ECHO MV A VELOCITY: 0.69 M/S
ECHO MV E DECELERATION TIME (DT): 282 MS
ECHO MV E VELOCITY: 0.73 M/S
ECHO MV E/A RATIO: 1.06
ECHO MV E/E' LATERAL: 9.13
ECHO MV E/E' RATIO (AVERAGED): 8.62
ECHO PV MAX VELOCITY: 0.7 M/S
ECHO PV PEAK GRADIENT: 2 MMHG
ECHO RV INTERNAL DIMENSION: 2.8 CM
ECHO RV TAPSE: 2.3 CM (ref 1.7–?)
ECHO TV E WAVE: 0.6 M/S
ECHO TV REGURGITANT MAX VELOCITY: 2.38 M/S
ECHO TV REGURGITANT PEAK GRADIENT: 23 MMHG

## 2023-11-14 PROCEDURE — 93306 TTE W/DOPPLER COMPLETE: CPT

## 2023-11-16 NOTE — PROGRESS NOTES
some cough/congesting. Denies further F/C, issues with mediport, or PN; but was having some post surgical pain particularly around BSO area and tender around SLN site-healing much better. Mastectomy sites healing quite well. Review of Systems:  Constitutional: Denies any weakness, fatigue, lack of appetite, excessive appetite, weight change, chills or fever. Eyes: Reports no blurry vision, no double vision, no pain in the eyes, dry eyes or tearing. ENT: Reports no decreased hearing capacity, ear pain or tinnitus. Denies any epistaxis, nasal congestion, mouth sores or bleeding, tooth or jaw pain, sore throat or hoarseness. Respiratory: Denies any cough, sputum production or hemoptysis. There is no wheezing, shortness of breath or any other respiratory complaints. Cardiovascular: Denies any chest pain, shortness of breath when lying down, shortness of breath with activity, palpitations, or leg swelling. Breasts: +s/p mastectomy, healing per HPI. Denies any lumps, bumps, pain, nipple changes or discharge in the breasts. Gastrointestinal: +diarrhea/ constipation per HPI - still improved but not resolved. There are no complaints of abdominal pain, difficulty swallowing or painful swallowing, anorexia,  , heartburn, blood in the stools, dark stools, or change in bowel habits or hemorrhoids. Genitourinary: Denies any pain or burning on urination, blood in the urine or frequent urination. Denies dysparenunia, vaginal dryness. Musculoskeletal:  + chronic multiple painful joints per HPI stable except right hip improved; PN not related to chemo-stable, Denies swelling of the joints, muscle or back pain. Denies any pain or tingling in the legs, hands or feet. Neuropsychiatric: + Anxiety stable , Denies any depressed mood, headaches, blackouts, dizziness, weakness of limbs, loss of sensation, loss of balance, loss of coordination or difficulty in speaking.   Cutaneous: +resolving alopecia, moles per Derm-per pt

## 2023-11-20 ENCOUNTER — OFFICE VISIT (OUTPATIENT)
Dept: ONCOLOGY | Age: 53
End: 2023-11-20
Payer: COMMERCIAL

## 2023-11-20 ENCOUNTER — HOSPITAL ENCOUNTER (OUTPATIENT)
Dept: INFUSION THERAPY | Age: 53
Discharge: HOME | End: 2023-11-20
Payer: COMMERCIAL

## 2023-11-20 VITALS
RESPIRATION RATE: 18 BRPM | WEIGHT: 197.25 LBS | BODY MASS INDEX: 32.86 KG/M2 | SYSTOLIC BLOOD PRESSURE: 132 MMHG | TEMPERATURE: 98.2 F | HEART RATE: 79 BPM | OXYGEN SATURATION: 96 % | DIASTOLIC BLOOD PRESSURE: 80 MMHG | HEIGHT: 65 IN

## 2023-11-20 VITALS
RESPIRATION RATE: 18 BRPM | WEIGHT: 197.25 LBS | HEART RATE: 74 BPM | BODY MASS INDEX: 32.86 KG/M2 | HEIGHT: 65 IN | TEMPERATURE: 98 F | OXYGEN SATURATION: 96 % | SYSTOLIC BLOOD PRESSURE: 131 MMHG | DIASTOLIC BLOOD PRESSURE: 74 MMHG

## 2023-11-20 DIAGNOSIS — Z79.811 AROMATASE INHIBITOR USE: ICD-10-CM

## 2023-11-20 DIAGNOSIS — K52.1 CHEMOTHERAPY INDUCED DIARRHEA: ICD-10-CM

## 2023-11-20 DIAGNOSIS — Z15.09 BRCA GENE MUTATION POSITIVE IN FEMALE: Primary | ICD-10-CM

## 2023-11-20 DIAGNOSIS — Z15.02 BRCA GENE MUTATION POSITIVE IN FEMALE: Primary | ICD-10-CM

## 2023-11-20 DIAGNOSIS — C50.912 INVASIVE DUCTAL CARCINOMA OF BREAST, FEMALE, LEFT (HCC): ICD-10-CM

## 2023-11-20 DIAGNOSIS — C50.412 CARCINOMA OF UPPER-OUTER QUADRANT OF LEFT BREAST IN FEMALE, ESTROGEN RECEPTOR POSITIVE (HCC): ICD-10-CM

## 2023-11-20 DIAGNOSIS — Z17.0 CARCINOMA OF UPPER-OUTER QUADRANT OF LEFT BREAST IN FEMALE, ESTROGEN RECEPTOR POSITIVE (HCC): ICD-10-CM

## 2023-11-20 DIAGNOSIS — Z15.01 BRCA GENE MUTATION POSITIVE IN FEMALE: Primary | ICD-10-CM

## 2023-11-20 DIAGNOSIS — Z17.0 CARCINOMA OF UPPER-OUTER QUADRANT OF LEFT BREAST IN FEMALE, ESTROGEN RECEPTOR POSITIVE (HCC): Primary | ICD-10-CM

## 2023-11-20 DIAGNOSIS — T45.1X5A CHEMOTHERAPY INDUCED DIARRHEA: ICD-10-CM

## 2023-11-20 DIAGNOSIS — C50.412 CARCINOMA OF UPPER-OUTER QUADRANT OF LEFT BREAST IN FEMALE, ESTROGEN RECEPTOR POSITIVE (HCC): Primary | ICD-10-CM

## 2023-11-20 LAB
ABSOLUTE IMMATURE GRANULOCYTE: 0 THOU/MM3 (ref 0–0.07)
ALBUMIN SERPL BCG-MCNC: 4 G/DL (ref 3.5–5.1)
ALP SERPL-CCNC: 111 U/L (ref 38–126)
ALT SERPL W/O P-5'-P-CCNC: 8 U/L (ref 11–66)
AST SERPL-CCNC: 13 U/L (ref 5–40)
BASOPHILS ABSOLUTE: 0 THOU/MM3 (ref 0–0.1)
BASOPHILS NFR BLD AUTO: 0 % (ref 0–3)
BILIRUB CONJ SERPL-MCNC: < 0.2 MG/DL (ref 0–0.3)
BILIRUB SERPL-MCNC: 0.2 MG/DL (ref 0.3–1.2)
BUN BLDP-MCNC: 15 MG/DL (ref 8–26)
CHLORIDE BLD-SCNC: 107 MEQ/L (ref 98–109)
CREAT BLD-MCNC: 0.6 MG/DL (ref 0.5–1.2)
EOSINOPHIL NFR BLD AUTO: 3 % (ref 0–4)
EOSINOPHILS ABSOLUTE: 0.2 THOU/MM3 (ref 0–0.4)
ERYTHROCYTE [DISTWIDTH] IN BLOOD BY AUTOMATED COUNT: 13.1 % (ref 11.5–14.5)
GFR SERPL CREATININE-BSD FRML MDRD: > 60 ML/MIN/1.73M2
GLUCOSE BLD-MCNC: 123 MG/DL (ref 70–108)
HCT VFR BLD AUTO: 34.2 % (ref 37–47)
HGB BLD-MCNC: 11.2 GM/DL (ref 12–16)
IMMATURE GRANULOCYTES: 0 %
IONIZED CALCIUM, WHOLE BLOOD: 1.22 MMOL/L (ref 1.12–1.32)
LYMPHOCYTES ABSOLUTE: 2.3 THOU/MM3 (ref 1–4.8)
LYMPHOCYTES NFR BLD AUTO: 37 % (ref 15–47)
MCH RBC QN AUTO: 29.4 PG (ref 26–33)
MCHC RBC AUTO-ENTMCNC: 32.7 GM/DL (ref 32.2–35.5)
MCV RBC AUTO: 90 FL (ref 81–99)
MONOCYTES ABSOLUTE: 0.4 THOU/MM3 (ref 0.4–1.3)
MONOCYTES NFR BLD AUTO: 6 % (ref 0–12)
NEUTROPHILS NFR BLD AUTO: 54 % (ref 43–75)
PLATELET # BLD AUTO: 282 THOU/MM3 (ref 130–400)
PMV BLD AUTO: 9 FL (ref 9.4–12.4)
POTASSIUM BLD-SCNC: 3.2 MEQ/L (ref 3.5–4.9)
PROT SERPL-MCNC: 7.2 G/DL (ref 6.1–8)
RBC # BLD AUTO: 3.81 MILL/MM3 (ref 4.2–5.4)
SEGMENTED NEUTROPHILS ABSOLUTE COUNT: 3.3 THOU/MM3 (ref 1.8–7.7)
SODIUM BLD-SCNC: 144 MEQ/L (ref 138–146)
TOTAL CO2, WHOLE BLOOD: 28 MEQ/L (ref 23–33)
WBC # BLD AUTO: 6.1 THOU/MM3 (ref 4.8–10.8)

## 2023-11-20 PROCEDURE — 80076 HEPATIC FUNCTION PANEL: CPT

## 2023-11-20 PROCEDURE — 96413 CHEMO IV INFUSION 1 HR: CPT

## 2023-11-20 PROCEDURE — 36591 DRAW BLOOD OFF VENOUS DEVICE: CPT

## 2023-11-20 PROCEDURE — 80047 BASIC METABLC PNL IONIZED CA: CPT

## 2023-11-20 PROCEDURE — 3017F COLORECTAL CA SCREEN DOC REV: CPT | Performed by: INTERNAL MEDICINE

## 2023-11-20 PROCEDURE — G8484 FLU IMMUNIZE NO ADMIN: HCPCS | Performed by: INTERNAL MEDICINE

## 2023-11-20 PROCEDURE — G8417 CALC BMI ABV UP PARAM F/U: HCPCS | Performed by: INTERNAL MEDICINE

## 2023-11-20 PROCEDURE — 6360000002 HC RX W HCPCS: Performed by: INTERNAL MEDICINE

## 2023-11-20 PROCEDURE — 85025 COMPLETE CBC W/AUTO DIFF WBC: CPT

## 2023-11-20 PROCEDURE — 99214 OFFICE O/P EST MOD 30 MIN: CPT | Performed by: INTERNAL MEDICINE

## 2023-11-20 PROCEDURE — 2580000003 HC RX 258: Performed by: INTERNAL MEDICINE

## 2023-11-20 PROCEDURE — G8427 DOCREV CUR MEDS BY ELIG CLIN: HCPCS | Performed by: INTERNAL MEDICINE

## 2023-11-20 PROCEDURE — 4004F PT TOBACCO SCREEN RCVD TLK: CPT | Performed by: INTERNAL MEDICINE

## 2023-11-20 PROCEDURE — G9899 SCRN MAM PERF RSLTS DOC: HCPCS | Performed by: INTERNAL MEDICINE

## 2023-11-20 PROCEDURE — 96417 CHEMO IV INFUS EACH ADDL SEQ: CPT

## 2023-11-20 PROCEDURE — 99211 OFF/OP EST MAY X REQ PHY/QHP: CPT

## 2023-11-20 RX ORDER — SODIUM CHLORIDE 9 MG/ML
5-250 INJECTION, SOLUTION INTRAVENOUS PRN
Status: CANCELLED | OUTPATIENT
Start: 2023-11-20

## 2023-11-20 RX ORDER — MEPERIDINE HYDROCHLORIDE 50 MG/ML
12.5 INJECTION INTRAMUSCULAR; INTRAVENOUS; SUBCUTANEOUS PRN
Status: CANCELLED | OUTPATIENT
Start: 2023-11-20

## 2023-11-20 RX ORDER — ACETAMINOPHEN 325 MG/1
650 TABLET ORAL
Status: CANCELLED | OUTPATIENT
Start: 2023-11-20

## 2023-11-20 RX ORDER — FAMOTIDINE 10 MG/ML
20 INJECTION, SOLUTION INTRAVENOUS
Status: CANCELLED | OUTPATIENT
Start: 2023-11-20

## 2023-11-20 RX ORDER — HEPARIN 100 UNIT/ML
500 SYRINGE INTRAVENOUS PRN
Status: DISCONTINUED | OUTPATIENT
Start: 2023-11-20 | End: 2023-11-21 | Stop reason: HOSPADM

## 2023-11-20 RX ORDER — ONDANSETRON 2 MG/ML
8 INJECTION INTRAMUSCULAR; INTRAVENOUS
Status: CANCELLED | OUTPATIENT
Start: 2023-11-20

## 2023-11-20 RX ORDER — SODIUM CHLORIDE 0.9 % (FLUSH) 0.9 %
5-40 SYRINGE (ML) INJECTION PRN
Status: CANCELLED | OUTPATIENT
Start: 2023-11-20

## 2023-11-20 RX ORDER — SODIUM CHLORIDE 0.9 % (FLUSH) 0.9 %
5-40 SYRINGE (ML) INJECTION PRN
Status: DISCONTINUED | OUTPATIENT
Start: 2023-11-20 | End: 2023-11-21 | Stop reason: HOSPADM

## 2023-11-20 RX ORDER — HEPARIN 100 UNIT/ML
500 SYRINGE INTRAVENOUS PRN
OUTPATIENT
Start: 2023-11-20

## 2023-11-20 RX ORDER — DIPHENHYDRAMINE HYDROCHLORIDE 50 MG/ML
50 INJECTION INTRAMUSCULAR; INTRAVENOUS
Status: CANCELLED | OUTPATIENT
Start: 2023-11-20

## 2023-11-20 RX ORDER — SODIUM CHLORIDE 9 MG/ML
5-250 INJECTION, SOLUTION INTRAVENOUS PRN
Status: DISCONTINUED | OUTPATIENT
Start: 2023-11-20 | End: 2023-11-21 | Stop reason: HOSPADM

## 2023-11-20 RX ORDER — EPINEPHRINE 1 MG/ML
0.3 INJECTION, SOLUTION, CONCENTRATE INTRAVENOUS PRN
Status: CANCELLED | OUTPATIENT
Start: 2023-11-20

## 2023-11-20 RX ORDER — SODIUM CHLORIDE 9 MG/ML
INJECTION, SOLUTION INTRAVENOUS CONTINUOUS
Status: CANCELLED | OUTPATIENT
Start: 2023-11-20

## 2023-11-20 RX ORDER — HEPARIN SODIUM (PORCINE) LOCK FLUSH IV SOLN 100 UNIT/ML 100 UNIT/ML
500 SOLUTION INTRAVENOUS PRN
Status: CANCELLED | OUTPATIENT
Start: 2023-11-20

## 2023-11-20 RX ORDER — SODIUM CHLORIDE 0.9 % (FLUSH) 0.9 %
5-40 SYRINGE (ML) INJECTION PRN
OUTPATIENT
Start: 2023-11-20

## 2023-11-20 RX ORDER — SODIUM CHLORIDE 9 MG/ML
25 INJECTION, SOLUTION INTRAVENOUS PRN
OUTPATIENT
Start: 2023-11-20

## 2023-11-20 RX ORDER — ALBUTEROL SULFATE 90 UG/1
4 AEROSOL, METERED RESPIRATORY (INHALATION) PRN
Status: CANCELLED | OUTPATIENT
Start: 2023-11-20

## 2023-11-20 RX ADMIN — Medication 500 UNITS: at 13:10

## 2023-11-20 RX ADMIN — SODIUM CHLORIDE, PRESERVATIVE FREE 10 ML: 5 INJECTION INTRAVENOUS at 09:32

## 2023-11-20 RX ADMIN — PERTUZUMAB 420 MG: 30 INJECTION, SOLUTION, CONCENTRATE INTRAVENOUS at 12:19

## 2023-11-20 RX ADMIN — SODIUM CHLORIDE 20 ML/HR: 9 INJECTION, SOLUTION INTRAVENOUS at 10:33

## 2023-11-20 RX ADMIN — SODIUM CHLORIDE 567 MG: 9 INJECTION, SOLUTION INTRAVENOUS at 11:26

## 2023-11-20 RX ADMIN — SODIUM CHLORIDE, PRESERVATIVE FREE 20 ML: 5 INJECTION INTRAVENOUS at 09:33

## 2023-11-20 NOTE — PATIENT INSTRUCTIONS
Treatment today  RTC 12/11 for HP (RN eval) with labs  DEXA 12/12 (already scheduled)  Survivorship needs scheduled with Ani Arellano at both  and [address] [place] [address]  Try to find a pancreatic screening center for people at higher risk so we can refer you  Decide if you want referred to our plastic surgeon who works with Dr Suzette Rehman  RTC approx 1/1/24 for following HP with labs to see MD

## 2023-11-20 NOTE — DISCHARGE INSTRUCTIONS
Please contact your Oncologist if you have any questions regarding the herception//perjeta that you received today. You are instructed to call the office or go to the Emergency Dept. If you experience any of the following symptoms:    Dizziness/lightheadedness   Acute nausea or vomiting-not relieved by medications  Headaches-not relieved by medications  New chest pain or pressure  New rash /itching  New shortness of breath  Fever,chills or signs or symptoms of infection    Make sure you are drinking 48 to 64 ounces of water daily-if you are unable to drink fluids let us know right away.

## 2023-11-20 NOTE — PLAN OF CARE
Problem: Chronic Conditions and Co-morbidities  Goal: Patient's chronic conditions and co-morbidity symptoms are monitored and maintained or improved  11/20/2023 1152 by Avelino Dan RN  Outcome: Progressing  Flowsheets (Taken 11/20/2023 1152)  Care Plan - Patient's Chronic Conditions and Co-Morbidity Symptoms are Monitored and Maintained or Improved: Monitor and assess patient's chronic conditions and comorbid symptoms for stability, deterioration, or improvement  Note:   Chemotherapy Teaching     What is Chemotherapy   Drug action [x]   Method of Administration [x]   Handouts given []     Side Effects  Nausea/vomiting [x]   Diarrhea [x]   Fatigue [x]   Signs / Symptoms of infection [x]   Neutropenia [x]   Thrombocytopenia [x]   Alopecia [x]   neuropathy [x]   [place] diet &  the importance of fluids [x]       Micellaneous  Importance of nutrition [x]   Importance of oral hygiene [x]   When to call the MD [x]   Monitoring labs [x]   Use of supportive services []     Explanation of Drug Regimen / Frequency  Trazimera/perjeta     Comments  Verbalized understanding to drug,action,side effects and when to call MD      11/20/2023 1150 by Avelino Dan RN  Outcome: Progressing  Flowsheets (Taken 11/20/2023 1150)  Care Plan - Patient's Chronic Conditions and Co-Morbidity Symptoms are Monitored and Maintained or Improved: Monitor and assess patient's chronic conditions and comorbid symptoms for stability, deterioration, or improvement  Note:   Chemotherapy Teaching     What is Chemotherapy   Drug action [x]   Method of Administration [x]   Handouts given []     Side Effects  Nausea/vomiting [x]   Diarrhea [x]   Fatigue [x]   Signs / Symptoms of infection [x]   Neutropenia [x]   Thrombocytopenia [x]   Alopecia [x]   neuropathy [x]   [place] diet &  the importance of fluids [x]       Micellaneous  Importance of nutrition [x]   Importance of oral hygiene [x]   When to call the MD [x]   Monitoring labs [x]   Use of supportive services []     Explanation of Drug Regimen / Frequency  TRAZIMERA/PERJETA     Comments  Verbalized understanding to drug,action,side effects and when to call MD         Problem: Safety - Adult  Goal: Free from fall injury  Outcome: Progressing  Flowsheets (Taken 11/20/2023 1152)  Free From Fall Injury: Instruct family/caregiver on patient safety  Note: No falls occurred with visit today. Problem: Discharge Planning  Goal: Discharge to home or other facility with appropriate resources  Outcome: Progressing  Flowsheets (Taken 11/20/2023 1152)  Discharge to home or other facility with appropriate resources: Identify barriers to discharge with patient and caregiver  Note: Verbalize understanding of discharge instructions, follow up appointments, and when to call Physician. Care plan reviewed with patient. Patient verbalizes understanding of the plan of care and contributes to goal setting.

## 2023-11-27 ENCOUNTER — OFFICE VISIT (OUTPATIENT)
Dept: SURGERY | Age: 53
End: 2023-11-27

## 2023-11-27 VITALS
HEART RATE: 78 BPM | SYSTOLIC BLOOD PRESSURE: 126 MMHG | OXYGEN SATURATION: 95 % | WEIGHT: 196 LBS | BODY MASS INDEX: 32.65 KG/M2 | DIASTOLIC BLOOD PRESSURE: 82 MMHG | HEIGHT: 65 IN | TEMPERATURE: 97.7 F | RESPIRATION RATE: 18 BRPM

## 2023-11-27 DIAGNOSIS — F17.200 TOBACCO DEPENDENCE: ICD-10-CM

## 2023-11-27 DIAGNOSIS — Z15.09 BRCA1 GENE MUTATION POSITIVE: ICD-10-CM

## 2023-11-27 DIAGNOSIS — Z90.13 H/O BILATERAL MASTECTOMY: ICD-10-CM

## 2023-11-27 DIAGNOSIS — Z17.0 CARCINOMA OF UPPER-OUTER QUADRANT OF LEFT BREAST IN FEMALE, ESTROGEN RECEPTOR POSITIVE (HCC): Primary | ICD-10-CM

## 2023-11-27 DIAGNOSIS — Z15.01 BRCA2 GENE MUTATION POSITIVE: ICD-10-CM

## 2023-11-27 DIAGNOSIS — Z48.89 ENCOUNTER FOR POSTOPERATIVE CARE: ICD-10-CM

## 2023-11-27 DIAGNOSIS — Z15.09 BRCA2 GENE MUTATION POSITIVE: ICD-10-CM

## 2023-11-27 DIAGNOSIS — C50.412 CARCINOMA OF UPPER-OUTER QUADRANT OF LEFT BREAST IN FEMALE, ESTROGEN RECEPTOR POSITIVE (HCC): Primary | ICD-10-CM

## 2023-11-27 DIAGNOSIS — Z15.01 BRCA1 GENE MUTATION POSITIVE: ICD-10-CM

## 2023-11-27 PROCEDURE — 99024 POSTOP FOLLOW-UP VISIT: CPT | Performed by: SURGERY

## 2023-11-27 NOTE — PROGRESS NOTES
Sections show benign breast tissue with fibrocystic changes   including stromal fibrosis, usual ductal hyperplasia, duct ectasia, and   microcyst formation. There is no evidence of atypia or malignancy. D.  Sections of all 3 previous biopsy sites show no evidence of   residual carcinoma. Immunohistochemical stains*for p63 and CK5/6 were   performed on a small area of atypia on block D12. The stains show   retained myoepithelial cells consistent with a benign etiology. The   background breast tissue shows fibrocystic changes and previous biopsy   site changes.      Reporting Template   Protocol Posting Date: June 2023   CASE SUMMARY: (INVASIVE CARCINOMA OF THE BREAST: Resection)   Standard(s): AJCC-UICC 8     Procedure   Total mastectomy (including nipple-sparing and skin-sparing mastectomy)       Specimen Laterality   Left     Histologic Type   No residual invasive carcinoma     Histologic Grade ([place] Histologic Score)   Not applicable (no residual carcinoma)     Tumor Size   No residual invasive carcinoma     Ductal Carcinoma In Situ (DCIS)   Not identified     Lobular Carcinoma In Situ (LCIS)   Not identified     Lymphatic and / or Vascular Invasion   Not identified     Dermal Lymphatic and / or Vascular Invasion   Not identified     Microcalcifications   Present in non-neoplastic tissue     Treatment Effect in the Breast   No residual invasive carcinoma is present in the breast after   presurgical therapy     Treatment Effect in the Lymph Nodes   No lymph node metastases and no fibrous scarring or histiocytic   aggregates in the nodes     Margin Status for Invasive Carcinoma   Not applicable (residual invasive carcinoma in specimen is absent)     Regional Lymph Node Status   All regional lymph nodes negative for tumor     Total Number of Lymph Nodes Examined (sentinel and non-sentinel)   Exact number (specify): 5     Number of [place] Nodes Examined   Exact number (specify): 5     Distant Site(s)

## 2023-11-30 ENCOUNTER — TELEMEDICINE (OUTPATIENT)
Dept: PSYCHIATRY | Age: 53
End: 2023-11-30
Payer: COMMERCIAL

## 2023-11-30 ENCOUNTER — OFFICE VISIT (OUTPATIENT)
Dept: FAMILY MEDICINE CLINIC | Age: 53
End: 2023-11-30

## 2023-11-30 VITALS
WEIGHT: 197.25 LBS | SYSTOLIC BLOOD PRESSURE: 118 MMHG | HEIGHT: 65 IN | DIASTOLIC BLOOD PRESSURE: 60 MMHG | HEART RATE: 60 BPM | BODY MASS INDEX: 32.86 KG/M2 | RESPIRATION RATE: 18 BRPM

## 2023-11-30 DIAGNOSIS — M54.50 ACUTE BILATERAL LOW BACK PAIN WITHOUT SCIATICA: ICD-10-CM

## 2023-11-30 DIAGNOSIS — G89.29 OTHER CHRONIC PAIN: ICD-10-CM

## 2023-11-30 DIAGNOSIS — E11.9 TYPE 2 DIABETES MELLITUS TREATED WITHOUT INSULIN (HCC): Primary | ICD-10-CM

## 2023-11-30 DIAGNOSIS — G47.00 INSOMNIA, UNSPECIFIED TYPE: ICD-10-CM

## 2023-11-30 DIAGNOSIS — F41.9 ANXIETY: ICD-10-CM

## 2023-11-30 DIAGNOSIS — M54.2 NECK PAIN: ICD-10-CM

## 2023-11-30 DIAGNOSIS — C50.912 INVASIVE DUCTAL CARCINOMA OF BREAST, FEMALE, LEFT (HCC): ICD-10-CM

## 2023-11-30 DIAGNOSIS — F33.42 RECURRENT MAJOR DEPRESSIVE DISORDER, IN FULL REMISSION (HCC): Primary | ICD-10-CM

## 2023-11-30 LAB
CHP ED QC CHECK: ABNORMAL
GLUCOSE BLD-MCNC: 162 MG/DL
HBA1C MFR BLD: 6.1 %

## 2023-11-30 PROCEDURE — 99213 OFFICE O/P EST LOW 20 MIN: CPT | Performed by: PSYCHIATRY & NEUROLOGY

## 2023-11-30 PROCEDURE — G9899 SCRN MAM PERF RSLTS DOC: HCPCS | Performed by: PSYCHIATRY & NEUROLOGY

## 2023-11-30 PROCEDURE — 3017F COLORECTAL CA SCREEN DOC REV: CPT | Performed by: PSYCHIATRY & NEUROLOGY

## 2023-11-30 PROCEDURE — G8427 DOCREV CUR MEDS BY ELIG CLIN: HCPCS | Performed by: PSYCHIATRY & NEUROLOGY

## 2023-11-30 RX ORDER — DESVENLAFAXINE SUCCINATE 50 MG/1
50 TABLET, EXTENDED RELEASE ORAL DAILY
Qty: 30 TABLET | Refills: 5 | Status: SHIPPED | OUTPATIENT
Start: 2023-11-30

## 2023-11-30 RX ORDER — BUPROPION HYDROCHLORIDE 300 MG/1
300 TABLET ORAL EVERY MORNING
Qty: 30 TABLET | Refills: 5 | Status: SHIPPED | OUTPATIENT
Start: 2023-11-30 | End: 2024-05-28

## 2023-11-30 RX ORDER — BUPROPION HYDROCHLORIDE 150 MG/1
150 TABLET ORAL EVERY MORNING
Qty: 30 TABLET | Refills: 5 | Status: SHIPPED | OUTPATIENT
Start: 2023-11-30

## 2023-11-30 RX ORDER — LORAZEPAM 0.5 MG/1
0.5 TABLET ORAL DAILY PRN
Qty: 30 TABLET | Refills: 0 | Status: SHIPPED | OUTPATIENT
Start: 2023-11-30 | End: 2023-12-30

## 2023-11-30 RX ORDER — DESVENLAFAXINE 100 MG/1
100 TABLET, EXTENDED RELEASE ORAL DAILY
Qty: 30 TABLET | Refills: 5 | Status: SHIPPED | OUTPATIENT
Start: 2023-11-30

## 2023-11-30 ASSESSMENT — ENCOUNTER SYMPTOMS
BACK PAIN: 1
CONSTIPATION: 0
SINUS PRESSURE: 0
SHORTNESS OF BREATH: 0

## 2023-11-30 NOTE — PROGRESS NOTES
Beulah Strickland (:  1970) is a 47 y.o. female,Established patient, here for evaluation of the following chief complaint(s):  Diabetes         ASSESSMENT/PLAN:   Diagnosis Orders   1. Type 2 diabetes mellitus treated without insulin (HCC)  POCT glycosylated hemoglobin (Hb A1C)    POCT Glucose      2. Acute bilateral low back pain without sciatica  XR LUMBAR SPINE (MIN 4 VIEWS)      3. Neck pain  XR CERVICAL SPINE (4-5 VIEWS)      4. Invasive ductal carcinoma of breast, female, left ([address])  PET CT SKULL BASE TO MID THIGH    XR LUMBAR SPINE (MIN 4 VIEWS)    XR CERVICAL SPINE (4-5 VIEWS)        Do the plain xrays soon. We will arrange the PET scan to investigate the ache in the back       Subjective   SUBJECTIVE/OBJECTIVE:  HPI  Her lower back bothers with an ache worse since this July. It's been present for the past 6 years  It comes and goes. She feels that norco and percocet do not help and the neurontin helps some. No new control problems with bowel or bladder. No pelvic numbness. No new leg weakness  We reviewed the A1c looking well  Review of Systems   Constitutional:  Negative for fatigue. HENT:  Negative for sinus pressure. Eyes:  Negative for visual disturbance. Respiratory:  Negative for shortness of breath. Cardiovascular:  Negative for chest pain. Gastrointestinal:  Negative for constipation. Genitourinary: Negative. Musculoskeletal:  Positive for arthralgias, back pain, myalgias and neck pain. Skin:  Negative for rash. Neurological:  Negative for headaches. The patient's medications, allergies, past medical problems, surgical, social, and family histories were reviewed and updated as needed. Objective   Physical Exam  Constitutional:       Appearance: Normal appearance. She is well-developed. HENT:      Head: Normocephalic and atraumatic. Eyes:      General: No scleral icterus.      Conjunctiva/sclera: Conjunctivae normal.   Neck:      Trachea: No tracheal

## 2023-11-30 NOTE — PROGRESS NOTES
[address] PSYCHIATRY  [address] [facility] [id number] [phone]    Progress Note    Patient:  Johan Carlson  YOB: 1970  PCP:  Gavin Cortes MD  Visit Date:  11/30/2023      Chief Complaint   Patient presents with    Follow-up    Medication Check    Depression    Anxiety    Insomnia       SUBJECTIVE:      Johan Carlson, a 47 y. o. female, presents for a follow up visit. She presents alone. Going with her granddtr on a field trip to [place] today. Doing well. Mood is \"good\", stable. Anxiety is under control. Trouble with sleep d/t low back pain. Trouble staying asleep, up every couple hours. Feels worse mood if she misses medication. Denies any tearfulness. Priyank got into her bank account which is resolved. Notes this time of year more stressful as she lost her son in January. Deemed cancer free. Still in treatments for for immunotherapy infusions. Declines to make any med changes. No SI or psychosis. Med Trials: Wellbutrin, Pristiq, Xanax, Viibryd, Abilify, Buspar, Prozac, Zoloft, Lexapro, Cymbalta, ? Effexor, Atarax    OBJECTIVE:  Vitals: LMP 01/01/2008     MENTAL STATUS EXAM:    GENERAL  Build: Normal    Hygiene:  Appropriate    SENSORIUM Orientation: Place, Person, Time, & Situation     Consciousness: Alert    ATTENTION   Focused    RELATEDNESS  Cooperative    EYE CONTACT   Good    PSYCHOMOTOR  Normal    SPEECH Volume: Normal     Rate: Normal rate and tone    Amplitude:  Within normal limits   MOOD  \"Good\"     AFFECT Range: Full, bright   THOUGHT Process:  Goal-Directed     Content: no evidence of psychosis    COGNITION Insight: Good    Judgement:  Intact    MEMORY  Intact    INTELLIGENCE  Average       Mobility/Gait: Independently, has used a cane in the past    Controlled SubstancesMonitoring: Periodic Controlled Substance Monitoring: Possible medication side effects, risk

## 2023-12-04 ENCOUNTER — HOSPITAL ENCOUNTER (OUTPATIENT)
Age: 53
Discharge: HOME | End: 2023-12-04
Payer: COMMERCIAL

## 2023-12-04 ENCOUNTER — HOSPITAL ENCOUNTER (OUTPATIENT)
Dept: GENERAL RADIOLOGY | Age: 53
Discharge: HOME | End: 2023-12-04
Payer: COMMERCIAL

## 2023-12-04 DIAGNOSIS — C50.912 INVASIVE DUCTAL CARCINOMA OF BREAST, FEMALE, LEFT (HCC): ICD-10-CM

## 2023-12-04 DIAGNOSIS — M54.2 NECK PAIN: ICD-10-CM

## 2023-12-04 DIAGNOSIS — M54.50 ACUTE BILATERAL LOW BACK PAIN WITHOUT SCIATICA: ICD-10-CM

## 2023-12-04 PROCEDURE — 72110 X-RAY EXAM L-2 SPINE 4/>VWS: CPT

## 2023-12-04 PROCEDURE — 72050 X-RAY EXAM NECK SPINE 4/5VWS: CPT

## 2023-12-05 ENCOUNTER — TELEPHONE (OUTPATIENT)
Dept: FAMILY MEDICINE CLINIC | Age: 53
End: 2023-12-05

## 2023-12-05 RX ORDER — KETOROLAC TROMETHAMINE 10 MG/1
10 TABLET, FILM COATED ORAL 4 TIMES DAILY PRN
Qty: 20 TABLET | Refills: 0 | Status: SHIPPED | OUTPATIENT
Start: 2023-12-05 | End: 2024-12-04

## 2023-12-05 NOTE — TELEPHONE ENCOUNTER
PET CT SKULL (CPT: K1870422)    Completed the PA through [facility]- it was Approved. Valid from 12-5-2023 through 1-4-2024. Auth #: [id number]      PET Scan @ [facility] on Wednesday 12- at 9:45am. Patient to arrive at 9:15am to [address]. Prep: NPO 6 hours prior (including gum, mints, etc), Plain water is ok, she must drink 16oz of Water 2 hours prior to Scan, No DM Meds (including Insulin) the day of the test, Check Blood Sugar day of test (if over 200 they will send you home). Called and spoke with Patient- she requested I send her a [employer] Message with the Information. [employer] Message sent.

## 2023-12-05 NOTE — TELEPHONE ENCOUNTER
Date of last visit:  11/30/2023  Date of next visit:  Visit date not found    Requested Prescriptions     Signed Prescriptions Disp Refills    ketorolac (TORADOL) 10 MG tablet 20 tablet 0     Sig: Take 1 tablet by mouth 4 times daily as needed for Pain     Authorizing Provider: Krystina Hutton     Ordering User: Conor HOOD       Sent over script to Pharmacy per verbal order from Dr Chaparro [place]on. Eileen Garcia informed by Phone.

## 2023-12-05 NOTE — RESULT ENCOUNTER NOTE
Let her know that the lumbar spine showed some mild arthritis. Has the PET scan ordered 11/30/2023 been arranged?

## 2023-12-05 NOTE — TELEPHONE ENCOUNTER
----- Message from Nahomi Chacon MD sent at 12/4/2023  7:49 PM EST -----  Let her know that the lumbar spine showed some mild arthritis. Has the PET scan ordered 11/30/2023 been arranged?

## 2023-12-05 NOTE — TELEPHONE ENCOUNTER
Bree Bowen informed by Phone. PET scan has not been arranged that I have seen in Epic. I will look further into it today. She is requesting something to help with the Pain she is having in her Lower Back. [facility]: [facility].

## 2023-12-06 ENCOUNTER — TELEPHONE (OUTPATIENT)
Dept: FAMILY MEDICINE CLINIC | Age: 53
End: 2023-12-06

## 2023-12-06 NOTE — TELEPHONE ENCOUNTER
----- Message from Shaun Velez MD sent at 12/5/2023 10:20 PM EST -----  Let her know that the cervical spine xray showed some degenerative changes to the lower cervical spine.

## 2023-12-06 NOTE — RESULT ENCOUNTER NOTE
Let her know that the cervical spine xray showed some degenerative changes to the lower cervical spine.

## 2023-12-11 ENCOUNTER — CLINICAL DOCUMENTATION (OUTPATIENT)
Dept: SPIRITUAL SERVICES | Facility: CLINIC | Age: 53
End: 2023-12-11

## 2023-12-11 ENCOUNTER — HOSPITAL ENCOUNTER (OUTPATIENT)
Dept: INFUSION THERAPY | Age: 53
Discharge: HOME | End: 2023-12-11
Payer: COMMERCIAL

## 2023-12-11 ENCOUNTER — TELEPHONE (OUTPATIENT)
Dept: ONCOLOGY | Age: 53
End: 2023-12-11

## 2023-12-11 VITALS
BODY MASS INDEX: 33.05 KG/M2 | SYSTOLIC BLOOD PRESSURE: 131 MMHG | TEMPERATURE: 98.9 F | WEIGHT: 198.6 LBS | DIASTOLIC BLOOD PRESSURE: 82 MMHG | RESPIRATION RATE: 18 BRPM | OXYGEN SATURATION: 96 % | HEART RATE: 81 BPM

## 2023-12-11 DIAGNOSIS — C50.412 CARCINOMA OF UPPER-OUTER QUADRANT OF LEFT BREAST IN FEMALE, ESTROGEN RECEPTOR POSITIVE (HCC): Primary | ICD-10-CM

## 2023-12-11 DIAGNOSIS — Z17.0 CARCINOMA OF UPPER-OUTER QUADRANT OF LEFT BREAST IN FEMALE, ESTROGEN RECEPTOR POSITIVE (HCC): Primary | ICD-10-CM

## 2023-12-11 DIAGNOSIS — C50.912 INVASIVE DUCTAL CARCINOMA OF BREAST, FEMALE, LEFT (HCC): ICD-10-CM

## 2023-12-11 LAB
ABSOLUTE IMMATURE GRANULOCYTE: 0.01 THOU/MM3 (ref 0–0.07)
ALBUMIN SERPL BCG-MCNC: 4.1 G/DL (ref 3.5–5.1)
ALP SERPL-CCNC: 121 U/L (ref 38–126)
ALT SERPL W/O P-5'-P-CCNC: 14 U/L (ref 11–66)
AST SERPL-CCNC: 13 U/L (ref 5–40)
BASOPHILS ABSOLUTE: 0 THOU/MM3 (ref 0–0.1)
BASOPHILS NFR BLD AUTO: 0 % (ref 0–3)
BILIRUB CONJ SERPL-MCNC: < 0.2 MG/DL (ref 0–0.3)
BILIRUB SERPL-MCNC: 0.3 MG/DL (ref 0.3–1.2)
BUN BLDP-MCNC: 23 MG/DL (ref 8–26)
CHLORIDE BLD-SCNC: 104 MEQ/L (ref 98–109)
CREAT BLD-MCNC: 0.7 MG/DL (ref 0.5–1.2)
EOSINOPHIL NFR BLD AUTO: 3 % (ref 0–4)
EOSINOPHILS ABSOLUTE: 0.2 THOU/MM3 (ref 0–0.4)
ERYTHROCYTE [DISTWIDTH] IN BLOOD BY AUTOMATED COUNT: 13.3 % (ref 11.5–14.5)
GFR SERPL CREATININE-BSD FRML MDRD: > 60 ML/MIN/1.73M2
GLUCOSE BLD-MCNC: 108 MG/DL (ref 70–108)
HCT VFR BLD AUTO: 38.8 % (ref 37–47)
HGB BLD-MCNC: 12.8 GM/DL (ref 12–16)
IMMATURE GRANULOCYTES: 0 %
IONIZED CALCIUM, WHOLE BLOOD: 1.2 MMOL/L (ref 1.12–1.32)
LYMPHOCYTES ABSOLUTE: 1.1 THOU/MM3 (ref 1–4.8)
LYMPHOCYTES NFR BLD AUTO: 19 % (ref 15–47)
MCH RBC QN AUTO: 28.8 PG (ref 26–33)
MCHC RBC AUTO-ENTMCNC: 33 GM/DL (ref 32.2–35.5)
MCV RBC AUTO: 87 FL (ref 81–99)
MONOCYTES ABSOLUTE: 0.4 THOU/MM3 (ref 0.4–1.3)
MONOCYTES NFR BLD AUTO: 6 % (ref 0–12)
NEUTROPHILS NFR BLD AUTO: 72 % (ref 43–75)
PLATELET # BLD AUTO: 308 THOU/MM3 (ref 130–400)
PMV BLD AUTO: 8.9 FL (ref 9.4–12.4)
POTASSIUM BLD-SCNC: 3.8 MEQ/L (ref 3.5–4.9)
PROT SERPL-MCNC: 7.6 G/DL (ref 6.1–8)
RBC # BLD AUTO: 4.45 MILL/MM3 (ref 4.2–5.4)
SEGMENTED NEUTROPHILS ABSOLUTE COUNT: 4 THOU/MM3 (ref 1.8–7.7)
SODIUM BLD-SCNC: 138 MEQ/L (ref 138–146)
TOTAL CO2, WHOLE BLOOD: 23 MEQ/L (ref 23–33)
WBC # BLD AUTO: 5.6 THOU/MM3 (ref 4.8–10.8)

## 2023-12-11 PROCEDURE — 36591 DRAW BLOOD OFF VENOUS DEVICE: CPT

## 2023-12-11 PROCEDURE — 6360000002 HC RX W HCPCS: Performed by: INTERNAL MEDICINE

## 2023-12-11 PROCEDURE — 2580000003 HC RX 258: Performed by: INTERNAL MEDICINE

## 2023-12-11 PROCEDURE — 85025 COMPLETE CBC W/AUTO DIFF WBC: CPT

## 2023-12-11 PROCEDURE — 96417 CHEMO IV INFUS EACH ADDL SEQ: CPT

## 2023-12-11 PROCEDURE — 96413 CHEMO IV INFUSION 1 HR: CPT

## 2023-12-11 PROCEDURE — 80076 HEPATIC FUNCTION PANEL: CPT

## 2023-12-11 PROCEDURE — 80047 BASIC METABLC PNL IONIZED CA: CPT

## 2023-12-11 RX ORDER — ALBUTEROL SULFATE 90 UG/1
4 AEROSOL, METERED RESPIRATORY (INHALATION) PRN
Status: CANCELLED | OUTPATIENT
Start: 2023-12-11

## 2023-12-11 RX ORDER — ACETAMINOPHEN 325 MG/1
650 TABLET ORAL
Status: CANCELLED | OUTPATIENT
Start: 2023-12-11

## 2023-12-11 RX ORDER — ONDANSETRON 2 MG/ML
8 INJECTION INTRAMUSCULAR; INTRAVENOUS
Status: CANCELLED | OUTPATIENT
Start: 2023-12-11

## 2023-12-11 RX ORDER — SODIUM CHLORIDE 9 MG/ML
5-250 INJECTION, SOLUTION INTRAVENOUS PRN
Status: DISCONTINUED | OUTPATIENT
Start: 2023-12-11 | End: 2023-12-12 | Stop reason: HOSPADM

## 2023-12-11 RX ORDER — SODIUM CHLORIDE 9 MG/ML
5-250 INJECTION, SOLUTION INTRAVENOUS PRN
Status: CANCELLED | OUTPATIENT
Start: 2023-12-11

## 2023-12-11 RX ORDER — MEPERIDINE HYDROCHLORIDE 50 MG/ML
12.5 INJECTION INTRAMUSCULAR; INTRAVENOUS; SUBCUTANEOUS PRN
Status: CANCELLED | OUTPATIENT
Start: 2023-12-11

## 2023-12-11 RX ORDER — SODIUM CHLORIDE 0.9 % (FLUSH) 0.9 %
5-40 SYRINGE (ML) INJECTION PRN
OUTPATIENT
Start: 2023-12-11

## 2023-12-11 RX ORDER — SODIUM CHLORIDE 0.9 % (FLUSH) 0.9 %
5-40 SYRINGE (ML) INJECTION PRN
Status: CANCELLED | OUTPATIENT
Start: 2023-12-11

## 2023-12-11 RX ORDER — FAMOTIDINE 10 MG/ML
20 INJECTION, SOLUTION INTRAVENOUS
Status: CANCELLED | OUTPATIENT
Start: 2023-12-11

## 2023-12-11 RX ORDER — SODIUM CHLORIDE 9 MG/ML
25 INJECTION, SOLUTION INTRAVENOUS PRN
OUTPATIENT
Start: 2023-12-11

## 2023-12-11 RX ORDER — HEPARIN 100 UNIT/ML
500 SYRINGE INTRAVENOUS PRN
OUTPATIENT
Start: 2023-12-11

## 2023-12-11 RX ORDER — SODIUM CHLORIDE 9 MG/ML
INJECTION, SOLUTION INTRAVENOUS CONTINUOUS
Status: CANCELLED | OUTPATIENT
Start: 2023-12-11

## 2023-12-11 RX ORDER — SODIUM CHLORIDE 0.9 % (FLUSH) 0.9 %
5-40 SYRINGE (ML) INJECTION PRN
Status: DISCONTINUED | OUTPATIENT
Start: 2023-12-11 | End: 2023-12-12 | Stop reason: HOSPADM

## 2023-12-11 RX ORDER — DIPHENHYDRAMINE HYDROCHLORIDE 50 MG/ML
50 INJECTION INTRAMUSCULAR; INTRAVENOUS
Status: CANCELLED | OUTPATIENT
Start: 2023-12-11

## 2023-12-11 RX ORDER — HEPARIN SODIUM (PORCINE) LOCK FLUSH IV SOLN 100 UNIT/ML 100 UNIT/ML
500 SOLUTION INTRAVENOUS PRN
Status: CANCELLED | OUTPATIENT
Start: 2023-12-11

## 2023-12-11 RX ORDER — HEPARIN 100 UNIT/ML
500 SYRINGE INTRAVENOUS PRN
Status: DISCONTINUED | OUTPATIENT
Start: 2023-12-11 | End: 2023-12-12 | Stop reason: HOSPADM

## 2023-12-11 RX ORDER — EPINEPHRINE 1 MG/ML
0.3 INJECTION, SOLUTION, CONCENTRATE INTRAVENOUS PRN
Status: CANCELLED | OUTPATIENT
Start: 2023-12-11

## 2023-12-11 RX ADMIN — SODIUM CHLORIDE, PRESERVATIVE FREE 10 ML: 5 INJECTION INTRAVENOUS at 09:37

## 2023-12-11 RX ADMIN — SODIUM CHLORIDE, PRESERVATIVE FREE 20 ML: 5 INJECTION INTRAVENOUS at 09:38

## 2023-12-11 RX ADMIN — SODIUM CHLORIDE 567 MG: 9 INJECTION, SOLUTION INTRAVENOUS at 12:24

## 2023-12-11 RX ADMIN — PERTUZUMAB 420 MG: 30 INJECTION, SOLUTION, CONCENTRATE INTRAVENOUS at 11:46

## 2023-12-11 RX ADMIN — SODIUM CHLORIDE, PRESERVATIVE FREE 10 ML: 5 INJECTION INTRAVENOUS at 11:03

## 2023-12-11 RX ADMIN — SODIUM CHLORIDE 20 ML/HR: 9 INJECTION, SOLUTION INTRAVENOUS at 11:05

## 2023-12-11 RX ADMIN — HEPARIN 500 UNITS: 100 SYRINGE at 13:01

## 2023-12-11 RX ADMIN — SODIUM CHLORIDE, PRESERVATIVE FREE 10 ML: 5 INJECTION INTRAVENOUS at 13:01

## 2023-12-11 NOTE — PLAN OF CARE
Problem: Chronic Conditions and Co-morbidities  Goal: Patient's chronic conditions and co-morbidity symptoms are monitored and maintained or improved  Outcome: Progressing  Flowsheets (Taken 12/11/2023 1253)  Care Plan - Patient's Chronic Conditions and Co-Morbidity Symptoms are Monitored and Maintained or Improved: Monitor and assess patient's chronic conditions and comorbid symptoms for stability, deterioration, or improvement  Note:   Chemotherapy Teaching     What is Chemotherapy   Drug action [x]   Method of Administration [x]   Handouts given []     Side Effects  Nausea/vomiting [x]   Diarrhea [x]   Fatigue [x]   Signs / Symptoms of infection [x]   Neutropenia [x]   Thrombocytopenia [x]   Alopecia [x]   neuropathy [x]   [place] diet &  the importance of fluids [x]       Micellaneous  Importance of nutrition [x]   Importance of oral hygiene [x]   When to call the MD [x]   Monitoring labs [x]   Use of supportive services []     Explanation of Drug Regimen / Frequency  TRAZIMERA/PERJETA     Comments  Verbalized understanding to drug,action,side effects and when to call MD        Problem: Safety - Adult  Goal: Free from fall injury  Outcome: Progressing  Flowsheets (Taken 12/11/2023 1253)  Free From Fall Injury: Instruct family/caregiver on patient safety  Note: No falls occurred with visit today. Problem: Discharge Planning  Goal: Discharge to home or other facility with appropriate resources  Outcome: Progressing  Flowsheets (Taken 12/11/2023 1253)  Discharge to home or other facility with appropriate resources: Identify barriers to discharge with patient and caregiver  Note: Verbalize understanding of discharge instructions, follow up appointments, and when to call Physician. Care plan reviewed with patient. Patient verbalizes understanding of the plan of care and contributes to goal setting.

## 2023-12-11 NOTE — TELEPHONE ENCOUNTER
Patient stopped at the desk and said she could not do her appointment of 1/9/24. She sad she could do any other day.     Please advise

## 2023-12-11 NOTE — FLOWSHEET NOTE
12/11/23 1143   Encounter Summary   Encounter Overview/Reason  Spiritual/Emotional Needs   Service Provided For: Patient   Referral/Consult From: Rounding   Last Encounter  12/11/23   Complexity of Encounter Low   Begin Time 1008   End Time  1010   Total Time Calculated 2 min   Assessment/Intervention/Outcome   Assessment Calm;Coping   Intervention Active listening   Outcome Connection/Belonging     This is a spiritual health visit as a part of cancer center outpatient rounds. The patient, Shayy, was laying in her chair as she spent time on her phone, relaxing as she received treatment.  introduced  services, patient declined longer visit but expressed gratitude for  support.      team will remain available to support patient/family, PRN.        Filemon De Leon M.Div     Spiritual Care Department  [address] [address]  [phone]

## 2023-12-12 ENCOUNTER — HOSPITAL ENCOUNTER (OUTPATIENT)
Dept: WOMENS IMAGING | Age: 53
Discharge: HOME | End: 2023-12-12
Attending: INTERNAL MEDICINE
Payer: COMMERCIAL

## 2023-12-12 DIAGNOSIS — Z79.811 AROMATASE INHIBITOR USE: ICD-10-CM

## 2023-12-12 DIAGNOSIS — C50.412 CARCINOMA OF UPPER-OUTER QUADRANT OF LEFT BREAST IN FEMALE, ESTROGEN RECEPTOR POSITIVE (HCC): ICD-10-CM

## 2023-12-12 DIAGNOSIS — Z17.0 CARCINOMA OF UPPER-OUTER QUADRANT OF LEFT BREAST IN FEMALE, ESTROGEN RECEPTOR POSITIVE (HCC): ICD-10-CM

## 2023-12-12 PROCEDURE — 77080 DXA BONE DENSITY AXIAL: CPT

## 2023-12-27 ENCOUNTER — HOSPITAL ENCOUNTER (OUTPATIENT)
Dept: PET IMAGING | Age: 53
Discharge: HOME | End: 2023-12-27
Attending: FAMILY MEDICINE
Payer: COMMERCIAL

## 2023-12-27 ENCOUNTER — TELEPHONE (OUTPATIENT)
Dept: FAMILY MEDICINE CLINIC | Age: 53
End: 2023-12-27

## 2023-12-27 DIAGNOSIS — C50.912 INVASIVE DUCTAL CARCINOMA OF BREAST, FEMALE, LEFT (HCC): ICD-10-CM

## 2023-12-27 PROCEDURE — A9552 F18 FDG: HCPCS | Performed by: FAMILY MEDICINE

## 2023-12-27 PROCEDURE — 3430000000 HC RX DIAGNOSTIC RADIOPHARMACEUTICAL: Performed by: FAMILY MEDICINE

## 2023-12-27 PROCEDURE — 78815 PET IMAGE W/CT SKULL-THIGH: CPT

## 2023-12-27 RX ORDER — FLUDEOXYGLUCOSE F 18 200 MCI/ML
12.4 INJECTION, SOLUTION INTRAVENOUS
Status: COMPLETED | OUTPATIENT
Start: 2023-12-27 | End: 2023-12-27

## 2023-12-27 RX ADMIN — FLUDEOXYGLUCOSE F 18 12.4 MILLICURIE: 200 INJECTION, SOLUTION INTRAVENOUS at 09:28

## 2023-12-27 NOTE — TELEPHONE ENCOUNTER
----- Message from Malik Randall MD sent at 12/27/2023  6:17 PM EST -----  Let her know that the PET scan showed no cancer.

## 2024-01-06 NOTE — PROGRESS NOTES
positive:  -genetically susceptible to further breast cancer: 10/11/23 Dr Barfield performed b/l mastectomy   -11/20/23-discussion with her as she is unsure whether she wants to proceed with reconstruction, at the end of the day it is her personal decision, told her at minimum she should have a discussion with the plastic surgeon and ask him the hard questions as far as what is the realistic expectation at the end of the surgery as to what she can expect.  -pt had f/u with Gyn- 10/11/23 Dr. Vaca performed prophylactic BSO   -discussed still limited data on screening, preferred to be done at experienced, high volume center, ideally under research conditions: Contrast MRI/MRCP and/or EUS,-patient is willing to proceed, she is leaning towards [place] that she has a friend there-she is still looking for a screening site and has not committed yet  -will need discussion about screening for melanoma; Patient has f/u with dermatologist for screening for melanoma-most recent moles with no cancer  -recommendation of her children being screened (call genetics program at [facility] or use the web site as directed)- pt reports genetics has given information to kids so they can get testing    4) depression, chronic, could be exacerbated by cancer diagnosis  - Currently being treated by Dr Quinonez with Wellbutrin, desvenlafaxine, hydroxyzine   -pt will be meeting with her more regularly 2/2 cancer diagnosis and treatment  -Doing better now that she is actually getting treatment, she has more anxiety when she does not feel like she has control    6) hypokalemia: 2/2 infusions and associated diarrhea  -home PO potassium; pt stopped again and was a little low again today; will try to eat potassium rich foods  -continue to watch    6)  Current smoker -still 1/2 PPD-explained that this can definitely have an impact if she is thinking she is can have reconstructive surgery, again as per her to work on cutting back.      7)

## 2024-01-08 ENCOUNTER — TELEPHONE (OUTPATIENT)
Facility: HOSPITAL | Age: 54
End: 2024-01-08

## 2024-01-08 ENCOUNTER — CLINICAL DOCUMENTATION (OUTPATIENT)
Facility: HOSPITAL | Age: 54
End: 2024-01-08

## 2024-01-08 NOTE — PROGRESS NOTES
Patient Assistance    Met with: Shayy Pedersonator Type: Infusion  Documentation Type: New Patient  Contact Type: Telephone  Navigation Status: New Patient  Status of Patient Insurance Coverage: Patient has active coverage          Additional notes: patient will have same insurance coverage this year; [employer] plan.  Patient has $4700 OOP with $0 met as of today's date.  Reviewed [employer] Patient assistance application with Shayy.  Submitted patient portion online.  Submitting provider form to Dr. Graff for signature     Drug Name: Perjeta  Form of PAP Assistance: Free Drug - Pending

## 2024-01-08 NOTE — TELEPHONE ENCOUNTER
Free drug program available for Edson through [employer] patient assistance.   Contacted patient to screen for financial assistance, left msg.     Alethea Szymanski  Infusion Financial Navigator  [phone]

## 2024-01-10 ENCOUNTER — HOSPITAL ENCOUNTER (OUTPATIENT)
Dept: INFUSION THERAPY | Age: 54
Discharge: HOME | End: 2024-01-10
Payer: COMMERCIAL

## 2024-01-10 ENCOUNTER — OFFICE VISIT (OUTPATIENT)
Dept: ONCOLOGY | Age: 54
End: 2024-01-10
Payer: COMMERCIAL

## 2024-01-10 VITALS
HEIGHT: 65 IN | SYSTOLIC BLOOD PRESSURE: 106 MMHG | OXYGEN SATURATION: 95 % | RESPIRATION RATE: 18 BRPM | DIASTOLIC BLOOD PRESSURE: 75 MMHG | BODY MASS INDEX: 33.05 KG/M2 | TEMPERATURE: 98 F | HEART RATE: 79 BPM

## 2024-01-10 VITALS
TEMPERATURE: 97 F | DIASTOLIC BLOOD PRESSURE: 83 MMHG | SYSTOLIC BLOOD PRESSURE: 126 MMHG | RESPIRATION RATE: 18 BRPM | HEART RATE: 71 BPM | OXYGEN SATURATION: 98 %

## 2024-01-10 DIAGNOSIS — C50.412 CARCINOMA OF UPPER-OUTER QUADRANT OF LEFT BREAST IN FEMALE, ESTROGEN RECEPTOR POSITIVE (HCC): Primary | ICD-10-CM

## 2024-01-10 DIAGNOSIS — Z17.0 CARCINOMA OF UPPER-OUTER QUADRANT OF LEFT BREAST IN FEMALE, ESTROGEN RECEPTOR POSITIVE (HCC): Primary | ICD-10-CM

## 2024-01-10 DIAGNOSIS — C50.912 INVASIVE DUCTAL CARCINOMA OF BREAST, FEMALE, LEFT (HCC): ICD-10-CM

## 2024-01-10 DIAGNOSIS — Z79.811 AROMATASE INHIBITOR USE: ICD-10-CM

## 2024-01-10 LAB
ABSOLUTE IMMATURE GRANULOCYTE: 0.01 THOU/MM3 (ref 0–0.07)
ALBUMIN SERPL BCG-MCNC: 3.9 G/DL (ref 3.5–5.1)
ALP SERPL-CCNC: 132 U/L (ref 38–126)
ALT SERPL W/O P-5'-P-CCNC: 13 U/L (ref 11–66)
AST SERPL-CCNC: 13 U/L (ref 5–40)
BASOPHILS ABSOLUTE: 0 THOU/MM3 (ref 0–0.1)
BASOPHILS NFR BLD AUTO: 0 % (ref 0–3)
BILIRUB CONJ SERPL-MCNC: < 0.2 MG/DL (ref 0–0.3)
BILIRUB SERPL-MCNC: < 0.2 MG/DL (ref 0.3–1.2)
BUN BLDP-MCNC: 14 MG/DL (ref 8–26)
CHLORIDE BLD-SCNC: 107 MEQ/L (ref 98–109)
CREAT BLD-MCNC: 0.6 MG/DL (ref 0.5–1.2)
EOSINOPHIL NFR BLD AUTO: 4 % (ref 0–4)
EOSINOPHILS ABSOLUTE: 0.2 THOU/MM3 (ref 0–0.4)
ERYTHROCYTE [DISTWIDTH] IN BLOOD BY AUTOMATED COUNT: 13.3 % (ref 11.5–14.5)
GFR SERPL CREATININE-BSD FRML MDRD: > 60 ML/MIN/1.73M2
GLUCOSE BLD-MCNC: 155 MG/DL (ref 70–108)
HCT VFR BLD AUTO: 36.8 % (ref 37–47)
HGB BLD-MCNC: 12.1 GM/DL (ref 12–16)
IMMATURE GRANULOCYTES: 0 %
IONIZED CALCIUM, WHOLE BLOOD: 1.2 MMOL/L (ref 1.12–1.32)
LYMPHOCYTES ABSOLUTE: 2.3 THOU/MM3 (ref 1–4.8)
LYMPHOCYTES NFR BLD AUTO: 39 % (ref 15–47)
MAGNESIUM SERPL-MCNC: 1.8 MG/DL (ref 1.6–2.4)
MCH RBC QN AUTO: 28 PG (ref 26–33)
MCHC RBC AUTO-ENTMCNC: 32.9 GM/DL (ref 32.2–35.5)
MCV RBC AUTO: 85 FL (ref 81–99)
MONOCYTES ABSOLUTE: 0.4 THOU/MM3 (ref 0.4–1.3)
MONOCYTES NFR BLD AUTO: 7 % (ref 0–12)
NEUTROPHILS NFR BLD AUTO: 50 % (ref 43–75)
PLATELET # BLD AUTO: 304 THOU/MM3 (ref 130–400)
PMV BLD AUTO: 8.7 FL (ref 9.4–12.4)
POTASSIUM BLD-SCNC: 3.3 MEQ/L (ref 3.5–4.9)
PROT SERPL-MCNC: 7.2 G/DL (ref 6.1–8)
RBC # BLD AUTO: 4.32 MILL/MM3 (ref 4.2–5.4)
SEGMENTED NEUTROPHILS ABSOLUTE COUNT: 2.9 THOU/MM3 (ref 1.8–7.7)
SODIUM BLD-SCNC: 144 MEQ/L (ref 138–146)
TOTAL CO2, WHOLE BLOOD: 28 MEQ/L (ref 23–33)
WBC # BLD AUTO: 5.9 THOU/MM3 (ref 4.8–10.8)

## 2024-01-10 PROCEDURE — 96417 CHEMO IV INFUS EACH ADDL SEQ: CPT

## 2024-01-10 PROCEDURE — 80047 BASIC METABLC PNL IONIZED CA: CPT

## 2024-01-10 PROCEDURE — 80076 HEPATIC FUNCTION PANEL: CPT

## 2024-01-10 PROCEDURE — 6360000002 HC RX W HCPCS: Performed by: INTERNAL MEDICINE

## 2024-01-10 PROCEDURE — 2580000003 HC RX 258: Performed by: INTERNAL MEDICINE

## 2024-01-10 PROCEDURE — 99211 OFF/OP EST MAY X REQ PHY/QHP: CPT

## 2024-01-10 PROCEDURE — 96413 CHEMO IV INFUSION 1 HR: CPT

## 2024-01-10 PROCEDURE — 36591 DRAW BLOOD OFF VENOUS DEVICE: CPT

## 2024-01-10 PROCEDURE — 99214 OFFICE O/P EST MOD 30 MIN: CPT | Performed by: INTERNAL MEDICINE

## 2024-01-10 PROCEDURE — 85025 COMPLETE CBC W/AUTO DIFF WBC: CPT

## 2024-01-10 PROCEDURE — 83735 ASSAY OF MAGNESIUM: CPT

## 2024-01-10 RX ORDER — ALBUTEROL SULFATE 90 UG/1
4 AEROSOL, METERED RESPIRATORY (INHALATION) PRN
Status: CANCELLED | OUTPATIENT
Start: 2024-01-10

## 2024-01-10 RX ORDER — SODIUM CHLORIDE 0.9 % (FLUSH) 0.9 %
5-40 SYRINGE (ML) INJECTION PRN
Status: CANCELLED | OUTPATIENT
Start: 2024-01-10

## 2024-01-10 RX ORDER — HEPARIN 100 UNIT/ML
500 SYRINGE INTRAVENOUS PRN
Status: DISCONTINUED | OUTPATIENT
Start: 2024-01-10 | End: 2024-01-11 | Stop reason: HOSPADM

## 2024-01-10 RX ORDER — SODIUM CHLORIDE 0.9 % (FLUSH) 0.9 %
5-40 SYRINGE (ML) INJECTION PRN
OUTPATIENT
Start: 2024-01-10

## 2024-01-10 RX ORDER — DIPHENHYDRAMINE HYDROCHLORIDE 50 MG/ML
50 INJECTION INTRAMUSCULAR; INTRAVENOUS
Status: CANCELLED | OUTPATIENT
Start: 2024-01-10

## 2024-01-10 RX ORDER — SODIUM CHLORIDE 9 MG/ML
5-250 INJECTION, SOLUTION INTRAVENOUS PRN
Status: CANCELLED | OUTPATIENT
Start: 2024-01-10

## 2024-01-10 RX ORDER — SODIUM CHLORIDE 0.9 % (FLUSH) 0.9 %
5-40 SYRINGE (ML) INJECTION PRN
Status: DISCONTINUED | OUTPATIENT
Start: 2024-01-10 | End: 2024-01-11 | Stop reason: HOSPADM

## 2024-01-10 RX ORDER — MEPERIDINE HYDROCHLORIDE 50 MG/ML
12.5 INJECTION INTRAMUSCULAR; INTRAVENOUS; SUBCUTANEOUS PRN
Status: CANCELLED | OUTPATIENT
Start: 2024-01-10

## 2024-01-10 RX ORDER — SODIUM CHLORIDE 9 MG/ML
INJECTION, SOLUTION INTRAVENOUS CONTINUOUS
Status: CANCELLED | OUTPATIENT
Start: 2024-01-10

## 2024-01-10 RX ORDER — ONDANSETRON 2 MG/ML
8 INJECTION INTRAMUSCULAR; INTRAVENOUS
Status: CANCELLED | OUTPATIENT
Start: 2024-01-10

## 2024-01-10 RX ORDER — ACETAMINOPHEN 325 MG/1
650 TABLET ORAL
Status: CANCELLED | OUTPATIENT
Start: 2024-01-10

## 2024-01-10 RX ORDER — HEPARIN SODIUM (PORCINE) LOCK FLUSH IV SOLN 100 UNIT/ML 100 UNIT/ML
500 SOLUTION INTRAVENOUS PRN
Status: CANCELLED | OUTPATIENT
Start: 2024-01-10

## 2024-01-10 RX ORDER — SODIUM CHLORIDE 9 MG/ML
25 INJECTION, SOLUTION INTRAVENOUS PRN
OUTPATIENT
Start: 2024-01-10

## 2024-01-10 RX ORDER — FAMOTIDINE 10 MG/ML
20 INJECTION, SOLUTION INTRAVENOUS
Status: CANCELLED | OUTPATIENT
Start: 2024-01-10

## 2024-01-10 RX ORDER — EPINEPHRINE 1 MG/ML
0.3 INJECTION, SOLUTION, CONCENTRATE INTRAVENOUS PRN
Status: CANCELLED | OUTPATIENT
Start: 2024-01-10

## 2024-01-10 RX ORDER — HEPARIN 100 UNIT/ML
500 SYRINGE INTRAVENOUS PRN
OUTPATIENT
Start: 2024-01-10

## 2024-01-10 RX ORDER — SODIUM CHLORIDE 9 MG/ML
5-250 INJECTION, SOLUTION INTRAVENOUS PRN
Status: DISCONTINUED | OUTPATIENT
Start: 2024-01-10 | End: 2024-01-11 | Stop reason: HOSPADM

## 2024-01-10 RX ADMIN — SODIUM CHLORIDE, PRESERVATIVE FREE 10 ML: 5 INJECTION INTRAVENOUS at 08:58

## 2024-01-10 RX ADMIN — SODIUM CHLORIDE, PRESERVATIVE FREE 10 ML: 5 INJECTION INTRAVENOUS at 11:54

## 2024-01-10 RX ADMIN — SODIUM CHLORIDE 567 MG: 9 INJECTION, SOLUTION INTRAVENOUS at 10:22

## 2024-01-10 RX ADMIN — PERTUZUMAB 420 MG: 30 INJECTION, SOLUTION, CONCENTRATE INTRAVENOUS at 11:08

## 2024-01-10 RX ADMIN — SODIUM CHLORIDE 55 ML/HR: 9 INJECTION, SOLUTION INTRAVENOUS at 10:07

## 2024-01-10 RX ADMIN — SODIUM CHLORIDE, PRESERVATIVE FREE 20 ML: 5 INJECTION INTRAVENOUS at 08:59

## 2024-01-10 RX ADMIN — HEPARIN 500 UNITS: 100 SYRINGE at 11:54

## 2024-01-10 NOTE — PLAN OF CARE
Problem: Chronic Conditions and Co-morbidities  Goal: Patient's chronic conditions and co-morbidity symptoms are monitored and maintained or improved  Outcome: Adequate for Discharge  Flowsheets (Taken 1/10/2024 1343)  Care Plan - Patient's Chronic Conditions and Co-Morbidity Symptoms are Monitored and Maintained or Improved:   Monitor and assess patient's chronic conditions and comorbid symptoms for stability, deterioration, or improvement   Collaborate with multidisciplinary team to address chronic and comorbid conditions and prevent exacerbation or deterioration  Note:   Chemotherapy Teaching     What is Chemotherapy   Drug action [x]   Method of Administration [x]   Handouts given []     Side Effects  Nausea/vomiting [x]   Diarrhea [x]   Fatigue [x]   Signs / Symptoms of infection [x]   Neutropenia [x]   Thrombocytopenia [x]   Alopecia [x]   neuropathy [x]   [place] diet &  the importance of fluids [x]       Micellaneous  Importance of nutrition [x]   Importance of oral hygiene [x]   When to call the MD [x]   Monitoring labs [x]   Use of supportive services []     Explanation of Drug Regimen / Frequency  Trazimera/Perjeta     Comments  Verbalized understanding to drug,action,side effects and when to call MD        Problem: Safety - Adult  Goal: Free from fall injury  Outcome: Adequate for Discharge  Flowsheets (Taken 1/10/2024 1343)  Free From Fall Injury: Instruct family/caregiver on patient safety     Problem: Discharge Planning  Goal: Discharge to home or other facility with appropriate resources  Outcome: Adequate for Discharge  Flowsheets (Taken 1/10/2024 1343)  Discharge to home or other facility with appropriate resources: Identify barriers to discharge with patient and caregiver     Problem: Infection - Adult  Goal: Absence of infection at discharge  Outcome: Adequate for Discharge  Flowsheets (Taken 1/10/2024 1343)  Absence of infection at discharge: Monitor all insertion sites i.e., indwelling lines,  tubes and drains  Note: Mediport site with no redness or warmth. Skin over port site intact with no signs of breakdown noted. Patient verbalizes signs/symptoms of port infection and when to notify the physician.     Care plan reviewed with patient.  Patient verbalize understanding of the plan of care and contribute to goal setting.

## 2024-01-10 NOTE — DISCHARGE INSTRUCTIONS
Please contact your Oncologist if you have any questions regarding the Trazimera/ that you received today.    You are instructed to call the office or go to the Emergency Dept. If you experience any of the following symptoms:    Dizziness/lightheadedness   Acute nausea or vomiting-not relieved by medications  Headaches-not relieved by medications  New chest pain or pressure  New rash /itching  New shortness of breath  Fever,chills or signs or symptoms of infection    Make sure you are drinking 48 to 64 ounces of water daily-if you are unable to drink fluids let us know right away.

## 2024-01-10 NOTE — PROGRESS NOTES
Patient tolerated Trazimera/Perjeta without any complications.  Denies dizziness, lightheadedness, acute nausea or vomiting, headache, heart palpitations, rash/itching or increased SOB.    Last vital signs  /83   Pulse 71   Temp 97 °F (36.1 °C) (Tympanic)   Resp 18   LMP 01/01/2008   SpO2 98%     Patient instructed if they experience any of the above symptoms following today's visit, he/she is to notify the Physician or go to the Emergency Dept.    Discharge instructions given to patient, Verbalizes understanding. Ambulated off unit per self in stable condition with all belongings.

## 2024-01-10 NOTE — PATIENT INSTRUCTIONS
1) please draw magnesium today since diarrhea continuing  2) eat regularly and potassium rich foods  3) proceed with treatment today  4) RTC in 3 weeks with labs and tox check for next cycle

## 2024-01-10 NOTE — ONCOLOGY
Chemotherapy Administration    Pre-assessment Data: Antineoplastic Agents  See toxicity flow sheet for assessment                                          [x]         Interventions:   Chemotherapy SQ injection given []   Taxol administered-VS per protocol []   Blood pressure meds held 12 hours prior to Rituxan/Ruxience []   Rituxan/Ruxience administered- VS and precautions per guidelines []   Emergency drugs available as appropriate [x]   Anaphylaxis assessment completed [x]   Pre-medications administered as ordered [x]   Blood return noted upon initiation of chemotherapy [x]   Blood return noted each 1-2ml of a vesicant medication if given IV push []   Navelbine, Vincristine and Velban given as a monitored wide open drip, blood return noted before during and after infusion. []   Blood return noted each 2-3ml of a non-vesicant medication if given IV push []   Patient aware of potential Immunotherapy toxicities [x]   Monitor for signs / symptoms of hypersensitivity reaction [x]   Chemotherapy orders (drug/dose/rate) verified by 2 Chemo certified RN’s [x]   Monitor IV site and blood return throughout the infusion of the medication [x]   Document IV site checks on the IV assessment form [x]   Document chemotherapy teaching on the Patient Education tab [x]   Document patient verbalizes understanding of medications being administered [x]   If IV infiltration, see ONS Guidelines []   Other:     Trazimera/Perjeta []

## 2024-01-17 RX ORDER — KETOROLAC TROMETHAMINE 10 MG/1
10 TABLET, FILM COATED ORAL 4 TIMES DAILY PRN
Qty: 20 TABLET | Refills: 0 | Status: SHIPPED | OUTPATIENT
Start: 2024-01-17

## 2024-01-17 NOTE — TELEPHONE ENCOUNTER
Date of last visit:  11/30/2023  Date of next visit:  Visit date not found    Requested Prescriptions     Pending Prescriptions Disp Refills    ketorolac (TORADOL) 10 MG tablet 20 tablet 0     Sig: Take 1 tablet by mouth 4 times daily as needed for Pain

## 2024-01-17 NOTE — TELEPHONE ENCOUNTER
Patient still having intermittent chest pain with exertion post cath.      heparin gtt was d/c'd post cath and writer noticed heparin should be continued per CT surg signed & held orders. Writer contacted on call cardiology MD and agreed to restart gtt. Orders placed.    Pt called req refill for toradol 10mg. She states she has been taking one in the morning and 1 at bedtime and it really is helping her  [facility]

## 2024-01-22 DIAGNOSIS — S39.012A LOW BACK STRAIN, INITIAL ENCOUNTER: ICD-10-CM

## 2024-01-22 RX ORDER — TIZANIDINE 4 MG/1
8 TABLET ORAL NIGHTLY
Qty: 180 TABLET | Refills: 1 | Status: SHIPPED | OUTPATIENT
Start: 2024-01-22

## 2024-01-22 NOTE — TELEPHONE ENCOUNTER
Date of last visit:  11/30/2023  Date of next visit:  Visit date not found    Requested Prescriptions     Pending Prescriptions Disp Refills    tiZANidine (ZANAFLEX) 4 MG tablet 60 tablet 5     Sig: Take 2 tablets by mouth at bedtime

## 2024-01-22 NOTE — TELEPHONE ENCOUNTER
Pt called to req an refill on the following    tiZANidine (ZANAFLEX) 4 MG tablet  Take 2 tablets by mouth at bedtime      Please send to [facility] [place]

## 2024-01-23 ENCOUNTER — TELEPHONE (OUTPATIENT)
Dept: ONCOLOGY | Age: 54
End: 2024-01-23

## 2024-01-23 NOTE — TELEPHONE ENCOUNTER
Patient calling in confused about upcoming appointments and how they are scheduled. She has a treatment scheduled on 1/30 and also on scheduled on 2/1, and she has an appointment with Dr. Graff scheduled on 1/30 after treatment and an appointment scheduled with Kendal for survivorship scheduled on 2/1 after treatment. She is wondering if she should still see Kendal since she is seeing Dr. Ty on 1/30.

## 2024-01-30 ENCOUNTER — HOSPITAL ENCOUNTER (OUTPATIENT)
Dept: INFUSION THERAPY | Age: 54
Discharge: HOME | End: 2024-01-30

## 2024-02-15 ENCOUNTER — OFFICE VISIT (OUTPATIENT)
Dept: ONCOLOGY | Age: 54
End: 2024-02-15
Payer: COMMERCIAL

## 2024-02-15 ENCOUNTER — HOSPITAL ENCOUNTER (OUTPATIENT)
Dept: INFUSION THERAPY | Age: 54
Discharge: HOME | End: 2024-02-15
Payer: COMMERCIAL

## 2024-02-15 VITALS
BODY MASS INDEX: 31.82 KG/M2 | WEIGHT: 191.2 LBS | HEART RATE: 91 BPM | DIASTOLIC BLOOD PRESSURE: 69 MMHG | TEMPERATURE: 98.1 F | SYSTOLIC BLOOD PRESSURE: 121 MMHG | RESPIRATION RATE: 16 BRPM | OXYGEN SATURATION: 94 %

## 2024-02-15 VITALS
DIASTOLIC BLOOD PRESSURE: 69 MMHG | OXYGEN SATURATION: 94 % | SYSTOLIC BLOOD PRESSURE: 121 MMHG | TEMPERATURE: 98.1 F | RESPIRATION RATE: 16 BRPM | HEART RATE: 91 BPM

## 2024-02-15 DIAGNOSIS — Z15.01 BRCA GENE MUTATION POSITIVE IN FEMALE: ICD-10-CM

## 2024-02-15 DIAGNOSIS — C50.412 CARCINOMA OF UPPER-OUTER QUADRANT OF LEFT BREAST IN FEMALE, ESTROGEN RECEPTOR POSITIVE (HCC): Primary | ICD-10-CM

## 2024-02-15 DIAGNOSIS — Z72.0 TOBACCO USE: ICD-10-CM

## 2024-02-15 DIAGNOSIS — Z15.09 BRCA GENE MUTATION POSITIVE IN FEMALE: ICD-10-CM

## 2024-02-15 DIAGNOSIS — Z17.0 CARCINOMA OF UPPER-OUTER QUADRANT OF LEFT BREAST IN FEMALE, ESTROGEN RECEPTOR POSITIVE (HCC): Primary | ICD-10-CM

## 2024-02-15 DIAGNOSIS — Z79.811 AROMATASE INHIBITOR USE: ICD-10-CM

## 2024-02-15 DIAGNOSIS — Z15.02 BRCA GENE MUTATION POSITIVE IN FEMALE: ICD-10-CM

## 2024-02-15 PROCEDURE — 99211 OFF/OP EST MAY X REQ PHY/QHP: CPT

## 2024-02-15 PROCEDURE — 99214 OFFICE O/P EST MOD 30 MIN: CPT | Performed by: PHYSICIAN ASSISTANT

## 2024-02-15 NOTE — PROGRESS NOTES
thumbs and the ligaments for the past over weeks, heart squeezing to do laundry, occasionally with ankle pain.  Bone scan was ordered as well as x-ray of the hand bilateral, x-ray of ankle left.  Is well as prednisone 20 milligram tablet     4/25/2023 Initial medical oncology consult reporting she just received her genetics, that she is BRCA1 and BRCA2 positive.  Got her Mediport yesterday and tolerated procedure well, has her pretreatment echo later today, and a bone scan ordered for 5/1/2023.  She is already decided to do so proceed with mastectomy, however is wanting to proceed with neoadjuvant chemotherapy as she really wants to know the chemotherapy worked on the cancer not.  She still does not feel any nodules in her breasts, denies breast pain, lymphadenopathy in either axilla.  She still has chronic aches and pains in her hands, and her ankle, she is convinced she has psoriatic arthritis as the prednisone given to her by her PCP helps with the pain.  She is well versed in her breast cancer and understands right now plan will be chemotherapy first, followed by repeat imaging and surgery, followed by possible radiation, followed by endocrine therapy.  She expresses concern about her children as they mostly are caring at least one of the BRCA recommendations.  -4/25/23 Echo: EF 55% otherwise normal  -5/2/23 Genetics counseling; pt's kids are going to get testing  -5/15/23 Bone scan: no foci of abnormal radiotracer accumulation in the visualized skeleton to suggest osseous metastatic disease. Diffuse activity in the calvarium may be due to hyperostosis. Inter orbital, maxillary and mandibular activity is likely infectious or inflammatory. Probable degenerative arthropathic changes are present at the bilateral shoulders, knees, ankles and feet. IMPRESSION: no bone scan concerns for metastatic disease.  -7/28/2023 CT lung screen: No suspicious pulmonary mass or nodule. A 3 mm RUL pulmonary nodule and a benign

## 2024-02-16 NOTE — PROGRESS NOTES
tablet by mouth every 8 hours as needed for Nausea or Vomiting 60 tablet 3     No current facility-administered medications for this visit.     Facility-Administered Medications Ordered in Other Visits   Medication Dose Route Frequency Provider Last Rate Last Admin    sodium chloride flush 0.9 % injection 5-40 mL  5-40 mL IntraVENous PRAshanti Engle MD PhD   20 mL at 02/21/24 0812    heparin (PF) 100 UNIT/ML injection 500 Units  500 Units IntraCATHeter PRAshanti Engle MD PhD           Past Medical History:  Past Medical History:   Diagnosis Date    Anxiety     Arthritis     BRCA positive 05/21/2023    BRCA 1 AND BRCA 2 positive dx'd 4/2023 Saw genetic counselling 5/2023 Plan after dx: to have all breast tissue and ovaries removed    BRCA1 gene mutation positive     BRCA2 gene mutation positive     Chronic gastritis 01/2015    Depression     Deviated septum     GERD (gastroesophageal reflux disease)     chronic    Hiatal hernia 2009    Hypertension     Intraductal papilloma of breast 07/2020    right    Invasive ductal carcinoma of breast, female, left (HCC) 04/04/2023    Numbness and tingling 2007    left leg and foot-chronic    Ovarian cyst     PONV (postoperative nausea and vomiting)     Pulmonary nodule 10/01/2013    Pure hypercholesterolemia 02/14/2022    Schwannoma of spinal cord (HCC) 09/17/2013    Type 2 diabetes mellitus treated without insulin (HCC) 03/19/2023       Past Surgical History:  Past Surgical History:   Procedure Laterality Date    ANTERIOR CRUCIATE LIGAMENT REPAIR Left     [facility]    BLADDER SUSPENSION  2006    BREAST LUMPECTOMY Right 08/12/2020    papilloma    BREAST REDUCTION SURGERY  1995    [facility]    CARPAL TUNNEL RELEASE Bilateral     [facility]    CERVICAL SPINE SURGERY  2007    c4-c7 Dr Solorzano    COLONOSCOPY  1995,2015    Dr Trinh    CYSTOSCOPY  06/16/2006    Dr Lofton    HYSTERECTOMY (CERVIX STATUS UNKNOWN)  06/16/2006    for abnormal bleeding and prolapse

## 2024-02-19 ENCOUNTER — SOCIAL WORK (OUTPATIENT)
Dept: INFUSION THERAPY | Age: 54
End: 2024-02-19

## 2024-02-19 NOTE — PROGRESS NOTES
Oncology Social Work    Date: 2/19/2024  Time: 1:22 PM  Name: Esther Valle  MRN: [id number]     Contact Type: Follow-up    Note:   Situation: This staff called Esther Valle (goes by Shayy) via phone support to introduce myself as her Oncology Social Worker.     Background:  Shayy had her recent appointment with [facility]. This staff was calling to review her outstanding concerns identified on her coping thermometer.     Assessment:  This staff had the opportunity to speak with Shayy. She appeared very pleasant as we discussed the treatment process, of which she is just beginning this new process. Shared that although she had addressed concerns on the Distress Thermometer, she feels most of it is related to missing a dose of her antidepressant. She wasn't able to identify any other reason since she has no real concerns regarding treatment yet,  - Education regarding the services provided by the SW were explained during our conversation.  - A quick review of her Power of  document was provided since she doesn't have the documents on file. This staff encouraged her to reach out at her next local appt and we can complete according to her wishes.  - No community referrals were placed at this time because she is too early in the process to know or understand what services she may need. Therefore, her referral services may be reconsidered should her needs regarding assistance change.    Recommendation: Follow-up will be initiated by Shayy based on need  was able to provide her with my contact information and will remain available for support.      CHAGO Pate, EDELMIRA, ACE  Oncology Social Worker      Electronically signed by CHAGO Pate LSW, ACHP-SW on 2/19/2024 at 1:22 PM

## 2024-02-20 DIAGNOSIS — C50.412 CARCINOMA OF UPPER-OUTER QUADRANT OF LEFT BREAST IN FEMALE, ESTROGEN RECEPTOR POSITIVE (HCC): Primary | ICD-10-CM

## 2024-02-20 DIAGNOSIS — Z17.0 CARCINOMA OF UPPER-OUTER QUADRANT OF LEFT BREAST IN FEMALE, ESTROGEN RECEPTOR POSITIVE (HCC): Primary | ICD-10-CM

## 2024-02-21 ENCOUNTER — HOSPITAL ENCOUNTER (OUTPATIENT)
Dept: INFUSION THERAPY | Age: 54
Discharge: HOME | End: 2024-02-21
Payer: COMMERCIAL

## 2024-02-21 ENCOUNTER — OFFICE VISIT (OUTPATIENT)
Dept: ONCOLOGY | Age: 54
End: 2024-02-21
Payer: COMMERCIAL

## 2024-02-21 VITALS
HEART RATE: 82 BPM | DIASTOLIC BLOOD PRESSURE: 67 MMHG | BODY MASS INDEX: 31.82 KG/M2 | TEMPERATURE: 97.8 F | WEIGHT: 191 LBS | HEIGHT: 65 IN | SYSTOLIC BLOOD PRESSURE: 123 MMHG | RESPIRATION RATE: 16 BRPM | OXYGEN SATURATION: 95 %

## 2024-02-21 VITALS
RESPIRATION RATE: 16 BRPM | OXYGEN SATURATION: 95 % | SYSTOLIC BLOOD PRESSURE: 118 MMHG | DIASTOLIC BLOOD PRESSURE: 67 MMHG | TEMPERATURE: 98.1 F | HEART RATE: 73 BPM | WEIGHT: 191 LBS | BODY MASS INDEX: 31.82 KG/M2 | HEIGHT: 65 IN

## 2024-02-21 DIAGNOSIS — C50.412 CARCINOMA OF UPPER-OUTER QUADRANT OF LEFT BREAST IN FEMALE, ESTROGEN RECEPTOR POSITIVE (HCC): Primary | ICD-10-CM

## 2024-02-21 DIAGNOSIS — C50.912 INVASIVE DUCTAL CARCINOMA OF BREAST, FEMALE, LEFT (HCC): ICD-10-CM

## 2024-02-21 DIAGNOSIS — Z15.02 BRCA2 GENE MUTATION POSITIVE IN FEMALE: ICD-10-CM

## 2024-02-21 DIAGNOSIS — Z51.81 ENCOUNTER FOR MONITORING AROMATASE INHIBITOR THERAPY: ICD-10-CM

## 2024-02-21 DIAGNOSIS — Z15.01 BRCA2 GENE MUTATION POSITIVE IN FEMALE: ICD-10-CM

## 2024-02-21 DIAGNOSIS — Z15.01 BRCA1 GENE MUTATION POSITIVE IN FEMALE: ICD-10-CM

## 2024-02-21 DIAGNOSIS — Z15.02 BRCA1 GENE MUTATION POSITIVE IN FEMALE: ICD-10-CM

## 2024-02-21 DIAGNOSIS — Z72.0 TOBACCO USE: ICD-10-CM

## 2024-02-21 DIAGNOSIS — Z15.09 BRCA2 GENE MUTATION POSITIVE IN FEMALE: ICD-10-CM

## 2024-02-21 DIAGNOSIS — Z15.09 BRCA1 GENE MUTATION POSITIVE IN FEMALE: ICD-10-CM

## 2024-02-21 DIAGNOSIS — Z79.811 ENCOUNTER FOR MONITORING AROMATASE INHIBITOR THERAPY: ICD-10-CM

## 2024-02-21 DIAGNOSIS — Z17.0 CARCINOMA OF UPPER-OUTER QUADRANT OF LEFT BREAST IN FEMALE, ESTROGEN RECEPTOR POSITIVE (HCC): Primary | ICD-10-CM

## 2024-02-21 DIAGNOSIS — Z79.899 ENCOUNTER FOR MONITORING CARDIOTOXIC DRUG THERAPY: ICD-10-CM

## 2024-02-21 DIAGNOSIS — Z51.81 ENCOUNTER FOR MONITORING CARDIOTOXIC DRUG THERAPY: ICD-10-CM

## 2024-02-21 LAB
ABSOLUTE IMMATURE GRANULOCYTE: 0.01 THOU/MM3 (ref 0–0.07)
ALBUMIN SERPL BCG-MCNC: 4.3 G/DL (ref 3.5–5.1)
ALP SERPL-CCNC: 138 U/L (ref 38–126)
ALT SERPL W/O P-5'-P-CCNC: 9 U/L (ref 11–66)
AST SERPL-CCNC: 13 U/L (ref 5–40)
BASOPHILS ABSOLUTE: 0 THOU/MM3 (ref 0–0.1)
BASOPHILS NFR BLD AUTO: 0 % (ref 0–3)
BILIRUB CONJ SERPL-MCNC: < 0.2 MG/DL (ref 0–0.3)
BILIRUB SERPL-MCNC: 0.3 MG/DL (ref 0.3–1.2)
BUN BLDP-MCNC: 22 MG/DL (ref 8–26)
CHLORIDE BLD-SCNC: 104 MEQ/L (ref 98–109)
CREAT BLD-MCNC: 0.7 MG/DL (ref 0.5–1.2)
EOSINOPHIL NFR BLD AUTO: 2 % (ref 0–4)
EOSINOPHILS ABSOLUTE: 0.2 THOU/MM3 (ref 0–0.4)
ERYTHROCYTE [DISTWIDTH] IN BLOOD BY AUTOMATED COUNT: 13.3 % (ref 11.5–14.5)
GFR SERPL CREATININE-BSD FRML MDRD: > 60 ML/MIN/1.73M2
GLUCOSE BLD-MCNC: 124 MG/DL (ref 70–108)
HCT VFR BLD AUTO: 36.1 % (ref 37–47)
HGB BLD-MCNC: 12 GM/DL (ref 12–16)
IMMATURE GRANULOCYTES: 0 %
IONIZED CALCIUM, WHOLE BLOOD: 1.17 MMOL/L (ref 1.12–1.32)
LYMPHOCYTES ABSOLUTE: 2.6 THOU/MM3 (ref 1–4.8)
LYMPHOCYTES NFR BLD AUTO: 39 % (ref 15–47)
MCH RBC QN AUTO: 28.2 PG (ref 26–33)
MCHC RBC AUTO-ENTMCNC: 33.2 GM/DL (ref 32.2–35.5)
MCV RBC AUTO: 85 FL (ref 81–99)
MONOCYTES ABSOLUTE: 0.5 THOU/MM3 (ref 0.4–1.3)
MONOCYTES NFR BLD AUTO: 8 % (ref 0–12)
NEUTROPHILS NFR BLD AUTO: 50 % (ref 43–75)
PLATELET # BLD AUTO: 276 THOU/MM3 (ref 130–400)
PMV BLD AUTO: 8.4 FL (ref 9.4–12.4)
POTASSIUM BLD-SCNC: 3.8 MEQ/L (ref 3.5–4.9)
PROT SERPL-MCNC: 7.8 G/DL (ref 6.1–8)
RBC # BLD AUTO: 4.25 MILL/MM3 (ref 4.2–5.4)
SEGMENTED NEUTROPHILS ABSOLUTE COUNT: 3.3 THOU/MM3 (ref 1.8–7.7)
SODIUM BLD-SCNC: 142 MEQ/L (ref 138–146)
TOTAL CO2, WHOLE BLOOD: 29 MEQ/L (ref 23–33)
WBC # BLD AUTO: 6.6 THOU/MM3 (ref 4.8–10.8)

## 2024-02-21 PROCEDURE — 80076 HEPATIC FUNCTION PANEL: CPT

## 2024-02-21 PROCEDURE — 96417 CHEMO IV INFUS EACH ADDL SEQ: CPT

## 2024-02-21 PROCEDURE — 96415 CHEMO IV INFUSION ADDL HR: CPT

## 2024-02-21 PROCEDURE — 85025 COMPLETE CBC W/AUTO DIFF WBC: CPT

## 2024-02-21 PROCEDURE — 96413 CHEMO IV INFUSION 1 HR: CPT

## 2024-02-21 PROCEDURE — 2580000003 HC RX 258: Performed by: INTERNAL MEDICINE

## 2024-02-21 PROCEDURE — 99214 OFFICE O/P EST MOD 30 MIN: CPT | Performed by: INTERNAL MEDICINE

## 2024-02-21 PROCEDURE — 80047 BASIC METABLC PNL IONIZED CA: CPT

## 2024-02-21 PROCEDURE — 36591 DRAW BLOOD OFF VENOUS DEVICE: CPT

## 2024-02-21 PROCEDURE — 99211 OFF/OP EST MAY X REQ PHY/QHP: CPT

## 2024-02-21 PROCEDURE — 6360000002 HC RX W HCPCS: Performed by: INTERNAL MEDICINE

## 2024-02-21 RX ORDER — ACETAMINOPHEN 325 MG/1
650 TABLET ORAL
Status: CANCELLED | OUTPATIENT
Start: 2024-02-21

## 2024-02-21 RX ORDER — HEPARIN 100 UNIT/ML
500 SYRINGE INTRAVENOUS PRN
Status: DISCONTINUED | OUTPATIENT
Start: 2024-02-21 | End: 2024-02-22 | Stop reason: HOSPADM

## 2024-02-21 RX ORDER — EPINEPHRINE 1 MG/ML
0.3 INJECTION, SOLUTION, CONCENTRATE INTRAVENOUS PRN
Status: CANCELLED | OUTPATIENT
Start: 2024-02-21

## 2024-02-21 RX ORDER — SODIUM CHLORIDE 9 MG/ML
5-250 INJECTION, SOLUTION INTRAVENOUS PRN
Status: CANCELLED | OUTPATIENT
Start: 2024-02-21

## 2024-02-21 RX ORDER — HEPARIN SODIUM (PORCINE) LOCK FLUSH IV SOLN 100 UNIT/ML 100 UNIT/ML
500 SOLUTION INTRAVENOUS PRN
Status: CANCELLED | OUTPATIENT
Start: 2024-02-21

## 2024-02-21 RX ORDER — SODIUM CHLORIDE 9 MG/ML
5-250 INJECTION, SOLUTION INTRAVENOUS PRN
Status: DISCONTINUED | OUTPATIENT
Start: 2024-02-21 | End: 2024-02-22 | Stop reason: HOSPADM

## 2024-02-21 RX ORDER — SODIUM CHLORIDE 0.9 % (FLUSH) 0.9 %
5-40 SYRINGE (ML) INJECTION PRN
OUTPATIENT
Start: 2024-02-21

## 2024-02-21 RX ORDER — SODIUM CHLORIDE 0.9 % (FLUSH) 0.9 %
5-40 SYRINGE (ML) INJECTION PRN
Status: CANCELLED | OUTPATIENT
Start: 2024-02-21

## 2024-02-21 RX ORDER — MEPERIDINE HYDROCHLORIDE 50 MG/ML
12.5 INJECTION INTRAMUSCULAR; INTRAVENOUS; SUBCUTANEOUS PRN
Status: CANCELLED | OUTPATIENT
Start: 2024-02-21

## 2024-02-21 RX ORDER — SODIUM CHLORIDE 0.9 % (FLUSH) 0.9 %
5-40 SYRINGE (ML) INJECTION PRN
Status: DISCONTINUED | OUTPATIENT
Start: 2024-02-21 | End: 2024-02-22 | Stop reason: HOSPADM

## 2024-02-21 RX ORDER — FAMOTIDINE 10 MG/ML
20 INJECTION, SOLUTION INTRAVENOUS
Status: CANCELLED | OUTPATIENT
Start: 2024-02-21

## 2024-02-21 RX ORDER — ALBUTEROL SULFATE 90 UG/1
4 AEROSOL, METERED RESPIRATORY (INHALATION) PRN
Status: CANCELLED | OUTPATIENT
Start: 2024-02-21

## 2024-02-21 RX ORDER — EPINEPHRINE 1 MG/ML
0.3 INJECTION, SOLUTION, CONCENTRATE INTRAVENOUS PRN
OUTPATIENT
Start: 2024-03-13

## 2024-02-21 RX ORDER — HEPARIN SODIUM (PORCINE) LOCK FLUSH IV SOLN 100 UNIT/ML 100 UNIT/ML
500 SOLUTION INTRAVENOUS PRN
OUTPATIENT
Start: 2024-03-13

## 2024-02-21 RX ORDER — ACETAMINOPHEN 325 MG/1
650 TABLET ORAL
OUTPATIENT
Start: 2024-03-13

## 2024-02-21 RX ORDER — DIPHENHYDRAMINE HYDROCHLORIDE 50 MG/ML
50 INJECTION INTRAMUSCULAR; INTRAVENOUS
Status: CANCELLED | OUTPATIENT
Start: 2024-02-21

## 2024-02-21 RX ORDER — SODIUM CHLORIDE 9 MG/ML
25 INJECTION, SOLUTION INTRAVENOUS PRN
OUTPATIENT
Start: 2024-02-21

## 2024-02-21 RX ORDER — SODIUM CHLORIDE 0.9 % (FLUSH) 0.9 %
5-40 SYRINGE (ML) INJECTION PRN
OUTPATIENT
Start: 2024-03-13

## 2024-02-21 RX ORDER — ALBUTEROL SULFATE 90 UG/1
4 AEROSOL, METERED RESPIRATORY (INHALATION) PRN
OUTPATIENT
Start: 2024-03-13

## 2024-02-21 RX ORDER — SODIUM CHLORIDE 9 MG/ML
5-250 INJECTION, SOLUTION INTRAVENOUS PRN
OUTPATIENT
Start: 2024-03-13

## 2024-02-21 RX ORDER — DIPHENHYDRAMINE HYDROCHLORIDE 50 MG/ML
50 INJECTION INTRAMUSCULAR; INTRAVENOUS
OUTPATIENT
Start: 2024-03-13

## 2024-02-21 RX ORDER — FAMOTIDINE 10 MG/ML
20 INJECTION, SOLUTION INTRAVENOUS
OUTPATIENT
Start: 2024-03-13

## 2024-02-21 RX ORDER — MEPERIDINE HYDROCHLORIDE 50 MG/ML
12.5 INJECTION INTRAMUSCULAR; INTRAVENOUS; SUBCUTANEOUS PRN
OUTPATIENT
Start: 2024-03-13

## 2024-02-21 RX ORDER — SODIUM CHLORIDE 9 MG/ML
INJECTION, SOLUTION INTRAVENOUS CONTINUOUS
OUTPATIENT
Start: 2024-03-13

## 2024-02-21 RX ORDER — ONDANSETRON 2 MG/ML
8 INJECTION INTRAMUSCULAR; INTRAVENOUS
OUTPATIENT
Start: 2024-03-13

## 2024-02-21 RX ORDER — ONDANSETRON 2 MG/ML
8 INJECTION INTRAMUSCULAR; INTRAVENOUS
Status: CANCELLED | OUTPATIENT
Start: 2024-02-21

## 2024-02-21 RX ORDER — SODIUM CHLORIDE 9 MG/ML
INJECTION, SOLUTION INTRAVENOUS CONTINUOUS
Status: CANCELLED | OUTPATIENT
Start: 2024-02-21

## 2024-02-21 RX ORDER — HEPARIN 100 UNIT/ML
500 SYRINGE INTRAVENOUS PRN
OUTPATIENT
Start: 2024-02-21

## 2024-02-21 RX ADMIN — SODIUM CHLORIDE, PRESERVATIVE FREE 20 ML: 5 INJECTION INTRAVENOUS at 08:12

## 2024-02-21 RX ADMIN — SODIUM CHLORIDE 40 ML/HR: 9 INJECTION, SOLUTION INTRAVENOUS at 09:02

## 2024-02-21 RX ADMIN — PERTUZUMAB 420 MG: 30 INJECTION, SOLUTION, CONCENTRATE INTRAVENOUS at 09:38

## 2024-02-21 RX ADMIN — SODIUM CHLORIDE, PRESERVATIVE FREE 10 ML: 5 INJECTION INTRAVENOUS at 08:11

## 2024-02-21 RX ADMIN — SODIUM CHLORIDE, PRESERVATIVE FREE 10 ML: 5 INJECTION INTRAVENOUS at 12:17

## 2024-02-21 RX ADMIN — Medication 500 UNITS: at 12:17

## 2024-02-21 RX ADMIN — SODIUM CHLORIDE 693 MG: 9 INJECTION, SOLUTION INTRAVENOUS at 10:16

## 2024-02-21 NOTE — PROGRESS NOTES
Patient tolerated Trazimera and perjeta without any complications.  Patient kept for 20 minuets observation post infusions. Denies dizziness, lightheadedness, acute nausea or vomiting, headache, heart palpitations, rash/itching or increased SOB.    Last vital signs  /67   Pulse 82   Temp 97.8 °F (36.6 °C) (Oral)   Resp 16   Ht 1.651 m (5' 5\")   Wt 86.6 kg (191 lb)   LMP 01/01/2008   SpO2 95%   BMI 31.78 kg/m²     Patient instructed if they experience any of the above symptoms following today's visit, he/she is to notify the Physician or go to the Emergency Dept.    Discharge instructions given to patient, Verbalizes understanding. Ambulated off unit per self in stable condition with all belongings.

## 2024-02-21 NOTE — PLAN OF CARE
Problem: Chronic Conditions and Co-morbidities  Goal: Patient's chronic conditions and co-morbidity symptoms are monitored and maintained or improved  Outcome: Adequate for Discharge  Flowsheets (Taken 2/21/2024 0906)  Care Plan - Patient's Chronic Conditions and Co-Morbidity Symptoms are Monitored and Maintained or Improved:   Monitor and assess patient's chronic conditions and comorbid symptoms for stability, deterioration, or improvement   Collaborate with multidisciplinary team to address chronic and comorbid conditions and prevent exacerbation or deterioration  Note:   Chemotherapy Teaching     What is Chemotherapy   Drug action [x]   Method of Administration [x]   Handouts given []     Side Effects  Nausea/vomiting [x]   Diarrhea [x]   Fatigue [x]   Signs / Symptoms of infection [x]   Neutropenia [x]   Thrombocytopenia [x]   Alopecia [x]   neuropathy [x]   [place] diet &  the importance of fluids [x]       Micellaneous  Importance of nutrition [x]   Importance of oral hygiene [x]   When to call the MD [x]   Monitoring labs [x]   Use of supportive services []     Explanation of Drug Regimen / Frequency  Trazimera and perjeta     Comments  Verbalized understanding to drug,action,side effects and when to call MD      Problem: Safety - Adult  Goal: Free from fall injury  Outcome: Adequate for Discharge  Flowsheets (Taken 2/21/2024 0906)  Free From Fall Injury:   Instruct family/caregiver on patient safety   Based on caregiver fall risk screen, instruct family/caregiver to ask for assistance with transferring infant if caregiver noted to have fall risk factors  Note: Free from falls while in O.P. Oncology.     Problem: Discharge Planning  Goal: Discharge to home or other facility with appropriate resources  Outcome: Adequate for Discharge  Flowsheets (Taken 2/21/2024 0906)  Discharge to home or other facility with appropriate resources:   Identify barriers to discharge with patient and caregiver   Identify

## 2024-02-23 ENCOUNTER — CLINICAL DOCUMENTATION (OUTPATIENT)
Dept: NUTRITION | Age: 54
End: 2024-02-23

## 2024-02-23 NOTE — PROGRESS NOTES
Oncology Nutrition    Contact Type: phone    Contact Reason: patient met criteria for nutritional risk according to Oncology Nutrition Screen: weight loss (6# in 1 month), less than normal amount of food intake.  Attempted to reach patient via phone today, no answer to call. Message left on voice mail with reason for call and RD direct phone number for return call if desired    Follow-up: RD available prn for needs/concerns.    Electronically signed by Carmina May RD, LD on 2/23/2024 at 10:58 AM  [phone]

## 2024-02-28 ENCOUNTER — TELEPHONE (OUTPATIENT)
Dept: PHARMACY | Age: 54
End: 2024-02-28

## 2024-02-28 ENCOUNTER — ENROLLMENT (OUTPATIENT)
Dept: PHARMACY | Age: 54
End: 2024-02-28

## 2024-02-28 ENCOUNTER — HOSPITAL ENCOUNTER (OUTPATIENT)
Age: 54
Discharge: HOME | End: 2024-03-01
Attending: INTERNAL MEDICINE
Payer: COMMERCIAL

## 2024-02-28 VITALS
HEIGHT: 65 IN | WEIGHT: 191 LBS | DIASTOLIC BLOOD PRESSURE: 67 MMHG | BODY MASS INDEX: 31.82 KG/M2 | SYSTOLIC BLOOD PRESSURE: 123 MMHG

## 2024-02-28 DIAGNOSIS — Z51.81 ENCOUNTER FOR MONITORING AROMATASE INHIBITOR THERAPY: ICD-10-CM

## 2024-02-28 DIAGNOSIS — Z51.81 ENCOUNTER FOR MONITORING CARDIOTOXIC DRUG THERAPY: ICD-10-CM

## 2024-02-28 DIAGNOSIS — Z79.811 ENCOUNTER FOR MONITORING AROMATASE INHIBITOR THERAPY: ICD-10-CM

## 2024-02-28 DIAGNOSIS — Z17.0 CARCINOMA OF UPPER-OUTER QUADRANT OF LEFT BREAST IN FEMALE, ESTROGEN RECEPTOR POSITIVE (HCC): ICD-10-CM

## 2024-02-28 DIAGNOSIS — C50.412 CARCINOMA OF UPPER-OUTER QUADRANT OF LEFT BREAST IN FEMALE, ESTROGEN RECEPTOR POSITIVE (HCC): ICD-10-CM

## 2024-02-28 DIAGNOSIS — Z79.899 ENCOUNTER FOR MONITORING CARDIOTOXIC DRUG THERAPY: ICD-10-CM

## 2024-02-28 LAB
ECHO AO ASC DIAM: 2.9 CM
ECHO AO ASCENDING AORTA INDEX: 1.49 CM/M2
ECHO AV CUSP MM: 1.9 CM
ECHO AV PEAK GRADIENT: 4 MMHG
ECHO AV PEAK VELOCITY: 1 M/S
ECHO AV VELOCITY RATIO: 0.9
ECHO BSA: 1.99 M2
ECHO LA AREA 2C: 12.8 CM2
ECHO LA AREA 4C: 12.5 CM2
ECHO LA DIAMETER INDEX: 1.6 CM/M2
ECHO LA DIAMETER: 3.1 CM
ECHO LA MAJOR AXIS: 4.3 CM
ECHO LA MINOR AXIS: 5.1 CM
ECHO LA VOL BP: 30 ML (ref 22–52)
ECHO LA VOL MOD A2C: 27 ML (ref 22–52)
ECHO LA VOL MOD A4C: 29 ML (ref 22–52)
ECHO LA VOL/BSA BIPLANE: 15 ML/M2 (ref 16–34)
ECHO LA VOLUME INDEX MOD A2C: 14 ML/M2 (ref 16–34)
ECHO LA VOLUME INDEX MOD A4C: 15 ML/M2 (ref 16–34)
ECHO LV E' LATERAL VELOCITY: 7 CM/S
ECHO LV E' SEPTAL VELOCITY: 8 CM/S
ECHO LV FRACTIONAL SHORTENING: 32 % (ref 28–44)
ECHO LV GLOBAL LONGITUDINAL STRAIN (GLS): -14.1 %
ECHO LV GLOBAL LONGITUDINAL STRAIN (GLS): -16.9 %
ECHO LV GLOBAL LONGITUDINAL STRAIN (GLS): -17.3 %
ECHO LV GLOBAL LONGITUDINAL STRAIN (GLS): -20.8 %
ECHO LV INTERNAL DIMENSION DIASTOLE INDEX: 1.75 CM/M2
ECHO LV INTERNAL DIMENSION DIASTOLIC: 3.4 CM (ref 3.9–5.3)
ECHO LV INTERNAL DIMENSION SYSTOLIC INDEX: 1.19 CM/M2
ECHO LV INTERNAL DIMENSION SYSTOLIC: 2.3 CM
ECHO LV ISOVOLUMETRIC RELAXATION TIME (IVRT): 95 MS
ECHO LV IVSD: 1.4 CM (ref 0.6–0.9)
ECHO LV MASS 2D: 166.2 G (ref 67–162)
ECHO LV MASS INDEX 2D: 85.7 G/M2 (ref 43–95)
ECHO LV POSTERIOR WALL DIASTOLIC: 1.4 CM (ref 0.6–0.9)
ECHO LV RELATIVE WALL THICKNESS RATIO: 0.82
ECHO LVOT PEAK GRADIENT: 3 MMHG
ECHO LVOT PEAK VELOCITY: 0.9 M/S
ECHO MV A VELOCITY: 0.63 M/S
ECHO MV E DECELERATION TIME (DT): 236 MS
ECHO MV E VELOCITY: 0.61 M/S
ECHO MV E/A RATIO: 0.97
ECHO MV E/E' LATERAL: 8.71
ECHO MV E/E' RATIO (AVERAGED): 8.17
ECHO PV MAX VELOCITY: 0.8 M/S
ECHO PV PEAK GRADIENT: 3 MMHG
ECHO RV INTERNAL DIMENSION: 2.9 CM
ECHO RV TAPSE: 1.9 CM (ref 1.7–?)
ECHO TV E WAVE: 0.5 M/S

## 2024-02-28 PROCEDURE — 93306 TTE W/DOPPLER COMPLETE: CPT

## 2024-02-28 NOTE — TELEPHONE ENCOUNTER
Referral to [facility] Smoking Cessation [facility] received from CHARLENE MARINO for Smoking Cessation Patient Education/Counseling.      Called patient, left message with instructions for patient to call the clinic back at [phone], option 2.

## 2024-03-02 ENCOUNTER — HOSPITAL ENCOUNTER (EMERGENCY)
Age: 54
Discharge: HOME | End: 2024-03-02
Payer: COMMERCIAL

## 2024-03-02 VITALS
DIASTOLIC BLOOD PRESSURE: 76 MMHG | SYSTOLIC BLOOD PRESSURE: 110 MMHG | TEMPERATURE: 98.6 F | WEIGHT: 193 LBS | BODY MASS INDEX: 32.15 KG/M2 | RESPIRATION RATE: 18 BRPM | HEIGHT: 65 IN | HEART RATE: 78 BPM | OXYGEN SATURATION: 96 %

## 2024-03-02 DIAGNOSIS — N30.00 ACUTE CYSTITIS WITHOUT HEMATURIA: Primary | ICD-10-CM

## 2024-03-02 LAB
BILIRUB UR STRIP.AUTO-MCNC: NEGATIVE MG/DL
CHARACTER UR: CLEAR
COLOR: YELLOW
GLUCOSE UR QL STRIP.AUTO: NEGATIVE MG/DL
KETONES UR QL STRIP.AUTO: ABNORMAL
NITRITE UR QL STRIP.AUTO: NEGATIVE
PH UR STRIP.AUTO: 6 [PH] (ref 5–9)
PROT UR STRIP.AUTO-MCNC: NEGATIVE MG/DL
RBC #/AREA URNS HPF: ABNORMAL /[HPF]
SARS-COV-2 RDRP RESP QL NAA+PROBE: NOT  DETECTED
SP GR UR STRIP.AUTO: >= 1.03 (ref 1–1.03)
UROBILINOGEN, URINE: 1 EU/DL (ref 0.2–1)
WBC #/AREA URNS HPF: ABNORMAL /[HPF]

## 2024-03-02 PROCEDURE — 87086 URINE CULTURE/COLONY COUNT: CPT

## 2024-03-02 PROCEDURE — 99214 OFFICE O/P EST MOD 30 MIN: CPT

## 2024-03-02 PROCEDURE — 81003 URINALYSIS AUTO W/O SCOPE: CPT

## 2024-03-02 PROCEDURE — 87635 SARS-COV-2 COVID-19 AMP PRB: CPT

## 2024-03-02 PROCEDURE — 99213 OFFICE O/P EST LOW 20 MIN: CPT

## 2024-03-02 RX ORDER — NITROFURANTOIN 25; 75 MG/1; MG/1
100 CAPSULE ORAL 2 TIMES DAILY
Qty: 10 CAPSULE | Refills: 0 | Status: SHIPPED | OUTPATIENT
Start: 2024-03-02 | End: 2024-03-04 | Stop reason: ALTCHOICE

## 2024-03-02 RX ORDER — IBUPROFEN 200 MG
200 TABLET ORAL EVERY 6 HOURS PRN
COMMUNITY

## 2024-03-02 ASSESSMENT — PAIN DESCRIPTION - ORIENTATION: ORIENTATION: LOWER

## 2024-03-02 ASSESSMENT — PAIN DESCRIPTION - DESCRIPTORS: DESCRIPTORS: ACHING;SHARP

## 2024-03-02 ASSESSMENT — ENCOUNTER SYMPTOMS
COUGH: 0
BACK PAIN: 1
VOMITING: 0
SHORTNESS OF BREATH: 0
NAUSEA: 0
ABDOMINAL PAIN: 0
DIARRHEA: 0
WHEEZING: 0

## 2024-03-02 ASSESSMENT — PAIN DESCRIPTION - LOCATION: LOCATION: BACK

## 2024-03-02 ASSESSMENT — PAIN SCALES - GENERAL: PAINLEVEL_OUTOF10: 9

## 2024-03-02 ASSESSMENT — PAIN - FUNCTIONAL ASSESSMENT: PAIN_FUNCTIONAL_ASSESSMENT: 0-10

## 2024-03-02 NOTE — ED NOTES
Pt with complaints of lower back pain that started last night. States she tried Ibuprofen and Toradol early this morning but it did not help with pain. States the last time she had COVID this is how she felt.     Dav Rausch LPN  03/02/24 0853

## 2024-03-02 NOTE — ED PROVIDER NOTES
[facility] URGENT CARE  Urgent Care Encounter      CHIEF COMPLAINT       Chief Complaint   Patient presents with    Back Pain     lower       Nurses Notes reviewed and I agree except as noted in the HPI.  HISTORY OF PRESENT ILLNESS   Esther Valle is a 53 y.o. female who presents to urgent care with complaints of bilateral low back pain that started yesterday.  Patient reports she did take ibuprofen and Toradol throughout the night due to back pain.  Patient reports the last time she had back pain she did have COVID and is requesting testing at this time.  Patient denies abdominal pain, fever, diarrhea.  Patient reports she has been around her grandchildren who do have colds currently.  REVIEW OF SYSTEMS     Review of Systems   Constitutional:  Negative for fatigue and fever.   HENT:  Negative for congestion.    Respiratory:  Negative for cough, shortness of breath and wheezing.    Cardiovascular:  Negative for chest pain.   Gastrointestinal:  Negative for abdominal pain, diarrhea, nausea and vomiting.   Genitourinary:  Negative for difficulty urinating, dyspareunia and dysuria.   Musculoskeletal:  Positive for back pain.   Neurological:  Negative for dizziness, seizures and headaches.       PAST MEDICAL HISTORY         Diagnosis Date    Anxiety     Arthritis     BRCA positive 05/21/2023    BRCA 1 AND BRCA 2 positive dx'd 4/2023 Saw genetic counselling 5/2023 Plan after dx: to have all breast tissue and ovaries removed    BRCA1 gene mutation positive     BRCA2 gene mutation positive     Chronic gastritis 01/2015    Depression     Deviated septum     GERD (gastroesophageal reflux disease)     chronic    Hiatal hernia 2009    Hypertension     Intraductal papilloma of breast 07/2020    right    Invasive ductal carcinoma of breast, female, left (HCC) 04/04/2023    Numbness and tingling 2007    left leg and foot-chronic    Ovarian cyst     PONV (postoperative nausea and vomiting)     Pulmonary nodule

## 2024-03-03 LAB
BACTERIA UR CULT: ABNORMAL
ORGANISM: ABNORMAL

## 2024-03-04 ENCOUNTER — TELEPHONE (OUTPATIENT)
Dept: FAMILY MEDICINE CLINIC | Age: 54
End: 2024-03-04

## 2024-03-04 NOTE — TELEPHONE ENCOUNTER
Pt called said that she was seen in urgent care this past Saturday morning and she said she was diagnosed with a UTI.  Said she went in due to having back pain which was also going down both legs.  Said that she is still having back pain and pain down both legs.  She said it has eased up a little bit but said it is still bad.  Said she started with headaches also yesterday and she doesn't know if that is due to the pain that she is having.  She is wondering what you think?    [address]

## 2024-03-04 NOTE — TELEPHONE ENCOUNTER
Called pt she states understanding. She will call office back to set appt as she was driving at this time

## 2024-03-04 NOTE — TELEPHONE ENCOUNTER
The urine culture showed no infection so she could stop the ATB. It would be good for one of us to see her for the back.

## 2024-03-04 NOTE — TELEPHONE ENCOUNTER
Pt called back to schedule and there were no appts available tomorrow or Wednesday with any of the providers.  I advised her to call tomorrow morning at 9:01 am.

## 2024-03-05 ENCOUNTER — OFFICE VISIT (OUTPATIENT)
Dept: FAMILY MEDICINE CLINIC | Age: 54
End: 2024-03-05

## 2024-03-05 VITALS
DIASTOLIC BLOOD PRESSURE: 62 MMHG | WEIGHT: 191 LBS | SYSTOLIC BLOOD PRESSURE: 106 MMHG | BODY MASS INDEX: 31.82 KG/M2 | HEIGHT: 65 IN | HEART RATE: 76 BPM | RESPIRATION RATE: 16 BRPM

## 2024-03-05 DIAGNOSIS — M54.41 ACUTE MIDLINE LOW BACK PAIN WITH BILATERAL SCIATICA: Primary | ICD-10-CM

## 2024-03-05 DIAGNOSIS — N30.00 ACUTE CYSTITIS WITHOUT HEMATURIA: ICD-10-CM

## 2024-03-05 DIAGNOSIS — M54.42 ACUTE MIDLINE LOW BACK PAIN WITH BILATERAL SCIATICA: Primary | ICD-10-CM

## 2024-03-05 DIAGNOSIS — C50.412 CARCINOMA OF UPPER-OUTER QUADRANT OF LEFT BREAST IN FEMALE, ESTROGEN RECEPTOR POSITIVE (HCC): ICD-10-CM

## 2024-03-05 DIAGNOSIS — Z17.0 CARCINOMA OF UPPER-OUTER QUADRANT OF LEFT BREAST IN FEMALE, ESTROGEN RECEPTOR POSITIVE (HCC): ICD-10-CM

## 2024-03-05 PROCEDURE — 99213 OFFICE O/P EST LOW 20 MIN: CPT | Performed by: FAMILY MEDICINE

## 2024-03-05 RX ORDER — PREDNISONE 20 MG/1
TABLET ORAL
Qty: 21 TABLET | Refills: 0 | Status: SHIPPED | OUTPATIENT
Start: 2024-03-05

## 2024-03-05 RX ORDER — TRAMADOL HYDROCHLORIDE 50 MG/1
50 TABLET ORAL EVERY 6 HOURS PRN
Qty: 28 TABLET | Refills: 0 | Status: SHIPPED | OUTPATIENT
Start: 2024-03-05 | End: 2024-03-12

## 2024-03-05 ASSESSMENT — ENCOUNTER SYMPTOMS
COUGH: 0
SORE THROAT: 0
ABDOMINAL PAIN: 0
NAUSEA: 0
CONSTIPATION: 0
BACK PAIN: 1
WHEEZING: 0
EYE PAIN: 0
SHORTNESS OF BREATH: 0
CHEST TIGHTNESS: 0

## 2024-03-05 NOTE — PROGRESS NOTES
Subjective:      Patient ID: Esther Valle is a 53 y.o. female.      Uti  noted and  with  back pain       4  to 5  days   persist  pain     Back Pain  This is a new problem. The current episode started in the past 7 days. The pain is present in the lumbar spine. The quality of the pain is described as aching. The pain radiates to the left knee and right knee. The pain is at a severity of 3/10. The pain is The same all the time. Stiffness is present All day. Pertinent negatives include no abdominal pain, chest pain, dysuria, fever, headaches, numbness or weakness. Risk factors include history of cancer. The treatment provided mild relief.     Past Medical History:   Diagnosis Date    Anxiety     Arthritis     BRCA positive 05/21/2023    BRCA 1 AND BRCA 2 positive dx'd 4/2023 Saw genetic counselling 5/2023 Plan after dx: to have all breast tissue and ovaries removed    BRCA1 gene mutation positive     BRCA2 gene mutation positive     Chronic gastritis 01/2015    Depression     Deviated septum     GERD (gastroesophageal reflux disease)     chronic    Hiatal hernia 2009    Hypertension     Intraductal papilloma of breast 07/2020    right    Invasive ductal carcinoma of breast, female, left (HCC) 04/04/2023    Numbness and tingling 2007    left leg and foot-chronic    Ovarian cyst     PONV (postoperative nausea and vomiting)     Pulmonary nodule 10/01/2013    Pure hypercholesterolemia 02/14/2022    Schwannoma of spinal cord ([facility]) 09/17/2013    Type 2 diabetes mellitus treated without insulin ([facility]) 03/19/2023        Review of Systems   Constitutional:  Negative for appetite change, fatigue and fever.   HENT:  Negative for congestion, ear pain, postnasal drip and sore throat.    Eyes:  Negative for pain and visual disturbance.   Respiratory:  Negative for cough, chest tightness, shortness of breath and wheezing.    Cardiovascular:  Negative for chest pain, palpitations and leg swelling.   Gastrointestinal:  Negative for

## 2024-03-11 DIAGNOSIS — Z17.0 CARCINOMA OF UPPER-OUTER QUADRANT OF LEFT BREAST IN FEMALE, ESTROGEN RECEPTOR POSITIVE (HCC): ICD-10-CM

## 2024-03-11 DIAGNOSIS — C50.412 CARCINOMA OF UPPER-OUTER QUADRANT OF LEFT BREAST IN FEMALE, ESTROGEN RECEPTOR POSITIVE (HCC): ICD-10-CM

## 2024-03-11 DIAGNOSIS — Z79.811 AROMATASE INHIBITOR USE: ICD-10-CM

## 2024-03-11 RX ORDER — LETROZOLE 2.5 MG/1
2.5 TABLET, FILM COATED ORAL DAILY
Qty: 30 TABLET | Refills: 3 | Status: SHIPPED | OUTPATIENT
Start: 2024-03-11

## 2024-03-13 ENCOUNTER — HOSPITAL ENCOUNTER (OUTPATIENT)
Dept: INFUSION THERAPY | Age: 54
Discharge: HOME | End: 2024-03-13
Payer: COMMERCIAL

## 2024-03-13 VITALS
OXYGEN SATURATION: 95 % | TEMPERATURE: 98.3 F | SYSTOLIC BLOOD PRESSURE: 126 MMHG | DIASTOLIC BLOOD PRESSURE: 72 MMHG | RESPIRATION RATE: 16 BRPM | HEART RATE: 67 BPM

## 2024-03-13 DIAGNOSIS — C50.912 INVASIVE DUCTAL CARCINOMA OF BREAST, FEMALE, LEFT (HCC): ICD-10-CM

## 2024-03-13 DIAGNOSIS — C50.412 CARCINOMA OF UPPER-OUTER QUADRANT OF LEFT BREAST IN FEMALE, ESTROGEN RECEPTOR POSITIVE (HCC): Primary | ICD-10-CM

## 2024-03-13 DIAGNOSIS — Z51.81 ENCOUNTER FOR MONITORING AROMATASE INHIBITOR THERAPY: ICD-10-CM

## 2024-03-13 DIAGNOSIS — Z79.811 ENCOUNTER FOR MONITORING AROMATASE INHIBITOR THERAPY: ICD-10-CM

## 2024-03-13 DIAGNOSIS — Z17.0 CARCINOMA OF UPPER-OUTER QUADRANT OF LEFT BREAST IN FEMALE, ESTROGEN RECEPTOR POSITIVE (HCC): Primary | ICD-10-CM

## 2024-03-13 LAB
ABSOLUTE IMMATURE GRANULOCYTE: 0.1 THOU/MM3 (ref 0–0.07)
ALBUMIN SERPL BCG-MCNC: 4.3 G/DL (ref 3.5–5.1)
ALP SERPL-CCNC: 139 U/L (ref 38–126)
ALT SERPL W/O P-5'-P-CCNC: 10 U/L (ref 11–66)
AST SERPL-CCNC: 8 U/L (ref 5–40)
BASOPHILS ABSOLUTE: 0 THOU/MM3 (ref 0–0.1)
BASOPHILS NFR BLD AUTO: 0 % (ref 0–3)
BILIRUB CONJ SERPL-MCNC: < 0.2 MG/DL (ref 0–0.3)
BILIRUB SERPL-MCNC: 0.2 MG/DL (ref 0.3–1.2)
BUN BLDP-MCNC: 25 MG/DL (ref 8–26)
CHLORIDE BLD-SCNC: 103 MEQ/L (ref 98–109)
CREAT BLD-MCNC: 0.5 MG/DL (ref 0.5–1.2)
EOSINOPHIL NFR BLD AUTO: 0 % (ref 0–4)
EOSINOPHILS ABSOLUTE: 0 THOU/MM3 (ref 0–0.4)
ERYTHROCYTE [DISTWIDTH] IN BLOOD BY AUTOMATED COUNT: 13.2 % (ref 11.5–14.5)
GFR SERPL CREATININE-BSD FRML MDRD: > 60 ML/MIN/1.73M2
GLUCOSE BLD-MCNC: 152 MG/DL (ref 70–108)
HCT VFR BLD AUTO: 37.6 % (ref 37–47)
HGB BLD-MCNC: 12.8 GM/DL (ref 12–16)
IMMATURE GRANULOCYTES: 1 %
IONIZED CALCIUM, WHOLE BLOOD: 1.19 MMOL/L (ref 1.12–1.32)
LYMPHOCYTES ABSOLUTE: 2 THOU/MM3 (ref 1–4.8)
LYMPHOCYTES NFR BLD AUTO: 16 % (ref 15–47)
MCH RBC QN AUTO: 28.5 PG (ref 26–33)
MCHC RBC AUTO-ENTMCNC: 34 GM/DL (ref 32.2–35.5)
MCV RBC AUTO: 84 FL (ref 81–99)
MONOCYTES ABSOLUTE: 0.5 THOU/MM3 (ref 0.4–1.3)
MONOCYTES NFR BLD AUTO: 4 % (ref 0–12)
NEUTROPHILS NFR BLD AUTO: 79 % (ref 43–75)
PLATELET # BLD AUTO: 351 THOU/MM3 (ref 130–400)
PMV BLD AUTO: 8.5 FL (ref 9.4–12.4)
POTASSIUM BLD-SCNC: 3.5 MEQ/L (ref 3.5–4.9)
PROT SERPL-MCNC: 7.6 G/DL (ref 6.1–8)
RBC # BLD AUTO: 4.49 MILL/MM3 (ref 4.2–5.4)
SEGMENTED NEUTROPHILS ABSOLUTE COUNT: 9.5 THOU/MM3 (ref 1.8–7.7)
SODIUM BLD-SCNC: 140 MEQ/L (ref 138–146)
TOTAL CO2, WHOLE BLOOD: 26 MEQ/L (ref 23–33)
WBC # BLD AUTO: 12 THOU/MM3 (ref 4.8–10.8)

## 2024-03-13 PROCEDURE — 6360000002 HC RX W HCPCS: Performed by: INTERNAL MEDICINE

## 2024-03-13 PROCEDURE — 96413 CHEMO IV INFUSION 1 HR: CPT

## 2024-03-13 PROCEDURE — 80076 HEPATIC FUNCTION PANEL: CPT

## 2024-03-13 PROCEDURE — 36591 DRAW BLOOD OFF VENOUS DEVICE: CPT

## 2024-03-13 PROCEDURE — 80047 BASIC METABLC PNL IONIZED CA: CPT

## 2024-03-13 PROCEDURE — 2580000003 HC RX 258: Performed by: INTERNAL MEDICINE

## 2024-03-13 PROCEDURE — 85025 COMPLETE CBC W/AUTO DIFF WBC: CPT

## 2024-03-13 PROCEDURE — 96417 CHEMO IV INFUS EACH ADDL SEQ: CPT

## 2024-03-13 RX ORDER — HEPARIN 100 UNIT/ML
500 SYRINGE INTRAVENOUS PRN
OUTPATIENT
Start: 2024-03-13

## 2024-03-13 RX ORDER — HEPARIN 100 UNIT/ML
500 SYRINGE INTRAVENOUS PRN
Status: DISCONTINUED | OUTPATIENT
Start: 2024-03-13 | End: 2024-03-14 | Stop reason: HOSPADM

## 2024-03-13 RX ORDER — SODIUM CHLORIDE 9 MG/ML
25 INJECTION, SOLUTION INTRAVENOUS PRN
OUTPATIENT
Start: 2024-03-13

## 2024-03-13 RX ORDER — SODIUM CHLORIDE 0.9 % (FLUSH) 0.9 %
5-40 SYRINGE (ML) INJECTION PRN
Status: DISCONTINUED | OUTPATIENT
Start: 2024-03-13 | End: 2024-03-14 | Stop reason: HOSPADM

## 2024-03-13 RX ORDER — SODIUM CHLORIDE 0.9 % (FLUSH) 0.9 %
5-40 SYRINGE (ML) INJECTION PRN
OUTPATIENT
Start: 2024-03-13

## 2024-03-13 RX ORDER — SODIUM CHLORIDE 9 MG/ML
5-250 INJECTION, SOLUTION INTRAVENOUS PRN
Status: DISCONTINUED | OUTPATIENT
Start: 2024-03-13 | End: 2024-03-14 | Stop reason: HOSPADM

## 2024-03-13 RX ADMIN — SODIUM CHLORIDE, PRESERVATIVE FREE 10 ML: 5 INJECTION INTRAVENOUS at 10:18

## 2024-03-13 RX ADMIN — HEPARIN 500 UNITS: 100 SYRINGE at 12:49

## 2024-03-13 RX ADMIN — SODIUM CHLORIDE 525 MG: 9 INJECTION, SOLUTION INTRAVENOUS at 11:17

## 2024-03-13 RX ADMIN — SODIUM CHLORIDE, PRESERVATIVE FREE 20 ML: 5 INJECTION INTRAVENOUS at 10:19

## 2024-03-13 RX ADMIN — SODIUM CHLORIDE 150 ML/HR: 9 INJECTION, SOLUTION INTRAVENOUS at 10:38

## 2024-03-13 RX ADMIN — PERTUZUMAB 420 MG: 30 INJECTION, SOLUTION, CONCENTRATE INTRAVENOUS at 12:06

## 2024-03-13 RX ADMIN — SODIUM CHLORIDE, PRESERVATIVE FREE 10 ML: 5 INJECTION INTRAVENOUS at 12:49

## 2024-03-13 NOTE — PROGRESS NOTES
Patient tolerated Trazimera/Perjeta without any complications.  Denies dizziness, lightheadedness, acute nausea or vomiting, headache, heart palpitations, rash/itching or increased SOB.    Last vital signs  /72   Pulse 67   Temp 98.3 °F (36.8 °C) (Oral)   Resp 16   LMP 01/01/2008   SpO2 95%     Patient instructed if they experience any of the above symptoms following today's visit, he/she is to notify the Physician or go to the Emergency Dept.    Discharge instructions given to patient, Verbalizes understanding. Ambulated off unit per self in stable condition with all belongings.

## 2024-03-13 NOTE — DISCHARGE INSTRUCTIONS
Please contact your Oncologist if you have any questions regarding the Trazimera/Perjeta that you received today.    You are instructed to call the office or go to the Emergency Dept. If you experience any of the following symptoms:    Dizziness/lightheadedness   Acute nausea or vomiting-not relieved by medications  Headaches-not relieved by medications  New chest pain or pressure  New rash /itching  New shortness of breath  Fever,chills or signs or symptoms of infection    Make sure you are drinking 48 to 64 ounces of water daily-if you are unable to drink fluids let us know right away.

## 2024-03-13 NOTE — PLAN OF CARE
with no redness or warmth. Skin over port site intact with no signs of breakdown noted. Patient verbalizes signs/symptoms of port infection and when to notify the physician.    Care plan reviewed with patient.  Patient verbalizes understanding of the plan of care and contribute to goal setting.

## 2024-03-20 ENCOUNTER — HOSPITAL ENCOUNTER (OUTPATIENT)
Dept: PHARMACY | Age: 54
Setting detail: THERAPIES SERIES
Discharge: HOME | End: 2024-03-20
Payer: COMMERCIAL

## 2024-03-20 ENCOUNTER — HOSPITAL ENCOUNTER (EMERGENCY)
Age: 54
Discharge: HOME | End: 2024-03-20
Payer: COMMERCIAL

## 2024-03-20 VITALS
WEIGHT: 188 LBS | RESPIRATION RATE: 16 BRPM | HEART RATE: 79 BPM | TEMPERATURE: 98.6 F | BODY MASS INDEX: 31.32 KG/M2 | HEIGHT: 65 IN | DIASTOLIC BLOOD PRESSURE: 80 MMHG | OXYGEN SATURATION: 96 % | SYSTOLIC BLOOD PRESSURE: 114 MMHG

## 2024-03-20 DIAGNOSIS — J02.0 STREPTOCOCCAL SORE THROAT: Primary | ICD-10-CM

## 2024-03-20 LAB — S PYO AG THROAT QL: POSITIVE

## 2024-03-20 PROCEDURE — 99212 OFFICE O/P EST SF 10 MIN: CPT | Performed by: PHARMACIST

## 2024-03-20 PROCEDURE — 87651 STREP A DNA AMP PROBE: CPT

## 2024-03-20 PROCEDURE — 99213 OFFICE O/P EST LOW 20 MIN: CPT

## 2024-03-20 RX ORDER — FLUTICASONE PROPIONATE 50 MCG
2 SPRAY, SUSPENSION (ML) NASAL DAILY
Qty: 16 G | Refills: 0 | Status: SHIPPED | OUTPATIENT
Start: 2024-03-20

## 2024-03-20 RX ORDER — AMOXICILLIN 500 MG/1
500 CAPSULE ORAL 2 TIMES DAILY
Qty: 20 CAPSULE | Refills: 0 | Status: SHIPPED | OUTPATIENT
Start: 2024-03-20 | End: 2024-03-30

## 2024-03-20 ASSESSMENT — PAIN SCALES - GENERAL: PAINLEVEL_OUTOF10: 6

## 2024-03-20 ASSESSMENT — PAIN - FUNCTIONAL ASSESSMENT: PAIN_FUNCTIONAL_ASSESSMENT: 0-10

## 2024-03-20 ASSESSMENT — PAIN DESCRIPTION - FREQUENCY: FREQUENCY: INTERMITTENT

## 2024-03-20 ASSESSMENT — PAIN DESCRIPTION - PAIN TYPE: TYPE: ACUTE PAIN

## 2024-03-20 ASSESSMENT — PAIN DESCRIPTION - DESCRIPTORS: DESCRIPTORS: DISCOMFORT

## 2024-03-20 ASSESSMENT — PAIN DESCRIPTION - LOCATION: LOCATION: EAR;THROAT

## 2024-03-20 ASSESSMENT — PAIN DESCRIPTION - ONSET: ONSET: ON-GOING

## 2024-03-20 NOTE — PROGRESS NOTES
interested in Nicotine patch/Nicotine lozenge or Chantix.  She plans to discuss with her oncology team and she is aware to ask them for prescription.    Following actions suggested:   Start to identify triggers  Look over pages 1-7 in smoking cessation booklet.     Discussed the following:  Nicotine replacement and pharmacological options      Standard written patient education provided to pt:  Pt given Smoking Cessation education booklet? Yes  Pt given a \"-800-QUIT-NOW\" card? Yes      Next appointment:  4/10 at 11am    Esther Valle verbalized understanding of the above information and denied further questions or concerns.      The total time spent with the pt was 30 minutes.    Courtney Mcdonnell, PharmD, BCPS, CACP, CTTS     For Pharmacy Admin Tracking Only    Intervention Detail: Adherence Monitorin  Total # of Interventions Recommended: 1  Total # of Interventions Accepted: 1  Time Spent (min): 30

## 2024-03-20 NOTE — ED PROVIDER NOTES
[facility] URGENT CARE  Urgent Care Encounter      CHIEF COMPLAINT       Chief Complaint   Patient presents with    Otalgia    Nasal Congestion    Pharyngitis       Nurses Notes reviewed and I agree except as noted in the HPI.  HISTORY OF PRESENT ILLNESS   Esther Valle is a 53 y.o. female who presents to urgent care with complaints of ear pain, congestion, sore throat.  Patient reports symptoms have been ongoing for 2 days.  Patient reports she has been taking Tylenol and ibuprofen over-the-counter at home.  Patient denies abdominal pain, nausea, emesis, diarrhea.  Patient denies being around anyone sick recently that she is aware of.    REVIEW OF SYSTEMS     Review of Systems   Constitutional:  Negative for fatigue and fever.   HENT:  Positive for congestion, ear pain and sore throat.    Respiratory:  Negative for cough, shortness of breath and wheezing.    Cardiovascular:  Negative for chest pain.   Gastrointestinal:  Negative for abdominal pain, diarrhea, nausea and vomiting.   Musculoskeletal:  Negative for arthralgias.   Neurological:  Negative for seizures.       PAST MEDICAL HISTORY         Diagnosis Date    Anxiety     Arthritis     BRCA positive 05/21/2023    BRCA 1 AND BRCA 2 positive dx'd 4/2023 Saw genetic counselling 5/2023 Plan after dx: to have all breast tissue and ovaries removed    BRCA1 gene mutation positive     BRCA2 gene mutation positive     Chronic gastritis 01/2015    Depression     Deviated septum     GERD (gastroesophageal reflux disease)     chronic    Hiatal hernia 2009    Hypertension     Intraductal papilloma of breast 07/2020    right    Invasive ductal carcinoma of breast, female, left (HCC) 04/04/2023    Numbness and tingling 2007    left leg and foot-chronic    Ovarian cyst     PONV (postoperative nausea and vomiting)     Pulmonary nodule 10/01/2013    Pure hypercholesterolemia 02/14/2022    Schwannoma of spinal cord (HCC) 09/17/2013    Type 2 diabetes mellitus  her primary care provider.  Encourage hydration.  Patient in agreement with this plan.    PATIENT REFERRED TO:  Shaun Jerez MD  [address] [place] [address]  [phone]    Schedule an appointment as soon as possible for a visit   As needed    DISCHARGE MEDICATIONS:  Discharge Medication List as of 3/20/2024 12:01 PM        START taking these medications    Details   amoxicillin (AMOXIL) 500 MG capsule Take 1 capsule by mouth 2 times daily for 10 days, Disp-20 capsule, R-0Normal      fluticasone (FLONASE) 50 MCG/ACT nasal spray 2 sprays by Each Nostril route daily, Disp-16 g, R-0Normal           Discharge Medication List as of 3/20/2024 12:01 PM          CATIE Graf CNP, Alecksa N, APRN - CNP  03/20/24 1247

## 2024-03-27 ENCOUNTER — OFFICE VISIT (OUTPATIENT)
Dept: FAMILY MEDICINE CLINIC | Age: 54
End: 2024-03-27

## 2024-03-27 ENCOUNTER — TELEPHONE (OUTPATIENT)
Dept: FAMILY MEDICINE CLINIC | Age: 54
End: 2024-03-27

## 2024-03-27 VITALS
DIASTOLIC BLOOD PRESSURE: 60 MMHG | SYSTOLIC BLOOD PRESSURE: 114 MMHG | HEIGHT: 65 IN | WEIGHT: 194.38 LBS | BODY MASS INDEX: 32.39 KG/M2 | RESPIRATION RATE: 16 BRPM | HEART RATE: 74 BPM

## 2024-03-27 DIAGNOSIS — M54.50 ACUTE BILATERAL LOW BACK PAIN WITHOUT SCIATICA: ICD-10-CM

## 2024-03-27 DIAGNOSIS — R35.0 URINARY FREQUENCY: Primary | ICD-10-CM

## 2024-03-27 LAB
BACTERIA URINE, POC: ABNORMAL
BILIRUBIN URINE: 0 MG/DL
BLOOD, URINE: POSITIVE
CASTS URINE, POC: ABNORMAL
CLARITY: CLEAR
COLOR: YELLOW
CRYSTALS URINE, POC: ABNORMAL
EPI CELLS URINE, POC: ABNORMAL
GLUCOSE URINE: ABNORMAL
KETONES, URINE: POSITIVE
LEUKOCYTE EST, POC: ABNORMAL
NITRITE, URINE: NEGATIVE
PH UA: 7 (ref 4.5–8)
PROTEIN UA: POSITIVE
RBC URINE, POC: ABNORMAL
SPECIFIC GRAVITY UA: 1 (ref 1–1.03)
UROBILINOGEN, URINE: NORMAL
WBC URINE, POC: ABNORMAL
YEAST URINE, POC: ABNORMAL

## 2024-03-27 PROCEDURE — 81000 URINALYSIS NONAUTO W/SCOPE: CPT | Performed by: FAMILY MEDICINE

## 2024-03-27 PROCEDURE — 99213 OFFICE O/P EST LOW 20 MIN: CPT | Performed by: FAMILY MEDICINE

## 2024-03-27 RX ORDER — LIDOCAINE 50 MG/G
3 PATCH TOPICAL DAILY
Qty: 10 PATCH | Refills: 3 | Status: SHIPPED | OUTPATIENT
Start: 2024-03-27

## 2024-03-27 SDOH — ECONOMIC STABILITY: FOOD INSECURITY: WITHIN THE PAST 12 MONTHS, THE FOOD YOU BOUGHT JUST DIDN'T LAST AND YOU DIDN'T HAVE MONEY TO GET MORE.: NEVER TRUE

## 2024-03-27 SDOH — ECONOMIC STABILITY: FOOD INSECURITY: WITHIN THE PAST 12 MONTHS, YOU WORRIED THAT YOUR FOOD WOULD RUN OUT BEFORE YOU GOT MONEY TO BUY MORE.: NEVER TRUE

## 2024-03-27 SDOH — ECONOMIC STABILITY: INCOME INSECURITY: HOW HARD IS IT FOR YOU TO PAY FOR THE VERY BASICS LIKE FOOD, HOUSING, MEDICAL CARE, AND HEATING?: NOT HARD AT ALL

## 2024-03-27 ASSESSMENT — ENCOUNTER SYMPTOMS
CONSTIPATION: 0
SHORTNESS OF BREATH: 0
SINUS PRESSURE: 0
BACK PAIN: 1

## 2024-03-27 ASSESSMENT — PATIENT HEALTH QUESTIONNAIRE - PHQ9
6. FEELING BAD ABOUT YOURSELF - OR THAT YOU ARE A FAILURE OR HAVE LET YOURSELF OR YOUR FAMILY DOWN: NOT AT ALL
SUM OF ALL RESPONSES TO PHQ QUESTIONS 1-9: 7
7. TROUBLE CONCENTRATING ON THINGS, SUCH AS READING THE NEWSPAPER OR WATCHING TELEVISION: NOT AT ALL
SUM OF ALL RESPONSES TO PHQ QUESTIONS 1-9: 7
SUM OF ALL RESPONSES TO PHQ QUESTIONS 1-9: 7
9. THOUGHTS THAT YOU WOULD BE BETTER OFF DEAD, OR OF HURTING YOURSELF: NOT AT ALL
4. FEELING TIRED OR HAVING LITTLE ENERGY: SEVERAL DAYS
SUM OF ALL RESPONSES TO PHQ QUESTIONS 1-9: 7
10. IF YOU CHECKED OFF ANY PROBLEMS, HOW DIFFICULT HAVE THESE PROBLEMS MADE IT FOR YOU TO DO YOUR WORK, TAKE CARE OF THINGS AT HOME, OR GET ALONG WITH OTHER PEOPLE: NOT DIFFICULT AT ALL
8. MOVING OR SPEAKING SO SLOWLY THAT OTHER PEOPLE COULD HAVE NOTICED. OR THE OPPOSITE, BEING SO FIGETY OR RESTLESS THAT YOU HAVE BEEN MOVING AROUND A LOT MORE THAN USUAL: NOT AT ALL
5. POOR APPETITE OR OVEREATING: SEVERAL DAYS
2. FEELING DOWN, DEPRESSED OR HOPELESS: SEVERAL DAYS
3. TROUBLE FALLING OR STAYING ASLEEP: NEARLY EVERY DAY
1. LITTLE INTEREST OR PLEASURE IN DOING THINGS: SEVERAL DAYS
SUM OF ALL RESPONSES TO PHQ9 QUESTIONS 1 & 2: 2

## 2024-03-27 NOTE — TELEPHONE ENCOUNTER
Prior authorization approved Case ID: [id number]      Payer: [employer] Patient's Payer    [phone]   Approved. This drug has been approved under the Member's Medicare Part D benefit. Approved quantity: 10 units per 3 day(s). You may fill up to a 90 day supply except for those on Specialty Tier 5, which can be filled up to a 30 day supply. Please call the pharmacy to process the prescription claim.   Approval Details    Authorized from March 13, 2024 to December 31, 2099      Electronic appeal: Not supported   Prior auth initiated by: [facility] [address] [place] [address], [place] - [address] - P [phone] - F [phone]   View History     Notes     Time User Attachment    Attachment received from payer. 3/27/2024  6:29 PM Des, Lynn Outgoing Prescription Prior Authorization Response Document     Medication Being Authorized     lidocaine (LIDODERM) 5 %    Place 3 patches onto the skin daily 12 hours on, 12 hours off.    Dispense: 10 patch Refills: 3     Start: 3/27/2024      Class: Normal Diagnoses: Acute bilateral low back pain without sciatica     This order has been released to its destination.   To be filled at: [address], [place] - [address] - P [phone] - F [phone]        Prior Authorization History for lidocaine (LIDODERM) 5 %    Today Canceled - Other (No pa needed)      Pharmacy Benefits     [employer]  -  2024 [employer]

## 2024-03-27 NOTE — PROGRESS NOTES
Esther Valle (:  1970) is a 53 y.o. female,Established patient, here for evaluation of the following chief complaint(s):  Lower Back Pain         ASSESSMENT/PLAN:   Diagnosis Orders   1. Urinary frequency  POC URINE with Microscopic      2. Acute bilateral low back pain without sciatica  MRI LUMBAR SPINE WO CONTRAST    lidocaine (LIDODERM) 5 %          Possible need for PT   We will try to get the MRI    Subjective   SUBJECTIVE/OBJECTIVE:  HPI  We discussed the last visit for her back earlier this month.  The motrin, prednisone, tramadol and zanaflex didn't help  She recalls originally there was a sudden laugh and then pain to the upper lumbar area.   Then it would get into the right anterior/posterior thigh and is always present  No rectal or vaginal numbness  She tried OTC creams and massages from family member without help. She used her tens unit without help and did stretching exercises from the sheet she was given in early March without help  It's hard to wipe after voiding due to needing to reach and stretches her back    Review of Systems   Constitutional:  Negative for fatigue.   HENT:  Negative for sinus pressure.    Eyes:  Negative for visual disturbance.   Respiratory:  Negative for shortness of breath.    Cardiovascular:  Negative for chest pain.   Gastrointestinal:  Negative for constipation.   Genitourinary: Negative.    Musculoskeletal:  Positive for arthralgias, back pain, gait problem and myalgias.   Skin:  Negative for rash.   Neurological:  Negative for headaches.    The patient's medications, allergies, past medical problems, surgical, social, and family histories were reviewed and updated as needed.      Objective   Physical Exam  Constitutional:       Appearance: Normal appearance. She is well-developed.   HENT:      Head: Normocephalic and atraumatic.   Eyes:      General: No scleral icterus.     Conjunctiva/sclera: Conjunctivae normal.   Neck:      Trachea: No tracheal deviation.

## 2024-03-28 ENCOUNTER — TELEPHONE (OUTPATIENT)
Dept: FAMILY MEDICINE CLINIC | Age: 54
End: 2024-03-28

## 2024-03-28 NOTE — TELEPHONE ENCOUNTER
PSA was done on MRI [facility] with [employer]. It was approved . It is good from 3-28-24 to 4-27-24 and the auth # is [id number].     Pt is scheduled at [facility] on 4-8-2024 at 230 pm and will need to arrive at 2 pm. She will need to arrive in Main Radiology. Pt is to wear no Jewelry or clothing with metal.     MOM for pt to CB.

## 2024-03-30 PROBLEM — Z15.02 BRCA1 GENE MUTATION POSITIVE IN FEMALE: Status: ACTIVE | Noted: 2024-03-30

## 2024-03-30 PROBLEM — Z15.09 BRCA2 GENE MUTATION POSITIVE IN FEMALE: Status: ACTIVE | Noted: 2024-03-30

## 2024-03-30 PROBLEM — Z79.811 ENCOUNTER FOR MONITORING AROMATASE INHIBITOR THERAPY: Status: ACTIVE | Noted: 2024-03-30

## 2024-03-30 PROBLEM — Z15.09 BRCA1 GENE MUTATION POSITIVE IN FEMALE: Status: ACTIVE | Noted: 2024-03-30

## 2024-03-30 PROBLEM — Z51.81 ENCOUNTER FOR MONITORING AROMATASE INHIBITOR THERAPY: Status: ACTIVE | Noted: 2024-03-30

## 2024-03-30 PROBLEM — Z15.01 BRCA2 GENE MUTATION POSITIVE IN FEMALE: Status: ACTIVE | Noted: 2024-03-30

## 2024-03-30 PROBLEM — Z15.01 BRCA1 GENE MUTATION POSITIVE IN FEMALE: Status: ACTIVE | Noted: 2024-03-30

## 2024-03-30 PROBLEM — Z15.02 BRCA2 GENE MUTATION POSITIVE IN FEMALE: Status: ACTIVE | Noted: 2024-03-30

## 2024-03-30 NOTE — PROGRESS NOTES
stop SMOKING- currently on the [facility] stopping smoking program      7) Medications reviewed.   Prescriptions today:              Orders Placed This Encounter   Medications    letrozole (FEMARA) 2.5 MG tablet     Sig: Take 1 tablet by mouth daily     Dispense:  90 tablet     Refill:  3        [place]ARRS:  Controlled Substance Monitoring:    Acute and Chronic Pain Monitoring:   RX Monitoring Periodic Controlled Substance Monitoring   3/5/2024  11:47 AM Possible medication side effects, risk of tolerance/dependence & alternative treatments discussed.;No signs of potential drug abuse or diversion identified.;Assessed functional status (ability to engage in work or other purposeful activities, the pain intensity and its interference with activities of daily living, quality of family life and social activities, and the physical activity);Obtaining appropriate analgesic effect of treatment.        8) Follow Up:  Return in about 6 weeks (around 5/15/2024) for abs and tox check with new MD prior to infusions.     Ashanti Graff formerly [facility]

## 2024-04-03 ENCOUNTER — OFFICE VISIT (OUTPATIENT)
Dept: ONCOLOGY | Age: 54
End: 2024-04-03
Payer: COMMERCIAL

## 2024-04-03 ENCOUNTER — HOSPITAL ENCOUNTER (OUTPATIENT)
Dept: INFUSION THERAPY | Age: 54
Discharge: HOME | End: 2024-04-03
Payer: COMMERCIAL

## 2024-04-03 VITALS
HEART RATE: 78 BPM | HEIGHT: 65 IN | BODY MASS INDEX: 33.05 KG/M2 | SYSTOLIC BLOOD PRESSURE: 119 MMHG | RESPIRATION RATE: 16 BRPM | OXYGEN SATURATION: 98 % | DIASTOLIC BLOOD PRESSURE: 73 MMHG | WEIGHT: 198.4 LBS | TEMPERATURE: 98.2 F

## 2024-04-03 VITALS
HEIGHT: 65 IN | TEMPERATURE: 98.8 F | OXYGEN SATURATION: 95 % | HEART RATE: 78 BPM | DIASTOLIC BLOOD PRESSURE: 70 MMHG | RESPIRATION RATE: 16 BRPM | BODY MASS INDEX: 33.05 KG/M2 | SYSTOLIC BLOOD PRESSURE: 118 MMHG | WEIGHT: 198.4 LBS

## 2024-04-03 DIAGNOSIS — Z17.0 CARCINOMA OF UPPER-OUTER QUADRANT OF LEFT BREAST IN FEMALE, ESTROGEN RECEPTOR POSITIVE (HCC): Primary | ICD-10-CM

## 2024-04-03 DIAGNOSIS — Z79.811 ENCOUNTER FOR MONITORING AROMATASE INHIBITOR THERAPY: ICD-10-CM

## 2024-04-03 DIAGNOSIS — C50.912 INVASIVE DUCTAL CARCINOMA OF BREAST, FEMALE, LEFT (HCC): ICD-10-CM

## 2024-04-03 DIAGNOSIS — C50.412 CARCINOMA OF UPPER-OUTER QUADRANT OF LEFT BREAST IN FEMALE, ESTROGEN RECEPTOR POSITIVE (HCC): Primary | ICD-10-CM

## 2024-04-03 DIAGNOSIS — Z15.02 BRCA2 GENE MUTATION POSITIVE IN FEMALE: ICD-10-CM

## 2024-04-03 DIAGNOSIS — Z15.01 BRCA2 GENE MUTATION POSITIVE IN FEMALE: ICD-10-CM

## 2024-04-03 DIAGNOSIS — Z15.09 BRCA2 GENE MUTATION POSITIVE IN FEMALE: ICD-10-CM

## 2024-04-03 DIAGNOSIS — Z15.01 BRCA1 GENE MUTATION POSITIVE IN FEMALE: ICD-10-CM

## 2024-04-03 DIAGNOSIS — Z79.811 AROMATASE INHIBITOR USE: ICD-10-CM

## 2024-04-03 DIAGNOSIS — Z15.09 BRCA1 GENE MUTATION POSITIVE IN FEMALE: ICD-10-CM

## 2024-04-03 DIAGNOSIS — Z15.02 BRCA1 GENE MUTATION POSITIVE IN FEMALE: ICD-10-CM

## 2024-04-03 DIAGNOSIS — Z51.81 ENCOUNTER FOR MONITORING AROMATASE INHIBITOR THERAPY: ICD-10-CM

## 2024-04-03 LAB
ALBUMIN SERPL BCG-MCNC: 3.9 G/DL (ref 3.5–5.1)
ALP SERPL-CCNC: 145 U/L (ref 38–126)
ALT SERPL W/O P-5'-P-CCNC: 11 U/L (ref 11–66)
AST SERPL-CCNC: 13 U/L (ref 5–40)
BASOPHILS ABSOLUTE: 0 THOU/MM3 (ref 0–0.1)
BASOPHILS NFR BLD AUTO: 0 % (ref 0–3)
BILIRUB CONJ SERPL-MCNC: < 0.2 MG/DL (ref 0–0.3)
BILIRUB SERPL-MCNC: 0.2 MG/DL (ref 0.3–1.2)
BUN BLDP-MCNC: 15 MG/DL (ref 8–26)
CHLORIDE BLD-SCNC: 106 MEQ/L (ref 98–109)
CREAT BLD-MCNC: 0.6 MG/DL (ref 0.5–1.2)
EOSINOPHIL NFR BLD AUTO: 3 % (ref 0–4)
EOSINOPHILS ABSOLUTE: 0.1 THOU/MM3 (ref 0–0.4)
ERYTHROCYTE [DISTWIDTH] IN BLOOD BY AUTOMATED COUNT: 13.2 % (ref 11.5–14.5)
GFR SERPL CREATININE-BSD FRML MDRD: > 90 ML/MIN/1.73M2
GLUCOSE BLD-MCNC: 141 MG/DL (ref 70–108)
HCT VFR BLD AUTO: 34.8 % (ref 37–47)
HGB BLD-MCNC: 11.6 GM/DL (ref 12–16)
IMMATURE GRANULOCYTES %: 0 %
IMMATURE GRANULOCYTES ABSOLUTE: 0.02 THOU/MM3 (ref 0–0.07)
IONIZED CALCIUM, WHOLE BLOOD: 1.2 MMOL/L (ref 1.12–1.32)
LYMPHOCYTES ABSOLUTE: 2.4 THOU/MM3 (ref 1–4.8)
LYMPHOCYTES NFR BLD AUTO: 45 % (ref 15–47)
MCH RBC QN AUTO: 28.9 PG (ref 26–33)
MCHC RBC AUTO-ENTMCNC: 33.3 GM/DL (ref 32.2–35.5)
MCV RBC AUTO: 87 FL (ref 81–99)
MONOCYTES ABSOLUTE: 0.4 THOU/MM3 (ref 0.4–1.3)
MONOCYTES NFR BLD AUTO: 7 % (ref 0–12)
NEUTROPHILS NFR BLD AUTO: 45 % (ref 43–75)
PLATELET # BLD AUTO: 315 THOU/MM3 (ref 130–400)
PMV BLD AUTO: 8.9 FL (ref 9.4–12.4)
POTASSIUM BLD-SCNC: 3.9 MEQ/L (ref 3.5–4.9)
PROT SERPL-MCNC: 7.1 G/DL (ref 6.1–8)
RBC # BLD AUTO: 4.02 MILL/MM3 (ref 4.2–5.4)
SEGMENTED NEUTROPHILS ABSOLUTE COUNT: 2.4 THOU/MM3 (ref 1.8–7.7)
SODIUM BLD-SCNC: 143 MEQ/L (ref 138–146)
TOTAL CO2, WHOLE BLOOD: 28 MEQ/L (ref 23–33)
WBC # BLD AUTO: 5.3 THOU/MM3 (ref 4.8–10.8)

## 2024-04-03 PROCEDURE — 99214 OFFICE O/P EST MOD 30 MIN: CPT | Performed by: INTERNAL MEDICINE

## 2024-04-03 PROCEDURE — 96417 CHEMO IV INFUS EACH ADDL SEQ: CPT

## 2024-04-03 PROCEDURE — 36415 COLL VENOUS BLD VENIPUNCTURE: CPT

## 2024-04-03 PROCEDURE — 96413 CHEMO IV INFUSION 1 HR: CPT

## 2024-04-03 PROCEDURE — 6360000002 HC RX W HCPCS: Performed by: INTERNAL MEDICINE

## 2024-04-03 PROCEDURE — 99211 OFF/OP EST MAY X REQ PHY/QHP: CPT

## 2024-04-03 PROCEDURE — 80076 HEPATIC FUNCTION PANEL: CPT

## 2024-04-03 PROCEDURE — 85025 COMPLETE CBC W/AUTO DIFF WBC: CPT

## 2024-04-03 PROCEDURE — 36591 DRAW BLOOD OFF VENOUS DEVICE: CPT

## 2024-04-03 PROCEDURE — 80047 BASIC METABLC PNL IONIZED CA: CPT

## 2024-04-03 PROCEDURE — 2580000003 HC RX 258: Performed by: INTERNAL MEDICINE

## 2024-04-03 RX ORDER — ALBUTEROL SULFATE 90 UG/1
4 AEROSOL, METERED RESPIRATORY (INHALATION) PRN
Status: CANCELLED | OUTPATIENT
Start: 2024-04-03

## 2024-04-03 RX ORDER — DIPHENHYDRAMINE HYDROCHLORIDE 50 MG/ML
50 INJECTION INTRAMUSCULAR; INTRAVENOUS
OUTPATIENT
Start: 2024-04-24

## 2024-04-03 RX ORDER — HEPARIN SODIUM (PORCINE) LOCK FLUSH IV SOLN 100 UNIT/ML 100 UNIT/ML
500 SOLUTION INTRAVENOUS PRN
Status: CANCELLED | OUTPATIENT
Start: 2024-04-03

## 2024-04-03 RX ORDER — FAMOTIDINE 10 MG/ML
20 INJECTION, SOLUTION INTRAVENOUS
OUTPATIENT
Start: 2024-04-24

## 2024-04-03 RX ORDER — SODIUM CHLORIDE 9 MG/ML
5-250 INJECTION, SOLUTION INTRAVENOUS PRN
Status: DISCONTINUED | OUTPATIENT
Start: 2024-04-03 | End: 2024-04-04 | Stop reason: HOSPADM

## 2024-04-03 RX ORDER — ACETAMINOPHEN 325 MG/1
650 TABLET ORAL
Status: CANCELLED | OUTPATIENT
Start: 2024-04-03

## 2024-04-03 RX ORDER — ALBUTEROL SULFATE 90 UG/1
4 AEROSOL, METERED RESPIRATORY (INHALATION) PRN
OUTPATIENT
Start: 2024-04-24

## 2024-04-03 RX ORDER — HEPARIN SODIUM (PORCINE) LOCK FLUSH IV SOLN 100 UNIT/ML 100 UNIT/ML
500 SOLUTION INTRAVENOUS PRN
OUTPATIENT
Start: 2024-04-24

## 2024-04-03 RX ORDER — EPINEPHRINE 1 MG/ML
0.3 INJECTION, SOLUTION, CONCENTRATE INTRAVENOUS PRN
OUTPATIENT
Start: 2024-04-24

## 2024-04-03 RX ORDER — SODIUM CHLORIDE 9 MG/ML
25 INJECTION, SOLUTION INTRAVENOUS PRN
OUTPATIENT
Start: 2024-04-03

## 2024-04-03 RX ORDER — ONDANSETRON 2 MG/ML
8 INJECTION INTRAMUSCULAR; INTRAVENOUS
Status: CANCELLED | OUTPATIENT
Start: 2024-04-03

## 2024-04-03 RX ORDER — SODIUM CHLORIDE 9 MG/ML
5-250 INJECTION, SOLUTION INTRAVENOUS PRN
Status: CANCELLED | OUTPATIENT
Start: 2024-04-03

## 2024-04-03 RX ORDER — EPINEPHRINE 1 MG/ML
0.3 INJECTION, SOLUTION, CONCENTRATE INTRAVENOUS PRN
Status: CANCELLED | OUTPATIENT
Start: 2024-04-03

## 2024-04-03 RX ORDER — SODIUM CHLORIDE 0.9 % (FLUSH) 0.9 %
5-40 SYRINGE (ML) INJECTION PRN
OUTPATIENT
Start: 2024-04-24

## 2024-04-03 RX ORDER — ACETAMINOPHEN 325 MG/1
650 TABLET ORAL
OUTPATIENT
Start: 2024-04-24

## 2024-04-03 RX ORDER — SODIUM CHLORIDE 0.9 % (FLUSH) 0.9 %
5-40 SYRINGE (ML) INJECTION PRN
Status: CANCELLED | OUTPATIENT
Start: 2024-04-03

## 2024-04-03 RX ORDER — SODIUM CHLORIDE 0.9 % (FLUSH) 0.9 %
5-40 SYRINGE (ML) INJECTION PRN
Status: DISCONTINUED | OUTPATIENT
Start: 2024-04-03 | End: 2024-04-04 | Stop reason: HOSPADM

## 2024-04-03 RX ORDER — LETROZOLE 2.5 MG/1
2.5 TABLET, FILM COATED ORAL DAILY
Qty: 90 TABLET | Refills: 3 | Status: SHIPPED | OUTPATIENT
Start: 2024-04-03

## 2024-04-03 RX ORDER — DIPHENHYDRAMINE HYDROCHLORIDE 50 MG/ML
50 INJECTION INTRAMUSCULAR; INTRAVENOUS
Status: CANCELLED | OUTPATIENT
Start: 2024-04-03

## 2024-04-03 RX ORDER — SODIUM CHLORIDE 9 MG/ML
INJECTION, SOLUTION INTRAVENOUS CONTINUOUS
Status: CANCELLED | OUTPATIENT
Start: 2024-04-03

## 2024-04-03 RX ORDER — HEPARIN 100 UNIT/ML
500 SYRINGE INTRAVENOUS PRN
Status: DISCONTINUED | OUTPATIENT
Start: 2024-04-03 | End: 2024-04-04 | Stop reason: HOSPADM

## 2024-04-03 RX ORDER — FAMOTIDINE 10 MG/ML
20 INJECTION, SOLUTION INTRAVENOUS
Status: CANCELLED | OUTPATIENT
Start: 2024-04-03

## 2024-04-03 RX ORDER — ONDANSETRON 2 MG/ML
8 INJECTION INTRAMUSCULAR; INTRAVENOUS
OUTPATIENT
Start: 2024-04-24

## 2024-04-03 RX ORDER — SODIUM CHLORIDE 9 MG/ML
INJECTION, SOLUTION INTRAVENOUS CONTINUOUS
OUTPATIENT
Start: 2024-04-24

## 2024-04-03 RX ORDER — HEPARIN 100 UNIT/ML
500 SYRINGE INTRAVENOUS PRN
OUTPATIENT
Start: 2024-04-03

## 2024-04-03 RX ORDER — MEPERIDINE HYDROCHLORIDE 50 MG/ML
12.5 INJECTION INTRAMUSCULAR; INTRAVENOUS; SUBCUTANEOUS PRN
Status: CANCELLED | OUTPATIENT
Start: 2024-04-03

## 2024-04-03 RX ORDER — MEPERIDINE HYDROCHLORIDE 50 MG/ML
12.5 INJECTION INTRAMUSCULAR; INTRAVENOUS; SUBCUTANEOUS PRN
OUTPATIENT
Start: 2024-04-24

## 2024-04-03 RX ORDER — SODIUM CHLORIDE 9 MG/ML
5-250 INJECTION, SOLUTION INTRAVENOUS PRN
OUTPATIENT
Start: 2024-04-24

## 2024-04-03 RX ORDER — SODIUM CHLORIDE 0.9 % (FLUSH) 0.9 %
5-40 SYRINGE (ML) INJECTION PRN
OUTPATIENT
Start: 2024-04-03

## 2024-04-03 RX ADMIN — SODIUM CHLORIDE, PRESERVATIVE FREE 10 ML: 5 INJECTION INTRAVENOUS at 08:18

## 2024-04-03 RX ADMIN — SODIUM CHLORIDE 40 ML/HR: 9 INJECTION, SOLUTION INTRAVENOUS at 09:06

## 2024-04-03 RX ADMIN — PERTUZUMAB 420 MG: 30 INJECTION, SOLUTION, CONCENTRATE INTRAVENOUS at 10:23

## 2024-04-03 RX ADMIN — SODIUM CHLORIDE 525 MG: 9 INJECTION, SOLUTION INTRAVENOUS at 09:41

## 2024-04-03 RX ADMIN — Medication 500 UNITS: at 11:04

## 2024-04-03 RX ADMIN — SODIUM CHLORIDE, PRESERVATIVE FREE 20 ML: 5 INJECTION INTRAVENOUS at 08:19

## 2024-04-03 RX ADMIN — SODIUM CHLORIDE, PRESERVATIVE FREE 20 ML: 5 INJECTION INTRAVENOUS at 11:04

## 2024-04-03 NOTE — DISCHARGE INSTRUCTIONS
Please contact your Oncologist if you have any questions regarding the Perjeta and Trazimera that you received today.    You are instructed to call the office or go to the Emergency Dept. If you experience any of the following symptoms:    Dizziness/lightheadedness   Acute nausea or vomiting-not relieved by medications  Headaches-not relieved by medications  New chest pain or pressure  New rash /itching  New shortness of breath  Fever,chills or signs or symptoms of infection    Make sure you are drinking 48 to 64 ounces of water daily-if you are unable to drink fluids let us know right away.      1) ok for treatment today, monitor the diarrhea, echo looked great  2) 3 weeks with labs and infusion with RN  3) RTC 6 weeks with labs and tox check with new MD prior to infusions  4) [facility]  5) call us if any concerning findings on MRI on Monday- might want to call if worried about the eclipse

## 2024-04-03 NOTE — PLAN OF CARE
Problem: Chronic Conditions and Co-morbidities  Goal: Patient's chronic conditions and co-morbidity symptoms are monitored and maintained or improved  Outcome: Adequate for Discharge  Flowsheets (Taken 4/3/2024 0906)  Care Plan - Patient's Chronic Conditions and Co-Morbidity Symptoms are Monitored and Maintained or Improved:   Monitor and assess patient's chronic conditions and comorbid symptoms for stability, deterioration, or improvement   Collaborate with multidisciplinary team to address chronic and comorbid conditions and prevent exacerbation or deterioration  Note: Patient verbalizes understanding to verbal information given on trazimera and perjeta, including action and possible side effects. Aware to call MD if develop complications.     Chemotherapy Teaching     What is Chemotherapy   Drug action [x]   Method of Administration [x]   Handouts given []     Side Effects  Nausea/vomiting [x]   Diarrhea [x]   Fatigue [x]   Signs / Symptoms of infection [x]   Neutropenia [x]   Thrombocytopenia [x]   Alopecia [x]   neuropathy [x]   [place] diet &  the importance of fluids [x]       Micellaneous  Importance of nutrition [x]   Importance of oral hygiene [x]   When to call the MD [x]   Monitoring labs [x]   Use of supportive services []     Explanation of Drug Regimen / Frequency  Trazimera and perjeta     Comments  Verbalized understanding to drug,action,side effects and when to call MD      Problem: Safety - Adult  Goal: Free from fall injury  Outcome: Adequate for Discharge  Flowsheets (Taken 4/3/2024 0906)  Free From Fall Injury: Instruct family/caregiver on patient safety  Note: Patient free of falls this visit. Fall risks assessed. Precautions discussed.      Problem: Discharge Planning  Goal: Discharge to home or other facility with appropriate resources  Outcome: Adequate for Discharge  Flowsheets (Taken 4/3/2024 0906)  Discharge to home or other facility with appropriate resources:   Identify barriers to  discharge with patient and caregiver   Identify discharge learning needs (meds, wound care, etc)  Note: Patient verbalizes understanding of discharge instructions, follow up appointment, and when to call physician if needed     Problem: Infection - Adult  Goal: Absence of infection at discharge  Outcome: Adequate for Discharge  Flowsheets (Taken 4/3/2024 0906)  Absence of infection at discharge:   Assess and monitor for signs and symptoms of infection   Monitor all insertion sites i.e., indwelling lines, tubes and drains  Note: Mediport site with no redness or warmth. Skin over port site intact with no signs of breakdown noted. Patient verbalizes signs/symptoms of port infection and when to notify the physician.     Care plan reviewed with patient.  Patient verbalizes understanding of the plan of care and contributes to goal setting.

## 2024-04-03 NOTE — PATIENT INSTRUCTIONS
1) ok for treatment today, monitor the diarrhea, echo looked great  2) 3 weeks with labs and infusion with RN  3) RTC 6 weeks with labs and tox check with new MD prior to infusions  4) [facility]  5) call us if any concerning findings on MRI on Monday- might want to call if worried about the eclipse

## 2024-04-03 NOTE — PROGRESS NOTES
Patient tolerated Perjeta and Trazimera without any complications.  Denies dizziness, lightheadedness, acute nausea or vomiting, headache, heart palpitations, rash/itching or increased SOB.    Last vital signs  /73   Pulse 78   Temp 98.2 °F (36.8 °C) (Oral)   Resp 16   Ht 1.651 m (5' 5\")   Wt 90 kg (198 lb 6.4 oz)   LMP 01/01/2008   SpO2 98%   BMI 33.02 kg/m²     Patient instructed if they experience any of the above symptoms following today's visit, he/she is to notify the Physician or go to the Emergency Dept.    Discharge instructions given to patient, Verbalizes understanding. Ambulated off unit per self in stable condition with all belongings.

## 2024-04-08 ENCOUNTER — HOSPITAL ENCOUNTER (OUTPATIENT)
Dept: MRI IMAGING | Age: 54
Discharge: HOME | End: 2024-04-08
Attending: FAMILY MEDICINE
Payer: COMMERCIAL

## 2024-04-08 DIAGNOSIS — S32.10XA CLOSED FRACTURE OF SACRUM, UNSPECIFIED PORTION OF SACRUM, INITIAL ENCOUNTER (HCC): Primary | ICD-10-CM

## 2024-04-08 DIAGNOSIS — C50.412 CARCINOMA OF UPPER-OUTER QUADRANT OF LEFT BREAST IN FEMALE, ESTROGEN RECEPTOR POSITIVE (HCC): ICD-10-CM

## 2024-04-08 DIAGNOSIS — M54.50 ACUTE BILATERAL LOW BACK PAIN WITHOUT SCIATICA: ICD-10-CM

## 2024-04-08 DIAGNOSIS — Z17.0 CARCINOMA OF UPPER-OUTER QUADRANT OF LEFT BREAST IN FEMALE, ESTROGEN RECEPTOR POSITIVE (HCC): ICD-10-CM

## 2024-04-08 PROCEDURE — 72148 MRI LUMBAR SPINE W/O DYE: CPT

## 2024-04-09 ENCOUNTER — TELEPHONE (OUTPATIENT)
Dept: FAMILY MEDICINE CLINIC | Age: 54
End: 2024-04-09

## 2024-04-09 NOTE — TELEPHONE ENCOUNTER
Pt called checking on her MRI result.    Pt was informed of result and informed of another MRI being ordered.    [facility]    Pt ask if she needs to worry about the Renal Cyst that is present in the MRI?    Please call, pt once scheduled and addressed

## 2024-04-09 NOTE — TELEPHONE ENCOUNTER
----- Message from Shaun Jerez MD sent at 4/8/2024 11:29 PM EDT -----  Let her know that the MRI suggested a fracture of the sacrum and that the radiologist suggests an MRI of the sacrum to evaluate it better. I  will order it.

## 2024-04-09 NOTE — RESULT ENCOUNTER NOTE
Let her know that the MRI suggested a fracture of the sacrum and that the radiologist suggests an MRI of the sacrum to evaluate it better. I  will order it.

## 2024-04-10 ENCOUNTER — HOSPITAL ENCOUNTER (OUTPATIENT)
Dept: PHARMACY | Age: 54
Setting detail: THERAPIES SERIES
Discharge: HOME | End: 2024-04-10

## 2024-04-10 NOTE — PROGRESS NOTES
Medication Management   [facility]  Smoking Cessation Clinic  [phone] (phone)  [phone] (fax)  Esther Valle          1970  Shaun Jerez MD    Esther Valle is a 53 y.o. female was called today for visit 2/12.    Previous Goal/Quit Date: N/A                       New Goal/Quit Date: none set yet    Scaling:  Importance level:  6 (previous level was 8 ) - we discussed why this is lower today than last appt.  Confidence level: 5 (previous level was 5 )    Has the patient smoked ANY cigarettes (even 1 puff) in the last 7 days?: Yes  If yes, how much? 0.5PPD What were you doing/do you know what the trigger was? After eating, driving/in car.     Baseline PPD: 0.25PPD for 1 week when started with us, was 0.5PPD before that  Current PPD: 0.5 PPD  Smoking Reduction Percent: 0%    Pharmacological Agent used:  see notes below .    PLAN    Discussed the following:     Went over pages 2-5 in smoking cessation booklet  Benefits to quitting:  health, time, financial  Nicotine replacement and pharmacological options- I briefly went over the med options, see notes below.   Cigarette pack wraps to identify triggers explained to pt  Tobacco cessation quit tips  Get together emergency quit pack with things to grab instead of a cigarette-suckers, candy, straws, toothpick    Notes/comments: PT states she did not discuss with her oncologist the med options for smoking cessation and states her oncologist is leaving so she plans to ask her PCP what they feel is a good option for her to use.       Pts goal for the next week are to fill out the pack wrap paper and to talk to PCP about med options for smoking cessation.      Next appointment:  4/17 at 11am in office    Esther Valle verbalized understanding of the above information and denied further questions or concerns.    The total time spent with the pt was 20 minutes.    Charges dropped No. If no, due to appt done over the phone.

## 2024-04-16 ENCOUNTER — TELEPHONE (OUTPATIENT)
Dept: CARDIAC REHAB | Age: 54
End: 2024-04-16

## 2024-04-16 NOTE — TELEPHONE ENCOUNTER
Pt called and left message on 4/15 that she needed to change appt for 4/17.  I called pt back at this time and we changed appt to  5/1 at 1130.  (Pt could not come on 4/24 due to has chemo that day).

## 2024-04-23 ENCOUNTER — OFFICE VISIT (OUTPATIENT)
Dept: FAMILY MEDICINE CLINIC | Age: 54
End: 2024-04-23

## 2024-04-23 VITALS
SYSTOLIC BLOOD PRESSURE: 114 MMHG | HEIGHT: 65 IN | HEART RATE: 74 BPM | DIASTOLIC BLOOD PRESSURE: 80 MMHG | RESPIRATION RATE: 16 BRPM | BODY MASS INDEX: 32.57 KG/M2 | WEIGHT: 195.5 LBS

## 2024-04-23 DIAGNOSIS — M54.50 ACUTE BILATERAL LOW BACK PAIN WITHOUT SCIATICA: Primary | ICD-10-CM

## 2024-04-23 PROCEDURE — 99213 OFFICE O/P EST LOW 20 MIN: CPT | Performed by: FAMILY MEDICINE

## 2024-04-23 RX ORDER — PREDNISONE 20 MG/1
20 TABLET ORAL 2 TIMES DAILY
Qty: 14 TABLET | Refills: 0 | Status: SHIPPED | OUTPATIENT
Start: 2024-04-23 | End: 2024-04-30

## 2024-04-23 ASSESSMENT — ENCOUNTER SYMPTOMS
DIARRHEA: 1
SHORTNESS OF BREATH: 0
CONSTIPATION: 1
BACK PAIN: 1
SINUS PRESSURE: 0

## 2024-04-23 NOTE — PROGRESS NOTES
Esther Valle (:  1970) is a 53 y.o. female,Established patient, here for evaluation of the following chief complaint(s):  Back Pain      Assessment & Plan    Diagnosis Orders   1. Acute bilateral low back pain without sciatica  predniSONE (DELTASONE) 20 MG tablet    [place] [facility]        Let  us know if you do not hear from the [facility] this week.  Subjective   HPI  The low back pain is worse.  She had toradol pill without help. Lidoderm didn't help.  2 tramadol didn't help. She is already on gabapentin  She can't get comfortable. Worse if she bends  It goes down the front and back of the right thigh.  It started in early March  No change in bladder or bowel function and no perineal numbness  Review of Systems   Constitutional:  Negative for fatigue.   HENT:  Negative for sinus pressure.    Eyes:  Negative for visual disturbance.   Respiratory:  Negative for shortness of breath.    Cardiovascular:  Negative for chest pain.   Gastrointestinal:  Positive for constipation and diarrhea.   Genitourinary: Negative.    Musculoskeletal:  Positive for arthralgias and back pain.   Skin:  Negative for rash.   Neurological:  Negative for headaches.   The patient's medications, allergies, past medical problems, surgical, social, and family histories were reviewed and updated as needed.         Objective   Physical Exam  Constitutional:       Appearance: Normal appearance. She is well-developed.   HENT:      Head: Normocephalic and atraumatic.   Eyes:      General: No scleral icterus.     Conjunctiva/sclera: Conjunctivae normal.   Neck:      Trachea: No tracheal deviation.   Cardiovascular:      Rate and Rhythm: Normal rate and regular rhythm.      Heart sounds: Normal heart sounds.   Pulmonary:      Effort: Pulmonary effort is normal.      Breath sounds: Normal breath sounds.   Musculoskeletal:        Back:       Comments: She could only bend forward a few

## 2024-04-24 ENCOUNTER — HOSPITAL ENCOUNTER (EMERGENCY)
Age: 54
Discharge: HOME | End: 2024-04-24
Payer: COMMERCIAL

## 2024-04-24 ENCOUNTER — TELEPHONE (OUTPATIENT)
Dept: FAMILY MEDICINE CLINIC | Age: 54
End: 2024-04-24

## 2024-04-24 ENCOUNTER — HOSPITAL ENCOUNTER (OUTPATIENT)
Dept: INFUSION THERAPY | Age: 54
Discharge: HOME | End: 2024-04-24
Payer: COMMERCIAL

## 2024-04-24 ENCOUNTER — APPOINTMENT (OUTPATIENT)
Dept: CT IMAGING | Age: 54
End: 2024-04-24
Payer: COMMERCIAL

## 2024-04-24 VITALS
WEIGHT: 195 LBS | HEART RATE: 88 BPM | BODY MASS INDEX: 32.49 KG/M2 | TEMPERATURE: 99.1 F | HEIGHT: 65 IN | DIASTOLIC BLOOD PRESSURE: 93 MMHG | SYSTOLIC BLOOD PRESSURE: 144 MMHG | OXYGEN SATURATION: 95 % | RESPIRATION RATE: 18 BRPM

## 2024-04-24 DIAGNOSIS — C50.412 CARCINOMA OF UPPER-OUTER QUADRANT OF LEFT BREAST IN FEMALE, ESTROGEN RECEPTOR POSITIVE (HCC): Primary | ICD-10-CM

## 2024-04-24 DIAGNOSIS — M54.41 ACUTE MIDLINE LOW BACK PAIN WITH BILATERAL SCIATICA: ICD-10-CM

## 2024-04-24 DIAGNOSIS — Z15.01 BRCA2 GENE MUTATION POSITIVE IN FEMALE: ICD-10-CM

## 2024-04-24 DIAGNOSIS — Z15.01 BRCA1 GENE MUTATION POSITIVE IN FEMALE: ICD-10-CM

## 2024-04-24 DIAGNOSIS — Z17.0 CARCINOMA OF UPPER-OUTER QUADRANT OF LEFT BREAST IN FEMALE, ESTROGEN RECEPTOR POSITIVE (HCC): Primary | ICD-10-CM

## 2024-04-24 DIAGNOSIS — Z15.02 BRCA1 GENE MUTATION POSITIVE IN FEMALE: ICD-10-CM

## 2024-04-24 DIAGNOSIS — S39.012A LUMBOSACRAL STRAIN, INITIAL ENCOUNTER: Primary | ICD-10-CM

## 2024-04-24 DIAGNOSIS — Z15.02 BRCA2 GENE MUTATION POSITIVE IN FEMALE: ICD-10-CM

## 2024-04-24 DIAGNOSIS — C50.912 INVASIVE DUCTAL CARCINOMA OF BREAST, FEMALE, LEFT (HCC): ICD-10-CM

## 2024-04-24 DIAGNOSIS — M54.42 ACUTE MIDLINE LOW BACK PAIN WITH BILATERAL SCIATICA: ICD-10-CM

## 2024-04-24 DIAGNOSIS — Z15.09 BRCA1 GENE MUTATION POSITIVE IN FEMALE: ICD-10-CM

## 2024-04-24 DIAGNOSIS — Z15.09 BRCA2 GENE MUTATION POSITIVE IN FEMALE: ICD-10-CM

## 2024-04-24 LAB
ALBUMIN SERPL BCG-MCNC: 4.2 G/DL (ref 3.5–5.1)
ALP SERPL-CCNC: 141 U/L (ref 38–126)
ALT SERPL W/O P-5'-P-CCNC: 9 U/L (ref 11–66)
AST SERPL-CCNC: 12 U/L (ref 5–40)
BACTERIA: ABNORMAL
BASOPHILS ABSOLUTE: 0 THOU/MM3 (ref 0–0.1)
BASOPHILS NFR BLD AUTO: 0 % (ref 0–3)
BILIRUB CONJ SERPL-MCNC: < 0.2 MG/DL (ref 0–0.3)
BILIRUB SERPL-MCNC: 0.2 MG/DL (ref 0.3–1.2)
BILIRUB UR QL STRIP: NEGATIVE
BUN BLDP-MCNC: 15 MG/DL (ref 8–26)
CASTS #/AREA URNS LPF: ABNORMAL /LPF
CASTS #/AREA URNS LPF: ABNORMAL /LPF
CHARACTER UR: CLEAR
CHARCOAL URNS QL MICRO: ABNORMAL
CHLORIDE BLD-SCNC: 106 MEQ/L (ref 98–109)
COLOR UR: YELLOW
CREAT BLD-MCNC: 0.5 MG/DL (ref 0.5–1.2)
CRYSTALS URNS QL MICRO: ABNORMAL
EOSINOPHIL NFR BLD AUTO: 2 % (ref 0–4)
EOSINOPHILS ABSOLUTE: 0.2 THOU/MM3 (ref 0–0.4)
EPITHELIAL CELLS, UA: ABNORMAL /HPF
ERYTHROCYTE [DISTWIDTH] IN BLOOD BY AUTOMATED COUNT: 13.1 % (ref 11.5–14.5)
GFR SERPL CREATININE-BSD FRML MDRD: > 90 ML/MIN/1.73M2
GLUCOSE BLD-MCNC: 133 MG/DL (ref 70–108)
GLUCOSE UR QL STRIP.AUTO: NEGATIVE MG/DL
HCT VFR BLD AUTO: 37.5 % (ref 37–47)
HGB BLD-MCNC: 12.6 GM/DL (ref 12–16)
HGB UR QL STRIP.AUTO: NEGATIVE
IMMATURE GRANULOCYTES %: 0 %
IMMATURE GRANULOCYTES ABSOLUTE: 0.01 THOU/MM3 (ref 0–0.07)
IONIZED CALCIUM, WHOLE BLOOD: 1.22 MMOL/L (ref 1.12–1.32)
KETONES UR QL STRIP.AUTO: NEGATIVE
LEUKOCYTE ESTERASE UR QL STRIP.AUTO: ABNORMAL
LYMPHOCYTES ABSOLUTE: 2.4 THOU/MM3 (ref 1–4.8)
LYMPHOCYTES NFR BLD AUTO: 29 % (ref 15–47)
MAGNESIUM SERPL-MCNC: 2 MG/DL (ref 1.6–2.4)
MCH RBC QN AUTO: 28.6 PG (ref 26–33)
MCHC RBC AUTO-ENTMCNC: 33.6 GM/DL (ref 32.2–35.5)
MCV RBC AUTO: 85 FL (ref 81–99)
MONOCYTES ABSOLUTE: 0.5 THOU/MM3 (ref 0.4–1.3)
MONOCYTES NFR BLD AUTO: 6 % (ref 0–12)
NEUTROPHILS NFR BLD AUTO: 63 % (ref 43–75)
NITRITE UR QL STRIP.AUTO: NEGATIVE
PH UR STRIP.AUTO: 5.5 [PH] (ref 5–9)
PLATELET # BLD AUTO: 293 THOU/MM3 (ref 130–400)
PMV BLD AUTO: 8.9 FL (ref 9.4–12.4)
POTASSIUM BLD-SCNC: 3.9 MEQ/L (ref 3.5–4.9)
PROT SERPL-MCNC: 7.8 G/DL (ref 6.1–8)
PROT UR STRIP.AUTO-MCNC: NEGATIVE MG/DL
RBC # BLD AUTO: 4.41 MILL/MM3 (ref 4.2–5.4)
RBC #/AREA URNS HPF: ABNORMAL /HPF
RENAL EPI CELLS #/AREA URNS HPF: ABNORMAL /[HPF]
SEGMENTED NEUTROPHILS ABSOLUTE COUNT: 5.2 THOU/MM3 (ref 1.8–7.7)
SODIUM BLD-SCNC: 142 MEQ/L (ref 138–146)
SPECIFIC GRAVITY UA: 1.02 (ref 1–1.03)
TOTAL CO2, WHOLE BLOOD: 27 MEQ/L (ref 23–33)
UROBILINOGEN, URINE: 1 EU/DL (ref 0–1)
WBC # BLD AUTO: 8.3 THOU/MM3 (ref 4.8–10.8)
WBC #/AREA URNS HPF: ABNORMAL /HPF
YEAST LIKE FUNGI URNS QL MICRO: ABNORMAL

## 2024-04-24 PROCEDURE — 80047 BASIC METABLC PNL IONIZED CA: CPT

## 2024-04-24 PROCEDURE — 6360000002 HC RX W HCPCS: Performed by: INTERNAL MEDICINE

## 2024-04-24 PROCEDURE — 80076 HEPATIC FUNCTION PANEL: CPT

## 2024-04-24 PROCEDURE — 2580000003 HC RX 258: Performed by: INTERNAL MEDICINE

## 2024-04-24 PROCEDURE — 6370000000 HC RX 637 (ALT 250 FOR IP)

## 2024-04-24 PROCEDURE — 96417 CHEMO IV INFUS EACH ADDL SEQ: CPT

## 2024-04-24 PROCEDURE — 36591 DRAW BLOOD OFF VENOUS DEVICE: CPT

## 2024-04-24 PROCEDURE — 99284 EMERGENCY DEPT VISIT MOD MDM: CPT

## 2024-04-24 PROCEDURE — 87086 URINE CULTURE/COLONY COUNT: CPT

## 2024-04-24 PROCEDURE — 6360000002 HC RX W HCPCS

## 2024-04-24 PROCEDURE — 81001 URINALYSIS AUTO W/SCOPE: CPT

## 2024-04-24 PROCEDURE — 85025 COMPLETE CBC W/AUTO DIFF WBC: CPT

## 2024-04-24 PROCEDURE — 96413 CHEMO IV INFUSION 1 HR: CPT

## 2024-04-24 PROCEDURE — 72192 CT PELVIS W/O DYE: CPT

## 2024-04-24 PROCEDURE — 83735 ASSAY OF MAGNESIUM: CPT

## 2024-04-24 RX ORDER — ALBUTEROL SULFATE 90 UG/1
4 AEROSOL, METERED RESPIRATORY (INHALATION) PRN
OUTPATIENT
Start: 2024-04-24

## 2024-04-24 RX ORDER — DIPHENHYDRAMINE HYDROCHLORIDE 50 MG/ML
50 INJECTION INTRAMUSCULAR; INTRAVENOUS
OUTPATIENT
Start: 2024-04-24

## 2024-04-24 RX ORDER — ONDANSETRON 2 MG/ML
8 INJECTION INTRAMUSCULAR; INTRAVENOUS
OUTPATIENT
Start: 2024-04-24

## 2024-04-24 RX ORDER — HEPARIN 100 UNIT/ML
300 SYRINGE INTRAVENOUS ONCE
Status: COMPLETED | OUTPATIENT
Start: 2024-04-24 | End: 2024-04-24

## 2024-04-24 RX ORDER — SODIUM CHLORIDE 9 MG/ML
5-250 INJECTION, SOLUTION INTRAVENOUS PRN
Status: DISCONTINUED | OUTPATIENT
Start: 2024-04-24 | End: 2024-04-25 | Stop reason: HOSPADM

## 2024-04-24 RX ORDER — EPINEPHRINE 1 MG/ML
0.3 INJECTION, SOLUTION INTRAMUSCULAR; SUBCUTANEOUS PRN
OUTPATIENT
Start: 2024-04-24

## 2024-04-24 RX ORDER — SODIUM CHLORIDE 9 MG/ML
INJECTION, SOLUTION INTRAVENOUS CONTINUOUS
OUTPATIENT
Start: 2024-04-24

## 2024-04-24 RX ORDER — SODIUM CHLORIDE 0.9 % (FLUSH) 0.9 %
5-40 SYRINGE (ML) INJECTION PRN
Status: DISCONTINUED | OUTPATIENT
Start: 2024-04-24 | End: 2024-04-25 | Stop reason: HOSPADM

## 2024-04-24 RX ORDER — SODIUM CHLORIDE 0.9 % (FLUSH) 0.9 %
5-40 SYRINGE (ML) INJECTION PRN
OUTPATIENT
Start: 2024-04-24

## 2024-04-24 RX ORDER — HEPARIN 100 UNIT/ML
500 SYRINGE INTRAVENOUS PRN
Status: DISCONTINUED | OUTPATIENT
Start: 2024-04-24 | End: 2024-04-25 | Stop reason: HOSPADM

## 2024-04-24 RX ORDER — ACETAMINOPHEN 325 MG/1
650 TABLET ORAL
OUTPATIENT
Start: 2024-04-24

## 2024-04-24 RX ORDER — HEPARIN 100 UNIT/ML
500 SYRINGE INTRAVENOUS PRN
OUTPATIENT
Start: 2024-04-24

## 2024-04-24 RX ORDER — OXYCODONE HYDROCHLORIDE AND ACETAMINOPHEN 5; 325 MG/1; MG/1
1 TABLET ORAL ONCE
Status: COMPLETED | OUTPATIENT
Start: 2024-04-24 | End: 2024-04-24

## 2024-04-24 RX ORDER — SODIUM CHLORIDE 9 MG/ML
5-250 INJECTION, SOLUTION INTRAVENOUS PRN
OUTPATIENT
Start: 2024-04-24

## 2024-04-24 RX ADMIN — SODIUM CHLORIDE, PRESERVATIVE FREE 30 ML: 5 INJECTION INTRAVENOUS at 08:57

## 2024-04-24 RX ADMIN — SODIUM CHLORIDE 20 ML/HR: 9 INJECTION, SOLUTION INTRAVENOUS at 09:29

## 2024-04-24 RX ADMIN — PERTUZUMAB 420 MG: 30 INJECTION, SOLUTION, CONCENTRATE INTRAVENOUS at 09:41

## 2024-04-24 RX ADMIN — SODIUM CHLORIDE 525 MG: 9 INJECTION, SOLUTION INTRAVENOUS at 10:25

## 2024-04-24 RX ADMIN — HEPARIN 300 UNITS: 100 SYRINGE at 14:54

## 2024-04-24 RX ADMIN — OXYCODONE HYDROCHLORIDE AND ACETAMINOPHEN 1 TABLET: 5; 325 TABLET ORAL at 13:20

## 2024-04-24 ASSESSMENT — PAIN SCALES - GENERAL: PAINLEVEL_OUTOF10: 8

## 2024-04-24 NOTE — PLAN OF CARE
Problem: Chronic Conditions and Co-morbidities  Goal: Patient's chronic conditions and co-morbidity symptoms are monitored and maintained or improved  Outcome: Progressing  Flowsheets (Taken 4/24/2024 0931)  Care Plan - Patient's Chronic Conditions and Co-Morbidity Symptoms are Monitored and Maintained or Improved: Monitor and assess patient's chronic conditions and comorbid symptoms for stability, deterioration, or improvement  Note:   Chemotherapy Teaching     What is Chemotherapy   Drug action [x]   Method of Administration [x]   Handouts given []     Side Effects  Nausea/vomiting [x]   Diarrhea [x]   Fatigue [x]   Signs / Symptoms of infection [x]   Neutropenia [x]   Thrombocytopenia [x]   Alopecia [x]   neuropathy [x]   [place] diet &  the importance of fluids [x]       Micellaneous  Importance of nutrition [x]   Importance of oral hygiene [x]   When to call the MD [x]   Monitoring labs [x]   Use of supportive services []     Explanation of Drug Regimen / Frequency  taxotere     Comments  Verbalized understanding to drug,action,side effects and when to call MD      Problem: Safety - Adult  Goal: Free from fall injury  Outcome: Progressing  Flowsheets (Taken 4/24/2024 0931)  Free From Fall Injury: Instruct family/caregiver on patient safety  Note: No falls occurred with visit today.     Problem: Discharge Planning  Goal: Discharge to home or other facility with appropriate resources  Outcome: Progressing  Flowsheets (Taken 4/24/2024 0931)  Discharge to home or other facility with appropriate resources: Identify barriers to discharge with patient and caregiver  Note: Verbalize understanding of discharge instructions, follow up appointments, and when to call Physician.   Care plan reviewed with patient.  Patient verbalizes understanding of the plan of care and contributes to goal setting.

## 2024-04-24 NOTE — ED PROVIDER NOTES
[place], herniated disc, AAA rupture, pyelonephritis, kidney stone, pancreatitis, PUD, aortic dissection                 Pertinent Comorbid Conditions:    History of back pain, history of cancer,    2)  Data Reviewed (none if left blank)          My Independent interpretations:     EKG:      None    Imaging: CT pelvis: bilateral sacroiliac joint sclerosis, right greater than left    Labs:      Urine with small leuk esterase, sent for culture                 Decision Rules/Clinical Scores utilized:  None            External Documentation Reviewed:         Previous patient encounter documents & history available on EMR was reviewed See MDM             See Formal Diagnostic Results above for the lab and radiology tests and orders.    3)  Treatment and Disposition         ED Reassessment: Patient appeared comfortable in the chair after receiving pain medication.         Case discussed with consulting clinician:  See MDM         Shared Decision-Making was performed and disposition discussed with the        Patient/Family and questions answered NA         Social determinants of health impacting treatment or disposition:  None               Summary of Patient Presentation:      MDM  /  ED Course as of 04/24/24 1441   Wed Apr 24, 2024   1406 With history of cancer, metastases were thought to be an possible etiology of this pain.  MRI from 16 days ago did not show anything, CT of pelvis did not show anything today. [RB]   1417 MRI of lumbar spine on 4/8 suggested a CT or MRI of sacrum to rule out fracture.  Got that today.  No fracture seen [RB]   1433 Upon reevaluation patient denied dysuria, fever, suprapubic pain, or any UTI symptoms.  Due to this and only small leuk esterase I will not treat her for UTI today, but will send her urine for culture.  Informed her of this and informed her to follow-up with PCP.  Also informed her not to drive after receiving narcotic pain medication, and the need to follow-up with [facility]. [RB]

## 2024-04-24 NOTE — DISCHARGE INSTRUCTIONS
Please contact your Oncologist if you have any questions regarding the perjeta/herceptin that you received today.    You are instructed to call the office or go to the Emergency Dept. If you experience any of the following symptoms:    Dizziness/lightheadedness   Acute nausea or vomiting-not relieved by medications  Headaches-not relieved by medications  New chest pain or pressure  New rash /itching  New shortness of breath  Fever,chills or signs or symptoms of infection    Make sure you are drinking 48 to 64 ounces of water daily-if you are unable to drink fluids let us know right away.

## 2024-04-24 NOTE — TELEPHONE ENCOUNTER
Pt called asking if she could get something stronger for her back pain.    Pt stated Tramadol is not helping her woth the pain.    [facility] [place]

## 2024-04-24 NOTE — ED TRIAGE NOTES
Patient to ED from home with c/o lower back pain. Patient reports she had an MRI and said it showed bulging discs and possible fracture of the sacrum. Patient rates pain a 10/10 at this time. Reports difficulties sleeping.

## 2024-04-24 NOTE — DISCHARGE INSTRUCTIONS
Because you received narcotic pain medication in the ED, you should not operate a vehicle or heavy machinery today.      Call today or tomorrow to follow up with Shaun Jerez MD  in 3 days.  Please also schedule an appointment with [facility] due to chronicity of this problem.     Use an ice pack or bag filled with ice and apply to the injured area 3 - 4 times a day for 15 - 20 minutes each time.  If the injury is older than 3 days, then use a heating pad to help relax the muscles in your back.    Use ibuprofen or Tylenol (unless prescribed medications that have Tylenol in it) for pain.  You can take over the counter Ibuprofen (advil) tablets (4 tablets every 8 hours or 3 tablets every 6 hours or 2 tablets every 4 hours)    Luis Alfredo' Flexion Exercises     1. Pelvic tilt. Lie on your back with knees bent, feet flat on floor. Flatten the small of your back against the floor, without pushing down with the legs. Hold for 5 to 10 seconds.     2. Single Knee to chest. Lie on your back with knees bent and feet flat on the floor. Slowly pull your right knee toward your shoulder and hold 5 to 10 seconds. Lower the knee and repeat with the other knee.     3. Double knee to chest. Begin as in the previous exercise. After pulling right knee to chest, pull left knee to chest and hold both knees for 5 to 10 seconds. Slowly lower one leg at a time.     4. Partial sit-up. Do the pelvic tilt (exercise 1) and, while holding this position, slowly curl your head and shoulders off the floor. Hold briefly. Return slowly to the starting position.     5. Hamstring stretch. Start in long sitting with toes directed toward the ceiling and knees fully extended. Slowly lower the trunk forward over the legs, keeping knees extended, arms outstretched over the legs, and eyes focus ahead.     6. Hip Flexor stretch. Place one foot in front of the other with the left (front) knee flexed and the right (back) knee held rigidly straight. Flex forward

## 2024-04-24 NOTE — PROGRESS NOTES
Patient tolerated perjeta/herceptin without any complications.  Patient kept for 20 minuets observation post infusion. Denies dizziness, lightheadedness, acute nausea or vomiting, headache, heart palpitations, rash/itching or increased SOB.    Last vital signs  LMP 01/01/2008     Patient instructed if they experience any of the above symptoms following today's visit, he/she is to notify the Physician or go to the Emergency Dept.    Discharge instructions given to patient, Verbalizes understanding. Ambulated off unit per self in stable condition with all belongings.

## 2024-04-25 ENCOUNTER — TELEPHONE (OUTPATIENT)
Dept: FAMILY MEDICINE CLINIC | Age: 54
End: 2024-04-25

## 2024-04-25 RX ORDER — TRAMADOL HYDROCHLORIDE 50 MG/1
50 TABLET ORAL EVERY 6 HOURS PRN
Qty: 28 TABLET | Refills: 0 | Status: SHIPPED | OUTPATIENT
Start: 2024-04-25 | End: 2024-05-02

## 2024-04-25 RX ORDER — KETOROLAC TROMETHAMINE 10 MG/1
10 TABLET, FILM COATED ORAL EVERY 6 HOURS PRN
Qty: 20 TABLET | Refills: 0 | Status: SHIPPED | OUTPATIENT
Start: 2024-04-25 | End: 2025-04-25

## 2024-04-25 RX ORDER — KETOROLAC TROMETHAMINE 10 MG/1
10 TABLET, FILM COATED ORAL 4 TIMES DAILY PRN
Qty: 20 TABLET | Refills: 0 | Status: SHIPPED | OUTPATIENT
Start: 2024-04-25 | End: 2024-04-25

## 2024-04-25 NOTE — TELEPHONE ENCOUNTER
Cancelled rx for torodol via [facility] pharmacy.via dr. Jeerz verbal order.

## 2024-04-25 NOTE — TELEPHONE ENCOUNTER
Is the prednisone helping any yet?  Continue the tramadol and I will  send a Rx for toradol to use with the tramadol.

## 2024-04-25 NOTE — TELEPHONE ENCOUNTER
Pt informed and she said that the Prednisone is starting to work and she only has two Tramadol left. She would like a refill sent to [facility] [place].

## 2024-04-26 LAB
BACTERIA UR CULT: ABNORMAL
ORGANISM: ABNORMAL

## 2024-05-01 ENCOUNTER — HOSPITAL ENCOUNTER (OUTPATIENT)
Dept: PHARMACY | Age: 54
Setting detail: THERAPIES SERIES
Discharge: HOME | End: 2024-05-01
Payer: COMMERCIAL

## 2024-05-01 ENCOUNTER — HOSPITAL ENCOUNTER (OUTPATIENT)
Dept: MRI IMAGING | Age: 54
Discharge: HOME | End: 2024-05-01
Attending: FAMILY MEDICINE
Payer: COMMERCIAL

## 2024-05-01 DIAGNOSIS — C50.412 CARCINOMA OF UPPER-OUTER QUADRANT OF LEFT BREAST IN FEMALE, ESTROGEN RECEPTOR POSITIVE (HCC): ICD-10-CM

## 2024-05-01 DIAGNOSIS — Z17.0 CARCINOMA OF UPPER-OUTER QUADRANT OF LEFT BREAST IN FEMALE, ESTROGEN RECEPTOR POSITIVE (HCC): ICD-10-CM

## 2024-05-01 DIAGNOSIS — S32.10XA CLOSED FRACTURE OF SACRUM, UNSPECIFIED PORTION OF SACRUM, INITIAL ENCOUNTER (HCC): ICD-10-CM

## 2024-05-01 PROCEDURE — 72195 MRI PELVIS W/O DYE: CPT

## 2024-05-01 PROCEDURE — 99211 OFF/OP EST MAY X REQ PHY/QHP: CPT

## 2024-05-01 NOTE — PROGRESS NOTES
3&4 education with her.      Next appointment:  5/29/24 at 1130 by phone    Esther Valle verbalized understanding of the above information and denied further questions or concerns.    The total time spent with the pt was 15 minutes.    Charges dropped Yes.

## 2024-05-03 ENCOUNTER — TELEPHONE (OUTPATIENT)
Dept: FAMILY MEDICINE CLINIC | Age: 54
End: 2024-05-03

## 2024-05-03 NOTE — TELEPHONE ENCOUNTER
Patient informed and stated that she is feeling better since taking the steroid.  She will check her insurance company to see what Rheumatologist is on her plan and call us back.

## 2024-05-03 NOTE — RESULT ENCOUNTER NOTE
The mri shows inflammation at the right SI joint that is probably the cause of the trouble. How is she feeling? I would like her to see a rheumatologist to have that evaluated and treated. Does she have a preference of one?

## 2024-05-03 NOTE — TELEPHONE ENCOUNTER
----- Message from Shaun Jerez MD sent at 5/3/2024 10:06 AM EDT -----  The mri shows inflammation at the right SI joint that is probably the cause of the trouble. How is she feeling? I would like her to see a rheumatologist to have that evaluated and treated. Does she have a preference of one?

## 2024-05-06 DIAGNOSIS — M46.1 INFLAMMATION OF LEFT SACROILIAC JOINT (HCC): Primary | ICD-10-CM

## 2024-05-06 NOTE — TELEPHONE ENCOUNTER
[id number] referral all labs and all imaging to [phone].  The doctor will review and call the patient to schedule.

## 2024-05-15 ENCOUNTER — OFFICE VISIT (OUTPATIENT)
Dept: ONCOLOGY | Age: 54
End: 2024-05-15
Payer: COMMERCIAL

## 2024-05-15 ENCOUNTER — HOSPITAL ENCOUNTER (OUTPATIENT)
Dept: INFUSION THERAPY | Age: 54
Discharge: HOME | End: 2024-05-15
Payer: COMMERCIAL

## 2024-05-15 VITALS
TEMPERATURE: 98.1 F | HEART RATE: 79 BPM | SYSTOLIC BLOOD PRESSURE: 133 MMHG | HEIGHT: 65 IN | OXYGEN SATURATION: 96 % | RESPIRATION RATE: 16 BRPM | WEIGHT: 195 LBS | DIASTOLIC BLOOD PRESSURE: 64 MMHG | BODY MASS INDEX: 32.49 KG/M2

## 2024-05-15 VITALS
SYSTOLIC BLOOD PRESSURE: 168 MMHG | DIASTOLIC BLOOD PRESSURE: 92 MMHG | TEMPERATURE: 98.2 F | WEIGHT: 195 LBS | OXYGEN SATURATION: 96 % | BODY MASS INDEX: 32.49 KG/M2 | HEART RATE: 101 BPM | HEIGHT: 65 IN | RESPIRATION RATE: 16 BRPM

## 2024-05-15 DIAGNOSIS — Z15.01 BRCA1 GENE MUTATION POSITIVE IN FEMALE: ICD-10-CM

## 2024-05-15 DIAGNOSIS — Z15.09 BRCA2 GENE MUTATION POSITIVE IN FEMALE: ICD-10-CM

## 2024-05-15 DIAGNOSIS — Z79.899 ENCOUNTER FOR MONITORING CARDIOTOXIC DRUG THERAPY: Primary | ICD-10-CM

## 2024-05-15 DIAGNOSIS — Z15.09 BRCA1 GENE MUTATION POSITIVE IN FEMALE: ICD-10-CM

## 2024-05-15 DIAGNOSIS — Z17.0 CARCINOMA OF UPPER-OUTER QUADRANT OF LEFT BREAST IN FEMALE, ESTROGEN RECEPTOR POSITIVE (HCC): Primary | ICD-10-CM

## 2024-05-15 DIAGNOSIS — Z15.02 BRCA1 GENE MUTATION POSITIVE IN FEMALE: ICD-10-CM

## 2024-05-15 DIAGNOSIS — Z15.01 BRCA2 GENE MUTATION POSITIVE IN FEMALE: ICD-10-CM

## 2024-05-15 DIAGNOSIS — Z51.81 ENCOUNTER FOR MONITORING CARDIOTOXIC DRUG THERAPY: Primary | ICD-10-CM

## 2024-05-15 DIAGNOSIS — C50.412 CARCINOMA OF UPPER-OUTER QUADRANT OF LEFT BREAST IN FEMALE, ESTROGEN RECEPTOR POSITIVE (HCC): Primary | ICD-10-CM

## 2024-05-15 DIAGNOSIS — Z15.02 BRCA2 GENE MUTATION POSITIVE IN FEMALE: ICD-10-CM

## 2024-05-15 DIAGNOSIS — C50.912 INVASIVE DUCTAL CARCINOMA OF BREAST, FEMALE, LEFT (HCC): ICD-10-CM

## 2024-05-15 LAB
ALBUMIN SERPL BCG-MCNC: 4.1 G/DL (ref 3.5–5.1)
ALP SERPL-CCNC: 143 U/L (ref 38–126)
ALT SERPL W/O P-5'-P-CCNC: 18 U/L (ref 11–66)
AST SERPL-CCNC: 21 U/L (ref 5–40)
BASOPHILS ABSOLUTE: 0 THOU/MM3 (ref 0–0.1)
BASOPHILS NFR BLD AUTO: 0 % (ref 0–3)
BILIRUB CONJ SERPL-MCNC: < 0.2 MG/DL (ref 0–0.3)
BILIRUB SERPL-MCNC: 0.3 MG/DL (ref 0.3–1.2)
BUN BLDP-MCNC: 18 MG/DL (ref 8–26)
CHLORIDE BLD-SCNC: 106 MEQ/L (ref 98–109)
CREAT BLD-MCNC: 0.6 MG/DL (ref 0.5–1.2)
EOSINOPHIL NFR BLD AUTO: 2 % (ref 0–4)
EOSINOPHILS ABSOLUTE: 0.2 THOU/MM3 (ref 0–0.4)
ERYTHROCYTE [DISTWIDTH] IN BLOOD BY AUTOMATED COUNT: 13.2 % (ref 11.5–14.5)
GFR SERPL CREATININE-BSD FRML MDRD: > 90 ML/MIN/1.73M2
GLUCOSE BLD-MCNC: 169 MG/DL (ref 70–108)
HCT VFR BLD AUTO: 37.4 % (ref 37–47)
HGB BLD-MCNC: 12.6 GM/DL (ref 12–16)
IMMATURE GRANULOCYTES %: 0 %
IMMATURE GRANULOCYTES ABSOLUTE: 0.01 THOU/MM3 (ref 0–0.07)
IONIZED CALCIUM, WHOLE BLOOD: 1.17 MMOL/L (ref 1.12–1.32)
LYMPHOCYTES ABSOLUTE: 2.3 THOU/MM3 (ref 1–4.8)
LYMPHOCYTES NFR BLD AUTO: 37 % (ref 15–47)
MCH RBC QN AUTO: 28.8 PG (ref 26–33)
MCHC RBC AUTO-ENTMCNC: 33.7 GM/DL (ref 32.2–35.5)
MCV RBC AUTO: 85 FL (ref 81–99)
MONOCYTES ABSOLUTE: 0.3 THOU/MM3 (ref 0.4–1.3)
MONOCYTES NFR BLD AUTO: 5 % (ref 0–12)
NEUTROPHILS ABSOLUTE: 3.5 THOU/MM3 (ref 1.8–7.7)
NEUTROPHILS NFR BLD AUTO: 55 % (ref 43–75)
PLATELET # BLD AUTO: 270 THOU/MM3 (ref 130–400)
PMV BLD AUTO: 8.6 FL (ref 9.4–12.4)
POTASSIUM BLD-SCNC: 3.8 MEQ/L (ref 3.5–4.9)
PROT SERPL-MCNC: 7.4 G/DL (ref 6.1–8)
RBC # BLD AUTO: 4.38 MILL/MM3 (ref 4.2–5.4)
SODIUM BLD-SCNC: 143 MEQ/L (ref 138–146)
TOTAL CO2, WHOLE BLOOD: 26 MEQ/L (ref 23–33)
WBC # BLD AUTO: 6.4 THOU/MM3 (ref 4.8–10.8)

## 2024-05-15 PROCEDURE — 36591 DRAW BLOOD OFF VENOUS DEVICE: CPT

## 2024-05-15 PROCEDURE — 2580000003 HC RX 258: Performed by: INTERNAL MEDICINE

## 2024-05-15 PROCEDURE — 99214 OFFICE O/P EST MOD 30 MIN: CPT | Performed by: INTERNAL MEDICINE

## 2024-05-15 PROCEDURE — 6360000002 HC RX W HCPCS: Performed by: INTERNAL MEDICINE

## 2024-05-15 PROCEDURE — 96413 CHEMO IV INFUSION 1 HR: CPT

## 2024-05-15 PROCEDURE — 80047 BASIC METABLC PNL IONIZED CA: CPT

## 2024-05-15 PROCEDURE — 85025 COMPLETE CBC W/AUTO DIFF WBC: CPT

## 2024-05-15 PROCEDURE — 96417 CHEMO IV INFUS EACH ADDL SEQ: CPT

## 2024-05-15 PROCEDURE — 99211 OFF/OP EST MAY X REQ PHY/QHP: CPT

## 2024-05-15 PROCEDURE — 80076 HEPATIC FUNCTION PANEL: CPT

## 2024-05-15 RX ORDER — ALBUTEROL SULFATE 90 UG/1
4 AEROSOL, METERED RESPIRATORY (INHALATION) PRN
Status: CANCELLED | OUTPATIENT
Start: 2024-05-15

## 2024-05-15 RX ORDER — SODIUM CHLORIDE 9 MG/ML
25 INJECTION, SOLUTION INTRAVENOUS PRN
OUTPATIENT
Start: 2024-05-15

## 2024-05-15 RX ORDER — FAMOTIDINE 10 MG/ML
20 INJECTION, SOLUTION INTRAVENOUS
Status: CANCELLED | OUTPATIENT
Start: 2024-05-15

## 2024-05-15 RX ORDER — EPINEPHRINE 1 MG/ML
0.3 INJECTION, SOLUTION, CONCENTRATE INTRAVENOUS PRN
Status: CANCELLED | OUTPATIENT
Start: 2024-05-15

## 2024-05-15 RX ORDER — DIPHENHYDRAMINE HYDROCHLORIDE 50 MG/ML
50 INJECTION INTRAMUSCULAR; INTRAVENOUS
Status: CANCELLED | OUTPATIENT
Start: 2024-05-15

## 2024-05-15 RX ORDER — SODIUM CHLORIDE 9 MG/ML
5-250 INJECTION, SOLUTION INTRAVENOUS PRN
Status: DISCONTINUED | OUTPATIENT
Start: 2024-05-15 | End: 2024-05-16 | Stop reason: HOSPADM

## 2024-05-15 RX ORDER — ONDANSETRON 2 MG/ML
8 INJECTION INTRAMUSCULAR; INTRAVENOUS
Status: CANCELLED | OUTPATIENT
Start: 2024-05-15

## 2024-05-15 RX ORDER — SODIUM CHLORIDE 0.9 % (FLUSH) 0.9 %
5-40 SYRINGE (ML) INJECTION PRN
OUTPATIENT
Start: 2024-05-15

## 2024-05-15 RX ORDER — HEPARIN SODIUM (PORCINE) LOCK FLUSH IV SOLN 100 UNIT/ML 100 UNIT/ML
500 SOLUTION INTRAVENOUS PRN
Status: CANCELLED | OUTPATIENT
Start: 2024-05-15

## 2024-05-15 RX ORDER — ACETAMINOPHEN 325 MG/1
650 TABLET ORAL
Status: CANCELLED | OUTPATIENT
Start: 2024-05-15

## 2024-05-15 RX ORDER — SODIUM CHLORIDE 9 MG/ML
25 INJECTION, SOLUTION INTRAVENOUS PRN
Status: CANCELLED | OUTPATIENT
Start: 2024-05-15

## 2024-05-15 RX ORDER — MEPERIDINE HYDROCHLORIDE 50 MG/ML
12.5 INJECTION INTRAMUSCULAR; INTRAVENOUS; SUBCUTANEOUS PRN
Status: CANCELLED | OUTPATIENT
Start: 2024-05-15

## 2024-05-15 RX ORDER — SODIUM CHLORIDE 0.9 % (FLUSH) 0.9 %
5-40 SYRINGE (ML) INJECTION PRN
Status: DISCONTINUED | OUTPATIENT
Start: 2024-05-15 | End: 2024-05-16 | Stop reason: HOSPADM

## 2024-05-15 RX ORDER — SODIUM CHLORIDE 9 MG/ML
5-250 INJECTION, SOLUTION INTRAVENOUS PRN
Status: CANCELLED | OUTPATIENT
Start: 2024-05-15

## 2024-05-15 RX ORDER — HEPARIN 100 UNIT/ML
500 SYRINGE INTRAVENOUS PRN
OUTPATIENT
Start: 2024-05-15

## 2024-05-15 RX ORDER — HEPARIN 100 UNIT/ML
500 SYRINGE INTRAVENOUS PRN
Status: DISCONTINUED | OUTPATIENT
Start: 2024-05-15 | End: 2024-05-16 | Stop reason: HOSPADM

## 2024-05-15 RX ORDER — SODIUM CHLORIDE 9 MG/ML
INJECTION, SOLUTION INTRAVENOUS CONTINUOUS
Status: CANCELLED | OUTPATIENT
Start: 2024-05-15

## 2024-05-15 RX ORDER — SODIUM CHLORIDE 0.9 % (FLUSH) 0.9 %
5-40 SYRINGE (ML) INJECTION PRN
Status: CANCELLED | OUTPATIENT
Start: 2024-05-15

## 2024-05-15 RX ADMIN — PERTUZUMAB 420 MG: 30 INJECTION, SOLUTION, CONCENTRATE INTRAVENOUS at 10:57

## 2024-05-15 RX ADMIN — Medication 500 UNITS: at 11:39

## 2024-05-15 RX ADMIN — SODIUM CHLORIDE, PRESERVATIVE FREE 10 ML: 5 INJECTION INTRAVENOUS at 09:59

## 2024-05-15 RX ADMIN — SODIUM CHLORIDE, PRESERVATIVE FREE 10 ML: 5 INJECTION INTRAVENOUS at 11:39

## 2024-05-15 RX ADMIN — SODIUM CHLORIDE 20 ML/HR: 9 INJECTION, SOLUTION INTRAVENOUS at 10:01

## 2024-05-15 RX ADMIN — SODIUM CHLORIDE 525 MG: 9 INJECTION, SOLUTION INTRAVENOUS at 10:20

## 2024-05-15 NOTE — DISCHARGE INSTRUCTIONS
Please contact your Oncologist if you have any questions regarding the therapy Trazimera and Perjeta that you received today.      Patient instructed if experience any of the symptoms following today's therapy / to notify MD immediately or go to emergency department.    * dizziness/lightheadedness  *acute nausea/vomiting - not relieved with medication  *headache - not relieved from Tylenol/pain medication  *chest pain/pressure  *rash/itching  *shortness of breath        Drink fluids - 48oz fluids daily  Call if develop fever/ chills/ signs or symptoms of infection

## 2024-05-15 NOTE — PATIENT INSTRUCTIONS
Labs reviewed, proceed with treatment.  Return in about 3 weeks (around 6/5/2024).MD visit+ labs+ treatment visit  Orders Placed This Encounter   Procedures    CBC with Auto Differential    Hepatic Function Panel    POC PANEL BMP W/IOCA   ECHO ordered.  Discussed using lactaid with each meal and assess response to diarrhea.  Continue with letrazole daily.           done

## 2024-05-15 NOTE — PLAN OF CARE
Problem: Chronic Conditions and Co-morbidities  Goal: Patient's chronic conditions and co-morbidity symptoms are monitored and maintained or improved  Outcome: Adequate for Discharge  Flowsheets (Taken 5/15/2024 1110)  Care Plan - Patient's Chronic Conditions and Co-Morbidity Symptoms are Monitored and Maintained or Improved: Monitor and assess patient's chronic conditions and comorbid symptoms for stability, deterioration, or improvement  Note:   Chemotherapy Teaching     What is Chemotherapy   Drug action [x]   Method of Administration [x]   Handouts given []     Side Effects  Nausea/vomiting [x]   Diarrhea [x]   Fatigue [x]   Signs / Symptoms of infection [x]   Neutropenia [x]   Thrombocytopenia [x]   Alopecia [x]   neuropathy [x]   [place] diet &  the importance of fluids [x]       Micellaneous  Importance of nutrition [x]   Importance of oral hygiene [x]   When to call the MD [x]   Monitoring labs [x]   Use of supportive services []     Explanation of Drug Regimen / Frequency  Trazimera/Perjeta     Comments  Verbalized understanding to drug,action,side effects and when to call MD      Problem: Safety - Adult  Goal: Free from fall injury  Outcome: Adequate for Discharge  Flowsheets (Taken 5/15/2024 1110)  Free From Fall Injury: Instruct family/caregiver on patient safety  Note: No falls occurred with visit today.     Problem: Discharge Planning  Goal: Discharge to home or other facility with appropriate resources  Outcome: Adequate for Discharge  Flowsheets (Taken 5/15/2024 1110)  Discharge to home or other facility with appropriate resources: Identify barriers to discharge with patient and caregiver  Note: Verbalized understanding of discharge instructions, follow-up appointments, and when to call the physician.     Problem: Infection - Adult  Goal: Absence of infection at discharge  Outcome: Adequate for Discharge  Flowsheets (Taken 5/15/2024 1110)  Absence of infection at discharge: Assess and monitor for

## 2024-05-15 NOTE — PROGRESS NOTES
Patient assessed for the following post immunotherapy:    Dizziness   No  Lightheadedness  No      Acute nausea/vomiting No  Headache   No  Chest pain/pressure  No  Rash/itching   No  Shortness of breath  No    Patient kept for 20 minutes observation post infusion immunotherapy.    Patient tolerated immunotherapy treatment Trazimera and Perjeta without any complications.    Last vital signs:   /64   Pulse 79   Temp 98.1 °F (36.7 °C) (Oral)   Resp 16   Ht 1.651 m (5' 5\")   Wt 88.5 kg (195 lb)   LMP 01/01/2008   SpO2 96%   BMI 32.45 kg/m²       Patient instructed if experience any of the above symptoms following today's infusion,he/she is to notify MD immediately or go to the emergency department.    Discharge instructions given to patient. Verbalizes understanding. Ambulated off unit per self with belongings.

## 2024-05-15 NOTE — PROGRESS NOTES
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    We previously discussed Trastuzumab and its biosimilars have increased risk of cardiotoxicity up to cardiac failure (will require frequent Echos-first 4/25/23 normal EF 55%), dyspnea, peripheral edema and can cause fetal abnormalities during pregnancy.  Pertuzumab in particular has increased risk of diarrhea, myelosuppression, rash and fatigue.  The patient will likely require Neulasta or bio similar to decrease risk of infection during this treatment side effects including not limited to bone aches, nausea, flushing, and rarely ARDS and splenic rupture.    -Pt had bone scan 5/15/23 for arthralgias by PCP-confirmed degenerative disease, no areas concerning for metastatic disease noted  -s/p Mediport for therapy. (Got 4/24/23)  -Has EMLA cream  Her final pathology 10/11/2023 did show complete pathologic response, 2/2 final pathology and b/l mastectomy, radiation not required.  -2/2 BRCA+ status, pt already had a genetics counselor see below  -see below Gyn    -PLAN: TCHP x 6 cycles (completed), followed by reimaging/resection by Dr. Barfield(completed), followed by completion of 1 year of HP

## 2024-05-20 ENCOUNTER — OFFICE VISIT (OUTPATIENT)
Dept: SURGERY | Age: 54
End: 2024-05-20
Payer: COMMERCIAL

## 2024-05-20 VITALS
HEIGHT: 65 IN | OXYGEN SATURATION: 97 % | SYSTOLIC BLOOD PRESSURE: 110 MMHG | RESPIRATION RATE: 18 BRPM | HEART RATE: 85 BPM | BODY MASS INDEX: 32.27 KG/M2 | WEIGHT: 193.7 LBS | TEMPERATURE: 97 F | DIASTOLIC BLOOD PRESSURE: 70 MMHG

## 2024-05-20 DIAGNOSIS — Z17.0 CARCINOMA OF UPPER-OUTER QUADRANT OF LEFT BREAST IN FEMALE, ESTROGEN RECEPTOR POSITIVE (HCC): Primary | ICD-10-CM

## 2024-05-20 DIAGNOSIS — C50.412 CARCINOMA OF UPPER-OUTER QUADRANT OF LEFT BREAST IN FEMALE, ESTROGEN RECEPTOR POSITIVE (HCC): Primary | ICD-10-CM

## 2024-05-20 DIAGNOSIS — Z15.09 BRCA2 GENE MUTATION POSITIVE: ICD-10-CM

## 2024-05-20 DIAGNOSIS — F17.200 TOBACCO DEPENDENCE: ICD-10-CM

## 2024-05-20 DIAGNOSIS — Z15.01 BRCA2 GENE MUTATION POSITIVE: ICD-10-CM

## 2024-05-20 DIAGNOSIS — Z90.13 H/O BILATERAL MASTECTOMY: ICD-10-CM

## 2024-05-20 DIAGNOSIS — E11.9 TYPE 2 DIABETES MELLITUS TREATED WITHOUT INSULIN (HCC): ICD-10-CM

## 2024-05-20 DIAGNOSIS — Z15.09 BRCA1 GENE MUTATION POSITIVE: ICD-10-CM

## 2024-05-20 DIAGNOSIS — Z15.01 BRCA1 GENE MUTATION POSITIVE: ICD-10-CM

## 2024-05-20 PROCEDURE — 99214 OFFICE O/P EST MOD 30 MIN: CPT | Performed by: SURGERY

## 2024-05-20 ASSESSMENT — ENCOUNTER SYMPTOMS
BLOOD IN STOOL: 0
COLOR CHANGE: 0
SORE THROAT: 0
ABDOMINAL PAIN: 0
WHEEZING: 0
VOICE CHANGE: 0
COUGH: 0
SHORTNESS OF BREATH: 0
VOMITING: 0
TROUBLE SWALLOWING: 0
NAUSEA: 0

## 2024-05-20 NOTE — PROGRESS NOTES
images 19 and 18 series 4. These appear stable from prior   MRI examination from July 2020.     IMPRESSION:  1. There is a 2.1 x 1.9 x 1.6 cm area of enhancement with washout kinetics located within the left upper outer quadrant anterior depth. This is consistent with postbiopsy changes and known biopsy proven malignancy. This is seen on axial image 89 series   11.     2. There is a 9 x 6 x 6 mm oval lesion within the upper outer quadrant left breast posterior depth. This is seen on axial image 95 series 11. This correlates with biopsy-proven malignancy and postbiopsy change.     3. There is a 1 cm oval nodule with washout kinetics within the right retroareolar region correlating with known biopsy proven malignancy and postbiopsy change. This is seen on axial image 87 series 11.        Follow-up with ACR/ACS guidelines.     This patient has a history of breast cancer diagnosed when she was <50..  Based on the ACR guidelines for breast cancer screening for women with a higher than average risk for developing breast cancer, yearly screening breast MRI  with and without   contrast is recommended in addition to yearly mammography.     BI-RADS CATEGORY 6: Known biopsy proven malignancy.     Management: Surgical excision when clinically appropriate        **This report has been created using voice recognition software. It may contain minor errors which are inherent in voice recognition technology.**     Final report electronically signed by Dr. Chavo Nick on 4/18/2023 9:23 AM                Exam Ended: 04/17/23 16:56 EDT Last Resulted: 04/18/23 10:23 EDT              Reading Physician Reading Date Result Priority   Shad Tanner MD  [phone] 5/15/2023      Narrative & Impression  PROCEDURE: NM BONE SCAN WHOLE BODY     CLINICAL INFORMATION: Breast cancer     Radiopharmaceutical: 30 mCi technetium 99m MDP, intravenously     TECHNIQUE: Delayed anterior and posterior whole-body planar images were obtained 3 hours

## 2024-05-22 ASSESSMENT — ENCOUNTER SYMPTOMS: BACK PAIN: 0

## 2024-05-24 ENCOUNTER — HOSPITAL ENCOUNTER (OUTPATIENT)
Age: 54
End: 2024-05-24
Attending: INTERNAL MEDICINE
Payer: COMMERCIAL

## 2024-05-24 DIAGNOSIS — Z79.899 ENCOUNTER FOR MONITORING CARDIOTOXIC DRUG THERAPY: ICD-10-CM

## 2024-05-24 DIAGNOSIS — Z51.81 ENCOUNTER FOR MONITORING CARDIOTOXIC DRUG THERAPY: ICD-10-CM

## 2024-05-24 LAB
ECHO AO ASC DIAM: 2.9 CM
ECHO AV CUSP MM: 2.3 CM
ECHO AV PEAK GRADIENT: 4 MMHG
ECHO AV PEAK VELOCITY: 1 M/S
ECHO AV VELOCITY RATIO: 0.9
ECHO EST RA PRESSURE: 5 MMHG
ECHO LA AREA 2C: 8.8 CM2
ECHO LA AREA 4C: 11.2 CM2
ECHO LA DIAMETER: 3.4 CM
ECHO LA MAJOR AXIS: 3.8 CM
ECHO LA MINOR AXIS: 2.9 CM
ECHO LA VOL MOD A2C: 20 ML (ref 22–52)
ECHO LA VOL MOD A4C: 26 ML (ref 22–52)
ECHO LV E' LATERAL VELOCITY: 9 CM/S
ECHO LV E' SEPTAL VELOCITY: 8 CM/S
ECHO LV FRACTIONAL SHORTENING: 27 % (ref 28–44)
ECHO LV GLOBAL LONGITUDINAL STRAIN (GLS): -16.9 %
ECHO LV INTERNAL DIMENSION DIASTOLIC: 4.8 CM (ref 3.9–5.3)
ECHO LV INTERNAL DIMENSION SYSTOLIC: 3.5 CM
ECHO LV ISOVOLUMETRIC RELAXATION TIME (IVRT): 95 MS
ECHO LV IVSD: 1 CM (ref 0.6–0.9)
ECHO LV MASS 2D: 170.2 G (ref 67–162)
ECHO LV POSTERIOR WALL DIASTOLIC: 1 CM (ref 0.6–0.9)
ECHO LV RELATIVE WALL THICKNESS RATIO: 0.42
ECHO LVOT PEAK GRADIENT: 3 MMHG
ECHO LVOT PEAK VELOCITY: 0.9 M/S
ECHO MV A VELOCITY: 0.58 M/S
ECHO MV E DECELERATION TIME (DT): 277 MS
ECHO MV E VELOCITY: 0.6 M/S
ECHO MV E/A RATIO: 1.03
ECHO MV E/E' LATERAL: 6.67
ECHO MV E/E' RATIO (AVERAGED): 7.08
ECHO MV E/E' SEPTAL: 7.5
ECHO PV MAX VELOCITY: 0.7 M/S
ECHO PV PEAK GRADIENT: 2 MMHG
ECHO RIGHT VENTRICULAR SYSTOLIC PRESSURE (RVSP): 29 MMHG
ECHO RV INTERNAL DIMENSION: 3 CM
ECHO RV TAPSE: 2.2 CM (ref 1.7–?)
ECHO TV E WAVE: 0.5 M/S
ECHO TV REGURGITANT MAX VELOCITY: 2.47 M/S
ECHO TV REGURGITANT PEAK GRADIENT: 24 MMHG

## 2024-05-24 PROCEDURE — 93306 TTE W/DOPPLER COMPLETE: CPT | Performed by: INTERNAL MEDICINE

## 2024-05-24 PROCEDURE — 93306 TTE W/DOPPLER COMPLETE: CPT

## 2024-05-24 PROCEDURE — 93356 MYOCRD STRAIN IMG SPCKL TRCK: CPT | Performed by: INTERNAL MEDICINE

## 2024-05-29 ENCOUNTER — APPOINTMENT (OUTPATIENT)
Dept: PHARMACY | Age: 54
End: 2024-05-29
Payer: COMMERCIAL

## 2024-05-29 ENCOUNTER — TELEMEDICINE (OUTPATIENT)
Dept: PSYCHIATRY | Age: 54
End: 2024-05-29
Payer: COMMERCIAL

## 2024-05-29 DIAGNOSIS — F41.9 ANXIETY: ICD-10-CM

## 2024-05-29 DIAGNOSIS — E78.00 PURE HYPERCHOLESTEROLEMIA: ICD-10-CM

## 2024-05-29 DIAGNOSIS — F33.42 RECURRENT MAJOR DEPRESSIVE DISORDER, IN FULL REMISSION (HCC): Primary | ICD-10-CM

## 2024-05-29 DIAGNOSIS — G89.29 OTHER CHRONIC PAIN: ICD-10-CM

## 2024-05-29 DIAGNOSIS — G47.00 INSOMNIA, UNSPECIFIED TYPE: ICD-10-CM

## 2024-05-29 PROCEDURE — 99213 OFFICE O/P EST LOW 20 MIN: CPT | Performed by: PSYCHIATRY & NEUROLOGY

## 2024-05-29 RX ORDER — DESVENLAFAXINE SUCCINATE 50 MG/1
50 TABLET, EXTENDED RELEASE ORAL DAILY
Qty: 30 TABLET | Refills: 5 | Status: SHIPPED | OUTPATIENT
Start: 2024-05-29

## 2024-05-29 RX ORDER — GABAPENTIN 600 MG/1
600 TABLET ORAL 3 TIMES DAILY
Qty: 270 TABLET | Refills: 1 | Status: SHIPPED | OUTPATIENT
Start: 2024-05-29 | End: 2024-11-25

## 2024-05-29 RX ORDER — ROSUVASTATIN CALCIUM 10 MG/1
10 TABLET, COATED ORAL NIGHTLY
Qty: 90 TABLET | Refills: 1 | Status: SHIPPED | OUTPATIENT
Start: 2024-05-29

## 2024-05-29 RX ORDER — BUPROPION HYDROCHLORIDE 300 MG/1
300 TABLET ORAL EVERY MORNING
Qty: 30 TABLET | Refills: 5 | Status: SHIPPED | OUTPATIENT
Start: 2024-05-29 | End: 2024-11-25

## 2024-05-29 RX ORDER — BUPROPION HYDROCHLORIDE 150 MG/1
150 TABLET ORAL EVERY MORNING
Qty: 30 TABLET | Refills: 5 | Status: SHIPPED | OUTPATIENT
Start: 2024-05-29

## 2024-05-29 RX ORDER — DESVENLAFAXINE 100 MG/1
100 TABLET, EXTENDED RELEASE ORAL DAILY
Qty: 30 TABLET | Refills: 5 | Status: SHIPPED | OUTPATIENT
Start: 2024-05-29

## 2024-05-29 NOTE — TELEPHONE ENCOUNTER
The pharmacy is  requesting a refill of the below medication which has been pended for you:     Requested Prescriptions     Pending Prescriptions Disp Refills    rosuvastatin (CRESTOR) 10 MG tablet [Pharmacy Med Name: Rosuvastatin Calcium 10 MG Oral Tablet] 90 tablet 1     Sig: Take 1 tablet by mouth nightly       Last Appointment Date: 4/23/2024  Next Appointment Date: 6/10/2024    No Known Allergies

## 2024-05-29 NOTE — PROGRESS NOTES
[facility] [place] [facility]  [facility]  [address] [place] [address] [phone]    Progress Note    Patient:  Esther Valle  YOB: 1970  PCP:  Shaun Jerez MD  Visit Date:  5/29/2024      Chief Complaint   Patient presents with    Follow-up    Medication Check    Anxiety    Depression    Insomnia    Chronic Pain       SUBJECTIVE:      Esther Valle, a 53 y.o. female, presents for a follow up visit.      She presents alone initially. Her 3 yo granddtr and 7yo grandson joined in briefly mid-visit.  Doing \"okay\".   Pain worse with rainy weather.   Living with her dtr and 4 grandkids which keeps her busy. Ages 8, 6, 5, and 3.  Thinks her 5 yo grandson may be on the autism spectrum.   Mood \"not too bad\".   Has some bad days with more depressed mood which she can manage. Medications have felt effective.   Anxiety is under control. Not going anywhere much which triggers the anxiety. Not using Ativan much.   Does most of the housework. Cooking, cleaning. Has to ask her dtr for help.   Sees Rheum soon for wosening low back pain. MRI showed inflammation.   6/5 will be last chemotherapy treatment.   Sleep is poor. Can be limited by pain.   In bed between 9:30am-11. Up by 7-7:30am. Up 3-4 times through the night.    Doesn't want sleep aid d/t back pain, can't be in same position too long or pain worsens.   No SI or psychosis.  Declines to make any medication changes today.       Med Trials: Wellbutrin, Pristiq, Xanax, Viibryd, Abilify, Buspar, Prozac, Zoloft, Lexapro, Cymbalta, ?Effexor, Atarax    OBJECTIVE:  Vitals: LMP 01/01/2008     MENTAL STATUS EXAM:    GENERAL  Build: Normal    Hygiene:  Appropriate in casual dress   SENSORIUM Orientation: Place, Person, Time, & Situation     Consciousness: Alert    ATTENTION   Focused    RELATEDNESS  Cooperative    EYE CONTACT   Good    PSYCHOMOTOR  Normal    SPEECH Volume: Normal     Rate: Normal

## 2024-05-29 NOTE — TELEPHONE ENCOUNTER
Esther called requesting a refill of their:    gabapentin (NEURONTIN) 600 MG tablet TID    Send to [facility] on [address]

## 2024-05-29 NOTE — TELEPHONE ENCOUNTER
Date of last visit:  4/23/2024  Date of next visit:  6/10/2024    Requested Prescriptions     Pending Prescriptions Disp Refills    gabapentin (NEURONTIN) 600 MG tablet 270 tablet 1     Sig: Take 1 tablet by mouth 3 times daily for 180 days.

## 2024-05-31 LAB
CHOLESTEROL, TOTAL: 162 MG/DL
CHOLESTEROL/HDL RATIO: 3.6 RATIO
HDLC SERPL-MCNC: 45 MG/DL
LDL CHOLESTEROL: 94 MG/DL
LDL/HDL RATIO: 2.1 RATIO
TRIGL SERPL-MCNC: 114 MG/DL
VLDLC SERPL CALC-MCNC: 23 MG/DL

## 2024-06-04 ENCOUNTER — TELEPHONE (OUTPATIENT)
Dept: FAMILY MEDICINE CLINIC | Age: 54
End: 2024-06-04

## 2024-06-04 DIAGNOSIS — E78.00 PURE HYPERCHOLESTEROLEMIA: Primary | ICD-10-CM

## 2024-06-04 NOTE — TELEPHONE ENCOUNTER
----- Message from Shaun Jerez MD sent at 6/4/2024  2:07 PM EDT -----  Let her know that the lab looked well. Continue the diet and weight loss and meds. Check the fasting labs again in early December .

## 2024-06-05 ENCOUNTER — TELEPHONE (OUTPATIENT)
Dept: PHARMACY | Age: 54
End: 2024-06-05

## 2024-06-05 ENCOUNTER — HOSPITAL ENCOUNTER (OUTPATIENT)
Dept: INFUSION THERAPY | Age: 54
Discharge: HOME | End: 2024-06-05
Payer: COMMERCIAL

## 2024-06-05 ENCOUNTER — OFFICE VISIT (OUTPATIENT)
Dept: ONCOLOGY | Age: 54
End: 2024-06-05

## 2024-06-05 ENCOUNTER — HOSPITAL ENCOUNTER (OUTPATIENT)
Dept: INFUSION THERAPY | Age: 54
Discharge: HOME | End: 2024-06-05

## 2024-06-05 VITALS
RESPIRATION RATE: 18 BRPM | SYSTOLIC BLOOD PRESSURE: 123 MMHG | DIASTOLIC BLOOD PRESSURE: 79 MMHG | OXYGEN SATURATION: 99 % | BODY MASS INDEX: 31.62 KG/M2 | HEART RATE: 78 BPM | WEIGHT: 190 LBS | TEMPERATURE: 97.3 F

## 2024-06-05 VITALS
RESPIRATION RATE: 16 BRPM | HEART RATE: 73 BPM | WEIGHT: 190 LBS | SYSTOLIC BLOOD PRESSURE: 136 MMHG | OXYGEN SATURATION: 96 % | TEMPERATURE: 97.3 F | HEIGHT: 65 IN | BODY MASS INDEX: 31.65 KG/M2 | DIASTOLIC BLOOD PRESSURE: 69 MMHG

## 2024-06-05 DIAGNOSIS — Z17.0 CARCINOMA OF UPPER-OUTER QUADRANT OF LEFT BREAST IN FEMALE, ESTROGEN RECEPTOR POSITIVE (HCC): Primary | ICD-10-CM

## 2024-06-05 DIAGNOSIS — C50.912 INVASIVE DUCTAL CARCINOMA OF BREAST, FEMALE, LEFT (HCC): ICD-10-CM

## 2024-06-05 DIAGNOSIS — Z15.09 BRCA1 GENE MUTATION POSITIVE IN FEMALE: ICD-10-CM

## 2024-06-05 DIAGNOSIS — Z51.81 ENCOUNTER FOR MONITORING AROMATASE INHIBITOR THERAPY: ICD-10-CM

## 2024-06-05 DIAGNOSIS — Z15.09 BRCA2 GENE MUTATION POSITIVE IN FEMALE: ICD-10-CM

## 2024-06-05 DIAGNOSIS — Z51.81 ENCOUNTER FOR MONITORING CARDIOTOXIC DRUG THERAPY: ICD-10-CM

## 2024-06-05 DIAGNOSIS — Z79.899 ENCOUNTER FOR MONITORING CARDIOTOXIC DRUG THERAPY: ICD-10-CM

## 2024-06-05 DIAGNOSIS — Z15.02 BRCA2 GENE MUTATION POSITIVE IN FEMALE: ICD-10-CM

## 2024-06-05 DIAGNOSIS — Z72.0 TOBACCO USE: ICD-10-CM

## 2024-06-05 DIAGNOSIS — Z15.02 BRCA1 GENE MUTATION POSITIVE IN FEMALE: ICD-10-CM

## 2024-06-05 DIAGNOSIS — C50.412 CARCINOMA OF UPPER-OUTER QUADRANT OF LEFT BREAST IN FEMALE, ESTROGEN RECEPTOR POSITIVE (HCC): Primary | ICD-10-CM

## 2024-06-05 DIAGNOSIS — Z79.811 ENCOUNTER FOR MONITORING AROMATASE INHIBITOR THERAPY: ICD-10-CM

## 2024-06-05 DIAGNOSIS — Z15.01 BRCA1 GENE MUTATION POSITIVE IN FEMALE: ICD-10-CM

## 2024-06-05 DIAGNOSIS — Z15.01 BRCA2 GENE MUTATION POSITIVE IN FEMALE: ICD-10-CM

## 2024-06-05 LAB
ALBUMIN SERPL BCG-MCNC: 4 G/DL (ref 3.5–5.1)
ALP SERPL-CCNC: 133 U/L (ref 38–126)
ALT SERPL W/O P-5'-P-CCNC: 7 U/L (ref 11–66)
AST SERPL-CCNC: 12 U/L (ref 5–40)
BASOPHILS ABSOLUTE: 0 THOU/MM3 (ref 0–0.1)
BASOPHILS NFR BLD AUTO: 0 % (ref 0–3)
BILIRUB CONJ SERPL-MCNC: < 0.2 MG/DL (ref 0–0.3)
BILIRUB SERPL-MCNC: 0.2 MG/DL (ref 0.3–1.2)
BUN BLDP-MCNC: 19 MG/DL (ref 8–26)
CHLORIDE BLD-SCNC: 106 MEQ/L (ref 98–109)
CREAT BLD-MCNC: 0.8 MG/DL (ref 0.5–1.2)
EOSINOPHIL NFR BLD AUTO: 2 % (ref 0–4)
EOSINOPHILS ABSOLUTE: 0.1 THOU/MM3 (ref 0–0.4)
ERYTHROCYTE [DISTWIDTH] IN BLOOD BY AUTOMATED COUNT: 13.2 % (ref 11.5–14.5)
GFR SERPL CREATININE-BSD FRML MDRD: 88 ML/MIN/1.73M2
GLUCOSE BLD-MCNC: 127 MG/DL (ref 70–108)
HCT VFR BLD AUTO: 36.2 % (ref 37–47)
HGB BLD-MCNC: 12.2 GM/DL (ref 12–16)
IMMATURE GRANULOCYTES %: 0 %
IMMATURE GRANULOCYTES ABSOLUTE: 0.01 THOU/MM3 (ref 0–0.07)
IONIZED CALCIUM, WHOLE BLOOD: 1.21 MMOL/L (ref 1.12–1.32)
LYMPHOCYTES ABSOLUTE: 2.8 THOU/MM3 (ref 1–4.8)
LYMPHOCYTES NFR BLD AUTO: 35 % (ref 15–47)
MCH RBC QN AUTO: 28.9 PG (ref 26–33)
MCHC RBC AUTO-ENTMCNC: 33.7 GM/DL (ref 32.2–35.5)
MCV RBC AUTO: 86 FL (ref 81–99)
MONOCYTES ABSOLUTE: 0.5 THOU/MM3 (ref 0.4–1.3)
MONOCYTES NFR BLD AUTO: 7 % (ref 0–12)
NEUTROPHILS ABSOLUTE: 4.4 THOU/MM3 (ref 1.8–7.7)
NEUTROPHILS NFR BLD AUTO: 56 % (ref 43–75)
PLATELET # BLD AUTO: 309 THOU/MM3 (ref 130–400)
PMV BLD AUTO: 8.9 FL (ref 9.4–12.4)
POTASSIUM BLD-SCNC: 3.8 MEQ/L (ref 3.5–4.9)
PROT SERPL-MCNC: 7.6 G/DL (ref 6.1–8)
RBC # BLD AUTO: 4.22 MILL/MM3 (ref 4.2–5.4)
SODIUM BLD-SCNC: 143 MEQ/L (ref 138–146)
TOTAL CO2, WHOLE BLOOD: 30 MEQ/L (ref 23–33)
WBC # BLD AUTO: 7.9 THOU/MM3 (ref 4.8–10.8)

## 2024-06-05 PROCEDURE — 99211 OFF/OP EST MAY X REQ PHY/QHP: CPT

## 2024-06-05 PROCEDURE — 6360000002 HC RX W HCPCS: Performed by: SPECIALIST

## 2024-06-05 PROCEDURE — 36591 DRAW BLOOD OFF VENOUS DEVICE: CPT

## 2024-06-05 PROCEDURE — 6360000002 HC RX W HCPCS: Performed by: INTERNAL MEDICINE

## 2024-06-05 PROCEDURE — 80047 BASIC METABLC PNL IONIZED CA: CPT

## 2024-06-05 PROCEDURE — 96417 CHEMO IV INFUS EACH ADDL SEQ: CPT

## 2024-06-05 PROCEDURE — 2580000003 HC RX 258: Performed by: INTERNAL MEDICINE

## 2024-06-05 PROCEDURE — 85025 COMPLETE CBC W/AUTO DIFF WBC: CPT

## 2024-06-05 PROCEDURE — 96413 CHEMO IV INFUSION 1 HR: CPT

## 2024-06-05 PROCEDURE — 2580000003 HC RX 258: Performed by: SPECIALIST

## 2024-06-05 PROCEDURE — 80076 HEPATIC FUNCTION PANEL: CPT

## 2024-06-05 RX ORDER — SODIUM CHLORIDE 9 MG/ML
5-250 INJECTION, SOLUTION INTRAVENOUS PRN
Status: CANCELLED | OUTPATIENT
Start: 2024-06-05

## 2024-06-05 RX ORDER — DIPHENHYDRAMINE HYDROCHLORIDE 50 MG/ML
50 INJECTION INTRAMUSCULAR; INTRAVENOUS
Status: CANCELLED | OUTPATIENT
Start: 2024-06-05

## 2024-06-05 RX ORDER — EPINEPHRINE 1 MG/ML
0.3 INJECTION, SOLUTION, CONCENTRATE INTRAVENOUS PRN
Status: CANCELLED | OUTPATIENT
Start: 2024-06-05

## 2024-06-05 RX ORDER — SODIUM CHLORIDE 0.9 % (FLUSH) 0.9 %
5-40 SYRINGE (ML) INJECTION PRN
Status: CANCELLED | OUTPATIENT
Start: 2024-06-05

## 2024-06-05 RX ORDER — SODIUM CHLORIDE 9 MG/ML
5-250 INJECTION, SOLUTION INTRAVENOUS PRN
Status: DISCONTINUED | OUTPATIENT
Start: 2024-06-05 | End: 2024-06-06 | Stop reason: HOSPADM

## 2024-06-05 RX ORDER — HEPARIN SODIUM (PORCINE) LOCK FLUSH IV SOLN 100 UNIT/ML 100 UNIT/ML
500 SOLUTION INTRAVENOUS PRN
Status: CANCELLED | OUTPATIENT
Start: 2024-06-05

## 2024-06-05 RX ORDER — SODIUM CHLORIDE 0.9 % (FLUSH) 0.9 %
5-40 SYRINGE (ML) INJECTION PRN
OUTPATIENT
Start: 2024-06-05

## 2024-06-05 RX ORDER — ALBUTEROL SULFATE 90 UG/1
4 AEROSOL, METERED RESPIRATORY (INHALATION) PRN
Status: CANCELLED | OUTPATIENT
Start: 2024-06-05

## 2024-06-05 RX ORDER — ACETAMINOPHEN 325 MG/1
650 TABLET ORAL
Status: CANCELLED | OUTPATIENT
Start: 2024-06-05

## 2024-06-05 RX ORDER — FAMOTIDINE 10 MG/ML
20 INJECTION, SOLUTION INTRAVENOUS
Status: CANCELLED | OUTPATIENT
Start: 2024-06-05

## 2024-06-05 RX ORDER — HEPARIN 100 UNIT/ML
500 SYRINGE INTRAVENOUS PRN
OUTPATIENT
Start: 2024-06-05

## 2024-06-05 RX ORDER — SODIUM CHLORIDE 0.9 % (FLUSH) 0.9 %
5-40 SYRINGE (ML) INJECTION PRN
Status: DISCONTINUED | OUTPATIENT
Start: 2024-06-05 | End: 2024-06-06 | Stop reason: HOSPADM

## 2024-06-05 RX ORDER — SODIUM CHLORIDE 9 MG/ML
INJECTION, SOLUTION INTRAVENOUS CONTINUOUS
Status: CANCELLED | OUTPATIENT
Start: 2024-06-05

## 2024-06-05 RX ORDER — SODIUM CHLORIDE 9 MG/ML
25 INJECTION, SOLUTION INTRAVENOUS PRN
OUTPATIENT
Start: 2024-06-05

## 2024-06-05 RX ORDER — MEPERIDINE HYDROCHLORIDE 50 MG/ML
12.5 INJECTION INTRAMUSCULAR; INTRAVENOUS; SUBCUTANEOUS PRN
Status: CANCELLED | OUTPATIENT
Start: 2024-06-05

## 2024-06-05 RX ORDER — ONDANSETRON 2 MG/ML
8 INJECTION INTRAMUSCULAR; INTRAVENOUS
Status: CANCELLED | OUTPATIENT
Start: 2024-06-05

## 2024-06-05 RX ORDER — HEPARIN 100 UNIT/ML
500 SYRINGE INTRAVENOUS PRN
Status: DISCONTINUED | OUTPATIENT
Start: 2024-06-05 | End: 2024-06-06 | Stop reason: HOSPADM

## 2024-06-05 RX ADMIN — SODIUM CHLORIDE, PRESERVATIVE FREE 10 ML: 5 INJECTION INTRAVENOUS at 12:43

## 2024-06-05 RX ADMIN — HEPARIN 500 UNITS: 100 SYRINGE at 12:43

## 2024-06-05 RX ADMIN — SODIUM CHLORIDE, PRESERVATIVE FREE 30 ML: 5 INJECTION INTRAVENOUS at 08:50

## 2024-06-05 RX ADMIN — PERTUZUMAB 420 MG: 30 INJECTION, SOLUTION, CONCENTRATE INTRAVENOUS at 11:11

## 2024-06-05 RX ADMIN — SODIUM CHLORIDE 75 ML/HR: 9 INJECTION, SOLUTION INTRAVENOUS at 10:26

## 2024-06-05 RX ADMIN — SODIUM CHLORIDE 525 MG: 9 INJECTION, SOLUTION INTRAVENOUS at 11:54

## 2024-06-05 NOTE — PROGRESS NOTES
Patient tolerated Trazimera/Perjeta without any complications.  Denies dizziness, lightheadedness, acute nausea or vomiting, headache, heart palpitations, rash/itching or increased SOB.    Last vital signs  /79   Pulse 78   Temp 97.3 °F (36.3 °C) (Oral)   Resp 18   Wt 86.2 kg (190 lb)   LMP 01/01/2008   SpO2 99%   BMI 31.62 kg/m²     Patient instructed if they experience any of the above symptoms following today's visit, he/she is to notify the Physician or go to the Emergency Dept.    Discharge instructions given to patient, Verbalizes understanding. Ambulated off unit per self in stable condition with all belongings.

## 2024-06-05 NOTE — PLAN OF CARE
Problem: Chronic Conditions and Co-morbidities  Goal: Patient's chronic conditions and co-morbidity symptoms are monitored and maintained or improved  Outcome: Adequate for Discharge  Flowsheets (Taken 6/5/2024 1332)  Care Plan - Patient's Chronic Conditions and Co-Morbidity Symptoms are Monitored and Maintained or Improved:   Monitor and assess patient's chronic conditions and comorbid symptoms for stability, deterioration, or improvement   Collaborate with multidisciplinary team to address chronic and comorbid conditions and prevent exacerbation or deterioration  Note:   Chemotherapy Teaching     What is Chemotherapy   Drug action [x]   Method of Administration [x]   Handouts given []     Side Effects  Nausea/vomiting [x]   Diarrhea [x]   Fatigue [x]   Signs / Symptoms of infection [x]   Neutropenia [x]   Thrombocytopenia [x]   Alopecia [x]   neuropathy [x]   [place] diet &  the importance of fluids [x]       Micellaneous  Importance of nutrition [x]   Importance of oral hygiene [x]   When to call the MD [x]   Monitoring labs [x]   Use of supportive services []     Explanation of Drug Regimen / Frequency  Trazimera/Perjeta     Comments  Verbalized understanding to drug,action,side effects and when to call MD        Problem: Safety - Adult  Goal: Free from fall injury  Outcome: Adequate for Discharge  Flowsheets (Taken 6/5/2024 1332)  Free From Fall Injury: Instruct family/caregiver on patient safety     Problem: Discharge Planning  Goal: Discharge to home or other facility with appropriate resources  Outcome: Adequate for Discharge  Flowsheets (Taken 6/5/2024 1332)  Discharge to home or other facility with appropriate resources: Identify barriers to discharge with patient and caregiver     Problem: Infection - Adult  Goal: Absence of infection at discharge  Outcome: Adequate for Discharge  Flowsheets (Taken 6/5/2024 1332)  Absence of infection at discharge:   Assess and monitor for signs and symptoms of  infection   Monitor all insertion sites i.e., indwelling lines, tubes and drains  Note: Mediport site with no redness or warmth. Skin over port site intact with no signs of breakdown noted. Patient verbalizes signs/symptoms of port infection and when to notify the physician.     Care plan reviewed with patient.  Patient verbalizes understanding of the plan of care and contribute to goal setting.

## 2024-06-05 NOTE — ONCOLOGY
Chemotherapy Administration    Pre-assessment Data: Antineoplastic Agents  See toxicity flow sheet for assessment                                          [x]         Interventions:   Chemotherapy SQ injection given []   Taxol administered-VS per protocol []   Blood pressure meds held 12 hours prior to Rituxan/Ruxience []   Rituxan/Ruxience administered- VS and precautions per guidelines []   Emergency drugs available as appropriate [x]   Anaphylaxis assessment completed [x]   Pre-medications administered as ordered [x]   Blood return noted upon initiation of chemotherapy [x]   Blood return noted each 1-2ml of a vesicant medication if given IV push []   Navelbine, Vincristine and Velban given as a monitored wide open drip, blood return noted before during and after infusion. []   Blood return noted each 2-3ml of a non-vesicant medication if given IV push []   Patient aware of potential Immunotherapy toxicities [x]   Monitor for signs / symptoms of hypersensitivity reaction [x]   Chemotherapy orders (drug/dose/rate) verified by 2 Chemo certified RN’s [x]   Monitor IV site and blood return throughout the infusion of the medication [x]   Document IV site checks on the IV assessment form [x]   Document chemotherapy teaching on the Patient Education tab [x]   Document patient verbalizes understanding of medications being administered [x]   If IV infiltration, see ONS Guidelines []   Other:     Perjeta/Trazimera []

## 2024-06-05 NOTE — PROGRESS NOTES
[facility] [place] [facility]  [facility]  [address]  [address]  [place] [id number]  Dept: [phone]  Loc: [phone]   Hematology/Oncology Progress Note (Clinic)        Esther Zaragozar  1970 6/5/2024     Referring Provider: No ref. provider found   PCP: Shaun Jerez MD       Assessment & Plan:   52 y/o  woman, with past medical history significant for hypertension, GERD, type 2 diabetes mellitus w/o insulin, who is here for follow-up for her triple positive outer left breast cancer, also found to have BRCA 1&2. She is s/p b/l mastectomy and SBO      PLAN:   Proceed with last treatment of Herceptin and Perjeta today  Follow-up with MD in 5 weeks 07/10/2024  CBC/CCP SAME DAY  Continue Letrozole 2.5 mg daily x 7-10 years  Smoking cessation - Pt is trying on her own  Work on Wt. Loss    Discussed with patient and she agrees with the plan above    Reason for Visit:   Chief Complaint   Patient presents with    Follow-up     Carcinoma of upper-outer quadrant of left breast in female, estrogen receptor positive (HCC)      Oncology Treatment  -5/1/23 C1D1 TCHP complicated by diarrhea, but refused to do Imodium because in the past Imodium had caused abdominal cramps. mild nausea vomiting and required Zofran on 2 occasions  -5/22/23 C2D1 TCHP, complicated by diarrhea, which was better; didn't need Imodium. mild nausea vomiting.    -6/12/23 C3D1 TCHP, complicated by N/V/diarrhea  -7/12/2023 C4D1,TCHP complicated by diarrhea and constipation  -8/2/2023 C5D1, TCHP complicated by diarrhea and constipation, hypokalemia  -8/23/23 C6D1, TCHP complicated by diarrhea and constipation, hypokalemia, required coming in the following week for IV hydration and electrolyte supplementation  -9/14/2023 HP only - ONGOING  -10/11/2023 Left breast, mastectomy, sentinel LND and Right breast, prophylactic mastectomy  -10/11/2023 BSO    FAMILY Hx:  includes Breast Cancer in an

## 2024-06-05 NOTE — PATIENT INSTRUCTIONS
Proceed with last treatment of Herceptin and Perjeta today  Follow-up with MD in 4 weeks   CBC/CCP SAME DAY  Continue Letrozole 2.5 mg daily  Smoking cessation  Work on Wt. Loss

## 2024-06-10 ENCOUNTER — OFFICE VISIT (OUTPATIENT)
Dept: FAMILY MEDICINE CLINIC | Age: 54
End: 2024-06-10

## 2024-06-10 VITALS
SYSTOLIC BLOOD PRESSURE: 134 MMHG | WEIGHT: 193.38 LBS | BODY MASS INDEX: 32.22 KG/M2 | HEART RATE: 70 BPM | DIASTOLIC BLOOD PRESSURE: 70 MMHG | HEIGHT: 65 IN | RESPIRATION RATE: 16 BRPM

## 2024-06-10 DIAGNOSIS — E11.9 TYPE 2 DIABETES MELLITUS TREATED WITHOUT INSULIN (HCC): Primary | ICD-10-CM

## 2024-06-10 DIAGNOSIS — Z00.00 MEDICARE ANNUAL WELLNESS VISIT, SUBSEQUENT: ICD-10-CM

## 2024-06-10 DIAGNOSIS — I10 PRIMARY HYPERTENSION: ICD-10-CM

## 2024-06-10 DIAGNOSIS — C50.912 INVASIVE DUCTAL CARCINOMA OF BREAST, FEMALE, LEFT (HCC): ICD-10-CM

## 2024-06-10 LAB
CHP ED QC CHECK: NORMAL
GLUCOSE BLD-MCNC: 112 MG/DL
HBA1C MFR BLD: 6.6 %

## 2024-06-10 PROCEDURE — 99213 OFFICE O/P EST LOW 20 MIN: CPT | Performed by: FAMILY MEDICINE

## 2024-06-10 PROCEDURE — 83036 HEMOGLOBIN GLYCOSYLATED A1C: CPT | Performed by: FAMILY MEDICINE

## 2024-06-10 PROCEDURE — 82962 GLUCOSE BLOOD TEST: CPT | Performed by: FAMILY MEDICINE

## 2024-06-10 PROCEDURE — G0439 PPPS, SUBSEQ VISIT: HCPCS | Performed by: FAMILY MEDICINE

## 2024-06-10 ASSESSMENT — PATIENT HEALTH QUESTIONNAIRE - PHQ9
SUM OF ALL RESPONSES TO PHQ QUESTIONS 1-9: 7
2. FEELING DOWN, DEPRESSED OR HOPELESS: SEVERAL DAYS
8. MOVING OR SPEAKING SO SLOWLY THAT OTHER PEOPLE COULD HAVE NOTICED. OR THE OPPOSITE, BEING SO FIGETY OR RESTLESS THAT YOU HAVE BEEN MOVING AROUND A LOT MORE THAN USUAL: NOT AT ALL
9. THOUGHTS THAT YOU WOULD BE BETTER OFF DEAD, OR OF HURTING YOURSELF: NOT AT ALL
6. FEELING BAD ABOUT YOURSELF - OR THAT YOU ARE A FAILURE OR HAVE LET YOURSELF OR YOUR FAMILY DOWN: NOT AT ALL
SUM OF ALL RESPONSES TO PHQ QUESTIONS 1-9: 7
SUM OF ALL RESPONSES TO PHQ QUESTIONS 1-9: 7
10. IF YOU CHECKED OFF ANY PROBLEMS, HOW DIFFICULT HAVE THESE PROBLEMS MADE IT FOR YOU TO DO YOUR WORK, TAKE CARE OF THINGS AT HOME, OR GET ALONG WITH OTHER PEOPLE: NOT DIFFICULT AT ALL
1. LITTLE INTEREST OR PLEASURE IN DOING THINGS: SEVERAL DAYS
SUM OF ALL RESPONSES TO PHQ QUESTIONS 1-9: 7
SUM OF ALL RESPONSES TO PHQ9 QUESTIONS 1 & 2: 2
4. FEELING TIRED OR HAVING LITTLE ENERGY: SEVERAL DAYS
5. POOR APPETITE OR OVEREATING: SEVERAL DAYS
3. TROUBLE FALLING OR STAYING ASLEEP: NEARLY EVERY DAY
7. TROUBLE CONCENTRATING ON THINGS, SUCH AS READING THE NEWSPAPER OR WATCHING TELEVISION: NOT AT ALL

## 2024-06-10 ASSESSMENT — LIFESTYLE VARIABLES
HOW OFTEN DO YOU HAVE A DRINK CONTAINING ALCOHOL: MONTHLY OR LESS
HOW MANY STANDARD DRINKS CONTAINING ALCOHOL DO YOU HAVE ON A TYPICAL DAY: 3 OR 4

## 2024-06-10 NOTE — PATIENT INSTRUCTIONS
included within your After Visit Summary for your review.    Other Preventive Recommendations:    A preventive eye exam performed by an eye specialist is recommended every 1-2 years to screen for glaucoma; cataracts, macular degeneration, and other eye disorders.  A preventive dental visit is recommended every 6 months.  Try to get at least 150 minutes of exercise per week or 10,000 steps per day on a pedometer .  Order or download the FREE \"Exercise & Physical Activity: Your Everyday Guide\" from [employer] [place] [employer]. Call 1-426.381.6861 or search [employer] [place] [employer] online.  You need 6625-2962 mg of calcium and 4591-2984 IU of vitamin D per day. It is possible to meet your calcium requirement with diet alone, but a vitamin D supplement is usually necessary to meet this goal.  When exposed to the sun, use a sunscreen that protects against both UVA and UVB radiation with an SPF of 30 or greater. Reapply every 2 to 3 hours or after sweating, drying off with a towel, or swimming.  Always wear a seat belt when traveling in a car. Always wear a helmet when riding a bicycle or motorcycle.

## 2024-06-10 NOTE — PROGRESS NOTES
MD REJI   buPROPion (WELLBUTRIN XL) 150 MG extended release tablet Take 1 tablet by mouth every morning Along with 300 mg for total 450 mg in morning  Jacqui Quinonez MD   buPROPion (WELLBUTRIN XL) 300 MG extended release tablet Take 1 tablet by mouth every morning Along with 150 mg for total 450 mg in morning  Jacqui Quinonez MD   desvenlafaxine succinate (PRISTIQ) 100 MG TB24 extended release tablet Take 1 tablet by mouth daily Along with 50 mg tab for total dose 150 mg in the morning.  Jacqui Quinonez MD   desvenlafaxine succinate (PRISTIQ) 50 MG TB24 extended release tablet Take 1 tablet by mouth daily Along with 100 mg tab for total dose 150 mg in morning  Jacqui Quinonez MD   gabapentin (NEURONTIN) 600 MG tablet Take 1 tablet by mouth 3 times daily for 180 days.  Shaun Jerez MD   letrozole (FEMARA) 2.5 MG tablet Take 1 tablet by mouth daily  Ashanti Graff MD PhD   fluticasone (FLONASE) 50 MCG/ACT nasal spray 2 sprays by Each Nostril route daily  Eliza Mckeon, APRN - CNP   ibuprofen (ADVIL;MOTRIN) 200 MG tablet Take 1 tablet by mouth every 6 hours as needed for Pain  ProviderMary Ellen MD   tiZANidine (ZANAFLEX) 4 MG tablet Take 2 tablets by mouth at bedtime  Shaun Jerez MD   bisoprolol-hydroCHLOROthiazide (ZIAC) 5-6.25 MG per tablet Take 1 tablet by mouth daily  Shaun Jerez MD   omeprazole (PRILOSEC) 20 MG delayed release capsule Take 1 capsule by mouth daily  Provider, MD Olga Hyde (Including outside providers/suppliers regularly involved in providing care):   Patient Care Team:  Shaun Jerez MD as PCP - General (Family Medicine)  Shaun Jerez MD as PCP - Empaneled Provider     Reviewed and updated this visit:  Tobacco  Allergies  Meds  Med Hx  Surg Hx  Soc Hx  Fam Hx

## 2024-06-20 ENCOUNTER — PROCEDURE VISIT (OUTPATIENT)
Dept: SURGERY | Age: 54
End: 2024-06-20
Payer: COMMERCIAL

## 2024-06-20 VITALS
SYSTOLIC BLOOD PRESSURE: 118 MMHG | WEIGHT: 209 LBS | OXYGEN SATURATION: 96 % | HEIGHT: 65 IN | TEMPERATURE: 97.3 F | HEART RATE: 97 BPM | DIASTOLIC BLOOD PRESSURE: 68 MMHG | BODY MASS INDEX: 34.82 KG/M2

## 2024-06-20 DIAGNOSIS — C50.912 INVASIVE DUCTAL CARCINOMA OF BREAST, FEMALE, LEFT (HCC): Primary | ICD-10-CM

## 2024-06-20 PROCEDURE — 36590 REMOVAL TUNNELED CV CATH: CPT | Performed by: SURGERY

## 2024-06-20 NOTE — PROGRESS NOTES
Ambulatory Procedure Note    Patient name: Esther Valle  Medical Record Number: [id number]  Primary Care Physician: Shaun Jerez MD    Date of Procedure: 6/20/2024    Pre-operative Diagnosis: Breast cancer, BRCA mutation, completed chemotherapy    Post-operative Diagnosis: Same    Procedure: Removal right subclavian Mediport    Anesthesia: Local     Surgeons/Assistants: Olt    Estimated Blood Loss: 2 ml    Complications: none immediately appreciated    Specimens: Port with catheter attached intact removed and disposed    Procedure: In the office, the pt was placed supine on the procedure table. Consent was given by the patient. The right chest wall was prepped and draped.  The area of skin and subcutaneous tissue overlying the port was infiltrated with local anesthetic.  A total of 8 mL of 1% lidocaine with epinephrine was utilized.  Old incision was opened with a #15 scalpel blade.  Sharp dissection was carried down to the port.  The port was removed from its pocket after freeing up fibrous adhesions and the port with a catheter attached was removed.  The catheter tract was suture-ligated with 3-0 Vicryl suture.  Dermis was closed with interrupted 3-0 Vicryl suture and skin was closed running subcuticular 4-0 Vicryl suture.  Skin glue was applied.  Patient will follow-up in August or September for breast cancer follow-up and discuss removing excess axillary tissue from under her arms.  She is declining reconstructive options.      Essence Barfield MD

## 2024-07-10 ENCOUNTER — HOSPITAL ENCOUNTER (OUTPATIENT)
Dept: INFUSION THERAPY | Age: 54
Discharge: HOME | End: 2024-07-10
Payer: COMMERCIAL

## 2024-07-10 ENCOUNTER — OFFICE VISIT (OUTPATIENT)
Dept: ONCOLOGY | Age: 54
End: 2024-07-10
Payer: COMMERCIAL

## 2024-07-10 VITALS
HEART RATE: 95 BPM | BODY MASS INDEX: 32.65 KG/M2 | DIASTOLIC BLOOD PRESSURE: 84 MMHG | OXYGEN SATURATION: 94 % | SYSTOLIC BLOOD PRESSURE: 132 MMHG | RESPIRATION RATE: 18 BRPM | HEIGHT: 65 IN | WEIGHT: 196 LBS | TEMPERATURE: 98.4 F

## 2024-07-10 VITALS
HEART RATE: 95 BPM | SYSTOLIC BLOOD PRESSURE: 132 MMHG | DIASTOLIC BLOOD PRESSURE: 84 MMHG | TEMPERATURE: 98.4 F | RESPIRATION RATE: 18 BRPM

## 2024-07-10 DIAGNOSIS — C50.412 CARCINOMA OF UPPER-OUTER QUADRANT OF LEFT BREAST IN FEMALE, ESTROGEN RECEPTOR POSITIVE (HCC): Primary | ICD-10-CM

## 2024-07-10 DIAGNOSIS — Z51.81 ENCOUNTER FOR MONITORING AROMATASE INHIBITOR THERAPY: ICD-10-CM

## 2024-07-10 DIAGNOSIS — Z15.01 BRCA1 GENE MUTATION POSITIVE IN FEMALE: ICD-10-CM

## 2024-07-10 DIAGNOSIS — Z15.09 BRCA1 GENE MUTATION POSITIVE IN FEMALE: ICD-10-CM

## 2024-07-10 DIAGNOSIS — Z15.01 BRCA2 GENE MUTATION POSITIVE IN FEMALE: ICD-10-CM

## 2024-07-10 DIAGNOSIS — Z15.02 BRCA2 GENE MUTATION POSITIVE IN FEMALE: ICD-10-CM

## 2024-07-10 DIAGNOSIS — Z17.0 CARCINOMA OF UPPER-OUTER QUADRANT OF LEFT BREAST IN FEMALE, ESTROGEN RECEPTOR POSITIVE (HCC): ICD-10-CM

## 2024-07-10 DIAGNOSIS — Z17.0 CARCINOMA OF UPPER-OUTER QUADRANT OF LEFT BREAST IN FEMALE, ESTROGEN RECEPTOR POSITIVE (HCC): Primary | ICD-10-CM

## 2024-07-10 DIAGNOSIS — M89.9 DISORDER OF BONE, UNSPECIFIED: ICD-10-CM

## 2024-07-10 DIAGNOSIS — Z15.02 BRCA1 GENE MUTATION POSITIVE IN FEMALE: ICD-10-CM

## 2024-07-10 DIAGNOSIS — Z79.811 AROMATASE INHIBITOR USE: ICD-10-CM

## 2024-07-10 DIAGNOSIS — Z15.09 BRCA2 GENE MUTATION POSITIVE IN FEMALE: ICD-10-CM

## 2024-07-10 DIAGNOSIS — C50.412 CARCINOMA OF UPPER-OUTER QUADRANT OF LEFT BREAST IN FEMALE, ESTROGEN RECEPTOR POSITIVE (HCC): ICD-10-CM

## 2024-07-10 DIAGNOSIS — Z79.811 ENCOUNTER FOR MONITORING AROMATASE INHIBITOR THERAPY: ICD-10-CM

## 2024-07-10 LAB
ALBUMIN SERPL BCG-MCNC: 4.1 G/DL (ref 3.5–5.1)
ALP SERPL-CCNC: 144 U/L (ref 38–126)
ALT SERPL W/O P-5'-P-CCNC: 9 U/L (ref 11–66)
AST SERPL-CCNC: 11 U/L (ref 5–40)
BASOPHILS ABSOLUTE: 0 THOU/MM3 (ref 0–0.1)
BASOPHILS NFR BLD AUTO: 0 % (ref 0–3)
BILIRUB CONJ SERPL-MCNC: < 0.1 MG/DL (ref 0.1–13.8)
BILIRUB SERPL-MCNC: 0.2 MG/DL (ref 0.3–1.2)
BUN BLDP-MCNC: 21 MG/DL (ref 8–26)
CHLORIDE BLD-SCNC: 109 MEQ/L (ref 98–109)
CREAT BLD-MCNC: 0.6 MG/DL (ref 0.5–1.2)
EOSINOPHIL NFR BLD AUTO: 4 % (ref 0–4)
EOSINOPHILS ABSOLUTE: 0.2 THOU/MM3 (ref 0–0.4)
ERYTHROCYTE [DISTWIDTH] IN BLOOD BY AUTOMATED COUNT: 13 % (ref 11.5–14.5)
GFR SERPL CREATININE-BSD FRML MDRD: > 90 ML/MIN/1.73M2
GLUCOSE BLD-MCNC: 133 MG/DL (ref 70–108)
HCT VFR BLD AUTO: 35.5 % (ref 37–47)
HGB BLD-MCNC: 12.1 GM/DL (ref 12–16)
IMMATURE GRANULOCYTES %: 0 %
IMMATURE GRANULOCYTES ABSOLUTE: 0.01 THOU/MM3 (ref 0–0.07)
IONIZED CALCIUM, WHOLE BLOOD: 1.17 MMOL/L (ref 1.12–1.32)
LYMPHOCYTES ABSOLUTE: 2.4 THOU/MM3 (ref 1–4.8)
LYMPHOCYTES NFR BLD AUTO: 41 % (ref 15–47)
MCH RBC QN AUTO: 29.2 PG (ref 26–33)
MCHC RBC AUTO-ENTMCNC: 34.1 GM/DL (ref 32.2–35.5)
MCV RBC AUTO: 86 FL (ref 81–99)
MONOCYTES ABSOLUTE: 0.5 THOU/MM3 (ref 0.4–1.3)
MONOCYTES NFR BLD AUTO: 8 % (ref 0–12)
NEUTROPHILS ABSOLUTE: 2.7 THOU/MM3 (ref 1.8–7.7)
NEUTROPHILS NFR BLD AUTO: 47 % (ref 43–75)
PLATELET # BLD AUTO: 282 THOU/MM3 (ref 130–400)
PMV BLD AUTO: 8.5 FL (ref 9.4–12.4)
POTASSIUM BLD-SCNC: 3.9 MEQ/L (ref 3.5–4.9)
PROT SERPL-MCNC: 7 G/DL (ref 6.1–8)
RBC # BLD AUTO: 4.15 MILL/MM3 (ref 4.2–5.4)
SODIUM BLD-SCNC: 144 MEQ/L (ref 138–146)
TOTAL CO2, WHOLE BLOOD: 27 MEQ/L (ref 23–33)
WBC # BLD AUTO: 5.8 THOU/MM3 (ref 4.8–10.8)

## 2024-07-10 PROCEDURE — 99211 OFF/OP EST MAY X REQ PHY/QHP: CPT

## 2024-07-10 PROCEDURE — 80076 HEPATIC FUNCTION PANEL: CPT

## 2024-07-10 PROCEDURE — 80047 BASIC METABLC PNL IONIZED CA: CPT

## 2024-07-10 PROCEDURE — 36415 COLL VENOUS BLD VENIPUNCTURE: CPT

## 2024-07-10 PROCEDURE — 99214 OFFICE O/P EST MOD 30 MIN: CPT | Performed by: SPECIALIST

## 2024-07-10 PROCEDURE — 85025 COMPLETE CBC W/AUTO DIFF WBC: CPT

## 2024-07-10 RX ORDER — MELOXICAM 15 MG/1
15 TABLET ORAL DAILY
COMMUNITY
Start: 2024-07-03

## 2024-07-10 RX ORDER — SODIUM CHLORIDE 9 MG/ML
25 INJECTION, SOLUTION INTRAVENOUS PRN
OUTPATIENT
Start: 2024-07-10

## 2024-07-10 RX ORDER — HEPARIN 100 UNIT/ML
500 SYRINGE INTRAVENOUS PRN
OUTPATIENT
Start: 2024-07-10

## 2024-07-10 RX ORDER — HEPARIN 100 UNIT/ML
500 SYRINGE INTRAVENOUS PRN
Status: DISCONTINUED | OUTPATIENT
Start: 2024-07-10 | End: 2024-07-11 | Stop reason: HOSPADM

## 2024-07-10 RX ORDER — SODIUM CHLORIDE 0.9 % (FLUSH) 0.9 %
5-40 SYRINGE (ML) INJECTION PRN
Status: DISCONTINUED | OUTPATIENT
Start: 2024-07-10 | End: 2024-07-11 | Stop reason: HOSPADM

## 2024-07-10 RX ORDER — SODIUM CHLORIDE 0.9 % (FLUSH) 0.9 %
5-40 SYRINGE (ML) INJECTION PRN
OUTPATIENT
Start: 2024-07-10

## 2024-07-10 NOTE — PATIENT INSTRUCTIONS
Continue Letrozole 2.5 mg daily  Follow-up with MD in 3 months 10/09/2024  CBC/CMP/Vit D in 3 months  Take Calcium 500 mg BID - Pt will buy OTC  Cerave lotion for dry skin after bath   Call with any concerns

## 2024-07-22 DIAGNOSIS — S39.012A LOW BACK STRAIN, INITIAL ENCOUNTER: ICD-10-CM

## 2024-07-22 RX ORDER — TIZANIDINE 4 MG/1
8 TABLET ORAL NIGHTLY
Qty: 180 TABLET | Refills: 1 | Status: SHIPPED | OUTPATIENT
Start: 2024-07-22

## 2024-07-22 NOTE — TELEPHONE ENCOUNTER
Date of last visit:  6/10/2024  Date of next visit:  7/24/2024    Requested Prescriptions     Pending Prescriptions Disp Refills    tiZANidine (ZANAFLEX) 4 MG tablet 180 tablet 1     Sig: Take 2 tablets by mouth at bedtime

## 2024-07-24 ENCOUNTER — TELEPHONE (OUTPATIENT)
Dept: FAMILY MEDICINE CLINIC | Age: 54
End: 2024-07-24

## 2024-07-24 ENCOUNTER — OFFICE VISIT (OUTPATIENT)
Dept: FAMILY MEDICINE CLINIC | Age: 54
End: 2024-07-24

## 2024-07-24 VITALS
RESPIRATION RATE: 16 BRPM | WEIGHT: 197.5 LBS | SYSTOLIC BLOOD PRESSURE: 132 MMHG | DIASTOLIC BLOOD PRESSURE: 80 MMHG | HEIGHT: 65 IN | HEART RATE: 70 BPM | BODY MASS INDEX: 32.9 KG/M2

## 2024-07-24 DIAGNOSIS — M54.10 RADICULOPATHY OF ARM: Primary | ICD-10-CM

## 2024-07-24 PROCEDURE — 99214 OFFICE O/P EST MOD 30 MIN: CPT | Performed by: FAMILY MEDICINE

## 2024-07-24 RX ORDER — PREDNISONE 20 MG/1
20 TABLET ORAL DAILY
Qty: 7 TABLET | Refills: 0 | Status: SHIPPED | OUTPATIENT
Start: 2024-07-24 | End: 2024-07-31

## 2024-07-24 ASSESSMENT — ENCOUNTER SYMPTOMS
SINUS PRESSURE: 0
CONSTIPATION: 0
SHORTNESS OF BREATH: 0

## 2024-07-24 NOTE — PROGRESS NOTES
Esther Valle (:  1970) is a 53 y.o. female,Established patient, here for evaluation of the following chief complaint(s):  Other (Pinched nerve)      Assessment & Plan    Diagnosis Orders   1. Radiculopathy of arm  MRI CERVICAL SPINE WO CONTRAST    [facility] - [facility] Goerge, , [facility], [place]    predniSONE (DELTASONE) 20 MG tablet          See Dr George and let us know if you do not hear from them in a week.  We will let you know about the MRI       Subjective   HPI  She has had  a pinch like burning pain in the left side of the neck, arm and left side of the back  Movements of the neck and arm can worsen it  She had neck surgery around   She has a hx of breast cancer  It's been 4 weeks and not worsening  No strength loss in the right hand  She tried massage, topical ointments, aleve without help  She's already on zanaflex, mobic, and neurontin  Review of Systems   Constitutional:  Negative for fatigue.   HENT:  Negative for sinus pressure.    Eyes:  Negative for visual disturbance.   Respiratory:  Negative for shortness of breath.    Cardiovascular:  Negative for chest pain.   Gastrointestinal:  Negative for constipation.   Genitourinary: Negative.    Musculoskeletal:  Positive for neck pain and neck stiffness. Negative for arthralgias.   Skin:  Negative for rash.   Neurological:  Negative for headaches.      The patient's medications, allergies, past medical problems, surgical, social, and family histories were reviewed and updated as needed.    Objective   Physical Exam  Constitutional:       Appearance: Normal appearance. She is well-developed.   HENT:      Head: Normocephalic and atraumatic.   Eyes:      General: No scleral icterus.     Conjunctiva/sclera: Conjunctivae normal.   Neck:      Trachea: No tracheal deviation.      Comments: Full flexion of the neck, extension to approx 40 degrees due to pain, full left rotation and at full rotation to the right it will cause arm

## 2024-07-24 NOTE — PATIENT INSTRUCTIONS
See Dr George and let us know if you do not hear from them in a week.  We will let you know about the MRI

## 2024-07-24 NOTE — TELEPHONE ENCOUNTER
Attempted to get mri cervical spine wo contrast.  Called [facility] at [phone]   case is pending clinical review.  Faxed all info to [phone].

## 2024-07-24 NOTE — TELEPHONE ENCOUNTER
Dr. George's office called stating that they received the referral for patient. They said that they do not accept patient's insurance so they are not able to schedule patient.

## 2024-07-29 NOTE — TELEPHONE ENCOUNTER
Mri scheduled for 8/14/2024 at [facility].  Patient to arrive by 830am and go to outpatient express testing.  MOM to return call to office

## 2024-08-14 ENCOUNTER — HOSPITAL ENCOUNTER (OUTPATIENT)
Dept: MRI IMAGING | Age: 54
Discharge: HOME | End: 2024-08-14
Attending: FAMILY MEDICINE
Payer: COMMERCIAL

## 2024-08-14 DIAGNOSIS — M54.10 RADICULOPATHY OF ARM: ICD-10-CM

## 2024-08-14 PROCEDURE — 72141 MRI NECK SPINE W/O DYE: CPT

## 2024-08-16 ENCOUNTER — TELEPHONE (OUTPATIENT)
Dept: FAMILY MEDICINE CLINIC | Age: 54
End: 2024-08-16

## 2024-08-16 NOTE — TELEPHONE ENCOUNTER
Esther informed by Phone. She stated she is not seeing Dr George due to an Insurance Issue. She is scheduled with Dr Kelly though that she will keep that appt.

## 2024-08-16 NOTE — TELEPHONE ENCOUNTER
----- Message from Dr. Jozef Mackenzie MD sent at 8/16/2024  2:19 PM EDT -----  MRI of cervical spine notes mild to moderate impingement on the left side that could be causing the symptoms.  Keep appointment with

## 2024-09-03 ENCOUNTER — PROCEDURE VISIT (OUTPATIENT)
Dept: NEUROLOGY | Age: 54
End: 2024-09-03
Payer: COMMERCIAL

## 2024-09-03 DIAGNOSIS — G56.01 RIGHT CARPAL TUNNEL SYNDROME: ICD-10-CM

## 2024-09-03 DIAGNOSIS — M54.12 CERVICAL RADICULOPATHY: ICD-10-CM

## 2024-09-03 DIAGNOSIS — R20.0 RIGHT ARM NUMBNESS: Primary | ICD-10-CM

## 2024-09-03 PROCEDURE — 95910 NRV CNDJ TEST 7-8 STUDIES: CPT | Performed by: PSYCHIATRY & NEUROLOGY

## 2024-09-03 PROCEDURE — 95886 MUSC TEST DONE W/N TEST COMP: CPT | Performed by: PSYCHIATRY & NEUROLOGY

## 2024-09-09 ENCOUNTER — TELEPHONE (OUTPATIENT)
Dept: FAMILY MEDICINE CLINIC | Age: 54
End: 2024-09-09

## 2024-09-13 ENCOUNTER — OFFICE VISIT (OUTPATIENT)
Dept: FAMILY MEDICINE CLINIC | Age: 54
End: 2024-09-13

## 2024-09-13 VITALS
HEART RATE: 76 BPM | BODY MASS INDEX: 32.55 KG/M2 | RESPIRATION RATE: 14 BRPM | DIASTOLIC BLOOD PRESSURE: 82 MMHG | HEIGHT: 65 IN | WEIGHT: 195.38 LBS | SYSTOLIC BLOOD PRESSURE: 122 MMHG

## 2024-09-13 DIAGNOSIS — Z17.0 CARCINOMA OF UPPER-OUTER QUADRANT OF LEFT BREAST IN FEMALE, ESTROGEN RECEPTOR POSITIVE (HCC): Primary | ICD-10-CM

## 2024-09-13 DIAGNOSIS — C50.412 CARCINOMA OF UPPER-OUTER QUADRANT OF LEFT BREAST IN FEMALE, ESTROGEN RECEPTOR POSITIVE (HCC): Primary | ICD-10-CM

## 2024-09-13 DIAGNOSIS — R14.0 ABDOMINAL BLOATING: ICD-10-CM

## 2024-09-13 ASSESSMENT — ENCOUNTER SYMPTOMS
SHORTNESS OF BREATH: 0
CONSTIPATION: 1
SINUS PRESSURE: 0

## 2024-09-17 ENCOUNTER — TELEPHONE (OUTPATIENT)
Dept: FAMILY MEDICINE CLINIC | Age: 54
End: 2024-09-17

## 2024-09-30 ENCOUNTER — OFFICE VISIT (OUTPATIENT)
Dept: SURGERY | Age: 54
End: 2024-09-30
Payer: COMMERCIAL

## 2024-09-30 VITALS
HEIGHT: 65 IN | HEART RATE: 78 BPM | WEIGHT: 198.7 LBS | TEMPERATURE: 97.3 F | BODY MASS INDEX: 33.11 KG/M2 | RESPIRATION RATE: 18 BRPM | OXYGEN SATURATION: 98 % | SYSTOLIC BLOOD PRESSURE: 128 MMHG | DIASTOLIC BLOOD PRESSURE: 84 MMHG

## 2024-09-30 DIAGNOSIS — Z90.13 H/O BILATERAL MASTECTOMY: ICD-10-CM

## 2024-09-30 DIAGNOSIS — Z15.01 BRCA1 GENE MUTATION POSITIVE: ICD-10-CM

## 2024-09-30 DIAGNOSIS — Z15.09 BRCA2 GENE MUTATION POSITIVE: ICD-10-CM

## 2024-09-30 DIAGNOSIS — Z15.09 BRCA1 GENE MUTATION POSITIVE: ICD-10-CM

## 2024-09-30 DIAGNOSIS — C50.912 INVASIVE DUCTAL CARCINOMA OF BREAST, FEMALE, LEFT (HCC): Primary | ICD-10-CM

## 2024-09-30 DIAGNOSIS — Z15.01 BRCA2 GENE MUTATION POSITIVE: ICD-10-CM

## 2024-09-30 DIAGNOSIS — E11.9 TYPE 2 DIABETES MELLITUS TREATED WITHOUT INSULIN (HCC): ICD-10-CM

## 2024-09-30 PROCEDURE — 3044F HG A1C LEVEL LT 7.0%: CPT | Performed by: SURGERY

## 2024-09-30 PROCEDURE — 99214 OFFICE O/P EST MOD 30 MIN: CPT | Performed by: SURGERY

## 2024-10-01 ENCOUNTER — HOSPITAL ENCOUNTER (OUTPATIENT)
Age: 54
Discharge: HOME | End: 2024-10-01
Payer: COMMERCIAL

## 2024-10-01 LAB
EKG ATRIAL RATE: 67 BPM
EKG P AXIS: 58 DEGREES
EKG P-R INTERVAL: 154 MS
EKG Q-T INTERVAL: 404 MS
EKG QRS DURATION: 88 MS
EKG QTC CALCULATION (BAZETT): 426 MS
EKG R AXIS: 28 DEGREES
EKG T AXIS: 48 DEGREES
EKG VENTRICULAR RATE: 67 BPM

## 2024-10-01 PROCEDURE — 93010 ELECTROCARDIOGRAM REPORT: CPT | Performed by: INTERNAL MEDICINE

## 2024-10-01 PROCEDURE — 93005 ELECTROCARDIOGRAM TRACING: CPT | Performed by: ORTHOPAEDIC SURGERY

## 2024-10-02 ASSESSMENT — ENCOUNTER SYMPTOMS
VOICE CHANGE: 0
COUGH: 0
SHORTNESS OF BREATH: 0
ABDOMINAL PAIN: 0
BLOOD IN STOOL: 0
SORE THROAT: 0
NAUSEA: 0
COLOR CHANGE: 0
TROUBLE SWALLOWING: 0
VOMITING: 0
BACK PAIN: 1
WHEEZING: 0

## 2024-10-02 NOTE — PROGRESS NOTES
procedure per    department protocol.       .           BI-RADS CATEGORY 5 - Highly suggestive of malignancy.       Management: Tissue diagnosis.  Biopsy should be performed in the absence    of clinical contraindication.               **This report has been created using voice recognition software. It may    contain minor errors which are inherent in voice recognition technology.**       Final report electronically signed by Dr. Tia Gillette on 3/29/2023 10:16    AM             Ashanti Graff MD PhD on 8/1/2023 07:02     CT LUNG SCREEN [Initial/Annual]  Order: [id number]  Status: Final result     Visible to patient: Yes (seen)     Next appt: 10/10/2023 at 03:00 PM in Radiology (STR NM INJECT RM1)     Dx: Carcinoma of upper-outer quadrant of ...     0 Result Notes  Recommendations     Due   Annual CT lung screening 7/28/2024     Details    Reading Physician Reading Date Result Priority   Daisha Vasquez MD  [phone] 7/28/2023      Narrative & Impression  NONCONTRAST SCREENING CT CHEST:     HISTORY: Personal history of tobacco     TECHNIQUE:   1 mm axial imaging of the chest and upper abdomen without IV contrast.      All CT scans at this facility use dose modulation, iterative reconstruction, and/or weight-based dosing when appropriate to reduce radiation dose to as low as reasonably achievable.     COMPARISON: Low-dose CT chest 4/24/2014.        FINDINGS:  Heart/mediastinum: A right chest Port-A-Cath is present. The thyroid gland is unremarkable. The heart size is normal. A small pericardial effusion has decreased in volume. The aorta is not dilated. No mediastinal, hilar, or axillary lymphadenopathy is   visualized accounting for lack of IV contrast.     Lungs: Mild scarring at the lung apices is unchanged. No focal consolidation, pleural effusion, or pneumothorax is identified. A 3 mm right upper lobe pulmonary nodule is present (series 3, image 115). A benign calcified granuloma in the left upper lobe

## 2024-10-03 ENCOUNTER — HOSPITAL ENCOUNTER (OUTPATIENT)
Dept: INFUSION THERAPY | Age: 54
Discharge: HOME | End: 2024-10-03
Payer: COMMERCIAL

## 2024-10-03 ENCOUNTER — OFFICE VISIT (OUTPATIENT)
Dept: ONCOLOGY | Age: 54
End: 2024-10-03
Payer: COMMERCIAL

## 2024-10-03 VITALS
TEMPERATURE: 98.5 F | HEART RATE: 79 BPM | OXYGEN SATURATION: 97 % | HEIGHT: 65 IN | SYSTOLIC BLOOD PRESSURE: 124 MMHG | RESPIRATION RATE: 18 BRPM | DIASTOLIC BLOOD PRESSURE: 81 MMHG | WEIGHT: 197 LBS | BODY MASS INDEX: 32.82 KG/M2

## 2024-10-03 VITALS
HEART RATE: 79 BPM | SYSTOLIC BLOOD PRESSURE: 124 MMHG | RESPIRATION RATE: 18 BRPM | TEMPERATURE: 98.5 F | DIASTOLIC BLOOD PRESSURE: 81 MMHG

## 2024-10-03 DIAGNOSIS — Z17.0 CARCINOMA OF UPPER-OUTER QUADRANT OF LEFT BREAST IN FEMALE, ESTROGEN RECEPTOR POSITIVE (HCC): Primary | ICD-10-CM

## 2024-10-03 DIAGNOSIS — Z79.811 ENCOUNTER FOR MONITORING AROMATASE INHIBITOR THERAPY: ICD-10-CM

## 2024-10-03 DIAGNOSIS — Z15.01 BRCA2 GENE MUTATION POSITIVE IN FEMALE: ICD-10-CM

## 2024-10-03 DIAGNOSIS — C50.412 CARCINOMA OF UPPER-OUTER QUADRANT OF LEFT BREAST IN FEMALE, ESTROGEN RECEPTOR POSITIVE (HCC): ICD-10-CM

## 2024-10-03 DIAGNOSIS — Z17.0 CARCINOMA OF UPPER-OUTER QUADRANT OF LEFT BREAST IN FEMALE, ESTROGEN RECEPTOR POSITIVE (HCC): ICD-10-CM

## 2024-10-03 DIAGNOSIS — Z15.01 BRCA1 GENE MUTATION POSITIVE IN FEMALE: ICD-10-CM

## 2024-10-03 DIAGNOSIS — C50.412 CARCINOMA OF UPPER-OUTER QUADRANT OF LEFT BREAST IN FEMALE, ESTROGEN RECEPTOR POSITIVE (HCC): Primary | ICD-10-CM

## 2024-10-03 DIAGNOSIS — Z79.811 AROMATASE INHIBITOR USE: ICD-10-CM

## 2024-10-03 DIAGNOSIS — Z15.09 BRCA1 GENE MUTATION POSITIVE IN FEMALE: ICD-10-CM

## 2024-10-03 DIAGNOSIS — M89.9 DISORDER OF BONE, UNSPECIFIED: ICD-10-CM

## 2024-10-03 DIAGNOSIS — Z51.81 ENCOUNTER FOR MONITORING AROMATASE INHIBITOR THERAPY: ICD-10-CM

## 2024-10-03 DIAGNOSIS — Z15.02 BRCA2 GENE MUTATION POSITIVE IN FEMALE: ICD-10-CM

## 2024-10-03 DIAGNOSIS — Z15.02 BRCA1 GENE MUTATION POSITIVE IN FEMALE: ICD-10-CM

## 2024-10-03 DIAGNOSIS — Z15.09 BRCA2 GENE MUTATION POSITIVE IN FEMALE: ICD-10-CM

## 2024-10-03 LAB
25(OH)D3 SERPL-MCNC: 37 NG/ML (ref 30–100)
ALBUMIN SERPL BCG-MCNC: 4.1 G/DL (ref 3.5–5.1)
ALP SERPL-CCNC: 112 U/L (ref 38–126)
ALT SERPL W/O P-5'-P-CCNC: 16 U/L (ref 11–66)
AST SERPL-CCNC: 18 U/L (ref 5–40)
BASOPHILS ABSOLUTE: 0 THOU/MM3 (ref 0–0.1)
BASOPHILS NFR BLD AUTO: 0 % (ref 0–3)
BILIRUB CONJ SERPL-MCNC: < 0.1 MG/DL (ref 0.1–13.8)
BILIRUB SERPL-MCNC: 0.2 MG/DL (ref 0.3–1.2)
BUN BLDP-MCNC: 21 MG/DL (ref 8–26)
CHLORIDE BLD-SCNC: 105 MEQ/L (ref 98–109)
CREAT BLD-MCNC: 0.7 MG/DL (ref 0.5–1.2)
EOSINOPHIL NFR BLD AUTO: 3 % (ref 0–4)
EOSINOPHILS ABSOLUTE: 0.2 THOU/MM3 (ref 0–0.4)
ERYTHROCYTE [DISTWIDTH] IN BLOOD BY AUTOMATED COUNT: 12.8 % (ref 11.5–14.5)
GFR SERPL CREATININE-BSD FRML MDRD: > 90 ML/MIN/1.73M2
GLUCOSE BLD-MCNC: 122 MG/DL (ref 70–108)
HCT VFR BLD AUTO: 37.5 % (ref 37–47)
HGB BLD-MCNC: 12.6 GM/DL (ref 12–16)
IMMATURE GRANULOCYTES %: 0 %
IMMATURE GRANULOCYTES ABSOLUTE: 0.01 THOU/MM3 (ref 0–0.07)
IONIZED CALCIUM, WHOLE BLOOD: 1.18 MMOL/L (ref 1.12–1.32)
LYMPHOCYTES ABSOLUTE: 2.6 THOU/MM3 (ref 1–4.8)
LYMPHOCYTES NFR BLD AUTO: 42 % (ref 15–47)
MCH RBC QN AUTO: 29 PG (ref 26–33)
MCHC RBC AUTO-ENTMCNC: 33.6 GM/DL (ref 32.2–35.5)
MCV RBC AUTO: 86 FL (ref 81–99)
MONOCYTES ABSOLUTE: 0.4 THOU/MM3 (ref 0.4–1.3)
MONOCYTES NFR BLD AUTO: 7 % (ref 0–12)
NEUTROPHILS ABSOLUTE: 3 THOU/MM3 (ref 1.8–7.7)
NEUTROPHILS NFR BLD AUTO: 48 % (ref 43–75)
PLATELET # BLD AUTO: 276 THOU/MM3 (ref 130–400)
PMV BLD AUTO: 8.7 FL (ref 9.4–12.4)
POTASSIUM BLD-SCNC: 3.9 MEQ/L (ref 3.5–4.9)
PROT SERPL-MCNC: 7.2 G/DL (ref 6.1–8)
RBC # BLD AUTO: 4.35 MILL/MM3 (ref 4.2–5.4)
SODIUM BLD-SCNC: 142 MEQ/L (ref 138–146)
TOTAL CO2, WHOLE BLOOD: 27 MEQ/L (ref 23–33)
WBC # BLD AUTO: 6.2 THOU/MM3 (ref 4.8–10.8)

## 2024-10-03 PROCEDURE — 80076 HEPATIC FUNCTION PANEL: CPT

## 2024-10-03 PROCEDURE — 36415 COLL VENOUS BLD VENIPUNCTURE: CPT

## 2024-10-03 PROCEDURE — 80047 BASIC METABLC PNL IONIZED CA: CPT

## 2024-10-03 PROCEDURE — 99211 OFF/OP EST MAY X REQ PHY/QHP: CPT

## 2024-10-03 PROCEDURE — 99213 OFFICE O/P EST LOW 20 MIN: CPT | Performed by: INTERNAL MEDICINE

## 2024-10-03 PROCEDURE — 85025 COMPLETE CBC W/AUTO DIFF WBC: CPT

## 2024-10-03 PROCEDURE — 82306 VITAMIN D 25 HYDROXY: CPT

## 2024-10-03 RX ORDER — UBIDECARENONE 75 MG
50 CAPSULE ORAL DAILY
COMMUNITY

## 2024-10-03 RX ORDER — MULTIVIT-MIN/IRON/FOLIC ACID/K 18-600-40
2000 CAPSULE ORAL DAILY
COMMUNITY

## 2024-10-03 RX ORDER — MAGNESIUM 30 MG
30 TABLET ORAL 2 TIMES DAILY
COMMUNITY

## 2024-10-03 ASSESSMENT — ENCOUNTER SYMPTOMS: ABDOMINAL DISTENTION: 0

## 2024-10-03 NOTE — PROGRESS NOTES
[facility] [place] [facility]  [facility]  [address]  [address]  [place] [id number]  Dept: [phone]  Loc: [phone]   Hematology/Oncology Progress Note (Clinic)        Esther Valle  1970    10/3/2024     No ref. provider found   Shaun Jerez MD       ASSESSMENT:     1. Carcinoma of upper-outer quadrant of left breast in female, estrogen receptor positive (HCC)  2. BRCA1 gene mutation positive in female  3. BRCA2 gene mutation positive in female       PLAN:   Shayy is doing quite well at this time with little to no symptoms or issues of her letrozole which she takes daily.  She has Stage Ia invasive breast cancer ER positive FL positive HER2 positive that is BRCA1 and BRCA2 positive.  She is doing well we will continue on letrozole.  Of note she is also had oophorectomy besides bilateral mastectomy.  She will follow-up in 3 months for monitoring and review.    Symptom Management: (include nausea, vomiting, diarrhea, constipation, appetite, weight loss, energy, anxiety, depression, SOB, neuropathy, pain)      None    Supportive care provided.       Level of care is appropriate.       Teaching done today.     Medications reviewed.   Prescriptions today:            No orders of the defined types were placed in this encounter.       OARRS:  Controlled Substance Monitoring:    Acute and Chronic Pain Monitoring:   RX Monitoring Periodic Controlled Substance Monitoring   5/29/2024   9:48 AM Possible medication side effects, risk of tolerance/dependence & alternative treatments discussed.;No signs of potential drug abuse or diversion identified.        Research Options:       None    Follow Up:  No follow-ups on file.     I spent 20 minutes face-to-face with the patient and family. Greater than half of this visit was spent counseling and coordinating her care.  Time Spent includes chart prep ,review of x-rays and  labs, symptom management. Teaching includes risk

## 2024-10-08 ENCOUNTER — HOSPITAL ENCOUNTER (OUTPATIENT)
Dept: CT IMAGING | Age: 54
Discharge: HOME | End: 2024-10-08
Attending: FAMILY MEDICINE
Payer: COMMERCIAL

## 2024-10-08 DIAGNOSIS — Z17.0 CARCINOMA OF UPPER-OUTER QUADRANT OF LEFT BREAST IN FEMALE, ESTROGEN RECEPTOR POSITIVE (HCC): ICD-10-CM

## 2024-10-08 DIAGNOSIS — R14.0 ABDOMINAL BLOATING: ICD-10-CM

## 2024-10-08 DIAGNOSIS — C50.412 CARCINOMA OF UPPER-OUTER QUADRANT OF LEFT BREAST IN FEMALE, ESTROGEN RECEPTOR POSITIVE (HCC): ICD-10-CM

## 2024-10-08 PROCEDURE — 6360000004 HC RX CONTRAST MEDICATION: Performed by: FAMILY MEDICINE

## 2024-10-08 PROCEDURE — 74177 CT ABD & PELVIS W/CONTRAST: CPT

## 2024-10-08 RX ORDER — IOPAMIDOL 755 MG/ML
80 INJECTION, SOLUTION INTRAVASCULAR
Status: COMPLETED | OUTPATIENT
Start: 2024-10-08 | End: 2024-10-08

## 2024-10-08 RX ADMIN — IOPAMIDOL 80 ML: 755 INJECTION, SOLUTION INTRAVENOUS at 09:23

## 2024-10-09 ENCOUNTER — TELEPHONE (OUTPATIENT)
Dept: FAMILY MEDICINE CLINIC | Age: 54
End: 2024-10-09

## 2024-10-09 NOTE — RESULT ENCOUNTER NOTE
The CT of the Abd and pelvis looked ok but showed the inflammation at the sacroiliac joints to be be present still. We made an external referral to rheumatology in early May of this year. Did she actually see someone?

## 2024-10-09 NOTE — TELEPHONE ENCOUNTER
----- Message from Dr. Shaun Jerez MD sent at 10/9/2024 11:10 AM EDT -----  The CT of the Abd and pelvis looked ok but showed the inflammation at the sacroiliac joints to be be present still. We made an external referral to rheumatology in early May of this year. Did she actually see someone?

## 2024-11-24 DIAGNOSIS — I10 PRIMARY HYPERTENSION: ICD-10-CM

## 2024-11-24 DIAGNOSIS — E78.00 PURE HYPERCHOLESTEROLEMIA: ICD-10-CM

## 2024-11-25 RX ORDER — ROSUVASTATIN CALCIUM 10 MG/1
10 TABLET, COATED ORAL NIGHTLY
Qty: 90 TABLET | Refills: 0 | Status: SHIPPED | OUTPATIENT
Start: 2024-11-25

## 2024-11-25 RX ORDER — BISOPROLOL FUMARATE AND HYDROCHLOROTHIAZIDE 5; 6.25 MG/1; MG/1
1 TABLET ORAL DAILY
Qty: 90 TABLET | Refills: 0 | Status: SHIPPED | OUTPATIENT
Start: 2024-11-25

## 2024-11-25 NOTE — TELEPHONE ENCOUNTER
The pharmacy is  requesting a refill of the below medication which has been pended for you:     Requested Prescriptions     Pending Prescriptions Disp Refills    bisoprolol-hydroCHLOROthiazide (ZIAC) 5-6.25 MG per tablet [Pharmacy Med Name: Bisoprolol-hydroCHLOROthiazide 5-6.25 MG Oral Tablet] 90 tablet 0     Sig: Take 1 tablet by mouth once daily    rosuvastatin (CRESTOR) 10 MG tablet [Pharmacy Med Name: Rosuvastatin Calcium 10 MG Oral Tablet] 90 tablet 0     Sig: Take 1 tablet by mouth nightly       Last Appointment Date: 9/13/2024  Next Appointment Date: 12/10/2024    No Known Allergies

## 2024-12-03 LAB
ALBUMIN: 4.5 G/DL (ref 3.5–5.2)
ALK PHOSPHATASE: 125 U/L (ref 30–111)
ALT SERPL-CCNC: 15 U/L (ref 5–33)
ANION GAP SERPL CALCULATED.3IONS-SCNC: 15 MMOL/L (ref 7–16)
AST SERPL-CCNC: 17 U/L (ref 9–40)
BILIRUB SERPL-MCNC: 0.3 MG/DL
BUN BLDV-MCNC: 19 MG/DL (ref 6–20)
CALCIUM SERPL-MCNC: 10.2 MG/DL (ref 8.6–10.5)
CHLORIDE BLD-SCNC: 103 MMOL/L (ref 96–107)
CO2: 24 MMOL/L (ref 18–32)
CREAT SERPL-MCNC: 0.64 MG/DL (ref 0.51–1.15)
EGFR IF NONAFRICAN AMERICAN: 106 ML/MIN/1.73M2
GLUCOSE: 104 MG/DL (ref 70–100)
LDL CHOLESTEROL DIRECT: 106 MG/DL
POTASSIUM SERPL-SCNC: 4.3 MMOL/L (ref 3.5–5.4)
SODIUM BLD-SCNC: 142 MMOL/L (ref 135–148)
TOTAL PROTEIN: 7.5 G/DL (ref 6–8.3)

## 2024-12-09 ENCOUNTER — TELEPHONE (OUTPATIENT)
Dept: FAMILY MEDICINE CLINIC | Age: 54
End: 2024-12-09

## 2024-12-09 NOTE — TELEPHONE ENCOUNTER
----- Message from Dr. Shaun Jerez MD sent at 12/7/2024 11:58 AM EST -----  I would like to see the LDL lower. Has she missed any doses of the rosuvastatin?

## 2024-12-10 ENCOUNTER — OFFICE VISIT (OUTPATIENT)
Dept: FAMILY MEDICINE CLINIC | Age: 54
End: 2024-12-10

## 2024-12-10 VITALS
SYSTOLIC BLOOD PRESSURE: 132 MMHG | RESPIRATION RATE: 14 BRPM | HEIGHT: 65 IN | DIASTOLIC BLOOD PRESSURE: 72 MMHG | BODY MASS INDEX: 34.57 KG/M2 | WEIGHT: 207.5 LBS | HEART RATE: 72 BPM

## 2024-12-10 DIAGNOSIS — E11.9 TYPE 2 DIABETES MELLITUS TREATED WITHOUT INSULIN (HCC): Primary | ICD-10-CM

## 2024-12-10 DIAGNOSIS — E78.00 PURE HYPERCHOLESTEROLEMIA: ICD-10-CM

## 2024-12-10 LAB
CHP ED QC CHECK: ABNORMAL
GLUCOSE BLD-MCNC: 112 MG/DL
HBA1C MFR BLD: 6.3 %

## 2024-12-10 PROCEDURE — 99214 OFFICE O/P EST MOD 30 MIN: CPT | Performed by: FAMILY MEDICINE

## 2024-12-10 PROCEDURE — 83036 HEMOGLOBIN GLYCOSYLATED A1C: CPT | Performed by: FAMILY MEDICINE

## 2024-12-10 PROCEDURE — 82962 GLUCOSE BLOOD TEST: CPT | Performed by: FAMILY MEDICINE

## 2024-12-10 RX ORDER — ROSUVASTATIN CALCIUM 10 MG/1
20 TABLET, COATED ORAL NIGHTLY
COMMUNITY
Start: 2024-12-10

## 2024-12-10 ASSESSMENT — ENCOUNTER SYMPTOMS
CONSTIPATION: 0
SINUS PRESSURE: 0
SHORTNESS OF BREATH: 0

## 2024-12-10 NOTE — PROGRESS NOTES
Esther Valle (:  1970) is a 54 y.o. female,Established patient, here for evaluation of the following chief complaint(s):  Diabetes         Assessment & Plan  Type 2 diabetes mellitus treated without insulin (HCC)       Orders:    POCT Glucose    POCT glycosylated hemoglobin (Hb A1C)    Increase the rosuvastatin to 2 of the 10 mg pills once daily  Check the fasting LDL in late January  Remind us in January to Rx the mounjaro  See me in 6 months         Subjective   HPI  She's getting PT for her rotator cuff.  She states she quit smoking and has gained weight  We discussed the weight gain and how with the DM she could use a GLP-1. She was on victoza in 2017  Review of Systems   Constitutional:  Negative for fatigue.   HENT:  Negative for sinus pressure.    Eyes:  Negative for visual disturbance.   Respiratory:  Negative for shortness of breath.    Cardiovascular:  Negative for chest pain.   Gastrointestinal:  Negative for constipation.   Genitourinary: Negative.    Musculoskeletal:  Positive for arthralgias and myalgias.   Skin:  Negative for rash.   Neurological:  Negative for headaches.          Objective   Physical Exam  Constitutional:       Appearance: Normal appearance. She is well-developed.   HENT:      Head: Normocephalic and atraumatic.   Eyes:      General: No scleral icterus.     Conjunctiva/sclera: Conjunctivae normal.   Neck:      Trachea: No tracheal deviation.   Cardiovascular:      Rate and Rhythm: Normal rate and regular rhythm.      Heart sounds: Normal heart sounds.   Pulmonary:      Effort: Pulmonary effort is normal.      Breath sounds: Normal breath sounds.   Skin:     General: Skin is warm and dry.   Neurological:      General: No focal deficit present.      Mental Status: She is alert.   Psychiatric:         Behavior: Behavior normal.                  An electronic signature was used to authenticate this note.    --Shaun Jerez MD

## 2024-12-10 NOTE — PATIENT INSTRUCTIONS
Increase the rosuvastatin to 2 of the 10 mg pills once daily  Check the fasting LDL in late January  Remind us in January to Rx the mounjaro  See me in 6 months

## 2024-12-17 ENCOUNTER — TELEPHONE (OUTPATIENT)
Dept: FAMILY MEDICINE CLINIC | Age: 54
End: 2024-12-17

## 2024-12-17 RX ORDER — AZITHROMYCIN 250 MG/1
TABLET, FILM COATED ORAL
Qty: 6 TABLET | Refills: 0 | Status: SHIPPED | OUTPATIENT
Start: 2024-12-17 | End: 2024-12-27

## 2024-12-17 NOTE — TELEPHONE ENCOUNTER
Pt called said that two days ago started with sinus drainage, dry cough, some possible fluid in left ear.  Mucous possibly light yellow. No fever or nausea, no fatigue.  Pt said she was just here last week and said she gets this every year.  Wondering if could call in a Zpak?    [facility] [address]

## 2025-01-02 ENCOUNTER — TELEMEDICINE (OUTPATIENT)
Dept: PSYCHIATRY | Age: 55
End: 2025-01-02

## 2025-01-02 DIAGNOSIS — G47.00 INSOMNIA, UNSPECIFIED TYPE: ICD-10-CM

## 2025-01-02 DIAGNOSIS — F33.42 RECURRENT MAJOR DEPRESSIVE DISORDER, IN FULL REMISSION (HCC): Primary | ICD-10-CM

## 2025-01-02 DIAGNOSIS — G89.29 OTHER CHRONIC PAIN: ICD-10-CM

## 2025-01-02 DIAGNOSIS — F41.9 ANXIETY: ICD-10-CM

## 2025-01-02 RX ORDER — BUPROPION HYDROCHLORIDE 300 MG/1
300 TABLET ORAL EVERY MORNING
Qty: 30 TABLET | Refills: 11 | Status: SHIPPED | OUTPATIENT
Start: 2025-01-02

## 2025-01-02 RX ORDER — BUPROPION HYDROCHLORIDE 150 MG/1
150 TABLET ORAL EVERY MORNING
Qty: 30 TABLET | Refills: 11 | Status: SHIPPED | OUTPATIENT
Start: 2025-01-02

## 2025-01-02 RX ORDER — DESVENLAFAXINE 100 MG/1
100 TABLET, EXTENDED RELEASE ORAL DAILY
Qty: 30 TABLET | Refills: 11 | Status: SHIPPED | OUTPATIENT
Start: 2025-01-02

## 2025-01-02 RX ORDER — DESVENLAFAXINE 50 MG/1
50 TABLET, FILM COATED, EXTENDED RELEASE ORAL DAILY
Qty: 30 TABLET | Refills: 11 | Status: SHIPPED | OUTPATIENT
Start: 2025-01-02

## 2025-01-02 ASSESSMENT — ANXIETY QUESTIONNAIRES
6. BECOMING EASILY ANNOYED OR IRRITABLE: SEVERAL DAYS
7. FEELING AFRAID AS IF SOMETHING AWFUL MIGHT HAPPEN: NOT AT ALL
3. WORRYING TOO MUCH ABOUT DIFFERENT THINGS: SEVERAL DAYS
4. TROUBLE RELAXING: SEVERAL DAYS
4. TROUBLE RELAXING: SEVERAL DAYS
6. BECOMING EASILY ANNOYED OR IRRITABLE: SEVERAL DAYS
5. BEING SO RESTLESS THAT IT IS HARD TO SIT STILL: NOT AT ALL
IF YOU CHECKED OFF ANY PROBLEMS ON THIS QUESTIONNAIRE, HOW DIFFICULT HAVE THESE PROBLEMS MADE IT FOR YOU TO DO YOUR WORK, TAKE CARE OF THINGS AT HOME, OR GET ALONG WITH OTHER PEOPLE: NOT DIFFICULT AT ALL
2. NOT BEING ABLE TO STOP OR CONTROL WORRYING: NOT AT ALL
7. FEELING AFRAID AS IF SOMETHING AWFUL MIGHT HAPPEN: NOT AT ALL
IF YOU CHECKED OFF ANY PROBLEMS ON THIS QUESTIONNAIRE, HOW DIFFICULT HAVE THESE PROBLEMS MADE IT FOR YOU TO DO YOUR WORK, TAKE CARE OF THINGS AT HOME, OR GET ALONG WITH OTHER PEOPLE: NOT DIFFICULT AT ALL
2. NOT BEING ABLE TO STOP OR CONTROL WORRYING: NOT AT ALL
5. BEING SO RESTLESS THAT IT IS HARD TO SIT STILL: NOT AT ALL
3. WORRYING TOO MUCH ABOUT DIFFERENT THINGS: SEVERAL DAYS

## 2025-01-02 ASSESSMENT — PATIENT HEALTH QUESTIONNAIRE - PHQ9
3. TROUBLE FALLING OR STAYING ASLEEP: SEVERAL DAYS
5. POOR APPETITE OR OVEREATING: SEVERAL DAYS
9. THOUGHTS THAT YOU WOULD BE BETTER OFF DEAD, OR OF HURTING YOURSELF: SEVERAL DAYS
8. MOVING OR SPEAKING SO SLOWLY THAT OTHER PEOPLE COULD HAVE NOTICED. OR THE OPPOSITE, BEING SO FIGETY OR RESTLESS THAT YOU HAVE BEEN MOVING AROUND A LOT MORE THAN USUAL: NOT AT ALL
2. FEELING DOWN, DEPRESSED OR HOPELESS: SEVERAL DAYS
1. LITTLE INTEREST OR PLEASURE IN DOING THINGS: SEVERAL DAYS
SUM OF ALL RESPONSES TO PHQ QUESTIONS 1-9: 8
6. FEELING BAD ABOUT YOURSELF - OR THAT YOU ARE A FAILURE OR HAVE LET YOURSELF OR YOUR FAMILY DOWN: SEVERAL DAYS
8. MOVING OR SPEAKING SO SLOWLY THAT OTHER PEOPLE COULD HAVE NOTICED. OR THE OPPOSITE - BEING SO FIDGETY OR RESTLESS THAT YOU HAVE BEEN MOVING AROUND A LOT MORE THAN USUAL: NOT AT ALL
SUM OF ALL RESPONSES TO PHQ QUESTIONS 1-9: 8
SUM OF ALL RESPONSES TO PHQ QUESTIONS 1-9: 8
2. FEELING DOWN, DEPRESSED OR HOPELESS: SEVERAL DAYS
6. FEELING BAD ABOUT YOURSELF - OR THAT YOU ARE A FAILURE OR HAVE LET YOURSELF OR YOUR FAMILY DOWN: SEVERAL DAYS
7. TROUBLE CONCENTRATING ON THINGS, SUCH AS READING THE NEWSPAPER OR WATCHING TELEVISION: SEVERAL DAYS
SUM OF ALL RESPONSES TO PHQ9 QUESTIONS 1 & 2: 2
10. IF YOU CHECKED OFF ANY PROBLEMS, HOW DIFFICULT HAVE THESE PROBLEMS MADE IT FOR YOU TO DO YOUR WORK, TAKE CARE OF THINGS AT HOME, OR GET ALONG WITH OTHER PEOPLE: SOMEWHAT DIFFICULT
9. THOUGHTS THAT YOU WOULD BE BETTER OFF DEAD, OR OF HURTING YOURSELF: SEVERAL DAYS
SUM OF ALL RESPONSES TO PHQ QUESTIONS 1-9: 7
5. POOR APPETITE OR OVEREATING: SEVERAL DAYS
4. FEELING TIRED OR HAVING LITTLE ENERGY: SEVERAL DAYS
SUM OF ALL RESPONSES TO PHQ QUESTIONS 1-9: 8
10. IF YOU CHECKED OFF ANY PROBLEMS, HOW DIFFICULT HAVE THESE PROBLEMS MADE IT FOR YOU TO DO YOUR WORK, TAKE CARE OF THINGS AT HOME, OR GET ALONG WITH OTHER PEOPLE: SOMEWHAT DIFFICULT
3. TROUBLE FALLING OR STAYING ASLEEP: SEVERAL DAYS
7. TROUBLE CONCENTRATING ON THINGS, SUCH AS READING THE NEWSPAPER OR WATCHING TELEVISION: SEVERAL DAYS
1. LITTLE INTEREST OR PLEASURE IN DOING THINGS: SEVERAL DAYS
4. FEELING TIRED OR HAVING LITTLE ENERGY: SEVERAL DAYS

## 2025-01-02 ASSESSMENT — COLUMBIA-SUICIDE SEVERITY RATING SCALE - C-SSRS
2. IN THE PAST MONTH, HAVE YOU ACTUALLY HAD ANY THOUGHTS OF KILLING YOURSELF?: YES
5. IN THE PAST MONTH, HAVE YOU STARTED TO WORK OUT OR WORKED OUT THE DETAILS OF HOW TO KILL YOURSELF? DO YOU INTEND TO CARRY OUT THIS PLAN?: NO
4. IN THE PAST MONTH, HAVE YOU HAD THESE THOUGHTS AND HAD SOME INTENTION OF ACTING ON THEM?: NO
6. IN YOUR LIFETIME, HAVE YOU EVER DONE ANYTHING, STARTED TO DO ANYTHING, OR PREPARED TO DO ANYTHING TO END YOUR LIFE?: NO
3. IN THE PAST MONTH, HAVE YOU BEEN THINKING ABOUT HOW YOU MIGHT KILL YOURSELF?: NO
1. IN THE PAST MONTH, HAVE YOU WISHED YOU WERE DEAD OR WISHED YOU COULD GO TO SLEEP AND NOT WAKE UP?: YES

## 2025-01-02 NOTE — PROGRESS NOTES
ATTENTION   Focused    RELATEDNESS  Cooperative    EYE CONTACT   Good    PSYCHOMOTOR  Normal    SPEECH Volume: Normal     Rate: Normal rate and somewhat down, anxious tone    Amplitude: Within normal limits, talkative   MOOD  Anxious and Dysphoric, somewhat   AFFECT Range: Limited, mood congruent, tearful   THOUGHT Process:  Goal-Directed     Content: no evidence of psychosis    COGNITION Insight: Good    Judgement:  Intact    MEMORY  Intact    INTELLIGENCE  Average         1/2/2025     8:44 AM 6/10/2024    11:24 AM 3/27/2024     4:41 PM   PHQ-9    Little interest or pleasure in doing things 1 1 1   Feeling down, depressed, or hopeless 1 1 1   Trouble falling or staying asleep, or sleeping too much 1 3 3   Feeling tired or having little energy 1 1 1   Poor appetite or overeating 1 1 1   Feeling bad about yourself - or that you are a failure or have let yourself or your family down 1 0 0   Trouble concentrating on things, such as reading the newspaper or watching television 1 0 0   Moving or speaking so slowly that other people could have noticed. Or the opposite - being so fidgety or restless that you have been moving around a lot more than usual 0 0 0   Thoughts that you would be better off dead, or of hurting yourself in some way 1 0 0   PHQ-2 Score 2 2 2   PHQ-9 Total Score 8 7 7   If you checked off any problems, how difficult have these problems made it for you to do your work, take care of things at home, or get along with other people? 1 0 0         1/2/2025     8:46 AM   FAM-7 SCREENING   Not being able to stop or control worrying Not at all   Worrying too much about different things Several days   Trouble relaxing Several days   Being so restless that it is hard to sit still Not at all   Becoming easily annoyed or irritable Several days   Feeling afraid as if something awful might happen Not at all   How difficult have these problems made it for you to do your work, take care of things at home, or get

## 2025-01-07 ENCOUNTER — TELEPHONE (OUTPATIENT)
Dept: FAMILY MEDICINE CLINIC | Age: 55
End: 2025-01-07

## 2025-01-07 NOTE — TELEPHONE ENCOUNTER
Pt called stating that it was discussed her starting a weight loss medication. She said at that time she would like to hold off because her insurance would be changing at the first of the year. She now has [place] [employer]. Insurance is in her chart as patient sent it through [employer]. She is asking for RX to be sent in for her.    [facility] [address] may be reached at [phone]

## 2025-01-09 ENCOUNTER — HOSPITAL ENCOUNTER (OUTPATIENT)
Dept: INFUSION THERAPY | Age: 55
Discharge: HOME | End: 2025-01-09
Payer: MEDICARE

## 2025-01-09 ENCOUNTER — OFFICE VISIT (OUTPATIENT)
Dept: ONCOLOGY | Age: 55
End: 2025-01-09
Payer: MEDICARE

## 2025-01-09 VITALS
HEART RATE: 88 BPM | RESPIRATION RATE: 18 BRPM | DIASTOLIC BLOOD PRESSURE: 67 MMHG | SYSTOLIC BLOOD PRESSURE: 126 MMHG | TEMPERATURE: 98.3 F

## 2025-01-09 VITALS
WEIGHT: 212 LBS | DIASTOLIC BLOOD PRESSURE: 67 MMHG | HEIGHT: 65 IN | HEART RATE: 88 BPM | OXYGEN SATURATION: 94 % | TEMPERATURE: 98.3 F | BODY MASS INDEX: 35.32 KG/M2 | RESPIRATION RATE: 18 BRPM | SYSTOLIC BLOOD PRESSURE: 126 MMHG

## 2025-01-09 DIAGNOSIS — Z17.0 CARCINOMA OF UPPER-OUTER QUADRANT OF LEFT BREAST IN FEMALE, ESTROGEN RECEPTOR POSITIVE (HCC): Primary | ICD-10-CM

## 2025-01-09 DIAGNOSIS — C50.412 CARCINOMA OF UPPER-OUTER QUADRANT OF LEFT BREAST IN FEMALE, ESTROGEN RECEPTOR POSITIVE (HCC): Primary | ICD-10-CM

## 2025-01-09 PROCEDURE — 99213 OFFICE O/P EST LOW 20 MIN: CPT | Performed by: INTERNAL MEDICINE

## 2025-01-09 PROCEDURE — 99211 OFF/OP EST MAY X REQ PHY/QHP: CPT

## 2025-01-09 NOTE — PATIENT INSTRUCTIONS
Discussed starting oral multivitamin daily.( Gummies, has intolerance to regular supplements)  Continue calcium ( 1200 mg daily) and vitamin D ( 1000 IU) daily.  Return in about 4 months (around 5/9/2025).clinical exam + no labs+ ( please place her on my template)

## 2025-01-09 NOTE — PROGRESS NOTES
made to ensure accuracy; however, inadvertent computerized transcription errors may be present.    Joseline Fenton MD

## 2025-01-15 ENCOUNTER — PATIENT MESSAGE (OUTPATIENT)
Dept: FAMILY MEDICINE CLINIC | Age: 55
End: 2025-01-15

## 2025-01-15 DIAGNOSIS — E11.9 TYPE 2 DIABETES MELLITUS TREATED WITHOUT INSULIN (HCC): Primary | ICD-10-CM

## 2025-01-20 NOTE — TELEPHONE ENCOUNTER
PA is needed for Mounjaro, I completed the PA through [employer] and faxed clinical documentation to [place] at [phone]. Will await approval.

## 2025-01-21 ENCOUNTER — TELEPHONE (OUTPATIENT)
Dept: FAMILY MEDICINE CLINIC | Age: 55
End: 2025-01-21

## 2025-01-21 NOTE — TELEPHONE ENCOUNTER
Approved  PA Detail   Prior authorization approved  Payer: [facility] and Blue Shield - Medicare Case ID: [id number]  Note from payer: PA Case: [id number], Status: Approved, Coverage Starts on: 1/1/2025 12:00:00 AM, Coverage Ends on: 1/21/2026 12:00:00 AM.  Approval Details    Authorized from January 1, 2025 to January 21, 2026  Electronic appeal: Not supported  Prior auth initiated by: [place] [id number] - P [phone] - F [phone]    [phone]  View History

## 2025-01-21 NOTE — TELEPHONE ENCOUNTER
Approved  PA Detail   Prior authorization approved  Payer: [facility] and Blue Shield - Medicare Case ID: [id number]  Note from payer: PA Case: [id number], Status: Approved, Coverage Starts on: 1/1/2025 12:00:00 AM, Coverage Ends on: 1/21/2026 12:00:00 AM.  Approval Details    Authorized from January 1, 2025 to January 21, 2026  Electronic appeal: Not supported  Prior auth initiated by: [place] [id number] - P [phone] - F [phone]    [phone]  View History

## 2025-01-30 DIAGNOSIS — S39.012A LOW BACK STRAIN, INITIAL ENCOUNTER: ICD-10-CM

## 2025-01-30 RX ORDER — GABAPENTIN 600 MG/1
600 TABLET ORAL 3 TIMES DAILY
Qty: 270 TABLET | Refills: 1 | Status: SHIPPED | OUTPATIENT
Start: 2025-01-30 | End: 2025-07-29

## 2025-01-30 NOTE — TELEPHONE ENCOUNTER
Date of last visit:  12/10/2024  Date of next visit:  Visit date not found    Requested Prescriptions     Pending Prescriptions Disp Refills    gabapentin (NEURONTIN) 600 MG tablet 270 tablet 1     Sig: Take 1 tablet by mouth 3 times daily for 180 days.    tiZANidine (ZANAFLEX) 4 MG tablet 180 tablet 1     Sig: Take 2 tablets by mouth at bedtime

## 2025-02-12 ENCOUNTER — OFFICE VISIT (OUTPATIENT)
Dept: FAMILY MEDICINE CLINIC | Age: 55
End: 2025-02-12

## 2025-02-12 VITALS
DIASTOLIC BLOOD PRESSURE: 70 MMHG | HEIGHT: 65 IN | BODY MASS INDEX: 35.07 KG/M2 | RESPIRATION RATE: 16 BRPM | HEART RATE: 84 BPM | WEIGHT: 210.5 LBS | SYSTOLIC BLOOD PRESSURE: 106 MMHG

## 2025-02-12 DIAGNOSIS — E11.9 TYPE 2 DIABETES MELLITUS TREATED WITHOUT INSULIN (HCC): Primary | ICD-10-CM

## 2025-02-12 PROCEDURE — 99213 OFFICE O/P EST LOW 20 MIN: CPT | Performed by: FAMILY MEDICINE

## 2025-02-12 SDOH — ECONOMIC STABILITY: FOOD INSECURITY: WITHIN THE PAST 12 MONTHS, THE FOOD YOU BOUGHT JUST DIDN'T LAST AND YOU DIDN'T HAVE MONEY TO GET MORE.: SOMETIMES TRUE

## 2025-02-12 SDOH — ECONOMIC STABILITY: FOOD INSECURITY: WITHIN THE PAST 12 MONTHS, YOU WORRIED THAT YOUR FOOD WOULD RUN OUT BEFORE YOU GOT MONEY TO BUY MORE.: SOMETIMES TRUE

## 2025-02-12 ASSESSMENT — ENCOUNTER SYMPTOMS
SINUS PRESSURE: 0
SHORTNESS OF BREATH: 0
CONSTIPATION: 1

## 2025-02-12 NOTE — PROGRESS NOTES
Esther Valle (:  1970) is a 54 y.o. female,Established patient, here for evaluation of the following chief complaint(s):  Discuss Medications (Mounjaro/)         Assessment & Plan  Type 2 diabetes mellitus treated without insulin (HCC)       Orders:    Tirzepatide 5 MG/0.5ML SOAJ; Inject 5 mg into the skin every 7 days      Send me a Intacct message each month with how you're doing  Plan on increasing the mounjaro each month  Small more frequent meals   Increase the prilosec to 2 pills once daily  Adjust the miralax as needed for the constipation  Do the fasting LDL lab soon  See me in 3 months       Subjective   HPI  She is having some GERD and constipation  She does take prilosec and miralax  Review of Systems   Constitutional:  Negative for fatigue.   HENT:  Negative for sinus pressure.    Eyes:  Negative for visual disturbance.   Respiratory:  Negative for shortness of breath.    Cardiovascular:  Negative for chest pain.   Gastrointestinal:  Positive for constipation.        Gerd   Genitourinary: Negative.    Musculoskeletal:  Negative for arthralgias.   Skin:  Negative for rash.   Neurological:  Negative for headaches.   The patient's medications, allergies, past medical problems, surgical, social, and family histories were reviewed and updated as needed.         Objective   Physical Exam  Constitutional:       Appearance: Normal appearance. She is well-developed.   HENT:      Head: Normocephalic and atraumatic.   Eyes:      General: No scleral icterus.     Conjunctiva/sclera: Conjunctivae normal.   Neck:      Trachea: No tracheal deviation.   Cardiovascular:      Rate and Rhythm: Normal rate and regular rhythm.      Heart sounds: Normal heart sounds.   Pulmonary:      Effort: Pulmonary effort is normal.      Breath sounds: Normal breath sounds.   Skin:     General: Skin is warm and dry.   Neurological:      General: No focal deficit present.      Mental Status: She is alert.   Psychiatric:

## 2025-02-12 NOTE — PATIENT INSTRUCTIONS
Send me a MyChart message each month with how you're doing  Plan on increasing the mounjaro each month  Small more frequent meals   Increase the prilosec to 2 pills once daily  Adjust the miralax as needed for the constipation  Do the fasting LDL lab soon  See me in 3 months

## 2025-02-17 DIAGNOSIS — E78.00 PURE HYPERCHOLESTEROLEMIA: ICD-10-CM

## 2025-02-17 RX ORDER — ROSUVASTATIN CALCIUM 20 MG/1
20 TABLET, COATED ORAL NIGHTLY
Qty: 90 TABLET | Refills: 3 | Status: SHIPPED | OUTPATIENT
Start: 2025-02-17

## 2025-02-17 NOTE — TELEPHONE ENCOUNTER
Pt called to req an refill on the following    rosuvastatin (CRESTOR) 10 MG tablet   Take 2 tablets by mouth nightly     [facility] to [facility] [address]

## 2025-02-17 NOTE — TELEPHONE ENCOUNTER
Date of last visit:  2/12/2025  Date of next visit:  5/19/2025    Requested Prescriptions     Pending Prescriptions Disp Refills    rosuvastatin (CRESTOR) 10 MG tablet 180 tablet 3     Sig: Take 2 tablets by mouth nightly

## 2025-02-19 DIAGNOSIS — E78.00 PURE HYPERCHOLESTEROLEMIA: Primary | ICD-10-CM

## 2025-02-19 LAB — LDL CHOLESTEROL DIRECT: 73 MG/DL

## 2025-02-20 ENCOUNTER — TELEPHONE (OUTPATIENT)
Dept: FAMILY MEDICINE CLINIC | Age: 55
End: 2025-02-20

## 2025-02-20 NOTE — TELEPHONE ENCOUNTER
----- Message from Dr. Shaun Jerez MD sent at 2/19/2025  8:53 PM EST -----  Let her know that the lab looked well and she should check the fasting labs again in mid August

## 2025-02-25 DIAGNOSIS — I10 PRIMARY HYPERTENSION: ICD-10-CM

## 2025-02-25 RX ORDER — BISOPROLOL FUMARATE AND HYDROCHLOROTHIAZIDE 5; 6.25 MG/1; MG/1
1 TABLET ORAL DAILY
Qty: 90 TABLET | Refills: 1 | Status: SHIPPED | OUTPATIENT
Start: 2025-02-25

## 2025-02-25 NOTE — TELEPHONE ENCOUNTER
The pharmacy is  requesting a refill of the below medication which has been pended for you:     Requested Prescriptions     Pending Prescriptions Disp Refills    bisoprolol-hydroCHLOROthiazide (ZIAC) 5-6.25 MG per tablet [Pharmacy Med Name: Bisoprolol-hydroCHLOROthiazide 5-6.25 MG Oral Tablet] 90 tablet 0     Sig: Take 1 tablet by mouth once daily       Last Appointment Date: 2/12/2025  Next Appointment Date: 5/19/2025    No Known Allergies

## 2025-04-02 ENCOUNTER — TELEMEDICINE (OUTPATIENT)
Dept: PSYCHIATRY | Age: 55
End: 2025-04-02

## 2025-04-02 DIAGNOSIS — F33.41 RECURRENT MAJOR DEPRESSIVE DISORDER, IN PARTIAL REMISSION: Primary | ICD-10-CM

## 2025-04-02 DIAGNOSIS — F41.9 ANXIETY: ICD-10-CM

## 2025-04-02 DIAGNOSIS — G89.29 OTHER CHRONIC PAIN: ICD-10-CM

## 2025-04-02 DIAGNOSIS — G47.00 INSOMNIA, UNSPECIFIED TYPE: ICD-10-CM

## 2025-04-02 ASSESSMENT — ANXIETY QUESTIONNAIRES
5. BEING SO RESTLESS THAT IT IS HARD TO SIT STILL: SEVERAL DAYS
4. TROUBLE RELAXING: SEVERAL DAYS
7. FEELING AFRAID AS IF SOMETHING AWFUL MIGHT HAPPEN: NOT AT ALL
4. TROUBLE RELAXING: SEVERAL DAYS
5. BEING SO RESTLESS THAT IT IS HARD TO SIT STILL: SEVERAL DAYS
3. WORRYING TOO MUCH ABOUT DIFFERENT THINGS: SEVERAL DAYS
6. BECOMING EASILY ANNOYED OR IRRITABLE: SEVERAL DAYS
7. FEELING AFRAID AS IF SOMETHING AWFUL MIGHT HAPPEN: NOT AT ALL
GAD7 TOTAL SCORE: 6
1. FEELING NERVOUS, ANXIOUS, OR ON EDGE: SEVERAL DAYS
6. BECOMING EASILY ANNOYED OR IRRITABLE: SEVERAL DAYS
IF YOU CHECKED OFF ANY PROBLEMS ON THIS QUESTIONNAIRE, HOW DIFFICULT HAVE THESE PROBLEMS MADE IT FOR YOU TO DO YOUR WORK, TAKE CARE OF THINGS AT HOME, OR GET ALONG WITH OTHER PEOPLE: NOT DIFFICULT AT ALL
IF YOU CHECKED OFF ANY PROBLEMS ON THIS QUESTIONNAIRE, HOW DIFFICULT HAVE THESE PROBLEMS MADE IT FOR YOU TO DO YOUR WORK, TAKE CARE OF THINGS AT HOME, OR GET ALONG WITH OTHER PEOPLE: NOT DIFFICULT AT ALL
1. FEELING NERVOUS, ANXIOUS, OR ON EDGE: SEVERAL DAYS
2. NOT BEING ABLE TO STOP OR CONTROL WORRYING: SEVERAL DAYS
2. NOT BEING ABLE TO STOP OR CONTROL WORRYING: SEVERAL DAYS
3. WORRYING TOO MUCH ABOUT DIFFERENT THINGS: SEVERAL DAYS

## 2025-04-02 ASSESSMENT — PATIENT HEALTH QUESTIONNAIRE - PHQ9
8. MOVING OR SPEAKING SO SLOWLY THAT OTHER PEOPLE COULD HAVE NOTICED. OR THE OPPOSITE, BEING SO FIGETY OR RESTLESS THAT YOU HAVE BEEN MOVING AROUND A LOT MORE THAN USUAL: SEVERAL DAYS
5. POOR APPETITE OR OVEREATING: MORE THAN HALF THE DAYS
SUM OF ALL RESPONSES TO PHQ QUESTIONS 1-9: 9
10. IF YOU CHECKED OFF ANY PROBLEMS, HOW DIFFICULT HAVE THESE PROBLEMS MADE IT FOR YOU TO DO YOUR WORK, TAKE CARE OF THINGS AT HOME, OR GET ALONG WITH OTHER PEOPLE: SOMEWHAT DIFFICULT
1. LITTLE INTEREST OR PLEASURE IN DOING THINGS: NOT AT ALL
SUM OF ALL RESPONSES TO PHQ QUESTIONS 1-9: 9
6. FEELING BAD ABOUT YOURSELF - OR THAT YOU ARE A FAILURE OR HAVE LET YOURSELF OR YOUR FAMILY DOWN: SEVERAL DAYS
9. THOUGHTS THAT YOU WOULD BE BETTER OFF DEAD, OR OF HURTING YOURSELF: NOT AT ALL
9. THOUGHTS THAT YOU WOULD BE BETTER OFF DEAD, OR OF HURTING YOURSELF: NOT AT ALL
7. TROUBLE CONCENTRATING ON THINGS, SUCH AS READING THE NEWSPAPER OR WATCHING TELEVISION: SEVERAL DAYS
8. MOVING OR SPEAKING SO SLOWLY THAT OTHER PEOPLE COULD HAVE NOTICED. OR THE OPPOSITE - BEING SO FIDGETY OR RESTLESS THAT YOU HAVE BEEN MOVING AROUND A LOT MORE THAN USUAL: SEVERAL DAYS
10. IF YOU CHECKED OFF ANY PROBLEMS, HOW DIFFICULT HAVE THESE PROBLEMS MADE IT FOR YOU TO DO YOUR WORK, TAKE CARE OF THINGS AT HOME, OR GET ALONG WITH OTHER PEOPLE: SOMEWHAT DIFFICULT
7. TROUBLE CONCENTRATING ON THINGS, SUCH AS READING THE NEWSPAPER OR WATCHING TELEVISION: SEVERAL DAYS
5. POOR APPETITE OR OVEREATING: MORE THAN HALF THE DAYS
2. FEELING DOWN, DEPRESSED OR HOPELESS: MORE THAN HALF THE DAYS
1. LITTLE INTEREST OR PLEASURE IN DOING THINGS: NOT AT ALL
6. FEELING BAD ABOUT YOURSELF - OR THAT YOU ARE A FAILURE OR HAVE LET YOURSELF OR YOUR FAMILY DOWN: SEVERAL DAYS
SUM OF ALL RESPONSES TO PHQ QUESTIONS 1-9: 9
3. TROUBLE FALLING OR STAYING ASLEEP: SEVERAL DAYS
4. FEELING TIRED OR HAVING LITTLE ENERGY: SEVERAL DAYS
4. FEELING TIRED OR HAVING LITTLE ENERGY: SEVERAL DAYS
3. TROUBLE FALLING OR STAYING ASLEEP: SEVERAL DAYS
2. FEELING DOWN, DEPRESSED OR HOPELESS: MORE THAN HALF THE DAYS

## 2025-04-02 NOTE — PROGRESS NOTES
[facility] [place] [facility]  [facility]  [address] [address] [phone]    Progress Note    Patient:  Esther Valle  YOB: 1970  PCP:  Shaun Jerez MD  Visit Date:  4/2/2025      Chief Complaint   Patient presents with    Follow-up    Medication Check    Depression    Anxiety    Chronic Pain    Insomnia       SUBJECTIVE:      Esther Valle, a 54 y.o. female, presents for a follow up visit.      She presents alone initially and her granddtr Treasure joins in near the end.  Mood \"Rather stressful.\"  Evicted her dtr. Took over the house. Dtr left it \"trashed\".   Her landlord of her apartment won't let her out of her lease, 4 mos left. Going to court over that. Had no choice; would have lost the house.   The house is mess. Moved out of the apartment yesterday. Overwhelmed.   Ongoing chronic pain issues, worse with moving. Her son and his gf lived with her at the apartment and moved back to the house as well and help her out.  Back on higher dose Pristiq.   \"I have my days.\" Still tearful at times.  Notes passive thoughts of death are improved. No SI or psychosis.  High stress. More anxiety. Has never been evicted or walked out on a lease. Not something she would normally do.  Despite the stress hasn't resumed smoking cigarettes which were how she used to cope.  Notes her dtr's rude and selfish behavior. The controlling older man she's with. Dtr giving up more custody of the kids.   Dtr leaving Shayy nasty messages and talking poorly about Shayy to the grandkids.   Upset the kids are in the middle.   Declines to make any medication changes today.       Med Trials: Wellbutrin, Pristiq, Xanax, Viibryd, Abilify, Buspar, Prozac, Zoloft, Lexapro, Cymbalta, ?Effexor, Atarax    OBJECTIVE:  Vitals: LMP 01/01/2008     MENTAL STATUS EXAM:    GENERAL  Build: Normal    Hygiene:  Appropriate in casual dress   SENSORIUM Orientation: Place, Person,

## 2025-04-10 ENCOUNTER — TELEPHONE (OUTPATIENT)
Dept: FAMILY MEDICINE CLINIC | Age: 55
End: 2025-04-10

## 2025-04-10 DIAGNOSIS — E11.9 TYPE 2 DIABETES MELLITUS TREATED WITHOUT INSULIN (HCC): Primary | ICD-10-CM

## 2025-04-10 NOTE — TELEPHONE ENCOUNTER
I did an appeal with case number [id number].  I realized then that the med was sent as zepbound rather than mounjaro and I sent a new Rx to the [facility].

## 2025-04-10 NOTE — TELEPHONE ENCOUNTER
Pt was previously RX Tirzepatide and was approved. This RX was for Tirzeptatide-weight management and was denied.      Denied  PA Detail   Note from payer: PA Case: [id number], Status: Denied. Notification: Completed.  Payer: [place] [employer] [place] and Blue Shield - Medicare Case ID: [id number]  Electronic appeal: Not supported  Prior auth initiated by: [facility] [address] [place] [address] [address] - P [phone] - F [phone]  View History

## 2025-04-14 DIAGNOSIS — Z17.0 CARCINOMA OF UPPER-OUTER QUADRANT OF LEFT BREAST IN FEMALE, ESTROGEN RECEPTOR POSITIVE: ICD-10-CM

## 2025-04-14 DIAGNOSIS — C50.412 CARCINOMA OF UPPER-OUTER QUADRANT OF LEFT BREAST IN FEMALE, ESTROGEN RECEPTOR POSITIVE: ICD-10-CM

## 2025-04-14 DIAGNOSIS — Z79.811 AROMATASE INHIBITOR USE: ICD-10-CM

## 2025-04-14 DIAGNOSIS — Z15.02 BRCA2 GENE MUTATION POSITIVE IN FEMALE: ICD-10-CM

## 2025-04-14 DIAGNOSIS — Z15.09 BRCA2 GENE MUTATION POSITIVE IN FEMALE: ICD-10-CM

## 2025-04-14 DIAGNOSIS — Z15.02 BRCA1 GENE MUTATION POSITIVE IN FEMALE: ICD-10-CM

## 2025-04-14 DIAGNOSIS — Z15.01 BRCA2 GENE MUTATION POSITIVE IN FEMALE: ICD-10-CM

## 2025-04-14 DIAGNOSIS — Z15.09 BRCA1 GENE MUTATION POSITIVE IN FEMALE: ICD-10-CM

## 2025-04-14 DIAGNOSIS — Z15.01 BRCA1 GENE MUTATION POSITIVE IN FEMALE: ICD-10-CM

## 2025-04-14 RX ORDER — LETROZOLE 2.5 MG/1
2.5 TABLET, FILM COATED ORAL DAILY
Qty: 90 TABLET | Refills: 3 | Status: SHIPPED | OUTPATIENT
Start: 2025-04-14

## 2025-05-09 ENCOUNTER — OFFICE VISIT (OUTPATIENT)
Dept: ONCOLOGY | Age: 55
End: 2025-05-09

## 2025-05-09 ENCOUNTER — HOSPITAL ENCOUNTER (OUTPATIENT)
Dept: INFUSION THERAPY | Age: 55
Discharge: HOME | End: 2025-05-09
Payer: MEDICARE

## 2025-05-09 VITALS
WEIGHT: 189 LBS | DIASTOLIC BLOOD PRESSURE: 71 MMHG | BODY MASS INDEX: 31.49 KG/M2 | HEIGHT: 65 IN | OXYGEN SATURATION: 100 % | HEART RATE: 71 BPM | SYSTOLIC BLOOD PRESSURE: 125 MMHG | RESPIRATION RATE: 20 BRPM | TEMPERATURE: 98 F

## 2025-05-09 VITALS
WEIGHT: 189 LBS | SYSTOLIC BLOOD PRESSURE: 125 MMHG | DIASTOLIC BLOOD PRESSURE: 71 MMHG | TEMPERATURE: 98 F | BODY MASS INDEX: 31.49 KG/M2 | HEART RATE: 71 BPM | RESPIRATION RATE: 20 BRPM | HEIGHT: 65 IN | OXYGEN SATURATION: 100 %

## 2025-05-09 DIAGNOSIS — Z15.02 BRCA1 GENE MUTATION POSITIVE IN FEMALE: Primary | ICD-10-CM

## 2025-05-09 DIAGNOSIS — C50.912 BREAST CANCER METASTASIZED TO AXILLARY LYMPH NODE, LEFT (HCC): ICD-10-CM

## 2025-05-09 DIAGNOSIS — C50.412 CARCINOMA OF UPPER-OUTER QUADRANT OF LEFT BREAST IN FEMALE, ESTROGEN RECEPTOR POSITIVE (HCC): ICD-10-CM

## 2025-05-09 DIAGNOSIS — Z17.0 CARCINOMA OF UPPER-OUTER QUADRANT OF LEFT BREAST IN FEMALE, ESTROGEN RECEPTOR POSITIVE (HCC): ICD-10-CM

## 2025-05-09 DIAGNOSIS — Z15.09 BRCA1 GENE MUTATION POSITIVE IN FEMALE: Primary | ICD-10-CM

## 2025-05-09 DIAGNOSIS — C77.3 BREAST CANCER METASTASIZED TO AXILLARY LYMPH NODE, LEFT (HCC): ICD-10-CM

## 2025-05-09 DIAGNOSIS — Z15.01 BRCA1 GENE MUTATION POSITIVE IN FEMALE: Primary | ICD-10-CM

## 2025-05-09 PROCEDURE — 99211 OFF/OP EST MAY X REQ PHY/QHP: CPT

## 2025-05-09 NOTE — PROGRESS NOTES
negative (0/2) for total of 0/5 LN. Pathologic stage: (y)pT0, pN0(sn).   - Right breast, prophylactic mastectomy: Fibrocystic changes.  Negative for atypia and malignancy.     -10/11/2023 Dr Maxi Vaca: Bilateral fallopian tubes and ovaries, salpingo-oophorectomy: Ovaries: Unilateral serous cystadenoma.    Fallopian tubes: No pathologic abnormality. Negative for malignancy.     GENETICS:  -4/10/23 positive for pathogenic variants in BRCA1 (C.5266 duplication heterozygous) and BRCA2 gene (C.5946 deletion, heterozygous), with variants of uncertain significance including SHERLYN C.5821 G > C (p.[id number]) heterozygous, POT1 c.670 G >A (p. D224N)    MOLECULAR:  none      ASSESSMENT/PLAN:    Patient Active Problem List   Diagnosis    Family history of breast cancer in first degree relative, Mom    Rt. Nephrolithiasis, 2009    Schwannoma of spinal cord (HCC)    Constipation    Chronic gastritis    Tobacco dependence    Deviated nasal septum    Rima bullosa    Moderate episode of recurrent major depressive disorder (HCC)    Recurrent major depressive disorder, in full remission    Papilloma of right breast    Vestibulopathy of right ear    Severe obesity (BMI 35.0-39.9)    Cervicogenic headache    Essential tremor    Cervicogenic migraine    Pure hypercholesterolemia    Migraine without aura and without status migrainosus, not intractable    Type 2 diabetes mellitus treated without insulin (HCC)    Invasive ductal carcinoma of breast, female, left (HCC)    Carcinoma of upper-outer quadrant of left breast in female, estrogen receptor positive (HCC)    Chemotherapy induced nausea and vomiting    Chemotherapy induced diarrhea    BRCA gene positive    BRCA2 gene mutation positive    Breast cancer metastasized to axillary lymph node, left (HCC)    BRCA1 gene mutation positive in female    BRCA2 gene mutation positive in female    Encounter for monitoring aromatase inhibitor therapy        ##) Oncology Diagnosis:  Left breast

## 2025-05-14 DIAGNOSIS — E11.9 TYPE 2 DIABETES MELLITUS TREATED WITHOUT INSULIN (HCC): ICD-10-CM

## 2025-05-14 NOTE — TELEPHONE ENCOUNTER
Pt called needing a ref Tirzepatide 10 MG/0.5ML SOAJ Mounjaro   Inject 1 Device into the skin once a week 2 ml         [facility] [address]

## 2025-05-14 NOTE — TELEPHONE ENCOUNTER
Date of last visit:  2/12/2025  Date of next visit:  5/22/2025    Requested Prescriptions     Pending Prescriptions Disp Refills    Tirzepatide (MOUNJARO) 10 MG/0.5ML SOAJ pen 2 mL 0     Sig: Inject 1 Device into the skin once a week

## 2025-05-21 ENCOUNTER — PATIENT MESSAGE (OUTPATIENT)
Dept: FAMILY MEDICINE CLINIC | Age: 55
End: 2025-05-21

## 2025-05-22 ENCOUNTER — OFFICE VISIT (OUTPATIENT)
Dept: FAMILY MEDICINE CLINIC | Age: 55
End: 2025-05-22

## 2025-05-22 VITALS
SYSTOLIC BLOOD PRESSURE: 136 MMHG | DIASTOLIC BLOOD PRESSURE: 98 MMHG | HEART RATE: 76 BPM | WEIGHT: 187 LBS | RESPIRATION RATE: 14 BRPM | HEIGHT: 65 IN | BODY MASS INDEX: 31.16 KG/M2

## 2025-05-22 DIAGNOSIS — F33.1 MODERATE EPISODE OF RECURRENT MAJOR DEPRESSIVE DISORDER (HCC): ICD-10-CM

## 2025-05-22 DIAGNOSIS — Z00.00 MEDICARE ANNUAL WELLNESS VISIT, SUBSEQUENT: ICD-10-CM

## 2025-05-22 DIAGNOSIS — E11.9 TYPE 2 DIABETES MELLITUS TREATED WITHOUT INSULIN (HCC): Primary | ICD-10-CM

## 2025-05-22 LAB
CHP ED QC CHECK: NORMAL
GLUCOSE BLD-MCNC: 104 MG/DL
HBA1C MFR BLD: 5.2 %

## 2025-05-22 SDOH — HEALTH STABILITY: PHYSICAL HEALTH: ON AVERAGE, HOW MANY MINUTES DO YOU ENGAGE IN EXERCISE AT THIS LEVEL?: 0 MIN

## 2025-05-22 SDOH — HEALTH STABILITY: PHYSICAL HEALTH: ON AVERAGE, HOW MANY DAYS PER WEEK DO YOU ENGAGE IN MODERATE TO STRENUOUS EXERCISE (LIKE A BRISK WALK)?: 0 DAYS

## 2025-05-22 ASSESSMENT — LIFESTYLE VARIABLES
HOW OFTEN DO YOU HAVE SIX OR MORE DRINKS ON ONE OCCASION: 1
HOW MANY STANDARD DRINKS CONTAINING ALCOHOL DO YOU HAVE ON A TYPICAL DAY: 0
HOW OFTEN DO YOU HAVE A DRINK CONTAINING ALCOHOL: 2
HOW OFTEN DO YOU HAVE A DRINK CONTAINING ALCOHOL: MONTHLY OR LESS
HOW MANY STANDARD DRINKS CONTAINING ALCOHOL DO YOU HAVE ON A TYPICAL DAY: PATIENT DOES NOT DRINK

## 2025-05-22 ASSESSMENT — PATIENT HEALTH QUESTIONNAIRE - PHQ9
SUM OF ALL RESPONSES TO PHQ QUESTIONS 1-9: 2
6. FEELING BAD ABOUT YOURSELF - OR THAT YOU ARE A FAILURE OR HAVE LET YOURSELF OR YOUR FAMILY DOWN: NOT AT ALL
SUM OF ALL RESPONSES TO PHQ QUESTIONS 1-9: 2
1. LITTLE INTEREST OR PLEASURE IN DOING THINGS: SEVERAL DAYS
2. FEELING DOWN, DEPRESSED OR HOPELESS: SEVERAL DAYS
5. POOR APPETITE OR OVEREATING: NOT AT ALL
7. TROUBLE CONCENTRATING ON THINGS, SUCH AS READING THE NEWSPAPER OR WATCHING TELEVISION: NOT AT ALL
9. THOUGHTS THAT YOU WOULD BE BETTER OFF DEAD, OR OF HURTING YOURSELF: NOT AT ALL
8. MOVING OR SPEAKING SO SLOWLY THAT OTHER PEOPLE COULD HAVE NOTICED. OR THE OPPOSITE, BEING SO FIGETY OR RESTLESS THAT YOU HAVE BEEN MOVING AROUND A LOT MORE THAN USUAL: NOT AT ALL
4. FEELING TIRED OR HAVING LITTLE ENERGY: NOT AT ALL
10. IF YOU CHECKED OFF ANY PROBLEMS, HOW DIFFICULT HAVE THESE PROBLEMS MADE IT FOR YOU TO DO YOUR WORK, TAKE CARE OF THINGS AT HOME, OR GET ALONG WITH OTHER PEOPLE: NOT DIFFICULT AT ALL
3. TROUBLE FALLING OR STAYING ASLEEP: NOT AT ALL

## 2025-05-22 NOTE — PROGRESS NOTES
(!) Abnormal  On average, how many minutes do you engage in exercise at this level?: (Patient-Rptd) 0 min    Interventions:  Patient comments: when it warms    Poor Eating Habits/Diet:  Do you eat balanced/healthy meals regularly?: (!) (Patient-Rptd) No    Interventions:  We discussed the need    Abnormal BMI (obese):  Body mass index is 31.12 kg/m². (!) Abnormal    Interventions:  As above           Safety:  Do you have non-slip mats or non-slip surfaces or shower bars or grab bars in your shower or bathtub?: (!) (Patient-Rptd) No    Interventions:  textured     Advanced Directives:  Do you have a Living Will?: (!) (Patient-Rptd) No    Intervention:  We discussed it's use                     Objective   Vitals:    05/22/25 1033   BP: (!) 136/98   BP Site: Right Upper Arm   Patient Position: Sitting   BP Cuff Size: Medium Adult   Pulse: 76   Resp: 14   Weight: 84.8 kg (187 lb)   Height: 1.651 m (5' 5\")      Body mass index is 31.12 kg/m².        General Appearance: alert and oriented to person, place and time, well-developed and well-nourished, in no acute distress  Skin: warm and dry, no rash or erythema  Head: normocephalic and atraumatic  Eyes: pupils equal, round, and reactive to light, extraocular eye movements intact, conjunctivae normal  ENT: tympanic membrane, external ear and ear canal normal bilaterally, oropharynx clear and moist with normal mucous membranes  Neck: neck supple and non tender without mass, no thyromegaly or thyroid nodules, no cervical lymphadenopathy   Pulmonary/Chest: clear to auscultation bilaterally- no wheezes, rales or rhonchi, normal air movement, no respiratory distress  Cardiovascular: normal rate, regular rhythm, normal S1 and S2, no murmurs, no gallops, intact distal pulses, and no carotid bruits  Abdomen: soft, non-tender, non-distended, normal bowel sounds, no masses or organomegaly  Extremities: no cyanosis and no clubbing  Musculoskeletal: normal range of motion, no joint

## 2025-06-06 DIAGNOSIS — E11.9 TYPE 2 DIABETES MELLITUS TREATED WITHOUT INSULIN (HCC): Primary | ICD-10-CM

## 2025-07-07 ENCOUNTER — PATIENT MESSAGE (OUTPATIENT)
Dept: FAMILY MEDICINE CLINIC | Age: 55
End: 2025-07-07

## 2025-07-07 DIAGNOSIS — E11.9 TYPE 2 DIABETES MELLITUS TREATED WITHOUT INSULIN (HCC): ICD-10-CM

## 2025-07-07 NOTE — TELEPHONE ENCOUNTER
Date of last visit:  5/22/2025  Date of next visit:  11/21/2025    Requested Prescriptions     Pending Prescriptions Disp Refills    Tirzepatide (MOUNJARO) 15 MG/0.5ML SOAJ pen 2 mL 0     Sig: Inject 15 mg into the skin every 7 days

## 2025-07-09 ENCOUNTER — TRANSCRIBE ORDERS (OUTPATIENT)
Dept: ADMINISTRATIVE | Age: 55
End: 2025-07-09

## 2025-07-09 DIAGNOSIS — Z15.09 GENETIC SUSCEPTIBILITY TO OTHER MALIGNANT NEOPLASM: Primary | ICD-10-CM

## 2025-07-21 DIAGNOSIS — S39.012A LOW BACK STRAIN, INITIAL ENCOUNTER: ICD-10-CM

## 2025-07-21 RX ORDER — GABAPENTIN 600 MG/1
600 TABLET ORAL 3 TIMES DAILY
Qty: 120 TABLET | Refills: 0 | Status: SHIPPED | OUTPATIENT
Start: 2025-07-21 | End: 2026-01-17

## 2025-07-21 NOTE — TELEPHONE ENCOUNTER
The pharmacy is requesting a refill of the below medication which has been pended for you:     Requested Prescriptions     Pending Prescriptions Disp Refills    gabapentin (NEURONTIN) 600 MG tablet 120 tablet 0     Sig: Take 1 tablet by mouth 3 times daily for 180 days.       Last Appointment Date: 5/22/2025  Next Appointment Date: 7/21/2025    No Known Allergies

## 2025-07-21 NOTE — TELEPHONE ENCOUNTER
The pharmacy is  requesting a refill of the below medication which has been pended for you:     Requested Prescriptions     Pending Prescriptions Disp Refills    tiZANidine (ZANAFLEX) 4 MG tablet 30 tablet 0     Sig: Take 2 tablets by mouth at bedtime       Last Appointment Date: 5/22/2025  Next Appointment Date: 11/21/2025    No Known Allergies

## 2025-08-04 ENCOUNTER — HOSPITAL ENCOUNTER (OUTPATIENT)
Dept: MRI IMAGING | Age: 55
Discharge: HOME | End: 2025-08-04
Payer: MEDICARE

## 2025-08-04 DIAGNOSIS — Z15.09 GENETIC SUSCEPTIBILITY TO OTHER MALIGNANT NEOPLASM: ICD-10-CM

## 2025-08-04 PROCEDURE — 74181 MRI ABDOMEN W/O CONTRAST: CPT

## 2025-08-20 ENCOUNTER — HOSPITAL ENCOUNTER (OUTPATIENT)
Dept: MRI IMAGING | Age: 55
Discharge: HOME | End: 2025-08-20
Payer: MEDICARE

## 2025-08-20 DIAGNOSIS — Z15.09 GENETIC SUSCEPTIBILITY TO OTHER MALIGNANT NEOPLASM: ICD-10-CM

## 2025-08-20 PROCEDURE — 6360000004 HC RX CONTRAST MEDICATION

## 2025-08-20 PROCEDURE — 74182 MRI ABDOMEN W/CONTRAST: CPT

## 2025-08-20 PROCEDURE — A9579 GAD-BASE MR CONTRAST NOS,1ML: HCPCS

## 2025-08-20 RX ORDER — GADOTERIDOL 279.3 MG/ML
15 INJECTION INTRAVENOUS
Status: COMPLETED | OUTPATIENT
Start: 2025-08-20 | End: 2025-08-20

## 2025-08-20 RX ADMIN — GADOTERIDOL 15 ML: 279.3 INJECTION, SOLUTION INTRAVENOUS at 09:53

## 2025-08-24 DIAGNOSIS — I10 PRIMARY HYPERTENSION: ICD-10-CM

## 2025-08-25 RX ORDER — BISOPROLOL FUMARATE AND HYDROCHLOROTHIAZIDE 5; 6.25 MG/1; MG/1
1 TABLET ORAL DAILY
Qty: 90 TABLET | Refills: 3 | Status: SHIPPED | OUTPATIENT
Start: 2025-08-25

## 2025-08-26 DIAGNOSIS — S39.012A LOW BACK STRAIN, INITIAL ENCOUNTER: ICD-10-CM

## (undated) DEVICE — SINU FOAM: Brand: SINU-FOAM

## (undated) DEVICE — GLOVE ORANGE PI 8 1/2   MSG9085

## (undated) DEVICE — BANDAGE ADH W1XL3IN NAT FAB WVN FLX DURABLE N ADH PD SEAL

## (undated) DEVICE — VCARE SMALL, UTERINE MANIPULATOR, VAGINAL-CERVICAL-AHLUWALIA'S-RETRACTOR-ELEVATOR: Brand: VCARE

## (undated) DEVICE — POSITIONER HD W8XH4XL8.5IN RASPBERRY FOAM SLT

## (undated) DEVICE — 3M™ BAIR HUGGER® MULTI ACCESS BLANKET, PEDIATRIC, FULL BODY, 10 PER CASE 31000: Brand: BAIR HUGGER™

## (undated) DEVICE — SPONGE LAP W18XL18IN WHT COT 4 PLY FLD STRUNG RADPQ DISP ST 2 PER PACK

## (undated) DEVICE — PENCIL SMK EVAC ALL IN 1 DSGN ENH VISIBILITY IMPROVED AIR

## (undated) DEVICE — GLOVE ORANGE PI 7   MSG9070

## (undated) DEVICE — EVACUATOR SURG 100CC SIL BLB SUCT RESVR FOR CLS WND DRNGE

## (undated) DEVICE — SUREFIT, DUAL DISPERSIVE ELECTRODE, CONTACT QUALITY MONITOR: Brand: SUREFIT

## (undated) DEVICE — BANDAGE,GAUZE,4.5"X4.1YD,STERILE,LF: Brand: MEDLINE

## (undated) DEVICE — SUTURE VCRL + SZ 4-0 L27IN ABSRB WHT FS-2 3/8 CIR REV CUT VCP422H

## (undated) DEVICE — PAD PT POS 36 IN SURGYPAD DISP

## (undated) DEVICE — INSUFFLATION NEEDLE TO ESTABLISH PNEUMOPERITONEUM.: Brand: INSUFFLATION NEEDLE

## (undated) DEVICE — SYRINGE MED 10ML TRNSLUC BRL PLUNG BLK MRK POLYPR CTRL

## (undated) DEVICE — BAG,BANDED,W/RUBBERBAND,STERILE,30X36: Brand: MEDLINE

## (undated) DEVICE — GLOVE ORANGE PI 8   MSG9080

## (undated) DEVICE — CONMED DISPOSABLE BIPOLAR CABLE, 10' (3.05M): Brand: CONMED

## (undated) DEVICE — SUTURE MCRYL SZ 4-0 L27IN ABSRB UD L19MM PS-2 1/2 CIR PRIM Y426H

## (undated) DEVICE — MEDI-VAC YANKAUER SUCTION HANDLE W/BULBOUS TIP: Brand: CARDINAL HEALTH

## (undated) DEVICE — SUTURE VCRL + SZ 2-0 L27IN ABSRB WHT SH 1/2 CIR TAPERCUT VCP417H

## (undated) DEVICE — HEAD POSITIONER, SURGICAL: Brand: DEROYAL

## (undated) DEVICE — SYRINGE IRRIG 60ML SFT PLIABLE BLB EZ TO GRP 1 HND USE W/

## (undated) DEVICE — ARM DRAPE

## (undated) DEVICE — PAD,NON-ADHERENT,3X8,STERILE,LF,1/PK: Brand: MEDLINE

## (undated) DEVICE — VCARE LARGE, UTERINE MANIPULATOR, VAGINAL-CERVICAL-AHLUWALIA'S-RETRACTOR-ELEVATOR: Brand: VCARE

## (undated) DEVICE — PACK,UNIVERSAL,NO GOWNS: Brand: MEDLINE

## (undated) DEVICE — MEDI-VAC NON-CONDUCTIVE SUCTION TUBING 6MM X 6.1M (20 FT.) L: Brand: CARDINAL HEALTH

## (undated) DEVICE — GOWN,SIRUS,NON REINFRCD,LARGE,SET IN SL: Brand: MEDLINE

## (undated) DEVICE — SYRINGE MED 10ML LUERLOCK TIP W/O SFTY DISP

## (undated) DEVICE — INTENDED FOR TISSUE SEPARATION, AND OTHER PROCEDURES THAT REQUIRE A SHARP SURGICAL BLADE TO PUNCTURE OR CUT.: Brand: BARD-PARKER ® CARBON RIB-BACK BLADES

## (undated) DEVICE — TURBINATOR WAND: Brand: COBLATION

## (undated) DEVICE — SUTURE VCRL + SZ 2-0 L27IN ABSRB VLT UR-6 5/8 CIR TAPR PNT VCP602H

## (undated) DEVICE — BREAST HERNIA: Brand: MEDLINE INDUSTRIES, INC.

## (undated) DEVICE — 1010 S-DRAPE TOWEL DRAPE 10/BX: Brand: STERI-DRAPE™

## (undated) DEVICE — AIRSEAL 8 MM ACCESS PORT AND LOW PROFILE OBTURATOR WITH BLADELESS OPTICAL TIP, 120 MM LENGTH: Brand: AIRSEAL

## (undated) DEVICE — CODMAN® SURGICAL PATTIES 1/2" X 3" (1.27CM X 7.62CM): Brand: CODMAN®

## (undated) DEVICE — COLUMN DRAPE

## (undated) DEVICE — GLOVE SURG SZ 7 L12IN FNGR THK94MIL TRNSLUC YEL LTX HYDRGEL

## (undated) DEVICE — PACK PROCEDURE SURG GYN ROBOTIC

## (undated) DEVICE — ELECTRODE PT RET AD L9FT HI MOIST COND ADH HYDRGEL CORDED

## (undated) DEVICE — GAUZE,SPONGE,8"X4",12PLY,XRAY,STRL,LF: Brand: MEDLINE

## (undated) DEVICE — TRI-LUMEN FILTERED TUBE SET WITH ACTIVATED CHARCOAL FILTER: Brand: AIRSEAL

## (undated) DEVICE — SOLUTION IV 1000ML 0.9% SOD CHL PH 5 INJ USP VIAFLX PLAS

## (undated) DEVICE — BASIC SINGLE BASIN BTC-LF: Brand: MEDLINE INDUSTRIES, INC.

## (undated) DEVICE — Z INACTIVE USE 2735373 APPLICATOR FBR LAIN COT WOOD TIP ECONOMICAL

## (undated) DEVICE — NEEDLE SPNL L3.5IN DIA25GA QNCKE TYP BVL SPINOCAN

## (undated) DEVICE — LIQUIBAND RAPID ADHESIVE 36/CS 0.8ML: Brand: MEDLINE

## (undated) DEVICE — STRIP,CLOSURE,WOUND,MEDI-STRIP,1/2X4: Brand: MEDLINE

## (undated) DEVICE — HYPODERMIC SAFETY NEEDLE: Brand: MAGELLAN

## (undated) DEVICE — ADHESIVE SKIN CLSR 0.7ML TOP DERMBND ADV

## (undated) DEVICE — ROYAL SILK SURGICAL GOWN, XXL: Brand: CONVERTORS

## (undated) DEVICE — SEAL

## (undated) DEVICE — BANDAGE COMPR M W6INXL10YD WHT BGE VELC E MTRX HK AND LOOP

## (undated) DEVICE — COVER ARMBRD W13XL28.5IN IMPERV BLU FOR OP RM

## (undated) DEVICE — SUTURE VCRL + SZ 3-0 L27IN ABSRB UD L26MM SH 1/2 CIR VCP416H

## (undated) DEVICE — SUTURE V-LOC 180 SZ 2-0 L12IN ABSRB VLT GS-21 L37MM 1/2 CIR VLOCM0315

## (undated) DEVICE — TIP COVER ACCESSORY

## (undated) DEVICE — ANTI-FOG SOLUTION WITH FOAM PAD: Brand: DEVON

## (undated) DEVICE — GLOVE SURG SZ 65 THK91MIL LTX FREE SYN POLYISOPRENE

## (undated) DEVICE — BRA COMPR 2XL NYL LYCRA SPANDEX WHT CURAD

## (undated) DEVICE — BLADE 1884002HRE M4 SERR 4MM ROTATE: Brand: STRAIGHTSHOT

## (undated) DEVICE — SPONGE GZ W4XL4IN COT 12 PLY TYP VII WVN C FLD DSGN

## (undated) DEVICE — GLOVE SURG 7 PF POLYMER COAT WHT STRL SIGN LTX ESSENTIAL LTX

## (undated) DEVICE — BAG RETRIEVAL SPECIMEN SUPERBAG 5 SMALL NYLON ITRODUCER

## (undated) DEVICE — MICRO TIP WIPE: Brand: DEVON

## (undated) DEVICE — Y-TYPE TUR/BLADDER IRRIGATION SET, REGULATING CLAMP

## (undated) DEVICE — DRAIN,WOUND,15FR,3/16,FULL-FLUTED: Brand: MEDLINE

## (undated) DEVICE — SUTURE PERMA-HAND SZ 2-0 L30IN NONABSORBABLE BLK L26MM SH K833H